# Patient Record
Sex: FEMALE | Race: WHITE | NOT HISPANIC OR LATINO | Employment: UNEMPLOYED | URBAN - METROPOLITAN AREA
[De-identification: names, ages, dates, MRNs, and addresses within clinical notes are randomized per-mention and may not be internally consistent; named-entity substitution may affect disease eponyms.]

---

## 2021-07-23 PROCEDURE — 99283 EMERGENCY DEPT VISIT LOW MDM: CPT

## 2021-07-23 PROCEDURE — 99282 EMERGENCY DEPT VISIT SF MDM: CPT

## 2021-07-24 ENCOUNTER — APPOINTMENT (OUTPATIENT)
Dept: CT IMAGING | Facility: HOSPITAL | Age: 46
End: 2021-07-24

## 2021-07-24 ENCOUNTER — HOSPITAL ENCOUNTER (EMERGENCY)
Facility: HOSPITAL | Age: 46
Discharge: HOME | End: 2021-07-24
Admitting: EMERGENCY MEDICINE

## 2021-07-24 VITALS
BODY MASS INDEX: 34.4 KG/M2 | TEMPERATURE: 99.2 F | RESPIRATION RATE: 20 BRPM | HEIGHT: 66 IN | SYSTOLIC BLOOD PRESSURE: 138 MMHG | OXYGEN SATURATION: 100 % | WEIGHT: 214.07 LBS | HEART RATE: 73 BPM | DIASTOLIC BLOOD PRESSURE: 85 MMHG

## 2021-07-24 DIAGNOSIS — L03.213 PRESEPTAL CELLULITIS OF LEFT EYE: Primary | ICD-10-CM

## 2021-07-24 DIAGNOSIS — R51.9 LEFT FACIAL PAIN: ICD-10-CM

## 2021-07-24 PROCEDURE — 96365 THER/PROPH/DIAG IV INF INIT: CPT

## 2021-07-24 PROCEDURE — 0 IOPAMIDOL PER 1 ML: Performed by: NURSE PRACTITIONER

## 2021-07-24 PROCEDURE — 25010000002 MORPHINE PER 10 MG: Performed by: NURSE PRACTITIONER

## 2021-07-24 PROCEDURE — 96375 TX/PRO/DX INJ NEW DRUG ADDON: CPT

## 2021-07-24 PROCEDURE — 70487 CT MAXILLOFACIAL W/DYE: CPT

## 2021-07-24 PROCEDURE — 25010000002 ONDANSETRON PER 1 MG: Performed by: NURSE PRACTITIONER

## 2021-07-24 RX ORDER — ONDANSETRON 2 MG/ML
4 INJECTION INTRAMUSCULAR; INTRAVENOUS ONCE
Status: COMPLETED | OUTPATIENT
Start: 2021-07-24 | End: 2021-07-24

## 2021-07-24 RX ORDER — METHYLPREDNISOLONE 4 MG/1
TABLET ORAL
Qty: 21 TABLET | Refills: 0 | Status: SHIPPED | OUTPATIENT
Start: 2021-07-24 | End: 2021-07-24 | Stop reason: ALTCHOICE

## 2021-07-24 RX ORDER — SODIUM CHLORIDE 0.9 % (FLUSH) 0.9 %
10 SYRINGE (ML) INJECTION AS NEEDED
Status: DISCONTINUED | OUTPATIENT
Start: 2021-07-24 | End: 2021-07-24 | Stop reason: HOSPADM

## 2021-07-24 RX ORDER — TRAMADOL HYDROCHLORIDE 50 MG/1
50 TABLET ORAL EVERY 8 HOURS PRN
Qty: 7 TABLET | Refills: 0 | Status: SHIPPED | OUTPATIENT
Start: 2021-07-24

## 2021-07-24 RX ORDER — METHYLPREDNISOLONE 4 MG/1
TABLET ORAL
Qty: 21 TABLET | Refills: 0 | Status: SHIPPED | OUTPATIENT
Start: 2021-07-24

## 2021-07-24 RX ORDER — CLINDAMYCIN PHOSPHATE 600 MG/50ML
600 INJECTION, SOLUTION INTRAVENOUS ONCE
Status: COMPLETED | OUTPATIENT
Start: 2021-07-24 | End: 2021-07-24

## 2021-07-24 RX ORDER — MORPHINE SULFATE 4 MG/ML
4 INJECTION, SOLUTION INTRAMUSCULAR; INTRAVENOUS ONCE
Status: COMPLETED | OUTPATIENT
Start: 2021-07-24 | End: 2021-07-24

## 2021-07-24 RX ORDER — CLINDAMYCIN HYDROCHLORIDE 300 MG/1
300 CAPSULE ORAL 4 TIMES DAILY
Qty: 40 CAPSULE | Refills: 0 | Status: SHIPPED | OUTPATIENT
Start: 2021-07-24 | End: 2021-08-03

## 2021-07-24 RX ORDER — CLINDAMYCIN HYDROCHLORIDE 300 MG/1
300 CAPSULE ORAL 4 TIMES DAILY
Qty: 40 CAPSULE | Refills: 0 | Status: SHIPPED | OUTPATIENT
Start: 2021-07-24 | End: 2021-07-24 | Stop reason: ALTCHOICE

## 2021-07-24 RX ADMIN — ONDANSETRON 4 MG: 2 INJECTION INTRAMUSCULAR; INTRAVENOUS at 02:11

## 2021-07-24 RX ADMIN — MORPHINE SULFATE 4 MG: 4 INJECTION INTRAVENOUS at 02:10

## 2021-07-24 RX ADMIN — IOPAMIDOL 100 ML: 755 INJECTION, SOLUTION INTRAVENOUS at 02:38

## 2021-07-24 RX ADMIN — CLINDAMYCIN PHOSPHATE 600 MG: 600 INJECTION, SOLUTION INTRAVENOUS at 02:11

## 2021-07-24 NOTE — DISCHARGE INSTRUCTIONS
Take full course of antibiotics as well as full course of steroid and call Dr. Orozco office on Monday for follow-up appointment.  Worsening of symptoms such as shortness of breath difficulty swallowing return to ER immediately.

## 2021-07-24 NOTE — ED NOTES
Patient reports left sided swelling with pain. Unsure of what happened. Denies any bites or allergic reactions. Denies SOA.      Marissa Bowen RN  07/24/21 4917

## 2021-07-24 NOTE — ED PROVIDER NOTES
Subjective   History: Patient is a 46-year-old female history IVDA complains of left facial pain and swelling.  Patient states she was admitted to [facility] Wednesday, 2 days ago for left facial cellulitis and anaphylactic reaction.  Apparently patient took a Keflex antibiotic and then had a reaction to it.  reports she was intubated and then extubated yesterday.  Patient states she left AMA did not receive any antibiotics upon leaving.  Patient currently states she is in pain denies any sore throat, change in phonation, denies shortness of breath.      Onset: 2 days  Location: Left face  Duration: Constant  Character: Pain and edema  Aggravating/Alleviating factors: None  Radiation none  Severity: Moderate            Review of Systems   Constitutional: Negative for chills, fatigue and fever.   HENT: Positive for dental problem and facial swelling. Negative for congestion, sore throat, tinnitus, trouble swallowing and voice change.    Eyes: Negative for photophobia, discharge and visual disturbance.   Respiratory: Negative for cough and shortness of breath.    Cardiovascular: Negative for chest pain.   Gastrointestinal: Negative for abdominal pain, diarrhea, nausea and vomiting.   Genitourinary: Negative for dysuria, frequency and urgency.   Musculoskeletal: Negative for back pain and myalgias.   Skin: Negative for rash.   Neurological: Negative for dizziness and headaches.   Psychiatric/Behavioral: Negative for confusion.       History reviewed. No pertinent past medical history.    Allergies   Allergen Reactions   • Cephalexin Anaphylaxis     Required intubation       History reviewed. No pertinent surgical history.    History reviewed. No pertinent family history.    Social History     Socioeconomic History   • Marital status:      Spouse name: Not on file   • Number of children: Not on file   • Years of education: Not on file   • Highest education level: Not on file           Objective  "  Physical Exam  Vitals reviewed.   Constitutional:       General: She is not in acute distress.     Appearance: She is normal weight. She is not toxic-appearing.   HENT:      Head: Normocephalic and atraumatic.      Comments: Left facial swelling periorbital edema.  No submental or submandibular  no erythema edema or tenderness on palpation.     Right Ear: Tympanic membrane normal.      Left Ear: Tympanic membrane normal.      Nose: Nose normal.      Mouth/Throat:      Mouth: Mucous membranes are moist.      Pharynx: Oropharynx is clear. No oropharyngeal exudate or posterior oropharyngeal erythema.      Comments: No visible gingival abscess.  No peritonsillar abscess visualized.  Eyes:      General:         Right eye: No discharge.         Left eye: No discharge.      Extraocular Movements: Extraocular movements intact.      Pupils: Pupils are equal, round, and reactive to light.      Comments: No pain with EOM.  Conjunctiva are clear without injection   Cardiovascular:      Rate and Rhythm: Normal rate.      Pulses: Normal pulses.      Heart sounds: Normal heart sounds. No murmur heard.     Pulmonary:      Effort: Pulmonary effort is normal.      Breath sounds: Normal breath sounds.   Musculoskeletal:         General: Normal range of motion.      Cervical back: Normal range of motion and neck supple. No tenderness.   Lymphadenopathy:      Cervical: No cervical adenopathy.   Skin:     General: Skin is warm and dry.   Neurological:      Mental Status: She is alert and oriented to person, place, and time.   Psychiatric:         Mood and Affect: Mood normal.         Behavior: Behavior normal.         Thought Content: Thought content normal.         Judgment: Judgment normal.         Procedures           ED Course    /52 (BP Location: Left arm, Patient Position: Sitting)   Pulse 78   Temp 98.1 °F (36.7 °C) (Oral)   Resp 20   Ht 167.6 cm (66\")   Wt 97.1 kg (214 lb 1.1 oz)   SpO2 100%   BMI 34.55 kg/m² "   Labs Reviewed - No data to display  Medications   sodium chloride 0.9 % flush 10 mL (has no administration in time range)   clindamycin (CLEOCIN) 600 mg in sodium chloride 0.9% 50 mL IVPB (premix) (0 mg Intravenous Stopped 7/24/21 0302)   Morphine sulfate (PF) injection 4 mg (4 mg Intravenous Given 7/24/21 0210)   ondansetron (ZOFRAN) injection 4 mg (4 mg Intravenous Given 7/24/21 0211)   iopamidol (ISOVUE-370) 76 % injection 100 mL (100 mL Intravenous Given 7/24/21 0238)     No radiology results for the last day                                          MDM     I examined the patient using the appropriate personal protective equipment.      DISPOSITION:   Chart Review: 7/21/2021 admitted to [facility] anaphylaxis left facial swelling difficulty speaking wheezing vocal cord edema.  Patient was intubated and extubated 1 day later she was initially on clindamycin IV.  Per records patient was going to be discharged home on tapered steroids however she left AMA and did not receive any antibiotics or steroids.    Comorbidity:  has no past medical history on file.  Differentials:this list is not all inclusive and does not constitute the entirety of considered causes --> cellulitis orbital cellulitis dental abscess Cesar's angina  ECG: interpreted by ER physician and reviewed by myself: Not applicable  Labs: Not applicable    Imaging: Was interpreted by physician and reviewed by myself:  No radiology results for the last day    Disposition/Treatment:  IV established given clindamycin 600 mg morphine and Zofran while in ER.  Patient had CT facial bones with contrast Left facial and preseptal periorbital soft tissue swelling, consistent with the provided clinical diagnosis of cellulitis.  No evidence of post-septal orbital cellulitis.  Carious left upper canine tooth with associated periapical and periodontal abscesses, as above.   On physical exam of patient she was satting 100% on room air did not appear to be in  any distress she was not wheezing was not short of breath she was not tachycardic or febrile.  There was no submental or submandibular erythema edema or tenderness therefore Ludwigs angina unlikely.    Discussed results with patient she verbalized understanding agreeable plan of care.  Patient given patient connection number to establish PCP and discussed emergent reasons with patient to return to ER otherwise follow-up with PCP.    Patient left AMA from [facility] and the plan was to continue patient on tapered steroid pack therefore I will discharge patient with Medrol Dosepak and antibiotic    Prescription: medrol dose pack, clindamycin, tramadol    INSPECT quieried  Final diagnoses:   Preseptal cellulitis of left eye   Left facial pain       ED Disposition  ED Disposition     ED Disposition Condition Comment    Discharge Stable           Adolfo Dave MD  [address] [facility] [place] [address] [phone]               Medication List      New Prescriptions    clindamycin 300 MG capsule  Commonly known as: CLEOCIN  Take 1 capsule by mouth 4 (Four) Times a Day for 10 days.     methylPREDNISolone 4 MG dose pack  Commonly known as: MEDROL  Take as directed on package instructions.           Where to Get Your Medications      These medications were sent to [id number] - [facility] [place] - [address] AT [place] [address] & [address] [place] - [phone] [facility] [phone]   [address] [place] [address]    Hours: 24-hours Phone: [phone]   · clindamycin 300 MG capsule  · methylPREDNISolone 4 MG dose pack          Hallie Dean APRN  07/24/21 0326       Hallie Dean APRN  07/24/21 0333       Hallie Dean APRN  07/24/21 041

## 2024-05-09 ENCOUNTER — INPATIENT HOSPITAL (AMBULATORY)
Dept: URBAN - METROPOLITAN AREA HOSPITAL 76 | Facility: HOSPITAL | Age: 49
End: 2024-05-09
Payer: MEDICAID

## 2024-05-09 DIAGNOSIS — R94.5 ABNORMAL RESULTS OF LIVER FUNCTION STUDIES: ICD-10-CM

## 2024-05-09 PROCEDURE — 99222 1ST HOSP IP/OBS MODERATE 55: CPT | Performed by: NURSE PRACTITIONER

## 2024-05-10 ENCOUNTER — INPATIENT HOSPITAL (AMBULATORY)
Dept: URBAN - METROPOLITAN AREA HOSPITAL 76 | Facility: HOSPITAL | Age: 49
End: 2024-05-10
Payer: MEDICAID

## 2024-05-10 DIAGNOSIS — R94.5 ABNORMAL RESULTS OF LIVER FUNCTION STUDIES: ICD-10-CM

## 2024-05-10 PROCEDURE — 99232 SBSQ HOSP IP/OBS MODERATE 35: CPT | Performed by: NURSE PRACTITIONER

## 2024-06-02 ENCOUNTER — HOSPITAL ENCOUNTER (EMERGENCY)
Facility: HOSPITAL | Age: 49
Discharge: LEFT BEFORE DISCHARGE | End: 2024-06-02
Attending: EMERGENCY MEDICINE
Payer: MEDICAID

## 2024-06-02 ENCOUNTER — APPOINTMENT (OUTPATIENT)
Dept: GENERAL RADIOLOGY | Facility: HOSPITAL | Age: 49
End: 2024-06-02
Payer: MEDICAID

## 2024-06-02 ENCOUNTER — HOSPITAL ENCOUNTER (OUTPATIENT)
Facility: HOSPITAL | Age: 49
Discharge: LEFT BEFORE DISCHARGE | End: 2024-06-02
Attending: EMERGENCY MEDICINE | Admitting: HOSPITALIST
Payer: MEDICAID

## 2024-06-02 VITALS
BODY MASS INDEX: 31.89 KG/M2 | WEIGHT: 198.41 LBS | DIASTOLIC BLOOD PRESSURE: 74 MMHG | HEIGHT: 66 IN | OXYGEN SATURATION: 96 % | RESPIRATION RATE: 30 BRPM | SYSTOLIC BLOOD PRESSURE: 139 MMHG | HEART RATE: 103 BPM | TEMPERATURE: 97.6 F

## 2024-06-02 VITALS — HEART RATE: 111 BPM | TEMPERATURE: 98.7 F | RESPIRATION RATE: 17 BRPM

## 2024-06-02 DIAGNOSIS — I33.0 SUBACUTE BACTERIAL ENDOCARDITIS: Primary | ICD-10-CM

## 2024-06-02 PROBLEM — I38 ENDOCARDITIS: Status: ACTIVE | Noted: 2024-06-02

## 2024-06-02 LAB
ALBUMIN SERPL-MCNC: 3.3 G/DL (ref 3.5–5.2)
ALBUMIN/GLOB SERPL: 0.7 G/DL
ALP SERPL-CCNC: 117 U/L (ref 39–117)
ALT SERPL W P-5'-P-CCNC: 32 U/L (ref 1–33)
ANION GAP SERPL CALCULATED.3IONS-SCNC: 13.3 MMOL/L (ref 5–15)
AST SERPL-CCNC: 23 U/L (ref 1–32)
BASOPHILS # BLD AUTO: 0.06 10*3/MM3 (ref 0–0.2)
BASOPHILS NFR BLD AUTO: 0.3 % (ref 0–1.5)
BILIRUB SERPL-MCNC: 0.3 MG/DL (ref 0–1.2)
BUN SERPL-MCNC: 11 MG/DL (ref 6–20)
BUN/CREAT SERPL: 8.9 (ref 7–25)
CALCIUM SPEC-SCNC: 8.6 MG/DL (ref 8.6–10.5)
CHLORIDE SERPL-SCNC: 101 MMOL/L (ref 98–107)
CO2 SERPL-SCNC: 24.7 MMOL/L (ref 22–29)
CREAT SERPL-MCNC: 1.24 MG/DL (ref 0.57–1)
D-LACTATE SERPL-SCNC: 1.5 MMOL/L (ref 0.3–2)
DEPRECATED RDW RBC AUTO: 45.1 FL (ref 37–54)
EGFRCR SERPLBLD CKD-EPI 2021: 54.5 ML/MIN/1.73
EOSINOPHIL # BLD AUTO: 0.06 10*3/MM3 (ref 0–0.4)
EOSINOPHIL NFR BLD AUTO: 0.3 % (ref 0.3–6.2)
ERYTHROCYTE [DISTWIDTH] IN BLOOD BY AUTOMATED COUNT: 15.8 % (ref 12.3–15.4)
GLOBULIN UR ELPH-MCNC: 4.7 GM/DL
GLUCOSE SERPL-MCNC: 109 MG/DL (ref 65–99)
HCT VFR BLD AUTO: 29.1 % (ref 34–46.6)
HGB BLD-MCNC: 9.3 G/DL (ref 12–15.9)
IMM GRANULOCYTES # BLD AUTO: 0.18 10*3/MM3 (ref 0–0.05)
IMM GRANULOCYTES NFR BLD AUTO: 1 % (ref 0–0.5)
LYMPHOCYTES # BLD AUTO: 1.84 10*3/MM3 (ref 0.7–3.1)
LYMPHOCYTES NFR BLD AUTO: 9.8 % (ref 19.6–45.3)
MCH RBC QN AUTO: 27.4 PG (ref 26.6–33)
MCHC RBC AUTO-ENTMCNC: 32 G/DL (ref 31.5–35.7)
MCV RBC AUTO: 85.8 FL (ref 79–97)
MONOCYTES # BLD AUTO: 1.39 10*3/MM3 (ref 0.1–0.9)
MONOCYTES NFR BLD AUTO: 7.4 % (ref 5–12)
NEUTROPHILS NFR BLD AUTO: 15.2 10*3/MM3 (ref 1.7–7)
NEUTROPHILS NFR BLD AUTO: 81.2 % (ref 42.7–76)
NRBC BLD AUTO-RTO: 0 /100 WBC (ref 0–0.2)
NT-PROBNP SERPL-MCNC: ABNORMAL PG/ML (ref 0–450)
PLATELET # BLD AUTO: 436 10*3/MM3 (ref 140–450)
PMV BLD AUTO: 9.2 FL (ref 6–12)
POTASSIUM SERPL-SCNC: 3.2 MMOL/L (ref 3.5–5.2)
PROT SERPL-MCNC: 8 G/DL (ref 6–8.5)
RBC # BLD AUTO: 3.39 10*6/MM3 (ref 3.77–5.28)
SODIUM SERPL-SCNC: 139 MMOL/L (ref 136–145)
TROPONIN T SERPL HS-MCNC: 37 NG/L
WBC NRBC COR # BLD AUTO: 18.73 10*3/MM3 (ref 3.4–10.8)

## 2024-06-02 PROCEDURE — 85025 COMPLETE CBC W/AUTO DIFF WBC: CPT | Performed by: EMERGENCY MEDICINE

## 2024-06-02 PROCEDURE — 93005 ELECTROCARDIOGRAM TRACING: CPT

## 2024-06-02 PROCEDURE — 93005 ELECTROCARDIOGRAM TRACING: CPT | Performed by: EMERGENCY MEDICINE

## 2024-06-02 PROCEDURE — 71045 X-RAY EXAM CHEST 1 VIEW: CPT

## 2024-06-02 PROCEDURE — 84484 ASSAY OF TROPONIN QUANT: CPT | Performed by: EMERGENCY MEDICINE

## 2024-06-02 PROCEDURE — 96365 THER/PROPH/DIAG IV INF INIT: CPT

## 2024-06-02 PROCEDURE — 25010000002 FUROSEMIDE PER 20 MG: Performed by: EMERGENCY MEDICINE

## 2024-06-02 PROCEDURE — 99211 OFF/OP EST MAY X REQ PHY/QHP: CPT | Performed by: EMERGENCY MEDICINE

## 2024-06-02 PROCEDURE — 99285 EMERGENCY DEPT VISIT HI MDM: CPT

## 2024-06-02 PROCEDURE — 80053 COMPREHEN METABOLIC PANEL: CPT | Performed by: EMERGENCY MEDICINE

## 2024-06-02 PROCEDURE — 96375 TX/PRO/DX INJ NEW DRUG ADDON: CPT

## 2024-06-02 PROCEDURE — 94799 UNLISTED PULMONARY SVC/PX: CPT

## 2024-06-02 PROCEDURE — 83880 ASSAY OF NATRIURETIC PEPTIDE: CPT | Performed by: EMERGENCY MEDICINE

## 2024-06-02 PROCEDURE — 36415 COLL VENOUS BLD VENIPUNCTURE: CPT

## 2024-06-02 PROCEDURE — 87040 BLOOD CULTURE FOR BACTERIA: CPT | Performed by: EMERGENCY MEDICINE

## 2024-06-02 PROCEDURE — 94761 N-INVAS EAR/PLS OXIMETRY MLT: CPT

## 2024-06-02 PROCEDURE — 83605 ASSAY OF LACTIC ACID: CPT

## 2024-06-02 PROCEDURE — 25010000002 DAPTOMYCIN PER 1 MG: Performed by: EMERGENCY MEDICINE

## 2024-06-02 PROCEDURE — 94640 AIRWAY INHALATION TREATMENT: CPT

## 2024-06-02 RX ORDER — SODIUM CHLORIDE 0.9 % (FLUSH) 0.9 %
10 SYRINGE (ML) INJECTION EVERY 12 HOURS SCHEDULED
Status: DISCONTINUED | OUTPATIENT
Start: 2024-06-02 | End: 2024-06-02 | Stop reason: HOSPADM

## 2024-06-02 RX ORDER — IPRATROPIUM BROMIDE AND ALBUTEROL SULFATE 2.5; .5 MG/3ML; MG/3ML
3 SOLUTION RESPIRATORY (INHALATION) ONCE
Status: COMPLETED | OUTPATIENT
Start: 2024-06-02 | End: 2024-06-02

## 2024-06-02 RX ORDER — GABAPENTIN 600 MG/1
600 TABLET ORAL 3 TIMES DAILY
COMMUNITY
End: 2024-06-03

## 2024-06-02 RX ORDER — POTASSIUM CHLORIDE 20 MEQ/1
40 TABLET, EXTENDED RELEASE ORAL EVERY 4 HOURS
Qty: 4 TABLET | Refills: 0 | Status: DISCONTINUED | OUTPATIENT
Start: 2024-06-02 | End: 2024-06-02 | Stop reason: HOSPADM

## 2024-06-02 RX ORDER — BISACODYL 10 MG
10 SUPPOSITORY, RECTAL RECTAL DAILY PRN
Status: DISCONTINUED | OUTPATIENT
Start: 2024-06-02 | End: 2024-06-02 | Stop reason: HOSPADM

## 2024-06-02 RX ORDER — AMOXICILLIN 250 MG
2 CAPSULE ORAL 2 TIMES DAILY PRN
Status: DISCONTINUED | OUTPATIENT
Start: 2024-06-02 | End: 2024-06-02 | Stop reason: HOSPADM

## 2024-06-02 RX ORDER — BISACODYL 5 MG/1
5 TABLET, DELAYED RELEASE ORAL DAILY PRN
Status: DISCONTINUED | OUTPATIENT
Start: 2024-06-02 | End: 2024-06-02 | Stop reason: HOSPADM

## 2024-06-02 RX ORDER — SODIUM CHLORIDE 0.9 % (FLUSH) 0.9 %
10 SYRINGE (ML) INJECTION AS NEEDED
Status: DISCONTINUED | OUTPATIENT
Start: 2024-06-02 | End: 2024-06-02 | Stop reason: HOSPADM

## 2024-06-02 RX ORDER — POLYETHYLENE GLYCOL 3350 17 G/17G
17 POWDER, FOR SOLUTION ORAL DAILY PRN
Status: DISCONTINUED | OUTPATIENT
Start: 2024-06-02 | End: 2024-06-02 | Stop reason: HOSPADM

## 2024-06-02 RX ORDER — FUROSEMIDE 10 MG/ML
40 INJECTION INTRAMUSCULAR; INTRAVENOUS ONCE
Status: COMPLETED | OUTPATIENT
Start: 2024-06-02 | End: 2024-06-02

## 2024-06-02 RX ORDER — SODIUM CHLORIDE 9 MG/ML
40 INJECTION, SOLUTION INTRAVENOUS AS NEEDED
Status: DISCONTINUED | OUTPATIENT
Start: 2024-06-02 | End: 2024-06-02 | Stop reason: HOSPADM

## 2024-06-02 RX ORDER — FUROSEMIDE 10 MG/ML
40 INJECTION INTRAMUSCULAR; INTRAVENOUS EVERY 12 HOURS SCHEDULED
Status: DISCONTINUED | OUTPATIENT
Start: 2024-06-02 | End: 2024-06-02 | Stop reason: HOSPADM

## 2024-06-02 RX ORDER — VANCOMYCIN/0.9 % SOD CHLORIDE 1.5G/250ML
1500 PLASTIC BAG, INJECTION (ML) INTRAVENOUS ONCE
Qty: 500 ML | Refills: 0 | Status: DISCONTINUED | OUTPATIENT
Start: 2024-06-02 | End: 2024-06-02 | Stop reason: HOSPADM

## 2024-06-02 RX ORDER — ALBUTEROL SULFATE 90 UG/1
2 AEROSOL, METERED RESPIRATORY (INHALATION) EVERY 4 HOURS PRN
Status: ON HOLD | COMMUNITY

## 2024-06-02 RX ORDER — ENOXAPARIN SODIUM 100 MG/ML
40 INJECTION SUBCUTANEOUS DAILY
Status: DISCONTINUED | OUTPATIENT
Start: 2024-06-02 | End: 2024-06-02 | Stop reason: HOSPADM

## 2024-06-02 RX ADMIN — POTASSIUM CHLORIDE 40 MEQ: 1500 TABLET, EXTENDED RELEASE ORAL at 19:15

## 2024-06-02 RX ADMIN — IPRATROPIUM BROMIDE AND ALBUTEROL SULFATE 3 ML: .5; 3 SOLUTION RESPIRATORY (INHALATION) at 14:20

## 2024-06-02 RX ADMIN — DAPTOMYCIN 700 MG: 500 INJECTION, POWDER, LYOPHILIZED, FOR SOLUTION INTRAVENOUS at 14:41

## 2024-06-02 RX ADMIN — FUROSEMIDE 40 MG: 10 INJECTION, SOLUTION INTRAMUSCULAR; INTRAVENOUS at 15:37

## 2024-06-02 NOTE — PLAN OF CARE
Goal Outcome Evaluation:      Patient admitted to PCU. Patient states that her chest hurts due to her shortness of breath. Patient is alert and oriented times 4. Skin assessment performed by this nurse and Alfredo GRIFFITH. Bruising to arms identified and skin tear found on bottom. Patient's respirations currently in 30s. Patient requesting water and complaining that the lasix makes her pee to much. Plan of care ongoing.

## 2024-06-02 NOTE — H&P
[facility] Medicine Services      Patient Name: Марина Tilley  : 1975  MRN: [id number]  Primary Care Physician:  Provider, No Known  Date of admission: 2024      Subjective      Chief Complaint: Shortness of breath and chest pain    History of Present Illness: Марина Tilley is a 48 y.o. female who presented to [facility] on 2024 complaining of increasing shortness of breath and chest pain.  Patient giving a significant history of IVDU with heroin, she said she was in [facility], from there she went to [facility] where she was diagnosed with endocarditis and recently she signed out herself AMA, patient not a very good historian.  Patient chest x-ray revealed possible CHF pattern with infiltrates involving the left lower lung field, proBNP was found significantly elevated.  Patient also noted to have creatinine of 1.24, baseline 0.45.  WBC count elevated around 18,000.  Records not available, will try to get records from [facility].      Review of Systems   All other systems reviewed and are negative.       Personal History     No past medical history on file.  Past medical history significant for IVDU and endocarditis.  No past surgical history on file.    Family History: family history is not on file. Otherwise pertinent FHx was reviewed and not pertinent to current issue.    Social History:    Significant for smoking and IVDU  Home Medications:  Prior to Admission Medications       None              Allergies:  Allergies   Allergen Reactions    Cephalexin Anaphylaxis     Required intubation       Objective      Vitals:   Temp:  [98.5 °F (36.9 °C)] 98.5 °F (36.9 °C)  Heart Rate:  [] 99  Resp:  [24-32] 32  BP: (129-165)/() 140/66  Flow (L/min):  [2-6] 2    Physical Exam  Vitals and nursing note reviewed.   Constitutional:       General: She is not in acute distress.     Appearance: Normal appearance. She is well-developed. She is not  ill-appearing, toxic-appearing or diaphoretic.   HENT:      Head: Normocephalic and atraumatic.      Right Ear: Ear canal and external ear normal.      Left Ear: Ear canal and external ear normal.      Nose: Nose normal. No congestion or rhinorrhea.      Mouth/Throat:      Mouth: Mucous membranes are moist.      Pharynx: No oropharyngeal exudate.   Eyes:      General: No scleral icterus.        Right eye: No discharge.         Left eye: No discharge.      Extraocular Movements: Extraocular movements intact.      Conjunctiva/sclera: Conjunctivae normal.      Pupils: Pupils are equal, round, and reactive to light.   Neck:      Thyroid: No thyromegaly.      Vascular: No carotid bruit or JVD.      Trachea: No tracheal deviation.   Cardiovascular:      Rate and Rhythm: Normal rate and regular rhythm.      Pulses: Normal pulses.      Heart sounds: Normal heart sounds. No murmur heard.     No friction rub. No gallop.   Pulmonary:      Effort: No respiratory distress.      Breath sounds: No stridor. Rales present. No wheezing or rhonchi.   Chest:      Chest wall: No tenderness.   Abdominal:      General: Bowel sounds are normal. There is no distension.      Palpations: Abdomen is soft. There is no mass.      Tenderness: There is no abdominal tenderness. There is no guarding or rebound.      Hernia: No hernia is present.   Musculoskeletal:         General: No swelling, tenderness, deformity or signs of injury. Normal range of motion.      Cervical back: Normal range of motion and neck supple. No rigidity. No muscular tenderness.      Right lower leg: No edema.      Left lower leg: No edema.   Lymphadenopathy:      Cervical: No cervical adenopathy.   Skin:     General: Skin is warm and dry.      Coloration: Skin is not jaundiced or pale.      Findings: No bruising, erythema or rash.   Neurological:      General: No focal deficit present.      Mental Status: She is alert and oriented to person, place, and time. Mental status  is at baseline.      Cranial Nerves: No cranial nerve deficit.      Sensory: No sensory deficit.      Motor: No weakness or abnormal muscle tone.      Coordination: Coordination normal.   Psychiatric:         Mood and Affect: Mood normal.         Behavior: Behavior normal.         Thought Content: Thought content normal.         Judgment: Judgment normal.          Result Review    Result Review:  I have personally reviewed the results from the time of this admission to 6/2/2024 17:11 EDT and agree with these findings:  [x]  Laboratory  [x]  Microbiology  [x]  Radiology  []  EKG/Telemetry   []  Cardiology/Vascular   []  Pathology  []  Old records  []  Other:  Most notable findings include:       Assessment & Plan        Active Hospital Problems:  Active Hospital Problems    Diagnosis     **Endocarditis      Plan:     IVDU with endocarditis diagnosed at [facility], will obtain records, will start the patient on IV vancomycin and cefepime, repeat 2D echo here, consult infectious disease, patient likely will need evaluation with cardiology in the cardiothoracic surgery based on 2D echo finding and after reviewing the records from [facility].    Acute exacerbation of CHF, will start the patient on Lasix 40 IV 2 times a day.    IVDU as described above      DVT prophylaxis:  Medical DVT prophylaxis orders are signed and held.          CODE STATUS:       Admission Status:  I believe this patient meets inpatient status.    I discussed the patient's findings and my recommendations with patient.    This patient has been examined wearing appropriate Personal Protective Equipment and discussed with hospital infection control department. 06/02/24      Signature:

## 2024-06-02 NOTE — ED PROVIDER NOTES
Subjective   History of Present Illness  48-year-old female presents for chest pain and shortness of breath.  Has known endocarditis.  States she has been at multiple hospitals.  States she has been at one in [place] and a couple in [facility].  States he was most recently at [facility].  Has been intermittently on antibiotics.  States when she was discharged from the hospital she was not placed on any antibiotics.  States she has not used since before being in the hospital the last time.    Review of cultures from [facility] shows that patient grew MRSA in her blood.    Review of echocardiogram from [facility] demonstrated vegetations on the septal leaflet of tricuspid valve, mild to moderate tricuspid regurgitation, small pericardial effusion, and EF at 30 to 35%, moderate to severe global LV hypokinesis and a severely dilated left ventricle.    Further review of records from [facility] demonstrated a white count of 15.9, hemoglobin of 10.3, a creatinine of 1.86, chest imaging that demonstrated multifocal pneumonia versus septic emboli.  Reportedly signed out AGAINST MEDICAL ADVICE from other hospital before going to the [facility] based on [facility] records.    Review of Systems  See HPI.  Past Medical History:   Diagnosis Date    Anxiety     Asthma     CHF (congestive heart failure)     COPD (chronic obstructive pulmonary disease)        Allergies   Allergen Reactions    Cephalexin Anaphylaxis     Required intubation    Latex Hives       Past Surgical History:   Procedure Laterality Date    CHOLECYSTECTOMY      COLONOSCOPY      ENDOSCOPY      HERNIA REPAIR      HYSTERECTOMY         Family History   Family history unknown: Yes       Social History     Socioeconomic History    Marital status: Legally    Tobacco Use    Smoking status: Every Day     Current packs/day: 3.00     Average packs/day: 3.0 packs/day for 30.4  "years (91.3 ttl pk-yrs)     Types: Cigarettes     Start date: 1994    Smokeless tobacco: Never   Vaping Use    Vaping status: Never Used   Substance and Sexual Activity    Alcohol use: Never    Drug use: Yes     Frequency: 7.0 times per week     Types: Heroin     Comment: patient states she quit 3 weeks ago    Sexual activity: Defer           Objective   Physical Exam  Tachycardic rate and regular rhythm, mild tachypnea, diminished breath sounds bilaterally, 2 out of 6 systolic murmur present, lower extremity edema, ill-appearing.  Procedures           ED Course      /74 (BP Location: Right arm, Patient Position: Lying)   Pulse 103   Temp 97.6 °F (36.4 °C) (Oral)   Resp (!) 30   Ht 167.6 cm (66\")   Wt 90 kg (198 lb 6.6 oz)   SpO2 96%   BMI 32.02 kg/m²   Labs Reviewed   COMPREHENSIVE METABOLIC PANEL - Abnormal; Notable for the following components:       Result Value    Glucose 109 (*)     Creatinine 1.24 (*)     Potassium 3.2 (*)     Albumin 3.3 (*)     eGFR 54.5 (*)     All other components within normal limits    Narrative:     GFR Normal >60  Chronic Kidney Disease <60  Kidney Failure <15     SINGLE HS TROPONIN T - Abnormal; Notable for the following components:    HS Troponin T 37 (*)     All other components within normal limits    Narrative:     High Sensitive Troponin T Reference Range:  <14.0 ng/L- Negative Female for AMI  <22.0 ng/L- Negative Male for AMI  >=14 - Abnormal Female indicating possible myocardial injury.  >=22 - Abnormal Male indicating possible myocardial injury.   Clinicians would have to utilize clinical acumen, EKG, Troponin, and serial changes to determine if it is an Acute Myocardial Infarction or myocardial injury due to an underlying chronic condition.        BNP (IN-HOUSE) - Abnormal; Notable for the following components:    proBNP 33,058.0 (*)     All other components within normal limits    Narrative:     This assay is used as an aid in the diagnosis of individuals " suspected of having heart failure. It can be used as an aid in the diagnosis of acute decompensated heart failure (ADHF) in patients presenting with signs and symptoms of ADHF to the emergency department (ED). In addition, NT-proBNP of <300 pg/mL indicates ADHF is not likely.    Age Range Result Interpretation  NT-proBNP Concentration (pg/mL:      <50             Positive            >450                   Gray                 300-450                    Negative             <300    50-75           Positive            >900                  Gray                300-900                  Negative            <300      >75             Positive            >1800                  Gray                300-1800                  Negative            <300   CBC WITH AUTO DIFFERENTIAL - Abnormal; Notable for the following components:    WBC 18.73 (*)     RBC 3.39 (*)     Hemoglobin 9.3 (*)     Hematocrit 29.1 (*)     RDW 15.8 (*)     Neutrophil % 81.2 (*)     Lymphocyte % 9.8 (*)     Immature Grans % 1.0 (*)     Neutrophils, Absolute 15.20 (*)     Monocytes, Absolute 1.39 (*)     Immature Grans, Absolute 0.18 (*)     All other components within normal limits   BLOOD CULTURE - Normal   BLOOD CULTURE - Normal    Narrative:     Less than seven (7) mL's of blood was collected.  Insufficient quantity may yield false negative results.   POC LACTATE - Normal   MRSA SCREEN, PCR   POC LACTATE   CBC AND DIFFERENTIAL    Narrative:     The following orders were created for panel order CBC & Differential.  Procedure                               Abnormality         Status                     ---------                               -----------         ------                     CBC Auto Differential[[id number]]        Abnormal            Final result                 Please view results for these tests on the individual orders.     Medications   ipratropium-albuterol (DUO-NEB) nebulizer solution 3 mL (3 mL Nebulization Given 6/2/24 9168)    DAPTOmycin (CUBICIN) 700 mg in sodium chloride 0.9 % 50 mL IVPB (0 mg Intravenous Stopped 6/2/24 1514)   furosemide (LASIX) injection 40 mg (40 mg Intravenous Given 6/2/24 1537)     MRI Lumbar Spine Without Contrast    Result Date: 6/4/2024  1. The study is mildly degraded by motion. 2. No MR evidence of discitis or osteomyelitis. 3. No high-grade lumbar canal stenosis or high-grade neural foraminal stenosis is evident. Electronically Signed: Migdalia Godoy MD  6/4/2024 11:04 AM EDT  Workstation ID: [id number]    MRI Thoracic Spine Without Contrast    Result Date: 6/4/2024  Impression: Somewhat motion degraded exam demonstrating no specific findings to suggest osteomyelitis or discitis in the thoracic spine. Electronically Signed: Glen Hartman MD  6/4/2024 8:45 AM EDT  Workstation ID: [id number]    Adult Transthoracic Echo Complete W/ Cont if Necessary Per Protocol    Addendum Date: 6/3/2024      Left ventricular systolic function is mildly decreased. Left ventricular ejection fraction appears to be 51 - 55%.   Left ventricular diastolic function was normal.   There is calcification of the aortic valve.   Moderate to severe aortic valve regurgitation is present.   Moderate to severe mitral valve regurgitation is present.   There is echodensity attached to septal leaflet of tricuspid valve measuring 1.2 x 1.5 cm.   Recommend LETICIA to assess echodensity and mitral and aortic valve regurgitation.  If clinically indicated.   Recommend modified Duke criteria for clinical diagnosis of bacterial endocarditis     CT Chest Without Contrast Diagnostic    Result Date: 6/3/2024  1. Multifocal patchy peripheral nodular densities are scattered throughout both lungs, some which have a cavitary component, which would correspond to the provided clinical suspicion of septic emboli. 2. Incompletely imaged at 8 cm low-density within the mid spleen, suspicious for splenic infarct. 3. Small bilateral pleural effusions. 4. Dense posterior  bibasilar airspace disease is nonspecific and may reflect changes of atelectasis or pneumonia. 5. Mild cardiomegaly. Small concentric pericardial effusion. 6. Enlarged bilateral hilar and mediastinal lymph nodes are nonspecific and may be reactive. Attention at CT chest follow-up is recommended Electronically Signed: Migdalia Godoy MD  6/3/2024 2:40 PM EDT  Workstation ID: [id number]                                          Medical Decision Making  Problems Addressed:  Subacute bacterial endocarditis: complicated acute illness or injury    Amount and/or Complexity of Data Reviewed  Labs: ordered.  Radiology: ordered.  ECG/medicine tests: ordered.    Risk  Prescription drug management.  Decision regarding hospitalization.    EKG interpretation: 1:04 PM, rate 110, sinus tachycardia, normal axis, borderline prolonged QTc, nonspecific T wave inversions and biphasic T waves present.    My interpretation of chest x-ray concerning for pulmonary edema.  See system for radiology interpretation.    Patient with known endocarditis.  Giving dose of daptomycin here given positive MRSA blood cultures. Likely needs LETICIA Excepted by Dr. Pino.    Final diagnoses:   Subacute bacterial endocarditis       ED Disposition  ED Disposition       ED Disposition   Decision to Admit    Condition   --    Comment   Level of Care: Telemetry [5]   Diagnosis: Endocarditis [[id number]]   Admitting Physician: NII BEAR [[id number]]   Attending Physician: NII BEAR [[id number]]   Certification: I Certify That Inpatient Hospital Services Are Medically Necessary For Greater Than 2 Midnights                 Provider, Radha Known  [facility] [place] [address]               Medication List      No changes were made to your prescriptions during this visit.            Sincere Grey MD  06/04/24 1600

## 2024-06-02 NOTE — ED NOTES
Pt in ER via EMS with c/o SOA and L sided CP that started last night and has worsened.  Pt reports she was recently admitted at [facility] for endocarditis and pneumonia.  Auditory wheezes present.  Denies HA, fever or NVD.  Pt is A/O x4.  No s/s of distress.  Pt is tachypneic but respirations are unlabored.

## 2024-06-02 NOTE — ED NOTES
Nursing report ED to floor  Марина Tilley  48 y.o.  female    HPI:   Chief Complaint   Patient presents with    Chest Pain     CP SOA HX endocarditis Possible Afib on ems EKG 6l n/c not on o2 at home       Admitting doctor:   Charlie Bowers MD    Admitting diagnosis:   The encounter diagnosis was Subacute bacterial endocarditis.    Code status:   Current Code Status       Date Active Code Status Order ID Comments User Context       Not on file            Allergies:   Cephalexin    Isolation:  No active isolations     Fall Risk:  Fall Risk Assessment was completed, and patient is at moderate risk for falls.   Predictive Model Details         6 (Low) Factor Value    Calculated 6/2/2024 16:58 Age 48    Risk of Fall Model Respiratory Rate 32     Active Peripheral IV Present     Imaging order in this encounter Present     Magnesium not on file     Number of Distinct Medication Classes administered 4     Diastolic BP 66     Drug Use Not Asked     Albumin 3.3 g/dL     Calcium 8.6 mg/dL     Kurtis Scale not on file     Creatinine 1.24 mg/dL     Chloride 101 mmol/L     ALT 32 U/L     Days after Admission 0.173     Tobacco Use Not Asked     Potassium 3.2 mmol/L     Total Bilirubin 0.3 mg/dL         Weight:       06/02/24  1246   Weight: 92.5 kg (204 lb)       Intake and Output  No intake or output data in the 24 hours ending 06/02/24 1658    Diet:        Most recent vitals:   Vitals:    06/02/24 1601 06/02/24 1626 06/02/24 1631 06/02/24 1646   BP: 151/90 133/71 129/72 140/66   Pulse: 108 105 102 99   Resp:       Temp:       SpO2: 96% 98% 96% 99%   Weight:       Height:           Active LDAs/IV Access:   Lines, Drains & Airways       Active LDAs       Name Placement date Placement time Site Days    Peripheral IV 06/02/24 1342 Anterior;Left;Medial Forearm 06/02/24  1342  Forearm  less than 1                    Skin Condition:   Skin Assessments (last day)       None             Labs (abnormal labs have a star):   Labs  Reviewed   COMPREHENSIVE METABOLIC PANEL - Abnormal; Notable for the following components:       Result Value    Glucose 109 (*)     Creatinine 1.24 (*)     Potassium 3.2 (*)     Albumin 3.3 (*)     eGFR 54.5 (*)     All other components within normal limits    Narrative:     GFR Normal >60  Chronic Kidney Disease <60  Kidney Failure <15     SINGLE HS TROPONIN T - Abnormal; Notable for the following components:    HS Troponin T 37 (*)     All other components within normal limits    Narrative:     High Sensitive Troponin T Reference Range:  <14.0 ng/L- Negative Female for AMI  <22.0 ng/L- Negative Male for AMI  >=14 - Abnormal Female indicating possible myocardial injury.  >=22 - Abnormal Male indicating possible myocardial injury.   Clinicians would have to utilize clinical acumen, EKG, Troponin, and serial changes to determine if it is an Acute Myocardial Infarction or myocardial injury due to an underlying chronic condition.        BNP (IN-HOUSE) - Abnormal; Notable for the following components:    proBNP 33,058.0 (*)     All other components within normal limits    Narrative:     This assay is used as an aid in the diagnosis of individuals suspected of having heart failure. It can be used as an aid in the diagnosis of acute decompensated heart failure (ADHF) in patients presenting with signs and symptoms of ADHF to the emergency department (ED). In addition, NT-proBNP of <300 pg/mL indicates ADHF is not likely.    Age Range Result Interpretation  NT-proBNP Concentration (pg/mL:      <50             Positive            >450                   Gray                 300-450                    Negative             <300    50-75           Positive            >900                  Gray                300-900                  Negative            <300      >75             Positive            >1800                  Gray                300-1800                  Negative            <300   CBC WITH AUTO DIFFERENTIAL -  Abnormal; Notable for the following components:    WBC 18.73 (*)     RBC 3.39 (*)     Hemoglobin 9.3 (*)     Hematocrit 29.1 (*)     RDW 15.8 (*)     Neutrophil % 81.2 (*)     Lymphocyte % 9.8 (*)     Immature Grans % 1.0 (*)     Neutrophils, Absolute 15.20 (*)     Monocytes, Absolute 1.39 (*)     Immature Grans, Absolute 0.18 (*)     All other components within normal limits   POC LACTATE - Normal   BLOOD CULTURE   BLOOD CULTURE   POC LACTATE   CBC AND DIFFERENTIAL    Narrative:     The following orders were created for panel order CBC & Differential.  Procedure                               Abnormality         Status                     ---------                               -----------         ------                     CBC Auto Differential[[id number]]        Abnormal            Final result                 Please view results for these tests on the individual orders.       LOC: Person, Place, Time, and Situation    Telemetry:  Telemetry    Cardiac Monitoring Ordered: yes    EKG:   ECG 12 Lead Chest Pain   Preliminary Result   HEART RATE= 110  bpm   RR Interval= 548  ms   LA Interval= 156  ms   P Horizontal Axis= 0  deg   P Front Axis= 46  deg   QRSD Interval= 86  ms   QT Interval= 370  ms   QTcB= 500  ms   QRS Axis= -7  deg   T Wave Axis= 176  deg   - ABNORMAL ECG -   Sinus tachycardia   Probable left atrial enlargement   Nonspecific T abnormalities, diffuse leads   Electronically Signed By:    Date and Time of Study: 2024-06-02 13:04:43          Medications Given in the ED:   Medications   sodium chloride 0.9 % flush 10 mL (has no administration in time range)   ipratropium-albuterol (DUO-NEB) nebulizer solution 3 mL (3 mL Nebulization Given 6/2/24 1420)   DAPTOmycin (CUBICIN) 700 mg in sodium chloride 0.9 % 50 mL IVPB (0 mg Intravenous Stopped 6/2/24 1514)   furosemide (LASIX) injection 40 mg (40 mg Intravenous Given 6/2/24 1537)       Imaging results:  XR Chest 1 View    Result Date: 6/2/2024  Vascular  congestion. Left lower lobe infiltrate may represent developing pulmonary edema or possibly pneumonia depending on the clinical setting. Electronically Signed: Onel Diaz MD  6/2/2024 2:27 PM EDT  Workstation ID: [id number]     Social issues:   Social History     Socioeconomic History    Marital status: Legally        NIH Stroke Scale:  Interval: (not recorded)  1a. Level of Consciousness: (not recorded)  1b. LOC Questions: (not recorded)  1c. LOC Commands: (not recorded)  2. Best Gaze: (not recorded)  3. Visual: (not recorded)  4. Facial Palsy: (not recorded)  5a. Motor Arm, Left: (not recorded)  5b. Motor Arm, Right: (not recorded)  6a. Motor Leg, Left: (not recorded)  6b. Motor Leg, Right: (not recorded)  7. Limb Ataxia: (not recorded)  8. Sensory: (not recorded)  9. Best Language: (not recorded)  10. Dysarthria: (not recorded)  11. Extinction and Inattention (formerly Neglect): (not recorded)    Total (NIH Stroke Scale): (not recorded)     Additional notable assessment information:N/A      Nursing report ED to floor:  NACHO David RN   06/02/24 16:58 EDT

## 2024-06-02 NOTE — Clinical Note
Level of Care: Progressive Care [20]   Admitting Physician: NII BEAR [[id number]]   Attending Physician: NII BEAR [[id number]]

## 2024-06-03 ENCOUNTER — APPOINTMENT (OUTPATIENT)
Dept: CT IMAGING | Facility: HOSPITAL | Age: 49
End: 2024-06-03
Payer: MEDICAID

## 2024-06-03 ENCOUNTER — APPOINTMENT (OUTPATIENT)
Dept: MRI IMAGING | Facility: HOSPITAL | Age: 49
End: 2024-06-03
Payer: MEDICAID

## 2024-06-03 ENCOUNTER — APPOINTMENT (OUTPATIENT)
Dept: CARDIOLOGY | Facility: HOSPITAL | Age: 49
End: 2024-06-03
Payer: MEDICAID

## 2024-06-03 ENCOUNTER — HOSPITAL ENCOUNTER (INPATIENT)
Facility: HOSPITAL | Age: 49
LOS: 5 days | Discharge: ANOTHER HOSPITAL | End: 2024-06-08
Attending: EMERGENCY MEDICINE | Admitting: FAMILY MEDICINE
Payer: MEDICAID

## 2024-06-03 DIAGNOSIS — I38 ENDOCARDITIS, UNSPECIFIED CHRONICITY, UNSPECIFIED ENDOCARDITIS TYPE: Primary | ICD-10-CM

## 2024-06-03 LAB
ALBUMIN SERPL-MCNC: 3.2 G/DL (ref 3.5–5.2)
ALBUMIN/GLOB SERPL: 0.7 G/DL
ALP SERPL-CCNC: 111 U/L (ref 39–117)
ALT SERPL W P-5'-P-CCNC: 27 U/L (ref 1–33)
AMPHET+METHAMPHET UR QL: NEGATIVE
ANION GAP SERPL CALCULATED.3IONS-SCNC: 15.3 MMOL/L (ref 5–15)
AORTIC DIMENSIONLESS INDEX: 0.84 (DI)
AST SERPL-CCNC: 23 U/L (ref 1–32)
BARBITURATES UR QL SCN: POSITIVE
BASOPHILS # BLD AUTO: 0.05 10*3/MM3 (ref 0–0.2)
BASOPHILS NFR BLD AUTO: 0.3 % (ref 0–1.5)
BENZODIAZ UR QL SCN: POSITIVE
BH CV ECHO MEAS - AI P1/2T: 417.5 MSEC
BH CV ECHO MEAS - AO MAX PG: 10 MMHG
BH CV ECHO MEAS - AO MEAN PG: 6 MMHG
BH CV ECHO MEAS - AO V2 MAX: 158 CM/SEC
BH CV ECHO MEAS - AO V2 VTI: 26 CM
BH CV ECHO MEAS - AVA(I,D): 2.7 CM2
BH CV ECHO MEAS - EDV(CUBED): 195.1 ML
BH CV ECHO MEAS - EDV(MOD-SP2): 150 ML
BH CV ECHO MEAS - EDV(MOD-SP4): 177 ML
BH CV ECHO MEAS - EF(MOD-BP): 48.9 %
BH CV ECHO MEAS - EF(MOD-SP2): 48.5 %
BH CV ECHO MEAS - EF(MOD-SP4): 49.8 %
BH CV ECHO MEAS - ESV(CUBED): 68.9 ML
BH CV ECHO MEAS - ESV(MOD-SP2): 77.2 ML
BH CV ECHO MEAS - ESV(MOD-SP4): 88.9 ML
BH CV ECHO MEAS - FS: 29.3 %
BH CV ECHO MEAS - IVS/LVPW: 1 CM
BH CV ECHO MEAS - IVSD: 1 CM
BH CV ECHO MEAS - LA DIMENSION: 4.3 CM
BH CV ECHO MEAS - LAT PEAK E' VEL: 10.8 CM/SEC
BH CV ECHO MEAS - LV DIASTOLIC VOL/BSA (35-75): 87.8 CM2
BH CV ECHO MEAS - LV MASS(C)D: 233.1 GRAMS
BH CV ECHO MEAS - LV MAX PG: 7 MMHG
BH CV ECHO MEAS - LV MEAN PG: 4 MMHG
BH CV ECHO MEAS - LV SYSTOLIC VOL/BSA (12-30): 44.1 CM2
BH CV ECHO MEAS - LV V1 MAX: 132 CM/SEC
BH CV ECHO MEAS - LV V1 VTI: 22.1 CM
BH CV ECHO MEAS - LVIDD: 5.8 CM
BH CV ECHO MEAS - LVIDS: 4.1 CM
BH CV ECHO MEAS - LVOT AREA: 3.1 CM2
BH CV ECHO MEAS - LVOT DIAM: 2 CM
BH CV ECHO MEAS - LVPWD: 1 CM
BH CV ECHO MEAS - MED PEAK E' VEL: 12.4 CM/SEC
BH CV ECHO MEAS - MR MAX PG: 104.4 MMHG
BH CV ECHO MEAS - MR MAX VEL: 511 CM/SEC
BH CV ECHO MEAS - MV A DUR: 0.16 SEC
BH CV ECHO MEAS - MV A MAX VEL: 50.8 CM/SEC
BH CV ECHO MEAS - MV DEC SLOPE: 762 CM/SEC2
BH CV ECHO MEAS - MV DEC TIME: 0.17 SEC
BH CV ECHO MEAS - MV E MAX VEL: 108 CM/SEC
BH CV ECHO MEAS - MV E/A: 2.13
BH CV ECHO MEAS - MV MAX PG: 5.9 MMHG
BH CV ECHO MEAS - MV MEAN PG: 3 MMHG
BH CV ECHO MEAS - MV P1/2T: 45.7 MSEC
BH CV ECHO MEAS - MV V2 VTI: 21.8 CM
BH CV ECHO MEAS - MVA(P1/2T): 4.8 CM2
BH CV ECHO MEAS - MVA(VTI): 3.2 CM2
BH CV ECHO MEAS - PA ACC TIME: 0.08 SEC
BH CV ECHO MEAS - PA V2 MAX: 102 CM/SEC
BH CV ECHO MEAS - RV MAX PG: 1.93 MMHG
BH CV ECHO MEAS - RV V1 MAX: 69.5 CM/SEC
BH CV ECHO MEAS - RV V1 VTI: 11.5 CM
BH CV ECHO MEAS - SV(LVOT): 69.4 ML
BH CV ECHO MEAS - SV(MOD-SP2): 72.8 ML
BH CV ECHO MEAS - SV(MOD-SP4): 88.1 ML
BH CV ECHO MEAS - SVI(LVOT): 34.4 ML/M2
BH CV ECHO MEAS - SVI(MOD-SP2): 36.1 ML/M2
BH CV ECHO MEAS - SVI(MOD-SP4): 43.7 ML/M2
BH CV ECHO MEAS - TAPSE (>1.6): 3 CM
BH CV ECHO MEAS - TR MAX PG: 50.7 MMHG
BH CV ECHO MEAS - TR MAX VEL: 356 CM/SEC
BH CV ECHO MEASUREMENTS AVERAGE E/E' RATIO: 9.31
BH CV XLRA - TDI S': 13.4 CM/SEC
BILIRUB SERPL-MCNC: 0.3 MG/DL (ref 0–1.2)
BUN SERPL-MCNC: 14 MG/DL (ref 6–20)
BUN/CREAT SERPL: 10.1 (ref 7–25)
CALCIUM SPEC-SCNC: 8.3 MG/DL (ref 8.6–10.5)
CANNABINOIDS SERPL QL: NEGATIVE
CHLORIDE SERPL-SCNC: 100 MMOL/L (ref 98–107)
CK SERPL-CCNC: 62 U/L (ref 20–180)
CO2 SERPL-SCNC: 23.7 MMOL/L (ref 22–29)
COCAINE UR QL: NEGATIVE
CREAT SERPL-MCNC: 1.39 MG/DL (ref 0.57–1)
DEPRECATED RDW RBC AUTO: 46.5 FL (ref 37–54)
EGFRCR SERPLBLD CKD-EPI 2021: 47.5 ML/MIN/1.73
EOSINOPHIL # BLD AUTO: 0.06 10*3/MM3 (ref 0–0.4)
EOSINOPHIL NFR BLD AUTO: 0.3 % (ref 0.3–6.2)
ERYTHROCYTE [DISTWIDTH] IN BLOOD BY AUTOMATED COUNT: 16.2 % (ref 12.3–15.4)
GEN 5 2HR TROPONIN T REFLEX: 43 NG/L
GLOBULIN UR ELPH-MCNC: 4.6 GM/DL
GLUCOSE SERPL-MCNC: 117 MG/DL (ref 65–99)
HAV IGM SERPL QL IA: NORMAL
HBV CORE IGM SERPL QL IA: NORMAL
HBV SURFACE AG SERPL QL IA: NORMAL
HCT VFR BLD AUTO: 29.2 % (ref 34–46.6)
HCV AB SER QL: NORMAL
HGB BLD-MCNC: 8.9 G/DL (ref 12–15.9)
HIV 1+2 AB+HIV1 P24 AG SERPL QL IA: NORMAL
HOLD SPECIMEN: NORMAL
IMM GRANULOCYTES # BLD AUTO: 0.16 10*3/MM3 (ref 0–0.05)
IMM GRANULOCYTES NFR BLD AUTO: 0.9 % (ref 0–0.5)
LEFT ATRIUM VOLUME INDEX: 40.4 ML/M2
LYMPHOCYTES # BLD AUTO: 1.88 10*3/MM3 (ref 0.7–3.1)
LYMPHOCYTES NFR BLD AUTO: 10.7 % (ref 19.6–45.3)
MCH RBC QN AUTO: 26.8 PG (ref 26.6–33)
MCHC RBC AUTO-ENTMCNC: 30.5 G/DL (ref 31.5–35.7)
MCV RBC AUTO: 88 FL (ref 79–97)
METHADONE UR QL SCN: NEGATIVE
MONOCYTES # BLD AUTO: 1.48 10*3/MM3 (ref 0.1–0.9)
MONOCYTES NFR BLD AUTO: 8.4 % (ref 5–12)
NEUTROPHILS NFR BLD AUTO: 13.91 10*3/MM3 (ref 1.7–7)
NEUTROPHILS NFR BLD AUTO: 79.4 % (ref 42.7–76)
NRBC BLD AUTO-RTO: 0 /100 WBC (ref 0–0.2)
OPIATES UR QL: NEGATIVE
OXYCODONE UR QL SCN: NEGATIVE
PLATELET # BLD AUTO: 457 10*3/MM3 (ref 140–450)
PMV BLD AUTO: 9.2 FL (ref 6–12)
POTASSIUM SERPL-SCNC: 3.5 MMOL/L (ref 3.5–5.2)
POTASSIUM SERPL-SCNC: 4.5 MMOL/L (ref 3.5–5.2)
PROT SERPL-MCNC: 7.8 G/DL (ref 6–8.5)
QT INTERVAL: 366 MS
QT INTERVAL: 370 MS
QTC INTERVAL: 500 MS
QTC INTERVAL: 500 MS
RBC # BLD AUTO: 3.32 10*6/MM3 (ref 3.77–5.28)
SINUS: 2.8 CM
SODIUM SERPL-SCNC: 139 MMOL/L (ref 136–145)
STJ: 2.1 CM
TROPONIN T DELTA: -1 NG/L
TROPONIN T SERPL HS-MCNC: 44 NG/L
WBC NRBC COR # BLD AUTO: 17.54 10*3/MM3 (ref 3.4–10.8)
WHOLE BLOOD HOLD COAG: NORMAL

## 2024-06-03 PROCEDURE — 25010000002 FUROSEMIDE PER 20 MG: Performed by: NURSE PRACTITIONER

## 2024-06-03 PROCEDURE — G0432 EIA HIV-1/HIV-2 SCREEN: HCPCS | Performed by: INTERNAL MEDICINE

## 2024-06-03 PROCEDURE — 80307 DRUG TEST PRSMV CHEM ANLYZR: CPT | Performed by: NURSE PRACTITIONER

## 2024-06-03 PROCEDURE — 99285 EMERGENCY DEPT VISIT HI MDM: CPT

## 2024-06-03 PROCEDURE — 71250 CT THORAX DX C-: CPT

## 2024-06-03 PROCEDURE — 94799 UNLISTED PULMONARY SVC/PX: CPT

## 2024-06-03 PROCEDURE — 25010000002 MORPHINE PER 10 MG: Performed by: NURSE PRACTITIONER

## 2024-06-03 PROCEDURE — 25010000002 ENOXAPARIN PER 10 MG: Performed by: NURSE PRACTITIONER

## 2024-06-03 PROCEDURE — 82550 ASSAY OF CK (CPK): CPT | Performed by: NURSE PRACTITIONER

## 2024-06-03 PROCEDURE — 84132 ASSAY OF SERUM POTASSIUM: CPT | Performed by: EMERGENCY MEDICINE

## 2024-06-03 PROCEDURE — 93005 ELECTROCARDIOGRAM TRACING: CPT

## 2024-06-03 PROCEDURE — 25010000002 LORAZEPAM PER 2 MG: Performed by: NURSE PRACTITIONER

## 2024-06-03 PROCEDURE — 25010000002 ONDANSETRON PER 1 MG: Performed by: NURSE PRACTITIONER

## 2024-06-03 PROCEDURE — 80074 ACUTE HEPATITIS PANEL: CPT | Performed by: INTERNAL MEDICINE

## 2024-06-03 PROCEDURE — 84484 ASSAY OF TROPONIN QUANT: CPT | Performed by: NURSE PRACTITIONER

## 2024-06-03 PROCEDURE — 80053 COMPREHEN METABOLIC PANEL: CPT | Performed by: NURSE PRACTITIONER

## 2024-06-03 PROCEDURE — 94640 AIRWAY INHALATION TREATMENT: CPT

## 2024-06-03 PROCEDURE — 93356 MYOCRD STRAIN IMG SPCKL TRCK: CPT | Performed by: INTERNAL MEDICINE

## 2024-06-03 PROCEDURE — 93356 MYOCRD STRAIN IMG SPCKL TRCK: CPT

## 2024-06-03 PROCEDURE — 72146 MRI CHEST SPINE W/O DYE: CPT

## 2024-06-03 PROCEDURE — 25010000002 DAPTOMYCIN PER 1 MG: Performed by: INTERNAL MEDICINE

## 2024-06-03 PROCEDURE — 85025 COMPLETE CBC W/AUTO DIFF WBC: CPT | Performed by: NURSE PRACTITIONER

## 2024-06-03 PROCEDURE — 93306 TTE W/DOPPLER COMPLETE: CPT | Performed by: INTERNAL MEDICINE

## 2024-06-03 PROCEDURE — 93306 TTE W/DOPPLER COMPLETE: CPT

## 2024-06-03 PROCEDURE — 99222 1ST HOSP IP/OBS MODERATE 55: CPT | Performed by: INTERNAL MEDICINE

## 2024-06-03 RX ORDER — CLONIDINE HYDROCHLORIDE 0.1 MG/1
0.1 TABLET ORAL 3 TIMES DAILY PRN
Status: ACTIVE | OUTPATIENT
Start: 2024-06-05 | End: 2024-06-06

## 2024-06-03 RX ORDER — POLYETHYLENE GLYCOL 3350 17 G/17G
17 POWDER, FOR SOLUTION ORAL DAILY PRN
Status: DISCONTINUED | OUTPATIENT
Start: 2024-06-03 | End: 2024-06-08 | Stop reason: HOSPADM

## 2024-06-03 RX ORDER — FUROSEMIDE 10 MG/ML
40 INJECTION INTRAMUSCULAR; INTRAVENOUS DAILY
Status: DISCONTINUED | OUTPATIENT
Start: 2024-06-03 | End: 2024-06-03

## 2024-06-03 RX ORDER — CALCIUM CARBONATE 500 MG/1
2 TABLET, CHEWABLE ORAL 2 TIMES DAILY PRN
Status: DISCONTINUED | OUTPATIENT
Start: 2024-06-03 | End: 2024-06-08 | Stop reason: HOSPADM

## 2024-06-03 RX ORDER — HYDROXYZINE HYDROCHLORIDE 25 MG/1
50 TABLET, FILM COATED ORAL 3 TIMES DAILY PRN
Status: DISPENSED | OUTPATIENT
Start: 2024-06-03 | End: 2024-06-08

## 2024-06-03 RX ORDER — ONDANSETRON 2 MG/ML
4 INJECTION INTRAMUSCULAR; INTRAVENOUS EVERY 6 HOURS PRN
Status: DISCONTINUED | OUTPATIENT
Start: 2024-06-03 | End: 2024-06-08 | Stop reason: HOSPADM

## 2024-06-03 RX ORDER — ENOXAPARIN SODIUM 100 MG/ML
40 INJECTION SUBCUTANEOUS DAILY
Status: DISCONTINUED | OUTPATIENT
Start: 2024-06-03 | End: 2024-06-08 | Stop reason: HOSPADM

## 2024-06-03 RX ORDER — CLONIDINE HYDROCHLORIDE 0.1 MG/1
0.1 TABLET ORAL 2 TIMES DAILY PRN
Status: ACTIVE | OUTPATIENT
Start: 2024-06-06 | End: 2024-06-07

## 2024-06-03 RX ORDER — BISACODYL 10 MG
10 SUPPOSITORY, RECTAL RECTAL DAILY PRN
Status: DISCONTINUED | OUTPATIENT
Start: 2024-06-03 | End: 2024-06-08 | Stop reason: HOSPADM

## 2024-06-03 RX ORDER — SODIUM CHLORIDE 0.9 % (FLUSH) 0.9 %
10 SYRINGE (ML) INJECTION AS NEEDED
Status: DISCONTINUED | OUTPATIENT
Start: 2024-06-03 | End: 2024-06-08 | Stop reason: HOSPADM

## 2024-06-03 RX ORDER — AMOXICILLIN 250 MG
2 CAPSULE ORAL 2 TIMES DAILY PRN
Status: DISCONTINUED | OUTPATIENT
Start: 2024-06-03 | End: 2024-06-08 | Stop reason: HOSPADM

## 2024-06-03 RX ORDER — DICYCLOMINE HYDROCHLORIDE 10 MG/1
10 CAPSULE ORAL 3 TIMES DAILY PRN
Status: ACTIVE | OUTPATIENT
Start: 2024-06-03 | End: 2024-06-08

## 2024-06-03 RX ORDER — NITROGLYCERIN 0.4 MG/1
0.4 TABLET SUBLINGUAL
Status: DISCONTINUED | OUTPATIENT
Start: 2024-06-03 | End: 2024-06-07

## 2024-06-03 RX ORDER — POTASSIUM CHLORIDE 20 MEQ/1
40 TABLET, EXTENDED RELEASE ORAL EVERY 4 HOURS
Qty: 4 TABLET | Refills: 0 | Status: COMPLETED | OUTPATIENT
Start: 2024-06-03 | End: 2024-06-03

## 2024-06-03 RX ORDER — NICOTINE 21 MG/24HR
1 PATCH, TRANSDERMAL 24 HOURS TRANSDERMAL
Status: DISCONTINUED | OUTPATIENT
Start: 2024-06-03 | End: 2024-06-08 | Stop reason: HOSPADM

## 2024-06-03 RX ORDER — IPRATROPIUM BROMIDE AND ALBUTEROL SULFATE 2.5; .5 MG/3ML; MG/3ML
3 SOLUTION RESPIRATORY (INHALATION) EVERY 4 HOURS PRN
Status: DISCONTINUED | OUTPATIENT
Start: 2024-06-03 | End: 2024-06-08 | Stop reason: HOSPADM

## 2024-06-03 RX ORDER — FUROSEMIDE 10 MG/ML
40 INJECTION INTRAMUSCULAR; INTRAVENOUS EVERY 12 HOURS
Status: DISCONTINUED | OUTPATIENT
Start: 2024-06-03 | End: 2024-06-05

## 2024-06-03 RX ORDER — CLONIDINE HYDROCHLORIDE 0.1 MG/1
0.1 TABLET ORAL ONCE AS NEEDED
Status: ACTIVE | OUTPATIENT
Start: 2024-06-07 | End: 2024-06-08

## 2024-06-03 RX ORDER — CLONIDINE HYDROCHLORIDE 0.1 MG/1
0.1 TABLET ORAL 4 TIMES DAILY PRN
Status: ACTIVE | OUTPATIENT
Start: 2024-06-04 | End: 2024-06-05

## 2024-06-03 RX ORDER — FUROSEMIDE 10 MG/ML
40 INJECTION INTRAMUSCULAR; INTRAVENOUS EVERY 12 HOURS
Status: DISCONTINUED | OUTPATIENT
Start: 2024-06-03 | End: 2024-06-03

## 2024-06-03 RX ORDER — ACETAMINOPHEN 325 MG/1
650 TABLET ORAL EVERY 4 HOURS PRN
Status: DISCONTINUED | OUTPATIENT
Start: 2024-06-03 | End: 2024-06-08 | Stop reason: HOSPADM

## 2024-06-03 RX ORDER — METOPROLOL SUCCINATE 25 MG/1
25 TABLET, EXTENDED RELEASE ORAL
Status: DISCONTINUED | OUTPATIENT
Start: 2024-06-03 | End: 2024-06-08 | Stop reason: HOSPADM

## 2024-06-03 RX ORDER — MORPHINE SULFATE 2 MG/ML
2 INJECTION, SOLUTION INTRAMUSCULAR; INTRAVENOUS EVERY 4 HOURS PRN
Status: DISPENSED | OUTPATIENT
Start: 2024-06-03 | End: 2024-06-08

## 2024-06-03 RX ORDER — BISACODYL 5 MG/1
5 TABLET, DELAYED RELEASE ORAL DAILY PRN
Status: DISCONTINUED | OUTPATIENT
Start: 2024-06-03 | End: 2024-06-08 | Stop reason: HOSPADM

## 2024-06-03 RX ORDER — SODIUM CHLORIDE 9 MG/ML
40 INJECTION, SOLUTION INTRAVENOUS AS NEEDED
Status: DISCONTINUED | OUTPATIENT
Start: 2024-06-03 | End: 2024-06-08 | Stop reason: HOSPADM

## 2024-06-03 RX ORDER — FAMOTIDINE 20 MG/1
40 TABLET, FILM COATED ORAL DAILY
Status: DISCONTINUED | OUTPATIENT
Start: 2024-06-03 | End: 2024-06-08 | Stop reason: HOSPADM

## 2024-06-03 RX ORDER — CLONIDINE HYDROCHLORIDE 0.1 MG/1
0.1 TABLET ORAL 4 TIMES DAILY PRN
Status: DISPENSED | OUTPATIENT
Start: 2024-06-03 | End: 2024-06-04

## 2024-06-03 RX ORDER — SODIUM CHLORIDE 0.9 % (FLUSH) 0.9 %
10 SYRINGE (ML) INJECTION EVERY 12 HOURS SCHEDULED
Status: DISCONTINUED | OUTPATIENT
Start: 2024-06-03 | End: 2024-06-08 | Stop reason: HOSPADM

## 2024-06-03 RX ORDER — LORAZEPAM 2 MG/ML
1 INJECTION INTRAMUSCULAR ONCE
Status: COMPLETED | OUTPATIENT
Start: 2024-06-03 | End: 2024-06-03

## 2024-06-03 RX ADMIN — POTASSIUM CHLORIDE 40 MEQ: 1500 TABLET, EXTENDED RELEASE ORAL at 05:32

## 2024-06-03 RX ADMIN — MORPHINE SULFATE 2 MG: 2 INJECTION, SOLUTION INTRAMUSCULAR; INTRAVENOUS at 18:34

## 2024-06-03 RX ADMIN — NITROGLYCERIN 0.5 INCH: 20 OINTMENT TOPICAL at 05:32

## 2024-06-03 RX ADMIN — MORPHINE SULFATE 2 MG: 2 INJECTION, SOLUTION INTRAMUSCULAR; INTRAVENOUS at 14:18

## 2024-06-03 RX ADMIN — METOPROLOL SUCCINATE 25 MG: 25 TABLET, EXTENDED RELEASE ORAL at 20:46

## 2024-06-03 RX ADMIN — CLONIDINE HYDROCHLORIDE 0.1 MG: 0.1 TABLET ORAL at 08:39

## 2024-06-03 RX ADMIN — DAPTOMYCIN 750 MG: 500 INJECTION, POWDER, LYOPHILIZED, FOR SOLUTION INTRAVENOUS at 17:11

## 2024-06-03 RX ADMIN — MORPHINE SULFATE 2 MG: 2 INJECTION, SOLUTION INTRAMUSCULAR; INTRAVENOUS at 03:18

## 2024-06-03 RX ADMIN — FUROSEMIDE 40 MG: 10 INJECTION, SOLUTION INTRAMUSCULAR; INTRAVENOUS at 08:03

## 2024-06-03 RX ADMIN — ACETAMINOPHEN 650 MG: 325 TABLET, FILM COATED ORAL at 02:45

## 2024-06-03 RX ADMIN — IPRATROPIUM BROMIDE AND ALBUTEROL SULFATE 3 ML: .5; 3 SOLUTION RESPIRATORY (INHALATION) at 03:24

## 2024-06-03 RX ADMIN — LORAZEPAM 1 MG: 2 INJECTION INTRAMUSCULAR; INTRAVENOUS at 08:41

## 2024-06-03 RX ADMIN — ONDANSETRON 4 MG: 2 INJECTION INTRAMUSCULAR; INTRAVENOUS at 08:18

## 2024-06-03 RX ADMIN — Medication 10 ML: at 02:07

## 2024-06-03 RX ADMIN — Medication 10 ML: at 02:56

## 2024-06-03 RX ADMIN — FUROSEMIDE 40 MG: 10 INJECTION, SOLUTION INTRAMUSCULAR; INTRAVENOUS at 20:46

## 2024-06-03 RX ADMIN — FAMOTIDINE 40 MG: 20 TABLET, FILM COATED ORAL at 08:03

## 2024-06-03 RX ADMIN — ENOXAPARIN SODIUM 40 MG: 100 INJECTION SUBCUTANEOUS at 17:11

## 2024-06-03 RX ADMIN — NITROGLYCERIN 0.5 INCH: 20 OINTMENT TOPICAL at 12:10

## 2024-06-03 RX ADMIN — Medication 10 ML: at 08:19

## 2024-06-03 RX ADMIN — Medication 10 ML: at 20:46

## 2024-06-03 RX ADMIN — POTASSIUM CHLORIDE 40 MEQ: 1500 TABLET, EXTENDED RELEASE ORAL at 08:03

## 2024-06-03 RX ADMIN — Medication 1 PATCH: at 02:45

## 2024-06-03 RX ADMIN — MORPHINE SULFATE 2 MG: 2 INJECTION, SOLUTION INTRAMUSCULAR; INTRAVENOUS at 22:34

## 2024-06-03 RX ADMIN — NITROGLYCERIN 0.5 INCH: 20 OINTMENT TOPICAL at 17:11

## 2024-06-03 RX ADMIN — MORPHINE SULFATE 2 MG: 2 INJECTION, SOLUTION INTRAMUSCULAR; INTRAVENOUS at 08:03

## 2024-06-03 NOTE — PROGRESS NOTES
"Pharmacy Antimicrobial Dosing Service    Subjective:  Марина Tilley is a 48 y.o.female admitted with endocarditis. Pharmacy has been consulted to dose Vancomycin and Cefepime for possible bacteremia.      Assessment/Plan    1. Day #1 Vancomycin: Goal -600 mcg*h/mL.   Load: 1500 mg (~21 mg/kg AdjBW)  Maint: 1000 mg (~14 mg/kg AdjBW)  AUC: ~ 590 per Insight  Pk: 6/3 @ 2300  Tr: 6/4 @ 0700    2. Day #1 Cefepime: 2 gm IV q8h for estCrCl > 60 mL/min.    Will continue to monitor drug levels, renal function, culture and sensitivities, and patient clinical status.       Objective:  Relevant clinical data and objective history reviewed:  167.6 cm (66\")   90 kg (198 lb 6.6 oz)   Ideal body weight: 59.3 kg (130 lb 11.7 oz)  Adjusted ideal body weight: 71.6 kg (157 lb 12.9 oz)  Body mass index is 32.02 kg/m².        Results from last 7 days   Lab Units 06/02/24  1344   CREATININE mg/dL 1.24*     Estimated Creatinine Clearance: 64.1 mL/min (A) (by C-G formula based on SCr of 1.24 mg/dL (H)).  I/O last 3 completed shifts:  In: 120 [P.O.:120]  Out: -     Results from last 7 days   Lab Units 06/02/24  1344   WBC 10*3/mm3 18.73*     Temperature    06/02/24 1246 06/02/24 1734   Temp: 98.5 °F (36.9 °C) 97.6 °F (36.4 °C)     Baseline culture/source/susceptibility:  Microbiology Results (last 10 days)       ** No results found for the last 240 hours. **            Clifton Choudhury, [facility]  06/02/24 19:32 EDT    "

## 2024-06-03 NOTE — ED NOTES
"Pt states that she left AMA from PCU and family was called and told to bring her back in due to her dx of endocarditis. Notified pt that she would have to be seen through the ER again and pt states that she \"does not want to wait hours in an ER room to get back upstairs\" and decided to leave.  "

## 2024-06-03 NOTE — CASE MANAGEMENT/SOCIAL WORK
Case Management Discharge Note                Selected Continued Care - Discharged on 6/2/2024 Admission date: 6/2/2024 - Discharge disposition: Left Against Medical Advice            Transportation Services  Private: Car    Final Discharge Disposition Code: 07 - left AMA

## 2024-06-03 NOTE — H&P
[facility] Medicine Services    Hospitalist History and Physical     Марина Tilley : 1975 MRN:[id number] LOS:0 ROOM: 10/10     Reason for admission: Endocarditis     Assessment / Plan     Chest pain and dyspnea  Suspect 2/2 untreated Endocarditis and Bacteremia  Presumed ICM/ pulmonary edema vs LLL infiltrate   Noncompliance- pt seen at multiple hospitals and recurrent history of leaving AMA, most recently yesterday from  this hospital   Hx IV Fentanyl use   Hypoxia   - Outside records show positive for MRSA bacteremia at [facility]  - per ED provider report Echo from [facility] demonstrated vegetationson the septal leaflet of tricuspid valve, mild to moderate tricuspid regurgitation, small pericardial effusion, and EF at 30 to 35%, moderate to severe global LV hypokinesis and a severely dilated left ventricle  -CXR 24: Vascular congestion. Left lower lobe infiltrate may represent developing pulmonary edema or possibly pneumonia depending on the clinical setting.   - pt with history of anaphylaxis requiring intubation from oral Keflex  - will continue previous plan for Daptomycin. Had considered Cefepime and Vancomycin but due to pt hx of anaphylaxis concern for possible adverse affect- discussed with pharmacy  - will consult ID for antibiotic management  - get 2D echo   - WBC 18.73. Now 17.54 after Daptomycin on 24  - Blood cultures from 24 pending  - will get urine drug screen  - cardiology consult     Presumed ICM 2/2 IV drug use  - outside source documentation with EF 30-35%  - BNP 33,058  - lactic 1.5  - VSS  - check 2D echo   - lasix prn for symptomatic mangement  - cardiology to follow    Hypokalemia  - replace per protocol    CATRINA  - scr 1.24  - will hold lasix   - monitor     Anemia  - noted hgb drop 9.3-8.9  - no overt sign of bleed  - montior  - transfuse <7.0    Tobacco dependency  - nicotine patch     IV Fentanyl use  - add the clonidine  withdrawal protocol for opioid withdrawal       Code Status (Patient has no pulse and is not breathing): CPR (Attempt to Resuscitate)  Medical Interventions (Patient has pulse or is breathing): Full Support       Nutrition:   Diet: Regular/House; Fluid Consistency: Thin (IDDSI 0)     DVT prophylaxis:  Medical DVT prophylaxis orders are present.         History of Present illness     Марина Tilley is a 48 y.o. female with known history of IV drug use initially came to the ED on 6/2/2024 at 1530.  Per ED documentation patient was seen for chest pain and shortness of breath.  Has a known endocarditis.  She has been to multiple hospitals.  She had been seen in [place] and a couple in [facility].  Most recently was at [facility].  She has been intermittently on antibiotics.  States she was discharged from the hospital but was not given any antibiotics.  She reports she had not used since before being in the hospital the last time.      Review of blood cultures from [facility] shows patient grew MRSA on 5/9/2024.  Review of echocardiogram from [facility] demonstrated vegetationson the septal leaflet of tricuspid valve, mild to moderate tricuspid regurgitation, small pericardial effusion, and EF at 30 to 35%, moderate to severe global LV hypokinesis and a severely dilated left ventricle. Further review of records from [facility] demonstrated a white count of 15.9, hemoglobin of 10.3, a creatinine of 1.86, chest imaging that demonstrated multifocal pneumonia versus septic emboli. Reportedly signed out AGAINST MEDICAL ADVICE from other hospital before going to the [facility] based on [facility] records.     Patient was seen by the hospitalist at 1700 on 6/2/2024.  She was admitted to the PCU at 1751.  At 2005 patient was requesting to leave the unit and go outside and became upset when told that she could not.  At 2010 patient had  "IV removed and left AGAINST MEDICAL ADVICE.  At 2130 to return to the ED and was subsequently readmitted to the hospitalist team around 2 AM on 6/3/2024 to continue previous treatment plan. Pt states \"I can't do this anymore\". Using a cane because SOA with any exertion. Having significant pain in chest and back and just pain in general. On 2L NC.  States long history of IV Fentanyl but denies use in the past 3 weeks.     Review of records show patient with elevated troponin elevated BNP potassium 3.2 acute kidney injury with creatinine 1.2 and white blood cell of 18.73 hemoglobin 9.3.  Blood cultures collected at 1344 and 1356 are pending.  Due to patient's history of anaphylaxis with Keflex will continue plan for daptomycin that was given yesterday afternoon.  Will consult ID for antibiotic management.  Have ordered 2D echo and will have cardiology to follow due to documentation suggesting patient with cardiomyopathy and endocarditis.  Will continue diuretics.  She has been admitted for further treatment.      Patient was seen and examined on 06/03/24 at 03:24 EDT .    Subjective / Review of systems     Review of Systems   Constitutional:  Positive for chills and fatigue.   Respiratory:  Positive for shortness of breath (with exertion and at rest).    Cardiovascular:  Positive for chest pain.   Musculoskeletal:  Positive for back pain.   Neurological:  Positive for weakness.          Past Medical/Surgical/Social/Family History & Allergies     Past Medical History:   Diagnosis Date    Anxiety     Asthma     CHF (congestive heart failure)     COPD (chronic obstructive pulmonary disease)       Past Surgical History:   Procedure Laterality Date    CHOLECYSTECTOMY      COLONOSCOPY      ENDOSCOPY      HERNIA REPAIR      HYSTERECTOMY        Social History     Socioeconomic History    Marital status: Legally    Tobacco Use    Smoking status: Every Day     Current packs/day: 3.00     Average packs/day: 3.0 " packs/day for 30.4 years (91.3 ttl pk-yrs)     Types: Cigarettes     Start date: 1994    Smokeless tobacco: Never   Vaping Use    Vaping status: Never Used   Substance and Sexual Activity    Alcohol use: Never    Drug use: Yes     Frequency: 7.0 times per week     Types: Heroin     Comment: patient states she quit 3 weeks ago    Sexual activity: Defer      Family History   Family history unknown: Yes      Allergies   Allergen Reactions    Cephalexin Anaphylaxis     Required intubation    Latex Hives      Social Determinants of Health     Tobacco Use: High Risk (6/2/2024)    Patient History     Smoking Tobacco Use: Every Day     Smokeless Tobacco Use: Never     Passive Exposure: Not on file   Alcohol Use: Not At Risk (6/2/2024)    AUDIT-C     Frequency of Alcohol Consumption: Never     Average Number of Drinks: Patient does not drink     Frequency of Binge Drinking: Never   Financial Resource Strain: Not on file   Food Insecurity: Not on file   Transportation Needs: Not on file   Physical Activity: Not on file   Stress: Not on file   Social Connections: Unknown (10/12/2023)    Family and Community Support     Help with Day-to-Day Activities: Not on file     Lonely or Isolated: Not on file   Interpersonal Safety: Not At Risk (6/3/2024)    Abuse Screen     Unsafe at Home or Work/School: no     Feels Threatened by Someone?: no     Does Anyone Keep You from Contacting Others or Doint Things Outside the Home?: no     Physical Sign of Abuse Present: no   Depression: Not on file   Housing Stability: Unknown (6/2/2024)    Housing Stability     Current Living Arrangements: home     Potentially Unsafe Housing Conditions: Not on file   Utilities: Not on file   Health Literacy: Unknown (10/12/2023)    Education     Help with school or training?: Not on file     Preferred Language: Not on file   Employment: Unknown (10/12/2023)    Employment     Do you want help finding or keeping work or a job?: Not on file   Disabilities: Not  At Risk (6/2/2024)    Disabilities     Concentrating, Remembering, or Making Decisions Difficulty: no     Doing Errands Independently Difficulty: no        Home Medications     Prior to Admission medications    Medication Sig Start Date End Date Taking? Authorizing Provider   albuterol sulfate  (90 Base) MCG/ACT inhaler Inhale 2 puffs Every 4 (Four) Hours As Needed for Wheezing.    ProviderLopez MD   gabapentin (NEURONTIN) 600 MG tablet Take 1 tablet by mouth 3 (Three) Times a Day.    Provider, MD Lopez        Objective / Physical Exam     Vital signs:  Temp: 98 °F (36.7 °C)  BP: 128/70  Heart Rate: 111  Resp: 19  SpO2: 94 %  Weight: 92.5 kg (204 lb)    Admission Weight: Weight: 92.5 kg (204 lb)    Physical Exam  Constitutional:       Appearance: She is ill-appearing.   HENT:      Mouth/Throat:      Mouth: Mucous membranes are dry.   Cardiovascular:      Rate and Rhythm: Regular rhythm. Tachycardia present.   Pulmonary:      Comments: Wheeze    Musculoskeletal:         General: Normal range of motion.   Neurological:      Mental Status: She is alert and oriented to person, place, and time.            Labs     Results from last 7 days   Lab Units 06/03/24  0209 06/02/24  1344   WBC 10*3/mm3 17.54* 18.73*   HEMOGLOBIN g/dL 8.9* 9.3*   HEMATOCRIT % 29.2* 29.1*   PLATELETS 10*3/mm3 457* 436      Results from last 7 days   Lab Units 06/02/24  1344   ALK PHOS U/L 117   AST (SGOT) U/L 23   ALT (SGPT) U/L 32           Results from last 7 days   Lab Units 06/02/24  1344   SODIUM mmol/L 139   POTASSIUM mmol/L 3.2*   CHLORIDE mmol/L 101   CO2 mmol/L 24.7   BUN mg/dL 11   CREATININE mg/dL 1.24*   GLUCOSE mg/dL 109*        Imaging     XR Chest 1 View    Result Date: 6/2/2024  XR CHEST 1 VW Date of Exam: 6/2/2024 2:15 PM EDT Indication: sob Comparison: None available. Findings: Vascular congestion. Left lower lobe infiltrate may represent developing pulmonary edema. Cardiac size is normal. The clavicles are  intact. No rib fractures. No pneumothorax or pleural effusion. The visualized upper abdomen is normal.     Vascular congestion. Left lower lobe infiltrate may represent developing pulmonary edema or possibly pneumonia depending on the clinical setting. Electronically Signed: Onel Diaz MD  6/2/2024 2:27 PM EDT  Workstation ID: [id number]      ECG 12 Lead Chest Pain   Preliminary Result   HEART RATE= 112  bpm   RR Interval= 536  ms   LA Interval= 145  ms   P Horizontal Axis= 6  deg   P Front Axis= 59  deg   QRSD Interval= 82  ms   QT Interval= 366  ms   QTcB= 500  ms   QRS Axis= 14  deg   T Wave Axis= 171  deg   - ABNORMAL ECG -   Sinus tachycardia   Probable left atrial enlargement   Anteroseptal infarct, age indeterminate   When compared with ECG of 02-Jun-2024 13:04:43,   No significant change   Electronically Signed By:    Date and Time of Study: 2024-06-03 01:32:22           Current Medications     Scheduled Meds:  DAPTOmycin, 8 mg/kg (Adjusted), Intravenous, Q24H  enoxaparin, 40 mg, Subcutaneous, Daily  famotidine, 40 mg, Oral, Daily  furosemide, 40 mg, Intravenous, Daily  nicotine, 1 patch, Transdermal, Q24H  nitroglycerin, 0.5 inch, Topical, Q6H  sodium chloride, 10 mL, Intravenous, Q12H         Continuous Infusions:          Aicha Gerber [facility]  06/03/24   03:24 EDT

## 2024-06-03 NOTE — PROGRESS NOTES
[facility] MEDICINE SERVICE  DAILY PROGRESS NOTE    NAME: Марина Tilley  : 1975  MRN: [id number]      LOS: 0 days     PROVIDER OF SERVICE: Timothy Duane Brammell, MD    Chief Complaint: Endocarditis    Subjective:     Interval History:  History taken from: patient  Patient Complaints: Patient states that she feels somewhat better than prior.  Some chest pain complaints.  Generalized abdominal discomfort.  Noted back pain.  Denies any nausea vomiting.  States that she does not plan on resuming her IV drug abuse.  Remorseful for her use as well as her recent AMA discharges.  Concerned over her ability to continue to live.  No other acute concerns noted.      Review of Systems:   Review of Systems   All other systems reviewed and are negative.      Objective:     Vital Signs  Temp:  [98 °F (36.7 °C)-98.2 °F (36.8 °C)] 98.2 °F (36.8 °C)  Heart Rate:  [] 116  Resp:  [19-38] 30  BP: (104-135)/(59-82) 114/68  Flow (L/min):  [0.5-2] 2   Body mass index is 32.91 kg/m².    Physical Exam  Physical Exam  Vitals reviewed.   Constitutional:       General: She is not in acute distress.     Appearance: Normal appearance. She is obese.   HENT:      Head: Normocephalic.   Cardiovascular:      Rate and Rhythm: Normal rate.      Heart sounds: No murmur heard.  Pulmonary:      Effort: Pulmonary effort is normal.      Breath sounds: Normal breath sounds.   Abdominal:      General: Bowel sounds are normal.      Palpations: Abdomen is soft.      Tenderness: There is no abdominal tenderness.   Musculoskeletal:         General: No swelling.   Neurological:      Mental Status: She is alert.         Scheduled Meds   DAPTOmycin, 10 mg/kg (Adjusted), Intravenous, Q24H  enoxaparin, 40 mg, Subcutaneous, Daily  famotidine, 40 mg, Oral, Daily  furosemide, 40 mg, Intravenous, Q12H  nicotine, 1 patch, Transdermal, Q24H  nitroglycerin, 0.5 inch, Topical, Q6H  sodium chloride, 10 mL, Intravenous, Q12H       PRN Meds      acetaminophen    senna-docusate sodium **AND** polyethylene glycol **AND** bisacodyl **AND** bisacodyl    calcium carbonate    cloNIDine **FOLLOWED BY** [START ON 6/4/2024] cloNIDine **FOLLOWED BY** [START ON 6/5/2024] cloNIDine **FOLLOWED BY** [START ON 6/6/2024] cloNIDine **FOLLOWED BY** [START ON 6/7/2024] cloNIDine    dicyclomine    hydrOXYzine    ipratropium-albuterol    Morphine    nitroglycerin    ondansetron    Potassium Replacement - Follow Nurse / BPA Driven Protocol    sodium chloride    sodium chloride   Infusions         Diagnostic Data    Results from last 7 days   Lab Units 06/03/24  0209   WBC 10*3/mm3 17.54*   HEMOGLOBIN g/dL 8.9*   HEMATOCRIT % 29.2*   PLATELETS 10*3/mm3 457*   GLUCOSE mg/dL 117*   CREATININE mg/dL 1.39*   BUN mg/dL 14   SODIUM mmol/L 139   POTASSIUM mmol/L 3.5   AST (SGOT) U/L 23   ALT (SGPT) U/L 27   ALK PHOS U/L 111   BILIRUBIN mg/dL 0.3   ANION GAP mmol/L 15.3*       CT Chest Without Contrast Diagnostic    Result Date: 6/3/2024  1. Multifocal patchy peripheral nodular densities are scattered throughout both lungs, some which have a cavitary component, which would correspond to the provided clinical suspicion of septic emboli. 2. Incompletely imaged at 8 cm low-density within the mid spleen, suspicious for splenic infarct. 3. Small bilateral pleural effusions. 4. Dense posterior bibasilar airspace disease is nonspecific and may reflect changes of atelectasis or pneumonia. 5. Mild cardiomegaly. Small concentric pericardial effusion. 6. Enlarged bilateral hilar and mediastinal lymph nodes are nonspecific and may be reactive. Attention at CT chest follow-up is recommended Electronically Signed: Migdalia Godoy MD  6/3/2024 2:40 PM EDT  Workstation ID: [id number]    XR Chest 1 View    Result Date: 6/2/2024  Vascular congestion. Left lower lobe infiltrate may represent developing pulmonary edema or possibly pneumonia depending on the clinical setting. Electronically Signed: Onel Diaz  MD  6/2/2024 2:27 PM EDT  Workstation ID: [id number]           Assessment:    Endocarditis/tricuspid endocarditis/regurgitation  IV drug abuse  Medical noncompliance  MRSA bacteremia  Nonischemic cardiomyopathy  Acute on chronic systolic congestive heart failure  Anemia  Acute renal insufficiency  Back pain   Plan.  Appreciate infectious disease input.  Planned MRI of thoracic and lumbar spine.  Echocardiogram pending.  Antibiotics per infectious disease.  Ongoing diuresis.  Monitoring for any signs of opiate withdrawal.  Follow-up labs.      Active and Resolved Problems  Active Hospital Problems    Diagnosis  POA    **Endocarditis [I38]  Yes      Resolved Hospital Problems   No resolved problems to display.           DVT prophylaxis:  Medical DVT prophylaxis orders are present.         Code status is   Code Status and Medical Interventions:   Ordered at: 06/03/24 0151     Code Status (Patient has no pulse and is not breathing):    CPR (Attempt to Resuscitate)     Medical Interventions (Patient has pulse or is breathing):    Full Support       Plan for disposition: SNF for antibiotics in 3 days    Time: 30 minutes    Signature: Electronically signed by Timothy Duane Brammell, MD, 06/03/24, 16:16 EDT.  [facility] Hospitalist Team

## 2024-06-03 NOTE — CASE MANAGEMENT/SOCIAL WORK
Social Work Assessment  [facility]     Patient Name: Марина Tilley  MRN: [id number]  Today's Date: 6/3/2024    Admit Date: 6/3/2024     Substance Abuse       Row Name 06/03/24 1049       Substance Use    Substance Use Status current street drug/inhalant/medication abuse    Longest Period of Street Drug Sobriety 8 years with MAT    Previous Substance Use Treatment detox unit;inpatient rehab;intensive outpatient;medication-assisted treatment;outpatient rehab    Substance Use Comment SW consulted for substance abuse. SW met with pt at bedside. Pt reported using IV heroin/fentanyl since she was 16 years old.  Pt reported an extensive hx of inpt and outpt tx.  Pt reported using buprenorphine for MAT for 8 years.  Pt does not want any tx or MAT at this time.   Pt stated she quit using street drugs 3 weeks ago. Pt stated, “I’m done.  I don’t want to die.”  Pt stated she will not leave AMA this time.  Pt declined any resources for substance abuse.  No further needs at this time.        Rosi Juarez, LCSW, MSSW   [facility]     Ph: [phone]  F: [phone]

## 2024-06-03 NOTE — CONSULTS
Infectious Diseases Consult Note    Referring Provider: Brammell, Timothy Duane,*    Reason for Consultation: Endocarditis    Patient Care Team:  Provider, No Known as PCP - General    Chief complaint chest pain and back pain    Subjective     History of present illness:      This is 48-year-old female who is being IV drug user since age 16.  Patient denied having history of hepatitis or HIV.  Patient has been evaluated at the different facilities during the last few weeks.  According to her she went to [facility] in May 2024 and she was told that she has infection in the heart valve/endocarditis.  She is not sure what valve was involved.  Reviewing care everywhere patient had a positive blood culture for MRSA from May 9 and May 11.  The patient claims that she went to nursing [place] and medicine and she was not sent on IV antibiotics and she left [facility] and then went to [facility] and she left [facility] eventually she ended up going to [facility] and leaving [facility].  She presented to [facility] yesterday and left [facility] then came back earlier today.  2 sets of blood cultures were ordered.  Patient was started on IV daptomycin 8 mg/kg CPK was normal.  The patient stated that she was having chest pain on the left side and she was having spine pain.    Review of Systems   Review of Systems   Constitutional: Negative.    HENT: Negative.     Eyes: Negative.    Respiratory: Negative.     Cardiovascular:  Positive for chest pain.   Gastrointestinal: Negative.    Genitourinary: Negative.    Musculoskeletal:  Positive for back pain.   Skin: Negative.    Neurological: Negative.    Hematological: Negative.    Psychiatric/Behavioral: Negative.         Medications  (Not in a hospital admission)      History  Past Medical History:   Diagnosis Date    Anxiety     Asthma     CHF (congestive heart failure)     COPD (chronic obstructive pulmonary disease)      Past Surgical History:   Procedure Laterality  Date    CHOLECYSTECTOMY      COLONOSCOPY      ENDOSCOPY      HERNIA REPAIR      HYSTERECTOMY         Family History  Family History   Family history unknown: Yes       Social History   reports that she has been smoking cigarettes. She started smoking about 30 years ago. She has a 91.3 pack-year smoking history. She has never used smokeless tobacco. She reports current drug use. Frequency: 7.00 times per week. Drug: Heroin. She reports that she does not drink alcohol.    Allergies  Cephalexin and Latex    Objective     Vital Signs   Vital Signs (last 24 hours)         06/02 0700  06/03 0659 06/03 0700  06/03 1215   Most Recent      Temp (°F)   98      98.2     98.2 (36.8) 06/03 1111    Heart Rate 100 -  117    89 -  116     116 06/03 1000    Resp 19 -  38    30 -  35     30 06/03 1111    /59 -  135/82    104/63 -  123/81     114/68 06/03 1000    SpO2 (%) 94 -  99    95 -  99     97 06/03 1000    Flow (L/min) 0.5 -  2      2     2 06/03 1111            Physical Exam:  Physical Exam  Vitals and nursing note reviewed.   Constitutional:       Appearance: She is well-developed.   HENT:      Head: Normocephalic and atraumatic.   Eyes:      Pupils: Pupils are equal, round, and reactive to light.   Cardiovascular:      Rate and Rhythm: Normal rate and regular rhythm.      Heart sounds: Murmur heard.   Pulmonary:      Effort: Pulmonary effort is normal. No respiratory distress.      Breath sounds: Normal breath sounds. No wheezing or rales.      Comments: Diminished breathing sounds bilaterally  Abdominal:      General: Bowel sounds are normal. There is no distension.      Palpations: Abdomen is soft. There is no mass.      Tenderness: There is no abdominal tenderness. There is no guarding or rebound.   Musculoskeletal:         General: No deformity. Normal range of motion.      Cervical back: Normal range of motion and neck supple.   Skin:     General: Skin is warm.      Findings: No erythema or rash.   Neurological:       Mental Status: She is alert and oriented to person, place, and time.      Cranial Nerves: No cranial nerve deficit.         Microbiology  Microbiology Results (last 10 days)       ** No results found for the last 240 hours. **            Laboratory  Results from last 7 days   Lab Units 06/03/24  0209   WBC 10*3/mm3 17.54*   HEMOGLOBIN g/dL 8.9*   HEMATOCRIT % 29.2*   PLATELETS 10*3/mm3 457*     Results from last 7 days   Lab Units 06/03/24  0209   SODIUM mmol/L 139   POTASSIUM mmol/L 3.5   CHLORIDE mmol/L 100   CO2 mmol/L 23.7   BUN mg/dL 14   CREATININE mg/dL 1.39*   GLUCOSE mg/dL 117*   CALCIUM mg/dL 8.3*     Results from last 7 days   Lab Units 06/03/24  0209   SODIUM mmol/L 139   POTASSIUM mmol/L 3.5   CHLORIDE mmol/L 100   CO2 mmol/L 23.7   BUN mg/dL 14   CREATININE mg/dL 1.39*   GLUCOSE mg/dL 117*   CALCIUM mg/dL 8.3*     Results from last 7 days   Lab Units 06/03/24  0417   CK TOTAL U/L 62               Radiology  Imaging Results (Last 72 Hours)       ** No results found for the last 72 hours. **            Cardiology      Results Review:  I have reviewed all clinical data, test, lab, and imaging results.       Schedule Meds  DAPTOmycin, 10 mg/kg (Adjusted), Intravenous, Q24H  enoxaparin, 40 mg, Subcutaneous, Daily  famotidine, 40 mg, Oral, Daily  furosemide, 40 mg, Intravenous, Daily  nicotine, 1 patch, Transdermal, Q24H  nitroglycerin, 0.5 inch, Topical, Q6H  sodium chloride, 10 mL, Intravenous, Q12H        Infusion Meds       PRN Meds    acetaminophen    senna-docusate sodium **AND** polyethylene glycol **AND** bisacodyl **AND** bisacodyl    calcium carbonate    cloNIDine **FOLLOWED BY** [START ON 6/4/2024] cloNIDine **FOLLOWED BY** [START ON 6/5/2024] cloNIDine **FOLLOWED BY** [START ON 6/6/2024] cloNIDine **FOLLOWED BY** [START ON 6/7/2024] cloNIDine    dicyclomine    hydrOXYzine    ipratropium-albuterol    Morphine    nitroglycerin    ondansetron    Potassium Replacement - Follow Nurse / BPA Driven  Protocol    sodium chloride    sodium chloride      Assessment & Plan       Assessment    Probable tricuspid valve endocarditis based on the chart history and patient history.  Patient had positive blood culture for MRSA from May 9, 2024.  No records available yet.    IV drug user with history of using fentanyl.  Claims that she stopped using fentanyl since admission to [facility] in May 2024    History of COPD and tobacco abuse        Plan    Continue IV daptomycin but increase dose to 10 mg/kg daily  Request labs in a.m. including CPK  Hepatitis B and C screen as well as HIV screen  Request records from [facility]  Waiting on blood culture results  2D echo was already ordered  The patient may need LETICIA  Request MRI of the lumbar and thoracic spine without contrast  Smoking and drug cessation    Andrew Pollack MD  06/03/24  12:15 EDT    Note is dictated utilizing voice recognition software/Dragon

## 2024-06-03 NOTE — CONSULTS
"[facility]        Subjective:     Encounter Date:06/03/2024      Patient ID: Марина Tilley is a 48 y.o. female.    Chief Complaint: pain, chest pain, HF    Referring Physician: Aicha Gerber     HPI:  Марина Tilley is a 48 y.o. female who presents with concerns over endocarditis and shortness of breath, chest pain. She does not routinely see a cardiologist. She has a history of IVDA since age of 16. She reportedly has been to numerous hospitals with concerns for weakness, shortness of breath, and was diagnosed with endocarditis of TV but unfortunately left AMA at [facility] and [facility]. Pmh includes anxiety, drug abuse, PTSD, COPD, smoker.    Per H&P: blood cultures from [facility] shows patient grew MRSA on 5/9/2024.  Review of echocardiogram from [facility] demonstrated vegetationson the septal leaflet of tricuspid valve, mild to moderate tricuspid regurgitation, small pericardial effusion, and EF at 30 to 35%, moderate to severe global LV hypokinesis and a severely dilated left ventricle. Further review of records from [facility] demonstrated a white count of 15.9, hemoglobin of 10.3, a creatinine of 1.86, chest imaging that demonstrated multifocal pneumonia versus septic emboli. Reportedly signed out AGAINST MEDICAL ADVICE from other hospital before going to the [facility] based on [facility] records.      Patient was seen by the hospitalist at 1700 on 6/2/2024.  She was admitted to the PCU at 1751.  At 2005 patient was requesting to leave the unit and go outside and became upset when told that she could not.  At 2010 patient had IV removed and left AGAINST MEDICAL ADVICE.  At 2130 to return to the ED and was subsequently readmitted to the hospitalist team around 2 AM on 6/3/2024 to continue previous treatment plan. Pt states \"I can't do this anymore\". Using a cane because SOA with any exertion. Having significant pain in " chest and back and just pain in general. On 2L NC.  States long history of IV Fentanyl but denies use in the past 3 weeks.     Cardiology has been consulted this admission with the request for LETICIA and tx of CHF. Patient is on 2L NC, she is dyspneic with conversation. She is on daily Lasix, she is fidgety and tearful saying she can't live like this anymore and requesting help for her drug abuse. She is complaining of chest pain and back pain.      Past Medical History:   Diagnosis Date    Anxiety     Asthma     CHF (congestive heart failure)     COPD (chronic obstructive pulmonary disease)        Past Surgical History:   Procedure Laterality Date    CHOLECYSTECTOMY      COLONOSCOPY      ENDOSCOPY      HERNIA REPAIR      HYSTERECTOMY         Family History   Family history unknown: Yes       Social History     Socioeconomic History    Marital status: Legally    Tobacco Use    Smoking status: Every Day     Current packs/day: 3.00     Average packs/day: 3.0 packs/day for 30.4 years (91.3 ttl pk-yrs)     Types: Cigarettes     Start date: 1994    Smokeless tobacco: Never   Vaping Use    Vaping status: Never Used   Substance and Sexual Activity    Alcohol use: Never    Drug use: Yes     Frequency: 7.0 times per week     Types: Heroin     Comment: patient states she quit 3 weeks ago    Sexual activity: Defer         Allergies   Allergen Reactions    Cephalexin Anaphylaxis     Required intubation    Latex Hives       Current Medications:   Scheduled Meds:DAPTOmycin, 10 mg/kg (Adjusted), Intravenous, Q24H  enoxaparin, 40 mg, Subcutaneous, Daily  famotidine, 40 mg, Oral, Daily  furosemide, 40 mg, Intravenous, Q12H  nicotine, 1 patch, Transdermal, Q24H  nitroglycerin, 0.5 inch, Topical, Q6H  sodium chloride, 10 mL, Intravenous, Q12H      Continuous Infusions:     Review of Systems   Constitutional: Positive for malaise/fatigue. Negative for chills and diaphoresis.   Cardiovascular:  Positive for chest pain and dyspnea  "on exertion. Negative for irregular heartbeat, leg swelling, near-syncope, orthopnea, palpitations, paroxysmal nocturnal dyspnea and syncope.   Respiratory:  Positive for shortness of breath. Negative for cough, sleep disturbances due to breathing and sputum production.    Gastrointestinal:  Positive for bloating. Negative for change in bowel habit.   Genitourinary:  Negative for urgency.   Neurological:  Negative for dizziness and headaches.   Psychiatric/Behavioral:  Negative for altered mental status.           Objective:         /72 (BP Location: Right arm, Patient Position: Lying)   Pulse 114   Temp 97.4 °F (36.3 °C) (Tympanic)   Resp 24   Ht 167.6 cm (66\")   Wt 92.5 kg (203 lb 14.8 oz)   SpO2 95%   BMI 32.91 kg/m²     Physical Exam:  General Appearance:    Alert, cooperative, in no acute distress                                Head: Atraumatic, normocephalic, PERRLA               Neck:   supple, no JVD   Lungs:    2L NC, diminished bilateral bases    Heart:    Regular rhythm and normal rate, normal S1 and S2 murmur   Abdomen:     Normal bowel sounds, no masses, no organomegaly, soft  nontender, nondistended, no guarding, no rebound  tenderness   Extremities:   Moves all extremities well, no edema, no cyanosis, no  redness   Pulses:   Pulses palpable and equal bilaterally   Skin:   discoloration   Neurologic:   Awake, alert, oriented x3                 ASCVD Risk Score::  The ASCVD Risk score (N[place] DK, et al., 2019) failed to calculate for the following reasons:    Cannot find a previous HDL lab    Cannot find a previous total cholesterol lab      Lab Review:     Results from last 7 days   Lab Units 06/03/24  1445 06/03/24  0209 06/02/24  1344   SODIUM mmol/L  --  139 139   POTASSIUM mmol/L 4.5 3.5 3.2*   CHLORIDE mmol/L  --  100 101   CO2 mmol/L  --  23.7 24.7   BUN mg/dL  --  14 11   CREATININE mg/dL  --  1.39* 1.24*   GLUCOSE mg/dL  --  117* 109*   CALCIUM mg/dL  --  8.3* 8.6   AST (SGOT) U/L  " "--  23 23   ALT (SGPT) U/L  --  27 32     Results from last 7 days   Lab Units 06/03/24  0417 06/03/24  0209 06/02/24  1344   CK TOTAL U/L 62  --   --    HSTROP T ng/L 43* 44* 37*     Results from last 7 days   Lab Units 06/03/24  0209 06/02/24  1344   WBC 10*3/mm3 17.54* 18.73*   HEMOGLOBIN g/dL 8.9* 9.3*   HEMATOCRIT % 29.2* 29.1*   PLATELETS 10*3/mm3 457* 436                   Invalid input(s): \"LDLCALC\"  Results from last 7 days   Lab Units 06/02/24  1344   PROBNP pg/mL 33,058.0*           Recent Radiology:  Imaging Results (Most Recent)       Procedure Component Value Units Date/Time    CT Chest Without Contrast Diagnostic [[id number]] Collected: 06/03/24 1434     Updated: 06/03/24 1442    Narrative:      CT CHEST WO CONTRAST DIAGNOSTIC    Date of Exam: 6/3/2024 2:29 PM EDT    Indication: Rule out septic emboli.    Comparison: AP portable chest 6/2/2024.    Technique: Axial CT images were obtained of the chest without contrast administration.  Sagittal and coronal reconstructions were performed.  Automated exposure control and iterative reconstruction methods were used.      Findings:  Multiple irregular patchy nodular densities are scattered throughout both lungs, few of which are cavitary. . Index right upper lobe peripheral density measures 2.4 x 1.9 cm (5/47). Index parenchymal density within the superior left lower lobe measures   2.1 x 3.3 cm (5/39). Index cavitary nodule in the right lower lobe measures 1.7 cm (5/59).    There are small layering bilateral pleural effusions with posterior bibasilar airspace disease which may represent atelectasis or pneumonia.    Heart size is mildly enlarged with a small concentric pericardial effusion. Evaluation for adenopathy is limited due to lack of IV contrast. Suspected bilateral hilar adenopathy, on the right measuring up to 1.5 cm short axis (2/42). Mildly large   subcarinal lymph node measures 1.4 cm short axis (2/45). AP window node measures 1.0 cm short axis " (2/34). Prevascular node measures 8 mm short axis.    There is incompletely imaged low-density within the soft 20 mid pole posteriorly measuring 8.0 x 6.3 cm, which appears to partially spare the peripheral capsule, raising the possibility of splenic infarct. Cholecystectomy changes are present. Remainder   of the imaged upper abdominal organs have a normal noncontrast appearance.    Mildly enlarged bilateral cervical lymph nodes are present, although not strictly pathologically enlarged by CT criteria.      Impression:      1. Multifocal patchy peripheral nodular densities are scattered throughout both lungs, some which have a cavitary component, which would correspond to the provided clinical suspicion of septic emboli.  2. Incompletely imaged at 8 cm low-density within the mid spleen, suspicious for splenic infarct.  3. Small bilateral pleural effusions.  4. Dense posterior bibasilar airspace disease is nonspecific and may reflect changes of atelectasis or pneumonia.  5. Mild cardiomegaly. Small concentric pericardial effusion.  6. Enlarged bilateral hilar and mediastinal lymph nodes are nonspecific and may be reactive. Attention at CT chest follow-up is recommended      Electronically Signed: Migdalia Godoy MD    6/3/2024 2:40 PM EDT    Workstation ID: [id number]              ECHOCARDIOGRAM:    Results for orders placed during the hospital encounter of 06/03/24    Adult Transthoracic Echo Complete W/ Cont if Necessary Per Protocol    Interpretation Summary    Left ventricular systolic function is mildly decreased. Left ventricular ejection fraction appears to be 51 - 55%.    Left ventricular diastolic function was normal.    There is calcification of the aortic valve.    Moderate to severe aortic valve regurgitation is present.    Moderate to severe mitral valve regurgitation is present.    There is echodensity attached to septal leaflet of tricuspid valve measuring 1.2 x 1.5 cm.    Recommend LETICIA to assess  echodensity and mitral and aortic valve regurgitation.  If clinically indicated.    Recommend modified Duke criteria for clinical diagnosis of bacterial endocarditis            Assessment:         Active Hospital Problems    Diagnosis  POA    **Endocarditis [I38]  Yes     Dyspnea / HFrEF / outside ECHO LVEF 30% mild to moderate tricuspid regurgitation, small pericardial effusion, and EF at 30 to 35%, moderate to severe global LV hypokinesis and a severely dilated left ventricle   Untreated endocarditis / bacteremia recently- [facility] records show vegetation on septal leaflet of TV  H/o IV drug abuse- fentanyl   CATRINA  Anemia  Pneumonia / shortness of breath / leukocytosis  Tobacco use  Medical non compliance   Back pain- MRI of lumbar and thoracic spine ordered     Plan:   ECHO pending this admission  ID consulted, managing Abx  Increase diuresis to Lasix 40mg Q12hr  Monitor electrolytes  Low sodium diet, fluid restriction, daily weight  Will tentatively plan for LETICIA tomorrow pending anesthesia availability / volume status / patient clinical status in am    Patient is seen and examined and findings are verified.  All data is reviewed by me personally.  Assessment and plan formulated by APC was done after discussion with attending.  I spent more than 50% of time in taking care of the patient.    Patient is short of breath.  Patient was admitted with shortness of breath.  Patient has mild leg edema.    Patient is tachycardic    Normal S1 and S2.  Cardiac murmur noted.  Decreased breath sound at the bases crackles noted.  Leg edema present    MDM:    1.  Congestive heart failure acute on chronic:    Patient is on diuretics continue diuresis for now.    2.  Dilated cardiomyopathy:    Patient EF is 30 to 35%.  I would start Toprol-XL and slowly add other guideline mediated heart failure therapy    3.  Bacterial endocarditis:    Patient has bacteremia and transthoracic echocardiogram showed tricuspid valve endocarditis with  vegetation attached to septal leaflet.  Patient is on antibiotics    4.  Aortic regurgitation and mitral regurgitation:    Patient is noted to have moderate to severe aortic regurgitation and mitral regurgitation.  At this stage I would start Toprol-XL.  Patient will need LETICIA to assess the severity of these regurgitation.  This may be related to endocarditis.    Electronically signed by Bryant Frank MD, 06/03/24, 6:26 PM EDT.           Bryant Frank MD  06/03/24  18:26 EDT

## 2024-06-03 NOTE — PAYOR COMM NOTE
"    James Tilley (48 y.o. Female)       Date of Birth   1975    Social Security Number       Address   [address] [place] [address] [place] [address]    Home Phone       MRN   [id number]       Druze   None    Marital Status   Legally                             Admission Date   6/3/24    Admission Type   Emergency    Admitting Provider   Bernabe Michael MD    Attending Provider   Brammell, Timothy Duane, MD    Department, Room/Bed   [facility] EMERGENCY DEPARTMENT,        Discharge Date       Discharge Disposition       Discharge Destination                                 Attending Provider: Brammell, Timothy Duane, MD    Allergies: Cephalexin, Latex    Isolation: None   Infection: None   Code Status: CPR    Ht: 167.6 cm (66\")   Wt: 92.5 kg (203 lb 14.8 oz)    Admission Cmt: None   Principal Problem: Endocarditis [I38]                   Active Insurance as of 6/3/2024       Primary Coverage       Payor Plan Insurance Group Employer/Plan Group    [employer] [facility]       Payor Plan Address Payor Plan Phone Number Payor Plan Fax Number Effective Dates    [address]   2021 - None Entered    [facility] [id number]         Subscriber Name Subscriber Birth Date Member ID       JAMES TILLEY 1975 [id number]                     Emergency Contacts        (Rel.) Home Phone Work Phone Mobile Phone    kate muhammad (Sister) -- -- [phone]                 History & Physical        Aicha Gerber APRN at 24 0210              [facility] Medicine Services    Hospitalist History and Physical     James Tilley : 1975 MRN:[id number] LOS:0 ROOM: 10/10     Reason for admission: Endocarditis     Assessment / Plan     Chest pain and dyspnea  Suspect 2/2 untreated Endocarditis and Bacteremia  Presumed ICM/ pulmonary edema vs LLL infiltrate   Noncompliance- pt seen at multiple hospitals and recurrent history of leaving [place], [facility] " recently yesterday from  this hospital   Hx IV Fentanyl use   Hypoxia   - Outside records show positive for MRSA bacteremia at [facility]  - per ED provider report Echo from [facility] demonstrated vegetationson the septal leaflet of tricuspid valve, mild to moderate tricuspid regurgitation, small pericardial effusion, and EF at 30 to 35%, moderate to severe global LV hypokinesis and a severely dilated left ventricle  -CXR 6/2/24: Vascular congestion. Left lower lobe infiltrate may represent developing pulmonary edema or possibly pneumonia depending on the clinical setting.   - pt with history of anaphylaxis requiring intubation from oral Keflex  - will continue previous plan for Daptomycin. Had considered Cefepime and Vancomycin but due to pt hx of anaphylaxis concern for possible adverse affect- discussed with pharmacy  - will consult ID for antibiotic management  - get 2D echo   - WBC 18.73. Now 17.54 after Daptomycin on 6/2/24  - Blood cultures from 6/2/24 pending  - will get urine drug screen  - cardiology consult     Presumed ICM 2/2 IV drug use  - outside source documentation with EF 30-35%  - BNP 33,058  - lactic 1.5  - VSS  - check 2D echo   - lasix prn for symptomatic mangement  - cardiology to follow    Hypokalemia  - replace per protocol    CATRINA  - scr 1.24  - will hold lasix   - monitor     Anemia  - noted hgb drop 9.3-8.9  - no overt sign of bleed  - montior  - transfuse <7.0    Tobacco dependency  - nicotine patch     IV Fentanyl use  - add the clonidine withdrawal protocol for opioid withdrawal       Code Status (Patient has no pulse and is not breathing): CPR (Attempt to Resuscitate)  Medical Interventions (Patient has pulse or is breathing): Full Support       Nutrition:   Diet: Regular/House; Fluid Consistency: Thin (IDDSI 0)     DVT prophylaxis:  Medical DVT prophylaxis orders are present.         History of Present illness     Марина Tilley is a 48 y.o. female  "with known history of IV drug use initially came to the ED on 6/2/2024 at 1530.  Per ED documentation patient was seen for chest pain and shortness of breath.  Has a known endocarditis.  She has been to multiple hospitals.  She had been seen in [place] and a couple in [facility].  Most recently was at [facility].  She has been intermittently on antibiotics.  States she was discharged from the hospital but was not given any antibiotics.  She reports she had not used since before being in the hospital the last time.      Review of blood cultures from [facility] shows patient grew MRSA on 5/9/2024.  Review of echocardiogram from [facility] demonstrated vegetationson the septal leaflet of tricuspid valve, mild to moderate tricuspid regurgitation, small pericardial effusion, and EF at 30 to 35%, moderate to severe global LV hypokinesis and a severely dilated left ventricle. Further review of records from [facility] demonstrated a white count of 15.9, hemoglobin of 10.3, a creatinine of 1.86, chest imaging that demonstrated multifocal pneumonia versus septic emboli. Reportedly signed out AGAINST MEDICAL ADVICE from other hospital before going to the [facility] based on [facility] records.     Patient was seen by the hospitalist at 1700 on 6/2/2024.  She was admitted to the PCU at 1751.  At 2005 patient was requesting to leave the unit and go outside and became upset when told that she could not.  At 2010 patient had IV removed and left AGAINST MEDICAL ADVICE.  At 2130 to return to the ED and was subsequently readmitted to the hospitalist team around 2 AM on 6/3/2024 to continue previous treatment plan. Pt states \"I can't do this anymore\". Using a cane because SOA with any exertion. Having significant pain in chest and back and just pain in general. On 2L NC.  States long history of IV Fentanyl but denies use in the past 3 " weeks.     Review of records show patient with elevated troponin elevated BNP potassium 3.2 acute kidney injury with creatinine 1.2 and white blood cell of 18.73 hemoglobin 9.3.  Blood cultures collected at 1344 and 1356 are pending.  Due to patient's history of anaphylaxis with Keflex will continue plan for daptomycin that was given yesterday afternoon.  Will consult ID for antibiotic management.  Have ordered 2D echo and will have cardiology to follow due to documentation suggesting patient with cardiomyopathy and endocarditis.  Will continue diuretics.  She has been admitted for further treatment.      Patient was seen and examined on 06/03/24 at 03:24 EDT .    Subjective / Review of systems     Review of Systems   Constitutional:  Positive for chills and fatigue.   Respiratory:  Positive for shortness of breath (with exertion and at rest).    Cardiovascular:  Positive for chest pain.   Musculoskeletal:  Positive for back pain.   Neurological:  Positive for weakness.          Past Medical/Surgical/Social/Family History & Allergies     Past Medical History:   Diagnosis Date    Anxiety     Asthma     CHF (congestive heart failure)     COPD (chronic obstructive pulmonary disease)       Past Surgical History:   Procedure Laterality Date    CHOLECYSTECTOMY      COLONOSCOPY      ENDOSCOPY      HERNIA REPAIR      HYSTERECTOMY        Social History     Socioeconomic History    Marital status: Legally    Tobacco Use    Smoking status: Every Day     Current packs/day: 3.00     Average packs/day: 3.0 packs/day for 30.4 years (91.3 ttl pk-yrs)     Types: Cigarettes     Start date: 1994    Smokeless tobacco: Never   Vaping Use    Vaping status: Never Used   Substance and Sexual Activity    Alcohol use: Never    Drug use: Yes     Frequency: 7.0 times per week     Types: Heroin     Comment: patient states she quit 3 weeks ago    Sexual activity: Defer      Family History   Family history unknown: Yes      Allergies    Allergen Reactions    Cephalexin Anaphylaxis     Required intubation    Latex Hives      Social Determinants of Health     Tobacco Use: High Risk (6/2/2024)    Patient History     Smoking Tobacco Use: Every Day     Smokeless Tobacco Use: Never     Passive Exposure: Not on file   Alcohol Use: Not At Risk (6/2/2024)    AUDIT-C     Frequency of Alcohol Consumption: Never     Average Number of Drinks: Patient does not drink     Frequency of Binge Drinking: Never   Financial Resource Strain: Not on file   Food Insecurity: Not on file   Transportation Needs: Not on file   Physical Activity: Not on file   Stress: Not on file   Social Connections: Unknown (10/12/2023)    Family and Community Support     Help with Day-to-Day Activities: Not on file     Lonely or Isolated: Not on file   Interpersonal Safety: Not At Risk (6/3/2024)    Abuse Screen     Unsafe at Home or Work/School: no     Feels Threatened by Someone?: no     Does Anyone Keep You from Contacting Others or Doint Things Outside the Home?: no     Physical Sign of Abuse Present: no   Depression: Not on file   Housing Stability: Unknown (6/2/2024)    Housing Stability     Current Living Arrangements: home     Potentially Unsafe Housing Conditions: Not on file   Utilities: Not on file   Health Literacy: Unknown (10/12/2023)    Education     Help with school or training?: Not on file     Preferred Language: Not on file   Employment: Unknown (10/12/2023)    Employment     Do you want help finding or keeping work or a job?: Not on file   Disabilities: Not At Risk (6/2/2024)    Disabilities     Concentrating, Remembering, or Making Decisions Difficulty: no     Doing Errands Independently Difficulty: no        Home Medications     Prior to Admission medications    Medication Sig Start Date End Date Taking? Authorizing Provider   albuterol sulfate  (90 Base) MCG/ACT inhaler Inhale 2 puffs Every 4 (Four) Hours As Needed for Wheezing.    Provider, MD Lopez    gabapentin (NEURONTIN) 600 MG tablet Take 1 tablet by mouth 3 (Three) Times a Day.    Provider, MD Lopez        Objective / Physical Exam     Vital signs:  Temp: 98 °F (36.7 °C)  BP: 128/70  Heart Rate: 111  Resp: 19  SpO2: 94 %  Weight: 92.5 kg (204 lb)    Admission Weight: Weight: 92.5 kg (204 lb)    Physical Exam  Constitutional:       Appearance: She is ill-appearing.   HENT:      Mouth/Throat:      Mouth: Mucous membranes are dry.   Cardiovascular:      Rate and Rhythm: Regular rhythm. Tachycardia present.   Pulmonary:      Comments: Wheeze    Musculoskeletal:         General: Normal range of motion.   Neurological:      Mental Status: She is alert and oriented to person, place, and time.            Labs     Results from last 7 days   Lab Units 06/03/24  0209 06/02/24  1344   WBC 10*3/mm3 17.54* 18.73*   HEMOGLOBIN g/dL 8.9* 9.3*   HEMATOCRIT % 29.2* 29.1*   PLATELETS 10*3/mm3 457* 436      Results from last 7 days   Lab Units 06/02/24  1344   ALK PHOS U/L 117   AST (SGOT) U/L 23   ALT (SGPT) U/L 32           Results from last 7 days   Lab Units 06/02/24  1344   SODIUM mmol/L 139   POTASSIUM mmol/L 3.2*   CHLORIDE mmol/L 101   CO2 mmol/L 24.7   BUN mg/dL 11   CREATININE mg/dL 1.24*   GLUCOSE mg/dL 109*        Imaging     XR Chest 1 View    Result Date: 6/2/2024  XR CHEST 1 VW Date of Exam: 6/2/2024 2:15 PM EDT Indication: sob Comparison: None available. Findings: Vascular congestion. Left lower lobe infiltrate may represent developing pulmonary edema. Cardiac size is normal. The clavicles are intact. No rib fractures. No pneumothorax or pleural effusion. The visualized upper abdomen is normal.     Vascular congestion. Left lower lobe infiltrate may represent developing pulmonary edema or possibly pneumonia depending on the clinical setting. Electronically Signed: Onel Diaz MD  6/2/2024 2:27 PM EDT  Workstation ID: [id number]      ECG 12 Lead Chest Pain   Preliminary Result   HEART RATE= 112  bpm   RR  "Interval= 536  ms   NJ Interval= 145  ms   P Horizontal Axis= 6  deg   P Front Axis= 59  deg   QRSD Interval= 82  ms   QT Interval= 366  ms   QTcB= 500  ms   QRS Axis= 14  deg   T Wave Axis= 171  deg   - ABNORMAL ECG -   Sinus tachycardia   Probable left atrial enlargement   Anteroseptal infarct, age indeterminate   When compared with ECG of 02-Jun-2024 13:04:43,   No significant change   Electronically Signed By:    Date and Time of Study: 2024-06-03 01:32:22           Current Medications     Scheduled Meds:  DAPTOmycin, 8 mg/kg (Adjusted), Intravenous, Q24H  enoxaparin, 40 mg, Subcutaneous, Daily  famotidine, 40 mg, Oral, Daily  furosemide, 40 mg, Intravenous, Daily  nicotine, 1 patch, Transdermal, Q24H  nitroglycerin, 0.5 inch, Topical, Q6H  sodium chloride, 10 mL, Intravenous, Q12H         Continuous Infusions:          MARY Pastor   [facility]  06/03/24   03:24 EDT       Electronically signed by Aicha Gerber APRN at 06/03/24 0546          Emergency Department Notes        Ursula Gutierrez RN at 06/03/24 0206          Pt reports chest pain and SOA, was admitted to PCU yesterday on the 2nd and left AMA for \"fresh air\" and called ems this morning to come back and be seen, pt states she has a history of using Fentanyl since she was 16 years old, pt states she has not used in three weeks. Bruising noted to both arms.     Electronically signed by Ursula Gutierrez RN at 06/03/24 0209       Florentino Holland MD at 06/03/24 0144          Subjective   History of Present Illness  48-year-old female admitted to this facility for endocarditis left AMA at 10:30 PM has come back to the ER for readmission.  Patient complains of the chest pain and dyspnea she had when she left the hospital.  EKG shows sinus tachycardia, rate 112, no ST elevation present      Review of Systems   Respiratory:  Positive for shortness of breath.    Cardiovascular:  Positive for chest pain.       Past Medical History:   Diagnosis " Date    Anxiety     Asthma     CHF (congestive heart failure)     COPD (chronic obstructive pulmonary disease)        Allergies   Allergen Reactions    Cephalexin Anaphylaxis     Required intubation    Latex Hives       Past Surgical History:   Procedure Laterality Date    CHOLECYSTECTOMY      COLONOSCOPY      ENDOSCOPY      HERNIA REPAIR      HYSTERECTOMY         Family History   Family history unknown: Yes       Social History     Socioeconomic History    Marital status: Legally    Tobacco Use    Smoking status: Every Day     Current packs/day: 3.00     Average packs/day: 3.0 packs/day for 30.4 years (91.3 ttl pk-yrs)     Types: Cigarettes     Start date: 1994    Smokeless tobacco: Never   Vaping Use    Vaping status: Never Used   Substance and Sexual Activity    Alcohol use: Never    Drug use: Yes     Frequency: 7.0 times per week     Types: Heroin     Comment: patient states she quit 3 weeks ago    Sexual activity: Defer           Objective   Physical Exam  Constitutional:       Appearance: She is well-developed.   HENT:      Head: Normocephalic and atraumatic.      Mouth/Throat:      Mouth: Mucous membranes are moist.      Pharynx: Oropharynx is clear.   Cardiovascular:      Rate and Rhythm: Tachycardia present.      Heart sounds: Normal heart sounds.   Pulmonary:      Effort: Pulmonary effort is normal.      Comments: Mild wheeze present  Musculoskeletal:         General: No swelling or tenderness. Normal range of motion.   Skin:     General: Skin is warm and dry.      Capillary Refill: Capillary refill takes less than 2 seconds.   Neurological:      General: No focal deficit present.      Mental Status: She is alert and oriented to person, place, and time.         Procedures          ED Course                                             Medical Decision Making  EKG without STEMI criteria, will readmit to the hospital service, case discussed with Aicha with hospital service, agrees with  plan.    Problems Addressed:  Endocarditis, unspecified chronicity, unspecified endocarditis type: acute illness or injury    Risk  Decision regarding hospitalization.        Final diagnoses:   Endocarditis, unspecified chronicity, unspecified endocarditis type       ED Disposition  ED Disposition       ED Disposition   Decision to Admit    Condition   --    Comment   Level of Care: Telemetry [5]   Admitting Physician: RADHA CROWE [[id number]]                 No follow-up provider specified.       Medication List      No changes were made to your prescriptions during this visit.            Florentino Holland MD  06/03/24 0145      Electronically signed by Florentino Holland MD at 06/03/24 0145       Vital Signs (last day)       Date/Time Temp Temp src Pulse Resp BP Patient Position SpO2    06/03/24 0839 -- -- 104 -- 104/63 -- --    06/03/24 0805 -- -- 110 35 123/81 Sitting 97    06/03/24 0749 -- -- 111 -- -- -- 95    06/03/24 0720 -- -- 89 -- -- -- 97    06/03/24 0543 -- -- 104 37 -- -- 97    06/03/24 0430 98 (36.7) Oral 100 -- 116/59 Lying 97    06/03/24 0330 -- -- 104 -- 118/62 -- 96    06/03/24 0327 -- -- 104 38 -- -- 99    06/03/24 0324 -- -- -- 38 -- -- --    06/03/24 0314 -- -- 110 -- 127/71 -- 95    06/03/24 0301 -- -- 111 -- 128/70 -- 94    06/03/24 0243 -- -- 117 -- -- -- 96    06/03/24 0211 -- -- 111 -- -- -- 95    06/03/24 0119 98 (36.7) Oral 115 19 135/82 Lying 99          Oxygen Therapy (last day)       Date/Time SpO2 Device (Oxygen Therapy) Flow (L/min) Oxygen Concentration (%) ETCO2 (mmHg)    06/03/24 0805 97 nasal cannula 2 -- --    06/03/24 0749 95 -- -- -- --    06/03/24 0720 97 -- -- -- --    06/03/24 0543 97 nasal cannula 0.5 -- --    06/03/24 0535 -- nasal cannula 0.5 -- --    06/03/24 0430 97 nasal cannula 0.5 -- --    06/03/24 0330 96 -- -- -- --    06/03/24 0327 99 nasal cannula 2 -- --    06/03/24 0324 -- nasal cannula 2 -- --    06/03/24 0314 95 -- -- -- --    06/03/24 0301 94 -- -- -- --     06/03/24 0243 96 -- -- -- --    06/03/24 0211 95 -- -- -- --    06/03/24 0119 99 nasal cannula 2 -- --          Facility-Administered Medications as of 6/3/2024   Medication Dose Route Frequency Provider Last Rate Last Admin    acetaminophen (TYLENOL) tablet 650 mg  650 mg Oral Q4H PRN Gerber, Aicha, APRN   650 mg at 06/03/24 0245    sennosides-docusate (PERICOLACE) 8.6-50 MG per tablet 2 tablet  2 tablet Oral BID PRN Gerber, Aicha, APRN        And    polyethylene glycol (MIRALAX) packet 17 g  17 g Oral Daily PRN Gerber, Aicha, APRN        And    bisacodyl (DULCOLAX) EC tablet 5 mg  5 mg Oral Daily PRN Gerber, Aicha, APRN        And    bisacodyl (DULCOLAX) suppository 10 mg  10 mg Rectal Daily PRN Gerber, Aicha, APRN        calcium carbonate (TUMS) chewable tablet 500 mg (200 mg elemental)  2 tablet Oral BID PRN Gerber, Aicha, APRN        cloNIDine (CATAPRES) tablet 0.1 mg  0.1 mg Oral 4x Daily PRN Gerber, Aicha, APRN   0.1 mg at 06/03/24 0839    Followed by    [START ON 6/4/2024] cloNIDine (CATAPRES) tablet 0.1 mg  0.1 mg Oral 4x Daily PRN Gerber, Aicha, APRN        Followed by    [START ON 6/5/2024] cloNIDine (CATAPRES) tablet 0.1 mg  0.1 mg Oral TID PRN Gerber, Aicha, APRN        Followed by    [START ON 6/6/2024] cloNIDine (CATAPRES) tablet 0.1 mg  0.1 mg Oral BID PRN Gerber, Aicha, APRN        Followed by    [START ON 6/7/2024] cloNIDine (CATAPRES) tablet 0.1 mg  0.1 mg Oral Once PRN Gerber, Aicha, APRN        DAPTOmycin (CUBICIN) 600 mg in sodium chloride 0.9 % 50 mL IVPB  8 mg/kg (Adjusted) Intravenous Q24H Aicha Gerber APRN        dicyclomine (BENTYL) capsule 10 mg  10 mg Oral TID PRN Aicha Gerber APRN        Enoxaparin Sodium (LOVENOX) syringe 40 mg  40 mg Subcutaneous Daily Aicha Gerber APRN        famotidine (PEPCID) tablet 40 mg  40 mg Oral Daily Aicha Gerber APRN   40 mg at 06/03/24 0803    furosemide (LASIX) injection 40 mg  40 mg Intravenous Daily  Brent Gerbera, APRN   40 mg at 06/03/24 0803    hydrOXYzine (ATARAX) tablet 50 mg  50 mg Oral TID PRN Brent Gerbera, APRN        ipratropium-albuterol (DUO-NEB) nebulizer solution 3 mL  3 mL Nebulization Q4H PRN Brent Gerbera, APRN   3 mL at 06/03/24 0324    [COMPLETED] LORazepam (ATIVAN) injection 1 mg  1 mg Intravenous Once Brent Gerbera, APRN   1 mg at 06/03/24 0841    morphine injection 2 mg  2 mg Intravenous Q4H PRN Gerber, Aicha, APRN   2 mg at 06/03/24 0803    nicotine (NICODERM CQ) 21 MG/24HR patch 1 patch  1 patch Transdermal Q24H Brent Gerbera, APRN   1 patch at 06/03/24 0245    nitroglycerin (NITROSTAT) ointment 0.5 inch  0.5 inch Topical Q6H Brent Gerbera, APRN   0.5 inch at 06/03/24 0532    nitroglycerin (NITROSTAT) SL tablet 0.4 mg  0.4 mg Sublingual Q5 Min PRN Brent Gerbera, APRN        ondansetron (ZOFRAN) injection 4 mg  4 mg Intravenous Q6H PRN Zabrina, Aicha, APRN   4 mg at 06/03/24 0818    [COMPLETED] potassium chloride (KLOR-CON M20) CR tablet 40 mEq  40 mEq Oral Q4H Florentino Holland MD   40 mEq at 06/03/24 0803    Potassium Replacement - Follow Nurse / BPA Driven Protocol   Does not apply PRN Gerber, Aicha, APRN        sodium chloride 0.9 % flush 10 mL  10 mL Intravenous Q12H Brent Gerbera, APRN   10 mL at 06/03/24 0819    sodium chloride 0.9 % flush 10 mL  10 mL Intravenous PRN Gerber, Aicha, APRN   10 mL at 06/03/24 0256    sodium chloride 0.9 % infusion 40 mL  40 mL Intravenous PRN Eric Gerberessa, APRN         Lab Results (last 24 hours)       Procedure Component Value Units Date/Time    High Sensitivity Troponin T 2Hr [[id number]]  (Abnormal) Collected: 06/03/24 0417    Specimen: Blood Updated: 06/03/24 0444     HS Troponin T 43 ng/L      Troponin T Delta -1 ng/L     Narrative:      High Sensitive Troponin T Reference Range:  <14.0 ng/L- Negative Female for AMI  <22.0 ng/L- Negative Male for AMI  >=14 - Abnormal Female indicating possible myocardial  injury.  >=22 - Abnormal Male indicating possible myocardial injury.   Clinicians would have to utilize clinical acumen, EKG, Troponin, and serial changes to determine if it is an Acute Myocardial Infarction or myocardial injury due to an underlying chronic condition.         Comprehensive Metabolic Panel [[id number]]  (Abnormal) Collected: 06/03/24 0209    Specimen: Blood from Arm, Left Updated: 06/03/24 0341     Glucose 117 mg/dL      BUN 14 mg/dL      Creatinine 1.39 mg/dL      Sodium 139 mmol/L      Potassium 3.5 mmol/L      Chloride 100 mmol/L      CO2 23.7 mmol/L      Calcium 8.3 mg/dL      Total Protein 7.8 g/dL      Albumin 3.2 g/dL      ALT (SGPT) 27 U/L      AST (SGOT) 23 U/L      Alkaline Phosphatase 111 U/L      Total Bilirubin 0.3 mg/dL      Globulin 4.6 gm/dL      A/G Ratio 0.7 g/dL      BUN/Creatinine Ratio 10.1     Anion Gap 15.3 mmol/L      eGFR 47.5 mL/min/1.73     Narrative:      GFR Normal >60  Chronic Kidney Disease <60  Kidney Failure <15      Urine Drug Screen - Urine, Clean Catch [[id number]]  (Abnormal) Collected: 06/03/24 0250    Specimen: Urine, Clean Catch Updated: 06/03/24 0323     Amphet/Methamphet, Screen Negative     Barbiturates Screen, Urine Positive     Benzodiazepine Screen, Urine Positive     Cocaine Screen, Urine Negative     Opiate Screen Negative     THC, Screen, Urine Negative     Methadone Screen, Urine Negative     Oxycodone Screen, Urine Negative    Narrative:      Negative Thresholds Per Drugs Screened:    Amphetamines                 500 ng/ml  Barbiturates                 200 ng/ml  Benzodiazepines              100 ng/ml  Cocaine                      300 ng/ml  Methadone                    300 ng/ml  Opiates                      300 ng/ml  Oxycodone                    100 ng/ml  THC                           50 ng/ml    The Normal Value for all drugs tested is negative. This report includes final unconfirmed screening results to be used for medical treatment purposes  only. Unconfirmed results must not be used for non-medical purposes such as employment or legal testing. Clinical consideration should be applied to any drug of abuse test, particularly when unconfirmed results are used.          All urine drugs of abuse requests without chain of custody are for medical screening purposes only.  False positives are possible.      High Sensitivity Troponin T [[id number]]  (Abnormal) Collected: 06/03/24 0209    Specimen: Blood from Arm, Left Updated: 06/03/24 0239     HS Troponin T 44 ng/L     Narrative:      High Sensitive Troponin T Reference Range:  <14.0 ng/L- Negative Female for AMI  <22.0 ng/L- Negative Male for AMI  >=14 - Abnormal Female indicating possible myocardial injury.  >=22 - Abnormal Male indicating possible myocardial injury.   Clinicians would have to utilize clinical acumen, EKG, Troponin, and serial changes to determine if it is an Acute Myocardial Infarction or myocardial injury due to an underlying chronic condition.         Extra Tubes [[id number]] Collected: 06/03/24 0209    Specimen: Blood from Arm, Left Updated: 06/03/24 0215    Narrative:      The following orders were created for panel order Extra Tubes.  Procedure                               Abnormality         Status                     ---------                               -----------         ------                     Gold Top - SST[[id number]]                                   Final result               Light Blue Top[[id number]]                                   Final result                 Please view results for these tests on the individual orders.    Gold Top - SST [[id number]] Collected: 06/03/24 0209    Specimen: Blood from Arm, Left Updated: 06/03/24 0215     Extra Tube Hold for add-ons.     Comment: Auto resulted.       Light Blue Top [[id number]] Collected: 06/03/24 0209    Specimen: Blood from Arm, Left Updated: 06/03/24 0215     Extra Tube Hold for add-ons.     Comment: Auto resulted        CBC & Differential [[id number]]  (Abnormal) Collected: 06/03/24 0209    Specimen: Blood from Arm, Left Updated: 06/03/24 0215    Narrative:      The following orders were created for panel order CBC & Differential.  Procedure                               Abnormality         Status                     ---------                               -----------         ------                     CBC Auto Differential[[id number]]        Abnormal            Final result                 Please view results for these tests on the individual orders.    CBC Auto Differential [[id number]]  (Abnormal) Collected: 06/03/24 0209    Specimen: Blood from Arm, Left Updated: 06/03/24 0215     WBC 17.54 10*3/mm3      RBC 3.32 10*6/mm3      Hemoglobin 8.9 g/dL      Hematocrit 29.2 %      MCV 88.0 fL      MCH 26.8 pg      MCHC 30.5 g/dL      RDW 16.2 %      RDW-SD 46.5 fl      MPV 9.2 fL      Platelets 457 10*3/mm3      Neutrophil % 79.4 %      Lymphocyte % 10.7 %      Monocyte % 8.4 %      Eosinophil % 0.3 %      Basophil % 0.3 %      Immature Grans % 0.9 %      Neutrophils, Absolute 13.91 10*3/mm3      Lymphocytes, Absolute 1.88 10*3/mm3      Monocytes, Absolute 1.48 10*3/mm3      Eosinophils, Absolute 0.06 10*3/mm3      Basophils, Absolute 0.05 10*3/mm3      Immature Grans, Absolute 0.16 10*3/mm3      nRBC 0.0 /100 WBC           Imaging Results (Last 24 Hours)       ** No results found for the last 24 hours. **          Operative/Procedure Notes (all)    No notes of this type exist for this encounter.       Physician Progress Notes (all)    No notes of this type exist for this encounter.       Consult Notes (all)    No notes of this type exist for this encounter.

## 2024-06-03 NOTE — ED PROVIDER NOTES
Subjective   History of Present Illness  48-year-old female admitted to this facility for endocarditis left AMA at 10:30 PM has come back to the ER for readmission.  Patient complains of the chest pain and dyspnea she had when she left the hospital.  EKG shows sinus tachycardia, rate 112, no ST elevation present      Review of Systems   Respiratory:  Positive for shortness of breath.    Cardiovascular:  Positive for chest pain.       Past Medical History:   Diagnosis Date    Anxiety     Asthma     CHF (congestive heart failure)     COPD (chronic obstructive pulmonary disease)        Allergies   Allergen Reactions    Cephalexin Anaphylaxis     Required intubation    Latex Hives       Past Surgical History:   Procedure Laterality Date    CHOLECYSTECTOMY      COLONOSCOPY      ENDOSCOPY      HERNIA REPAIR      HYSTERECTOMY         Family History   Family history unknown: Yes       Social History     Socioeconomic History    Marital status: Legally    Tobacco Use    Smoking status: Every Day     Current packs/day: 3.00     Average packs/day: 3.0 packs/day for 30.4 years (91.3 ttl pk-yrs)     Types: Cigarettes     Start date: 1994    Smokeless tobacco: Never   Vaping Use    Vaping status: Never Used   Substance and Sexual Activity    Alcohol use: Never    Drug use: Yes     Frequency: 7.0 times per week     Types: Heroin     Comment: patient states she quit 3 weeks ago    Sexual activity: Defer           Objective   Physical Exam  Constitutional:       Appearance: She is well-developed.   HENT:      Head: Normocephalic and atraumatic.      Mouth/Throat:      Mouth: Mucous membranes are moist.      Pharynx: Oropharynx is clear.   Cardiovascular:      Rate and Rhythm: Tachycardia present.      Heart sounds: Normal heart sounds.   Pulmonary:      Effort: Pulmonary effort is normal.      Comments: Mild wheeze present  Musculoskeletal:         General: No swelling or tenderness. Normal range of motion.   Skin:      General: Skin is warm and dry.      Capillary Refill: Capillary refill takes less than 2 seconds.   Neurological:      General: No focal deficit present.      Mental Status: She is alert and oriented to person, place, and time.         Procedures           ED Course                                             Medical Decision Making  EKG without STEMI criteria, will readmit to the hospital service, case discussed with Aicha with hospital service, agrees with plan.    Problems Addressed:  Endocarditis, unspecified chronicity, unspecified endocarditis type: acute illness or injury    Risk  Decision regarding hospitalization.        Final diagnoses:   Endocarditis, unspecified chronicity, unspecified endocarditis type       ED Disposition  ED Disposition       ED Disposition   Decision to Admit    Condition   --    Comment   Level of Care: Telemetry [5]   Admitting Physician: RADHA CROWE [[id number]]                 No follow-up provider specified.       Medication List      No changes were made to your prescriptions during this visit.            Florentino Holland MD  06/03/24 0145

## 2024-06-03 NOTE — CASE MANAGEMENT/SOCIAL WORK
Discharge Planning Assessment   [facility]     Patient Name: Марина Tilley  MRN: [id number]  Today's Date: 6/3/2024    Admit Date: 6/3/2024    Plan: Home, Watch for O2 needs.   Discharge Needs Assessment       Row Name 06/03/24 1223       Living Environment    People in Home sibling(s)    Name(s) of People in Home Sister Naomi and her family    Current Living Arrangements home    Potentially Unsafe Housing Conditions none    In the past 12 months has the electric, gas, oil, or water company threatened to shut off services in your home? No    Primary Care Provided by self    Provides Primary Care For no one    Family Caregiver if Needed sibling(s)    Family Caregiver Names Sister Naomi    Quality of Family Relationships helpful;involved;supportive    Able to Return to Prior Arrangements yes    Living Arrangement Comments Pt. lives with Sister Naomi and her family       Resource/Environmental Concerns    Resource/Environmental Concerns none    Transportation Concerns none       Transportation Needs    In the past 12 months, has lack of transportation kept you from medical appointments or from getting medications? no    In the past 12 months, has lack of transportation kept you from meetings, work, or from getting things needed for daily living? No       Food Insecurity    Within the past 12 months, you worried that your food would run out before you got the money to buy more. Never true    Within the past 12 months, the food you bought just didn't last and you didn't have money to get more. Never true       Transition Planning    Patient/Family Anticipates Transition to home with family    Transportation Anticipated family or friend will provide  Sister Naomi can transport at d/c.       Discharge Needs Assessment    Readmission Within the Last 30 Days other (see comments)  Pt. left AMA    Equipment Currently Used at Home nebulizer;grab bar                   Discharge Plan       Row Name 06/03/24 5736       Plan    Plan  Home, Watch for O2 needs.    Patient/Family in Agreement with Plan yes    Provided Post Acute Provider List? N/A    Provided Post Acute Provider Quality & Resource List? N/A    Plan Comments CM spoke to pt. at bedside. PCP and pharmacy confirmed. Pt. denies financial, transportation, or medication issues. Pt. wants M2B. Sister Naomi can transport at d/c. Watch for O2 needs. DC barriers: Cardiology consult, ID consult, IV meds.                Demographic Summary       Row Name 06/03/24 1111       General Information    Admission Type inpatient    Arrived From home    Referral Source admission list    Reason for Consult discharge planning    Preferred Language English       Contact Information    Permission Granted to Share Info With     Contact Information Obtained for                    Functional Status       Row Name 06/03/24 1218       Functional Status    Usual Activity Tolerance moderate    Current Activity Tolerance fair       Physical Activity    On average, how many days per week do you engage in moderate to strenuous exercise (like a brisk walk)? 7 days    On average, how many minutes do you engage in exercise at this level? 40 min    Number of minutes of exercise per week 280       Functional Status, IADL    Medications independent    Meal Preparation assistive person  Sister Naomi    Housekeeping assistive person  Sister Naomi    Laundry assistive person  Sister Naomi    Shopping assistive person  Sister Naomi                    Substance Abuse       Row Name 06/03/24 1049       Substance Use    Substance Use Status current street drug/inhalant/medication abuse    Longest Period of Street Drug Sobriety 8 years with MAT    Previous Substance Use Treatment detox unit;inpatient rehab;intensive outpatient;medication-assisted treatment;outpatient rehab    Substance Use Comment [facility] consulted for substance abuse. [facility] met with pt at bedside. Pt reported using heroin/fentanyl since she was 16  years old.  Pt reported an extensive hx of inpt and outpt tx.  Pt reported using buprenorphine for MAT for 8 years.  Pt does not want any tx or MAT at this time.   Pt stated she quit using street drugs 3 weeks ago. Pt stated, “I’m done.  I don’t want to die.”  Pt stated she will not leave AMA this time.  Pt declined any resources for substance abuse.  No further needs at this time.               Social Determinants of Health     Tobacco Use: High Risk (6/3/2024)    Patient History     Smoking Tobacco Use: Every Day     Smokeless Tobacco Use: Never     Passive Exposure: Not on file   Alcohol Use: Not At Risk (6/3/2024)    AUDIT-C     Frequency of Alcohol Consumption: Never     Average Number of Drinks: Patient does not drink     Frequency of Binge Drinking: Never   Financial Resource Strain: Low Risk  (6/3/2024)    Overall Financial Resource Strain (CARDIA)     Difficulty of Paying Living Expenses: Not hard at all   Food Insecurity: No Food Insecurity (6/3/2024)    Hunger Vital Sign     Worried About Running Out of Food in the Last Year: Never true     Ran Out of Food in the Last Year: Never true   Transportation Needs: No Transportation Needs (6/3/2024)    PRAPARE - Transportation     Lack of Transportation (Medical): No     Lack of Transportation (Non-Medical): No   Physical Activity: Sufficiently Active (6/3/2024)    Exercise Vital Sign     Days of Exercise per Week: 7 days     Minutes of Exercise per Session: 40 min   Stress: No Stress Concern Present (6/3/2024)    [employer] [place] [employer] - Occupational Stress Questionnaire     Feeling of Stress : Not at all   Social Connections: Not At Risk (6/3/2024)    Family and Community Support     Help with Day-to-Day Activities: I don't need any help     Lonely or Isolated: Rarely   Interpersonal Safety: Not At Risk (6/3/2024)    Abuse Screen     Unsafe at Home or Work/School: no     Feels Threatened by Someone?: no     Does Anyone Keep You from  Contacting Others or Doint Things Outside the Home?: no     Physical Sign of Abuse Present: no   Depression: Not at risk (6/3/2024)    PHQ-2     PHQ-2 Score: 1   Housing Stability: Not At Risk (6/3/2024)    Housing Stability     Current Living Arrangements: home     Potentially Unsafe Housing Conditions: none   Utilities: Not At Risk (6/3/2024)    C Utilities     Threatened with loss of utilities: No   Health Literacy: Not At Risk (6/3/2024)    Education     Help with school or training?: No     Preferred Language: English   Employment: Not At Risk (6/3/2024)    Employment     Do you want help finding or keeping work or a job?: I do not need or want help   Disabilities: Not At Risk (6/3/2024)    Disabilities     Concentrating, Remembering, or Making Decisions Difficulty: no     Doing Errands Independently Difficulty: no       Hilda Ochoa RN    Office: [phone]  Fax: [phone]  [email or web address]

## 2024-06-03 NOTE — NURSING NOTE
"This CRN received call from ED CRN. ED CRN advised that pt is reporting that we have called her & asked for her to come back to her room for treatment. Advised ED CRN that pt willingly left AMA. Pt was offered a nicotine patch after smoking in pt room & declined nicotine patch. Pt stated that was not the issue, that she needed fresh air. Even after being educated on hospital policy regarding leaving the unit, pt stated that \"she can't do it\" & that she \"needs fresh air\". Pt room & bathroom smelled strongly of smoke upon pt's AMA discharge. This CRN advised ED CRN that pt was not asked to return to unit as she has signed out AMA. Advised pt will need to be seen through the ED again at this time for readmission.  "

## 2024-06-03 NOTE — LETTER
EMS Transport Request  For use at [facility], [place], [place], [place], and [place] only   Patient Name: Марина Tilley : 1975   Weight:92.5 kg (203 lb 14.8 oz) Pick-up Location: [facility] BLS/ALS: BLS/ALS: BLS   Insurance: [employer] Auth End Date:    Pre-Cert #: D/C Summary complete:    [facility] [place] [facility]   Contact Precautions: None   Equipment (O2, Fluids, etc.): O2, settings 2L    Arrive By Date/Time: 24 1620 Stretcher/WC: Stretcher   CM Requesting: Brittany Blanc RN Ext: [phone]   Notes/Medical Necessity: Patient to be transferred to [facility] for Oral Surgery- periapical abscess of right molar. Patient in need of oral surgery prior to Heart Valve surgery, evaluation by Cardiothoracic surgery for Homograft. Patient needs 6 weeks IV abx. Recent History of drug use.      ______________________________________________________________________    *Only 2 patient bags OR 1 carry-on size bag are permitted.  Wheelchairs and walkers CANNOT transported with the patient. Acknowledge: Yes

## 2024-06-03 NOTE — NURSING NOTE
"Pt requesting to leave unit & go outside. Pt room smells strongly of smoke. Advised pt that once admitted as a patient, we cannot allow her to leave the unit. Pt stated that she was leaving, that she cannot do it. Advised pt that we would be more than happy to provide her with a nicotine patch. Pt stated \"that's not it, I need fresh air\". Educated pt on facility policy regarding leaving the unit. Pt stated again that she would like to leave. Advised pt she would need to sign out AMA & let us remove her IV. Pt requested for transport. Advised pt, once signing out AMA, she would not be able to have transport & would need to leave the hospital without staff assist.  notified of pt's wish to leave AMA.  "

## 2024-06-03 NOTE — ED NOTES
"Pt reports chest pain and SOA, was admitted to PCU yesterday on the 2nd and left AMA for \"fresh air\" and called ems this morning to come back and be seen, pt states she has a history of using Fentanyl since she was 16 years old, pt states she has not used in three weeks. Bruising noted to both arms.   "

## 2024-06-04 ENCOUNTER — APPOINTMENT (OUTPATIENT)
Dept: CARDIOLOGY | Facility: HOSPITAL | Age: 49
End: 2024-06-04
Payer: MEDICAID

## 2024-06-04 ENCOUNTER — APPOINTMENT (OUTPATIENT)
Dept: MRI IMAGING | Facility: HOSPITAL | Age: 49
End: 2024-06-04
Payer: MEDICAID

## 2024-06-04 ENCOUNTER — ANESTHESIA EVENT (OUTPATIENT)
Dept: CARDIOLOGY | Facility: HOSPITAL | Age: 49
End: 2024-06-04
Payer: MEDICAID

## 2024-06-04 ENCOUNTER — ANESTHESIA (OUTPATIENT)
Dept: CARDIOLOGY | Facility: HOSPITAL | Age: 49
End: 2024-06-04
Payer: MEDICAID

## 2024-06-04 LAB
ALBUMIN SERPL-MCNC: 3.1 G/DL (ref 3.5–5.2)
ALBUMIN/GLOB SERPL: 0.7 G/DL
ALP SERPL-CCNC: 107 U/L (ref 39–117)
ALT SERPL W P-5'-P-CCNC: 32 U/L (ref 1–33)
ANION GAP SERPL CALCULATED.3IONS-SCNC: 13.3 MMOL/L (ref 5–15)
AST SERPL-CCNC: 32 U/L (ref 1–32)
BASOPHILS # BLD AUTO: 0.08 10*3/MM3 (ref 0–0.2)
BASOPHILS NFR BLD AUTO: 0.5 % (ref 0–1.5)
BILIRUB SERPL-MCNC: 0.3 MG/DL (ref 0–1.2)
BUN SERPL-MCNC: 20 MG/DL (ref 6–20)
BUN/CREAT SERPL: 11.7 (ref 7–25)
CALCIUM SPEC-SCNC: 7.8 MG/DL (ref 8.6–10.5)
CHLORIDE SERPL-SCNC: 99 MMOL/L (ref 98–107)
CK SERPL-CCNC: 64 U/L (ref 20–180)
CO2 SERPL-SCNC: 24.7 MMOL/L (ref 22–29)
CREAT SERPL-MCNC: 1.71 MG/DL (ref 0.57–1)
DEPRECATED RDW RBC AUTO: 51.8 FL (ref 37–54)
EGFRCR SERPLBLD CKD-EPI 2021: 37 ML/MIN/1.73
EOSINOPHIL # BLD AUTO: 0.1 10*3/MM3 (ref 0–0.4)
EOSINOPHIL NFR BLD AUTO: 0.6 % (ref 0.3–6.2)
ERYTHROCYTE [DISTWIDTH] IN BLOOD BY AUTOMATED COUNT: 17 % (ref 12.3–15.4)
GLOBULIN UR ELPH-MCNC: 4.4 GM/DL
GLUCOSE SERPL-MCNC: 165 MG/DL (ref 65–99)
HCT VFR BLD AUTO: 26.4 % (ref 34–46.6)
HGB BLD-MCNC: 8 G/DL (ref 12–15.9)
IMM GRANULOCYTES # BLD AUTO: 0.14 10*3/MM3 (ref 0–0.05)
IMM GRANULOCYTES NFR BLD AUTO: 0.9 % (ref 0–0.5)
LYMPHOCYTES # BLD AUTO: 2.38 10*3/MM3 (ref 0.7–3.1)
LYMPHOCYTES NFR BLD AUTO: 15.3 % (ref 19.6–45.3)
MCH RBC QN AUTO: 27.7 PG (ref 26.6–33)
MCHC RBC AUTO-ENTMCNC: 30.3 G/DL (ref 31.5–35.7)
MCV RBC AUTO: 91.3 FL (ref 79–97)
MONOCYTES # BLD AUTO: 1.4 10*3/MM3 (ref 0.1–0.9)
MONOCYTES NFR BLD AUTO: 9 % (ref 5–12)
NEUTROPHILS NFR BLD AUTO: 11.49 10*3/MM3 (ref 1.7–7)
NEUTROPHILS NFR BLD AUTO: 73.7 % (ref 42.7–76)
NRBC BLD AUTO-RTO: 0.2 /100 WBC (ref 0–0.2)
PLATELET # BLD AUTO: 386 10*3/MM3 (ref 140–450)
PMV BLD AUTO: 9.7 FL (ref 6–12)
POTASSIUM SERPL-SCNC: 4.3 MMOL/L (ref 3.5–5.2)
PROT SERPL-MCNC: 7.5 G/DL (ref 6–8.5)
RBC # BLD AUTO: 2.89 10*6/MM3 (ref 3.77–5.28)
SODIUM SERPL-SCNC: 137 MMOL/L (ref 136–145)
WBC NRBC COR # BLD AUTO: 15.59 10*3/MM3 (ref 3.4–10.8)

## 2024-06-04 PROCEDURE — 82550 ASSAY OF CK (CPK): CPT | Performed by: INTERNAL MEDICINE

## 2024-06-04 PROCEDURE — 93312 ECHO TRANSESOPHAGEAL: CPT | Performed by: INTERNAL MEDICINE

## 2024-06-04 PROCEDURE — 25010000002 PHENYLEPHRINE 10 MG/ML SOLUTION: Performed by: NURSE ANESTHETIST, CERTIFIED REGISTERED

## 2024-06-04 PROCEDURE — 25010000002 MORPHINE PER 10 MG: Performed by: NURSE PRACTITIONER

## 2024-06-04 PROCEDURE — 72148 MRI LUMBAR SPINE W/O DYE: CPT

## 2024-06-04 PROCEDURE — 94799 UNLISTED PULMONARY SVC/PX: CPT

## 2024-06-04 PROCEDURE — 94664 DEMO&/EVAL PT USE INHALER: CPT

## 2024-06-04 PROCEDURE — 94761 N-INVAS EAR/PLS OXIMETRY MLT: CPT

## 2024-06-04 PROCEDURE — 25010000002 DAPTOMYCIN PER 1 MG: Performed by: INTERNAL MEDICINE

## 2024-06-04 PROCEDURE — 25810000003 SODIUM CHLORIDE 0.9 % SOLUTION: Performed by: NURSE ANESTHETIST, CERTIFIED REGISTERED

## 2024-06-04 PROCEDURE — 85025 COMPLETE CBC W/AUTO DIFF WBC: CPT | Performed by: INTERNAL MEDICINE

## 2024-06-04 PROCEDURE — 93320 DOPPLER ECHO COMPLETE: CPT | Performed by: INTERNAL MEDICINE

## 2024-06-04 PROCEDURE — 93312 ECHO TRANSESOPHAGEAL: CPT

## 2024-06-04 PROCEDURE — 25810000003 SODIUM CHLORIDE 0.9 % SOLUTION: Performed by: NURSE PRACTITIONER

## 2024-06-04 PROCEDURE — 25010000002 PROPOFOL 1000 MG/100ML EMULSION: Performed by: NURSE ANESTHETIST, CERTIFIED REGISTERED

## 2024-06-04 PROCEDURE — 25010000002 PROPOFOL 200 MG/20ML EMULSION: Performed by: NURSE ANESTHETIST, CERTIFIED REGISTERED

## 2024-06-04 PROCEDURE — 25010000002 VANCOMYCIN 10 G RECONSTITUTED SOLUTION: Performed by: NURSE PRACTITIONER

## 2024-06-04 PROCEDURE — 25010000002 ENOXAPARIN PER 10 MG: Performed by: NURSE PRACTITIONER

## 2024-06-04 PROCEDURE — 93325 DOPPLER ECHO COLOR FLOW MAPG: CPT | Performed by: INTERNAL MEDICINE

## 2024-06-04 PROCEDURE — 80053 COMPREHEN METABOLIC PANEL: CPT | Performed by: INTERNAL MEDICINE

## 2024-06-04 PROCEDURE — B24BZZ4 ULTRASONOGRAPHY OF HEART WITH AORTA, TRANSESOPHAGEAL: ICD-10-PCS | Performed by: INTERNAL MEDICINE

## 2024-06-04 PROCEDURE — 93325 DOPPLER ECHO COLOR FLOW MAPG: CPT

## 2024-06-04 PROCEDURE — 93320 DOPPLER ECHO COMPLETE: CPT

## 2024-06-04 PROCEDURE — 99232 SBSQ HOSP IP/OBS MODERATE 35: CPT | Performed by: INTERNAL MEDICINE

## 2024-06-04 RX ORDER — LIDOCAINE HYDROCHLORIDE 20 MG/ML
INJECTION, SOLUTION INTRAVENOUS AS NEEDED
Status: DISCONTINUED | OUTPATIENT
Start: 2024-06-04 | End: 2024-06-04 | Stop reason: SURG

## 2024-06-04 RX ORDER — PROPOFOL 10 MG/ML
INJECTION, EMULSION INTRAVENOUS CONTINUOUS PRN
Status: DISCONTINUED | OUTPATIENT
Start: 2024-06-04 | End: 2024-06-04 | Stop reason: SURG

## 2024-06-04 RX ORDER — ALPRAZOLAM 0.5 MG/1
0.5 TABLET ORAL ONCE
Status: COMPLETED | OUTPATIENT
Start: 2024-06-04 | End: 2024-06-04

## 2024-06-04 RX ORDER — PROPOFOL 10 MG/ML
INJECTION, EMULSION INTRAVENOUS AS NEEDED
Status: DISCONTINUED | OUTPATIENT
Start: 2024-06-04 | End: 2024-06-04 | Stop reason: SURG

## 2024-06-04 RX ORDER — SODIUM CHLORIDE 9 MG/ML
INJECTION, SOLUTION INTRAVENOUS CONTINUOUS PRN
Status: DISCONTINUED | OUTPATIENT
Start: 2024-06-04 | End: 2024-06-04 | Stop reason: SURG

## 2024-06-04 RX ORDER — EPHEDRINE SULFATE 5 MG/ML
INJECTION INTRAVENOUS AS NEEDED
Status: DISCONTINUED | OUTPATIENT
Start: 2024-06-04 | End: 2024-06-04 | Stop reason: SURG

## 2024-06-04 RX ORDER — PHENYLEPHRINE HYDROCHLORIDE 10 MG/ML
INJECTION INTRAVENOUS AS NEEDED
Status: DISCONTINUED | OUTPATIENT
Start: 2024-06-04 | End: 2024-06-04 | Stop reason: SURG

## 2024-06-04 RX ORDER — VANCOMYCIN 1.75 GRAM/500 ML IN 0.9 % SODIUM CHLORIDE INTRAVENOUS
20 ONCE
Status: DISCONTINUED | OUTPATIENT
Start: 2024-06-04 | End: 2024-06-04

## 2024-06-04 RX ORDER — VANCOMYCIN 2 GRAM/500 ML IN 0.9 % SODIUM CHLORIDE INTRAVENOUS
22 ONCE
Status: COMPLETED | OUTPATIENT
Start: 2024-06-04 | End: 2024-06-04

## 2024-06-04 RX ADMIN — NITROGLYCERIN 0.5 INCH: 20 OINTMENT TOPICAL at 06:24

## 2024-06-04 RX ADMIN — SODIUM CHLORIDE: 9 INJECTION, SOLUTION INTRAVENOUS at 13:18

## 2024-06-04 RX ADMIN — MORPHINE SULFATE 2 MG: 2 INJECTION, SOLUTION INTRAMUSCULAR; INTRAVENOUS at 19:54

## 2024-06-04 RX ADMIN — EPHEDRINE SULFATE 5 MG: 5 INJECTION INTRAVENOUS at 13:43

## 2024-06-04 RX ADMIN — PHENYLEPHRINE HYDROCHLORIDE 200 MCG: 10 INJECTION INTRAVENOUS at 13:31

## 2024-06-04 RX ADMIN — Medication 1 PATCH: at 08:21

## 2024-06-04 RX ADMIN — PHENYLEPHRINE HYDROCHLORIDE 200 MCG: 10 INJECTION INTRAVENOUS at 13:34

## 2024-06-04 RX ADMIN — Medication 10 ML: at 08:21

## 2024-06-04 RX ADMIN — PHENYLEPHRINE HYDROCHLORIDE 100 MCG: 10 INJECTION INTRAVENOUS at 13:28

## 2024-06-04 RX ADMIN — VANCOMYCIN HYDROCHLORIDE 2000 MG: 10 INJECTION, POWDER, LYOPHILIZED, FOR SOLUTION INTRAVENOUS at 18:38

## 2024-06-04 RX ADMIN — PHENYLEPHRINE HYDROCHLORIDE 200 MCG: 10 INJECTION INTRAVENOUS at 13:49

## 2024-06-04 RX ADMIN — LIDOCAINE HYDROCHLORIDE 60 MG: 20 INJECTION, SOLUTION INTRAVENOUS at 13:25

## 2024-06-04 RX ADMIN — MORPHINE SULFATE 2 MG: 2 INJECTION, SOLUTION INTRAMUSCULAR; INTRAVENOUS at 02:24

## 2024-06-04 RX ADMIN — ALPRAZOLAM 0.5 MG: 0.5 TABLET ORAL at 09:56

## 2024-06-04 RX ADMIN — EPHEDRINE SULFATE 5 MG: 5 INJECTION INTRAVENOUS at 13:37

## 2024-06-04 RX ADMIN — PROPOFOL INJECTABLE EMULSION 100 MCG/KG/MIN: 10 INJECTION, EMULSION INTRAVENOUS at 13:25

## 2024-06-04 RX ADMIN — EPHEDRINE SULFATE 5 MG: 5 INJECTION INTRAVENOUS at 13:49

## 2024-06-04 RX ADMIN — IPRATROPIUM BROMIDE AND ALBUTEROL SULFATE 3 ML: .5; 3 SOLUTION RESPIRATORY (INHALATION) at 09:50

## 2024-06-04 RX ADMIN — Medication 10 ML: at 20:01

## 2024-06-04 RX ADMIN — ENOXAPARIN SODIUM 40 MG: 100 INJECTION SUBCUTANEOUS at 15:12

## 2024-06-04 RX ADMIN — IPRATROPIUM BROMIDE AND ALBUTEROL SULFATE 3 ML: .5; 3 SOLUTION RESPIRATORY (INHALATION) at 23:30

## 2024-06-04 RX ADMIN — PHENYLEPHRINE HYDROCHLORIDE 200 MCG: 10 INJECTION INTRAVENOUS at 13:46

## 2024-06-04 RX ADMIN — MORPHINE SULFATE 2 MG: 2 INJECTION, SOLUTION INTRAMUSCULAR; INTRAVENOUS at 15:12

## 2024-06-04 RX ADMIN — DAPTOMYCIN 750 MG: 500 INJECTION, POWDER, LYOPHILIZED, FOR SOLUTION INTRAVENOUS at 15:12

## 2024-06-04 RX ADMIN — EPHEDRINE SULFATE 5 MG: 5 INJECTION INTRAVENOUS at 13:34

## 2024-06-04 RX ADMIN — PHENYLEPHRINE HYDROCHLORIDE 200 MCG: 10 INJECTION INTRAVENOUS at 13:43

## 2024-06-04 RX ADMIN — PHENYLEPHRINE HYDROCHLORIDE 100 MCG: 10 INJECTION INTRAVENOUS at 13:25

## 2024-06-04 RX ADMIN — MORPHINE SULFATE 2 MG: 2 INJECTION, SOLUTION INTRAMUSCULAR; INTRAVENOUS at 06:24

## 2024-06-04 RX ADMIN — PROPOFOL 50 MG: 10 INJECTION, EMULSION INTRAVENOUS at 13:25

## 2024-06-04 RX ADMIN — PHENYLEPHRINE HYDROCHLORIDE 200 MCG: 10 INJECTION INTRAVENOUS at 13:37

## 2024-06-04 NOTE — PLAN OF CARE
Goal Outcome Evaluation:  Plan of Care Reviewed With: patient        Progress: no change  Outcome Evaluation: Pt resting in bed with c/o gen pain, PRN meds given. MRI and LETICIA performed. IV abx given.

## 2024-06-04 NOTE — ANESTHESIA PREPROCEDURE EVALUATION
Anesthesia Evaluation     Patient summary reviewed and Nursing notes reviewed   no history of anesthetic complications:   NPO Solid Status: > 8 hours  NPO Liquid Status: > 2 hours           Airway   Dental      Pulmonary    (+) a smoker Current, COPD, asthma,  Cardiovascular     ECG reviewed    (+) valvular problems/murmurs AI, TI and MR, CHF       Neuro/Psych  (+) psychiatric history Anxiety  GI/Hepatic/Renal/Endo    (+) obesity, renal disease- CRI    Musculoskeletal     Abdominal    Substance History   (+) drug use     OB/GYN          Other   blood dyscrasia anemia,     ROS/Med Hx Other: Additional History:  Hypocalcemia, low albumin, endocarditis    Heroin abuse    Echo:  Echocardiogram Findings    Left Ventricle Left ventricular systolic function is mildly decreased. Left ventricular ejection fraction appears to be 51 - 55%.     Normal left ventricular cavity size and wall thickness noted. All left ventricular wall segments contract normally. Left ventricular diastolic function was normal. Normal left atrial pressure. No evidence of left ventricular thrombus or mass present.  Right Ventricle Normal right ventricular cavity size and systolic function noted.  Left Atrium Normal left atrial cavity size noted.  Right Atrium Normal right atrial cavity size noted.  Aortic Valve The aortic valve is abnormal in structure. The aortic valve exhibits sclerosis. There is calcification of the aortic valve. Moderate to severe aortic valve regurgitation is present. No hemodynamically significant aortic valve stenosis is present.  Mitral Valve The mitral valve is structurally normal with no significant stenosis present. Moderate to severe mitral valve regurgitation is present.  Tricuspid Valve The tricuspid valve is structurally normal with no significant stenosis present. Mild tricuspid valve regurgitation is present.  Pulmonic Valve The pulmonic valve is not well visualized.  Greater Vessels No dilation of the aortic root is  present.  Pericardium There is no evidence of pericardial effusion. .        PSH:  HYSTERECTOMY CHOLECYSTECTOMY  HERNIA REPAIR COLONOSCOPY  ENDOSCOPY               Anesthesia Plan    ASA 4     general   total IV anesthesia  (Patient identified; pre-operative vital signs, all relevant labs/studies, complete medical/surgical/anesthetic history, full medication list, full allergy list, and NPO status obtained/reviewed; physical assessment performed; anesthetic options, side effects, potential complications, risks, and benefits discussed; questions answered; written anesthesia consent obtained; patient cleared for procedure; anesthesia machine and equipment checked and functioning)  intravenous induction     Anesthetic plan, risks, benefits, and alternatives have been provided, discussed and informed consent has been obtained with: patient.    Plan discussed with CRNA and CAA.    CODE STATUS:    Code Status (Patient has no pulse and is not breathing): CPR (Attempt to Resuscitate)  Medical Interventions (Patient has pulse or is breathing): Full Support

## 2024-06-04 NOTE — PROGRESS NOTES
Infectious Diseases Progress Note      LOS: 1 day   Patient Care Team:  Provider, No Known as PCP - General    Chief Complaint: Chest and back pain, fatigue, shortness of breath    Subjective       The patient has been afebrile for the last 24 hours.  The patient is on 2 L of oxygen by nasal cannula hemodynamically stable, and is tolerating antimicrobial therapy.      Review of Systems:   Review of Systems   Constitutional:  Positive for fatigue.   HENT: Negative.     Eyes: Negative.    Respiratory:  Positive for shortness of breath.    Cardiovascular:  Positive for chest pain.   Gastrointestinal: Negative.    Endocrine: Negative.    Genitourinary: Negative.    Musculoskeletal:  Positive for back pain.   Skin: Negative.    Neurological: Negative.    Psychiatric/Behavioral: Negative.     All other systems reviewed and are negative.       Objective     Vital Signs  Temp:  [97.7 °F (36.5 °C)-99.6 °F (37.6 °C)] 98 °F (36.7 °C)  Heart Rate:  [] 88  Resp:  [16-34] 19  BP: ()/(43-68) 101/51    Physical Exam:  Physical Exam  Vitals and nursing note reviewed.   Constitutional:       General: She is not in acute distress.     Appearance: She is well-developed and normal weight. She is ill-appearing. She is not diaphoretic.   HENT:      Head: Normocephalic and atraumatic.   Eyes:      General: No scleral icterus.     Extraocular Movements: Extraocular movements intact.      Conjunctiva/sclera: Conjunctivae normal.      Pupils: Pupils are equal, round, and reactive to light.   Cardiovascular:      Rate and Rhythm: Normal rate and regular rhythm.      Heart sounds: S1 normal and S2 normal. Murmur heard.   Pulmonary:      Effort: Pulmonary effort is normal. No respiratory distress.      Breath sounds: No stridor. No wheezing or rales.      Comments: Diminished throughout  Chest:      Chest wall: No tenderness.   Abdominal:      General: Bowel sounds are normal. There is no distension.      Palpations: Abdomen is soft.  There is no mass.      Tenderness: There is no abdominal tenderness. There is no guarding.   Musculoskeletal:         General: No swelling, tenderness or deformity. Normal range of motion.      Cervical back: Neck supple.   Skin:     General: Skin is warm and dry.      Coloration: Skin is not pale.      Findings: No bruising, erythema or rash.   Neurological:      General: No focal deficit present.      Mental Status: She is alert and oriented to person, place, and time. Mental status is at baseline.      Cranial Nerves: No cranial nerve deficit.   Psychiatric:         Mood and Affect: Mood normal.          Results Review:    I have reviewed all clinical data, test, lab, and imaging results.     Radiology  MRI Lumbar Spine Without Contrast    Result Date: 6/4/2024  MRI LUMBAR SPINE WO CONTRAST Date of Exam: 6/4/2024 10:25 AM EDT Indication: Rule out discitis.  Comparison: No prior dedicated lumbar imaging for comparison at this institution. Technique:  Routine multiplanar/multisequence sequence images of the lumbar spine were obtained without contrast administration.  Findings: Study is degraded by motion. The patient was unable to tolerate repeated imaging due to claustrophobia. Lumbar vertebral bodies maintain normal height, alignment and marrow signal intensity. Disc heights appear preserved with normal signal intensity. No MR evidence of discitis/osteomyelitis. Conus medullaris terminates at the L1 level with normal signal intensity. The imaged paraspinal soft tissues appear grossly unremarkable. No high-grade canal stenosis or high-grade neural foraminal stenosis is seen. Mild posterior osteophyte formation is suggested at L3-4 and L4-5. No high-grade neural foraminal stenosis or lumbar nerve root encroachment is seen. Mild to moderate right neural foraminal stenosis at L4-5.     1. The study is mildly degraded by motion. 2. No MR evidence of discitis or osteomyelitis. 3. No high-grade lumbar canal stenosis or  high-grade neural foraminal stenosis is evident. Electronically Signed: Migdalia Godoy MD  6/4/2024 11:04 AM EDT  Workstation ID: [id number]    MRI Thoracic Spine Without Contrast    Result Date: 6/4/2024  MRI THORACIC SPINE WO CONTRAST Date of Exam: 6/3/2024 11:00 PM EDT Indication: Rule out discitis.  Comparison: None available. Technique:  Routine multiplanar/multisequence sequence images of the thoracic spine were obtained without contrast administration. Findings: Evaluation is somewhat degraded by patient motion. Marrow signal appears homogeneous and maintained, without evidence of marrow or disc edema to suggest infectious discitis or osteomyelitis. There is no evidence of fracture or malalignment. The thoracic spinal cord demonstrates no definite focal areas of intramedullary signal abnormality. The paraspinal soft tissues demonstrate no acute or suspicious findings.     Impression: Somewhat motion degraded exam demonstrating no specific findings to suggest osteomyelitis or discitis in the thoracic spine. Electronically Signed: Glen Hartman MD  6/4/2024 8:45 AM EDT  Workstation ID: [id number]     Cardiology    Laboratory    Results from last 7 days   Lab Units 06/04/24  0042 06/03/24  0209 06/02/24  1344   WBC 10*3/mm3 15.59* 17.54* 18.73*   HEMOGLOBIN g/dL 8.0* 8.9* 9.3*   HEMATOCRIT % 26.4* 29.2* 29.1*   PLATELETS 10*3/mm3 386 457* 436     Results from last 7 days   Lab Units 06/04/24  0042 06/03/24  1445 06/03/24  0209 06/02/24  1344   SODIUM mmol/L 137  --  139 139   POTASSIUM mmol/L 4.3 4.5 3.5 3.2*   CHLORIDE mmol/L 99  --  100 101   CO2 mmol/L 24.7  --  23.7 24.7   BUN mg/dL 20  --  14 11   CREATININE mg/dL 1.71*  --  1.39* 1.24*   GLUCOSE mg/dL 165*  --  117* 109*   ALBUMIN g/dL 3.1*  --  3.2* 3.3*   BILIRUBIN mg/dL 0.3  --  0.3 0.3   ALK PHOS U/L 107  --  111 117   AST (SGOT) U/L 32  --  23 23   ALT (SGPT) U/L 32  --  27 32   CALCIUM mg/dL 7.8*  --  8.3* 8.6     Results from last 7 days   Lab Units  24  0042   CK TOTAL U/L 64             Microbiology   Microbiology Results (last 10 days)       Procedure Component Value - Date/Time    Blood Culture - Blood, Arm, Right [[id number]]  (Normal) Collected: 24 1356    Lab Status: Preliminary result Specimen: Blood from Arm, Right Updated: 24 1400     Blood Culture No growth at 2 days    Narrative:      Less than seven (7) mL's of blood was collected.  Insufficient quantity may yield false negative results.    Blood Culture - Blood, Arm, Left [[id number]]  (Normal) Collected: 24 1344    Lab Status: Preliminary result Specimen: Blood from Arm, Left Updated: 24 1400     Blood Culture No growth at 2 days            Medication Review:       Schedule Meds  DAPTOmycin, 10 mg/kg (Adjusted), Intravenous, Q24H  enoxaparin, 40 mg, Subcutaneous, Daily  famotidine, 40 mg, Oral, Daily  [Held by provider] furosemide, 40 mg, Intravenous, Q12H  metoprolol succinate XL, 25 mg, Oral, Q24H  nicotine, 1 patch, Transdermal, Q24H  nitroglycerin, 0.5 inch, Topical, Q6H  sodium chloride, 10 mL, Intravenous, Q12H        Infusion Meds       PRN Meds    acetaminophen    senna-docusate sodium **AND** polyethylene glycol **AND** bisacodyl **AND** bisacodyl    calcium carbonate    [] cloNIDine **FOLLOWED BY** cloNIDine **FOLLOWED BY** [START ON 2024] cloNIDine **FOLLOWED BY** [START ON 2024] cloNIDine **FOLLOWED BY** [START ON 2024] cloNIDine    dicyclomine    hydrOXYzine    ipratropium-albuterol    Morphine    nitroglycerin    ondansetron    Potassium Replacement - Follow Nurse / BPA Driven Protocol    sodium chloride    sodium chloride        Assessment & Plan       Antimicrobial Therapy   1.  IV daptomycin        2.        3.        4.        5.            Assessment       Tricuspid valve endocarditis   Patient had positive blood culture for MRSA from 2024 and 2024 according to care everywhere records from [facility].  Cultures had an  KRYSTEN of 1 to vancomycin.  LETICIA performed at our facility on 6/4/2024 showed a large vegetation on the tricuspid valve with moderate tricuspid valve regurgitation.  LETICIA also showed severe aortic insufficiency with probably healed vegetation on the aortic valve leaflet.  There was moderate MR.    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  Claims that she stopped using fentanyl since admission to [facility] in May 2024.  Current drug screen positive for barbiturates and benzodiazepines.  HIV screen was negative.  Hepatitis panel was negative     History of COPD and tobacco abuse-currently on 2 L of oxygen by nasal cannula     Back pain-MRI of the lumbar spine and thoracic spine were negative for signs of discitis/osteomyelitis        Plan     Discontinue IV daptomycin since patient has septic emboli and daptomycin does not have good penetration to the lungs  Start IV vancomycin-ask pharmacy to monitor and dose and try to keep vancomycin trough between 15 and 20  Case discussed with cardiology  Consult cardiothoracic surgery evaluate patient for possible aortic and tricuspid valve replacement  Continue supportive care  A.m. labs  Tobacco and illicit drug abuse cessation  Overall prognosis is guarded patient continues use illicit substances continues to be noncompliant    MARY Clifford  06/04/24  16:51 EDT    Note is dictated utilizing voice recognition software/Dragon
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
## 2024-06-04 NOTE — NURSING NOTE
48-year-old female admitted to this facility for endocarditis who left AMA and has come back to the ER for admission.  Consult was received to assess the patient's buttocks area.  Patient reports that she has not been able to sleep in her bed at home and has been sitting up mostly.  She denies any diarrhea.  She thinks that the area to her bottom has been present for over a week but she thinks that it is now getting better.  She is well position off of the affected area.  Patient does have what appears to be a resolving stage II R freshly epithelialized area.  The area is dry.  There is fresh epithelialization noted.  It is a little hyperpigmented but no exudate.  No warmth induration noted from the area.  I would recommend continuing pressure injury prevention strategies.  I provided the patient with a chair cushion she can take this home and utilize at home as well and we can treat the area with zinc oxide-based barrier creams

## 2024-06-04 NOTE — CASE MANAGEMENT/SOCIAL WORK
Social Work Assessment  [facility]     Patient Name: Марина Tilley  MRN: [id number]  Today's Date: 6/4/2024    Admit Date: 6/3/2024     Discharge Plan       Row Name 06/04/24 1114       Plan    Plan Home with Sister.    Plan Comments SW met [facility] patient at bedside.  She is aware of the causes of her medical condition and reported she has stopped using IV heroin approximately 3-4 weeks ago.  She reported using heroin to self medicate due to her anxiety.  She is open to re-starting an anti-anxiety medication.  SW spoke with hospitalist who is going to get a Psych consult to discuss anxiety.  Patient was open to SA resources which were given to her for consideration.  Patient has tried IP and OP SA Tx Programs in the past.  Patient is interested in getting established with a PCP through [facility].  SW will arrange an appointment and put on AVS.    Pharmacy used is [facility] in [place],  DME used is a can.  Her sister will provide transportation home at time of discharge.                  Continued Care and Services - Admitted Since 6/3/2024    No active coordination exists for this encounter.       Expected Discharge Date and Time       Expected Discharge Date Expected Discharge Time    Jun 6, 2024            Demographic Summary       Row Name 06/04/24 1059       General Information    Admission Type inpatient    Arrived From emergency department    Referral Source admission list    Reason for Consult discharge planning    Preferred Language English       Contact Information    Permission Granted to Share Info With     Contact Information Obtained for                    Functional Status       Row Name 06/04/24 1100       Functional Status    Usual Activity Tolerance good    Current Activity Tolerance good       Physical Activity    On average, how many days per week do you engage in moderate to strenuous exercise (like a brisk walk)? 7 days    On average, how many minutes do you engage in  "exercise at this level? 30 min    Number of minutes of exercise per week 210       Functional Status, IADL    Medications independent    Meal Preparation assistive equipment    Housekeeping assistive equipment    Laundry assistive equipment    Shopping assistive equipment       Mental Status    General Appearance WDL WDL       Mental Status Summary    Recent Changes in Mental Status/Cognitive Functioning no changes       Employment/    Employment Status unemployed                   Psychosocial       Row Name 06/04/24 1103       Values/Beliefs    Spiritual, Cultural Beliefs, Roman Catholic Practices, Values that Affect Care no       Behavior WDL    Behavior WDL WDL       Emotion Mood WDL    Emotion/Mood/Affect WDL WDL       Mental Health    Little Interest or Pleasure in Doing Things 0-->not at all    Feeling Down, Depressed or Hopeless 1-->several days       Speech WDL    Speech WDL WDL       Perceptual State WDL    Perceptual State WDL WDL       Thought Process WDL    Thought Process WDL WDL       Stress    Do you feel stress - tense, restless, nervous, or anxious, or unable to sleep at night because your mind is troubled all the time - these days? Very much  \"I have a lot of anxiety and self medicate with heroine\"       Coping/Stress    Major Change/Loss/Stressor illness    Patient Personal Strengths expressive of needs    Sources of Support sibling(s)    Techniques to [place] with Loss/Stress/Change diversional activities;other (see comments)  Heroine use    Understanding of Condition and Treatment adequate understanding of medical condition       C-SSRS (Recent)    Q1 Wished to be Dead (Past Month) no    Q2 Suicidal Thoughts (Past Month) no    Q6 Suicide Behavior (Lifetime) no       Violence Risk    Feels Like Hurting Others no    Previous Attempt to Harm Others no                   Abuse/Neglect       Row Name 06/04/24 1105       Personal Safety    Feels Unsafe at Home or Work/School no    Feels Threatened by " Someone no    Does Anyone Try to Keep You From Having Contact with Others or Doing Things Outside Your Home? no    Physical Signs of Abuse Present no                   Legal       Row Name 06/04/24 1105       Financial Resource Strain    How hard is it for you to pay for the very basics like food, housing, medical care, and heating? Not hard       Financial/Legal    Source of Income none    Who Manages Finances if Patient Unable Sister assists with housing and utilities.       Legal    Criminal Activity/Legal Involvement none                   Substance Abuse       Row Name 06/04/24 1105       Substance Use    Substance Use Status current tobacco use;current street drug/inhalant/medication abuse    Last Tobacco Use Date 06/03/24    Environment Typically Uses Tobacco private residence    Reported Characteristics of Tobacco Use continue despite physical/psychological problems    Readiness to Change Tobacco Use precontemplation    Attempts to Quit Tobacco Use none    Longest Period Tobacco-Free unknown    Last Street Drug/Medication/Inhalant Use 05/04/24    Environment Typically Uses Street Drugs private residence    Reported Characteristics of Street Drugs continue despite physical/psychological problems    Readiness to Change Street Use precontemplation    Attempts to Quit Street Drug Use inpatient substance abuse rehabilitation    Street Drug Withdrawal Pattern intense desire for drugs;other (see comments)  anxiety    Previous Substance Use Treatment detox unit;inpatient rehab;outpatient rehab    Substance Use Comment HAIM met with pt today.  Patient reports self medicating with IV heroin for her anxiety.  She has taken anti anxiety medication in the past and it did help her.  Patient has been in Substance Abuse Tx in the past.  Patient was accepting of Substance Abuse resources and will consider.  Patient reported she stopped using heroin 3-4 weeks ago. HAIM spoke with hospitalist and he is going to consult Psych for  anxiety.  SW will arrange PCP and counseling appointment at [facility].  Information will be placed AVS.       AUDIT-C (Alcohol Use Disorders ID Test)    Q1: How often do you have a drink containing alcohol? Monthly or l    Q2: How many drinks containing alcohol do you have on a typical day when you are drinking? None    Q3: How often do you have six or more drinks on one occasion? Never    Audit-C Score 1                   Patient Forms    No documentation.              MALIK Kowalski MSW    Phone: [phone]  Cell: [phone]  Fax: [phone]  [email or web address]

## 2024-06-04 NOTE — PLAN OF CARE
Goal Outcome Evaluation:  Plan of Care Reviewed With: patient     Problem: Adult Inpatient Plan of Care  Goal: Plan of Care Review  Outcome: Ongoing, Progressing  Flowsheets (Taken 6/4/2024 8241)  Progress: no change  Plan of Care Reviewed With: patient        Progress: no change

## 2024-06-04 NOTE — PROGRESS NOTES
"Pharmacy Antimicrobial Dosing Service    Subjective:  Марина Tilley is a 48 y.o.female admitted with chest and back pain, shortness of breath. Pharmacy has been consulted to dose Vancomycin for endocarditis.    PMH: previously on daptomycin, switching to vancomycin per ID.       Assessment/Plan    1. Day #1 Vancomycin: Pulse dosing d/t renal dysfxn. Patient's Scr has been increasing over past two days    Latest Reference Range & Units Most Recent 06/02/24 13:44 06/03/24 02:09 06/04/24 00:42   Creatinine 0.57 - 1.00 mg/dL 1.71 (H)  6/4/24 00:42 1.24 (H) 1.39 (H) 1.71 (H)   (H): Data is abnormally high  Therefore, will pulse dose. Will load with 2000 mg IV x 1 (~22 mg/kg based on weight of 92.5 kg), then check random level ~18 hours and re-dose as needed.     Will continue to monitor drug levels, renal function, culture and sensitivities, and patient clinical status.       Objective:  Relevant clinical data and objective history reviewed:  167.6 cm (66\")   92.5 kg (203 lb 14.8 oz)   Ideal body weight: 59.3 kg (130 lb 11.7 oz)  Adjusted ideal body weight: 72.6 kg (160 lb 0.2 oz)  Body mass index is 32.91 kg/m².        Results from last 7 days   Lab Units 06/04/24  0042 06/03/24  0209 06/02/24  1344   CREATININE mg/dL 1.71* 1.39* 1.24*     Estimated Creatinine Clearance: 47.1 mL/min (A) (by C-G formula based on SCr of 1.71 mg/dL (H)).  I/O last 3 completed shifts:  In: 358 [P.O.:358]  Out: 0     Results from last 7 days   Lab Units 06/04/24  0042 06/03/24  0209 06/02/24  1344   WBC 10*3/mm3 15.59* 17.54* 18.73*     Temperature    06/04/24 0702 06/04/24 1107 06/04/24 1500   Temp: 98.7 °F (37.1 °C) 97.9 °F (36.6 °C) 98 °F (36.7 °C)     Baseline culture/source/susceptibility:  Microbiology Results (last 10 days)       Procedure Component Value - Date/Time    Blood Culture - Blood, Arm, Right [[id number]]  (Normal) Collected: 06/02/24 1356    Lab Status: Preliminary result Specimen: Blood from Arm, Right Updated: 06/04/24 " 1400     Blood Culture No growth at 2 days    Narrative:      Less than seven (7) mL's of blood was collected.  Insufficient quantity may yield false negative results.    Blood Culture - Blood, Arm, Left [[id number]]  (Normal) Collected: 06/02/24 1344    Lab Status: Preliminary result Specimen: Blood from Arm, Left Updated: 06/04/24 1400     Blood Culture No growth at 2 days            Marielena Valle, Sage  06/04/24 18:02 EDT

## 2024-06-04 NOTE — CASE MANAGEMENT/SOCIAL WORK
Case Management Readmission Assessment Note    Case Management Readmission Assessment (all recorded)       Readmission Interview       [facility] Name 06/04/24 1416 06/03/24 1219          Readmission Indications    Is the patient and/or family able to complete the readmission assessment questions? Yes Yes     Is this hospitalization related to the prior hospital diagnosis? Yes Yes       [facility] Name 06/04/24 1416 06/03/24 1219          Recommendation for rehospitalization    Did you speak with your physician prior to coming to the hospital No No  Family suggested return       [facility] Name 06/04/24 1416 06/03/24 1219          Follow-up Appointments    Do you have a PCP? Yes Yes     Did you have an appointment with PCP after your hospitalization? No No     Did you have an appointment with a Specialist? No No     Are you current with the Pulmonary Clinic? No No     Are you current with the CHF Clinic? No No       [facility] Name 06/04/24 1416 06/03/24 1219          Medications    Did you have newly prescribed medications at discharge? No No     Did you understand the reasons for your medications at discharge and how to take them? --  AMA --  N/A Left AMA     Did you understand the side effects of your medications? No No  N/A Left AMA     Are you taking all of you prescribed medications? No No  N/A Left AMA     If not, why? Patient non-compliance;Other (comment)  Left AMA --     What pharmacy was used to fill prescription(s)? -- N/A Left AMA     Were medications picked up? No No  N/A Left AMA       [facility] Name 06/04/24 1416 06/03/24 1219          Discharge Instructions    Did you understand your discharge instructions? No No  N/A Left AMA     Did your family/caregiver hear your instructions? No No  N/A Left AMA     Were you told to eat a special diet? No No  N/A Left AMA     Did you adhere to the diet? No No  N/A Left AMA     Were you given a number of someone to call if you had questions or concerns? No No  N/A Left AMA       [facility] Name 06/04/24  1416 06/03/24 1219          Index discharge location/services    Where did you go upon discharge? Home Home     Do you have supportive family or friends in the home? Yes Yes     What services were arranged at discharge? Other (comment)  N/A Left AMA --       Row Name 06/04/24 1416 06/03/24 1219          Discharge Readiness    On a scale of 1-5 (5 being well prepared), how ready were you for discharge 1 1  N/A Left AMA     Recommendation based on interview Education on diagnosis/self management --       Row Name 06/04/24 1416 06/03/24 1219          Palliative Care/Hospice    Are you current with Palliative Care? No No     Are you current with Hospice Care? No No       Row Name 06/04/24 1416 06/03/24 0545          Advance Directives (For Healthcare)    Pre-existing AND/MOST/POLST Order No No     Advance Directive Status Patient does not have advance directive Patient does not have advance directive     Have you reviewed your Advance Directive and is it valid for this stay? No Not applicable     Literature Provided on Advance Directives No No     Patient Requests Assistance on Advance Directives Patient Declined Patient Declined       Row Name 06/04/24 1416             Readmission Assessment Final Comments    Final Comments Previous stay 6/2 (LOS less than one day)   -ED w/ endocarditis.  -IV abx.  -IV Lasix.  -Infectious disease consulted.  -Chest Xray (Vascular congestion. Left lower lobe infiltrate may represent developing pulmonary edema. Cardiac size is normal. The clavicles are intact. No rib fractures. No pneumothorax or pleural effusion. The visualized upper abdomen is normal.)  -Left AMA.     Current stay 6/3  -ED w/ endocarditis.   -6/3 Infectious disease consult (Continue IV daptomycin but increase dose to 10 mg/kg daily. Request labs in a.m. including CPK. Hepatitis B and C screen as well as HIV screen. Request records from [facility]. Waiting on blood culture results. 2D echo was already  ordered. The patient may need LETICIA. Request MRI of the lumbar and thoracic spine without contrast. Smoking and drug cessation)    -6/3 Cardiology consult (ECHO pending this admission. ID consulted, managing Abx. Increase diuresis to Lasix 40mg Q12hr. Monitor electrolytes. Low sodium diet, fluid restriction, daily weight. Will tentatively plan for LETICIA tomorrow pending anesthesia availability / volume status / patient clinical status in am)

## 2024-06-05 ENCOUNTER — APPOINTMENT (OUTPATIENT)
Dept: GENERAL RADIOLOGY | Facility: HOSPITAL | Age: 49
End: 2024-06-05
Payer: MEDICAID

## 2024-06-05 LAB
ANION GAP SERPL CALCULATED.3IONS-SCNC: 14.1 MMOL/L (ref 5–15)
BASOPHILS # BLD AUTO: 0.09 10*3/MM3 (ref 0–0.2)
BASOPHILS NFR BLD AUTO: 0.6 % (ref 0–1.5)
BUN SERPL-MCNC: 23 MG/DL (ref 6–20)
BUN/CREAT SERPL: 16 (ref 7–25)
CALCIUM SPEC-SCNC: 7.5 MG/DL (ref 8.6–10.5)
CHLORIDE SERPL-SCNC: 100 MMOL/L (ref 98–107)
CO2 SERPL-SCNC: 20.9 MMOL/L (ref 22–29)
CREAT SERPL-MCNC: 1.44 MG/DL (ref 0.57–1)
DEPRECATED RDW RBC AUTO: 54.3 FL (ref 37–54)
EGFRCR SERPLBLD CKD-EPI 2021: 45.5 ML/MIN/1.73
EOSINOPHIL # BLD AUTO: 0.03 10*3/MM3 (ref 0–0.4)
EOSINOPHIL NFR BLD AUTO: 0.2 % (ref 0.3–6.2)
ERYTHROCYTE [DISTWIDTH] IN BLOOD BY AUTOMATED COUNT: 18 % (ref 12.3–15.4)
GLUCOSE SERPL-MCNC: 142 MG/DL (ref 65–99)
HCT VFR BLD AUTO: 26.4 % (ref 34–46.6)
HGB BLD-MCNC: 8.1 G/DL (ref 12–15.9)
IMM GRANULOCYTES # BLD AUTO: 0.16 10*3/MM3 (ref 0–0.05)
IMM GRANULOCYTES NFR BLD AUTO: 1.1 % (ref 0–0.5)
LYMPHOCYTES # BLD AUTO: 1.7 10*3/MM3 (ref 0.7–3.1)
LYMPHOCYTES NFR BLD AUTO: 11.5 % (ref 19.6–45.3)
MCH RBC QN AUTO: 27.9 PG (ref 26.6–33)
MCHC RBC AUTO-ENTMCNC: 30.7 G/DL (ref 31.5–35.7)
MCV RBC AUTO: 91 FL (ref 79–97)
MONOCYTES # BLD AUTO: 1.38 10*3/MM3 (ref 0.1–0.9)
MONOCYTES NFR BLD AUTO: 9.4 % (ref 5–12)
NEUTROPHILS NFR BLD AUTO: 11.38 10*3/MM3 (ref 1.7–7)
NEUTROPHILS NFR BLD AUTO: 77.2 % (ref 42.7–76)
NRBC BLD AUTO-RTO: 0 /100 WBC (ref 0–0.2)
PLATELET # BLD AUTO: 332 10*3/MM3 (ref 140–450)
PMV BLD AUTO: 10 FL (ref 6–12)
POTASSIUM SERPL-SCNC: 4.5 MMOL/L (ref 3.5–5.2)
RBC # BLD AUTO: 2.9 10*6/MM3 (ref 3.77–5.28)
SODIUM SERPL-SCNC: 135 MMOL/L (ref 136–145)
VANCOMYCIN SERPL-MCNC: 27.4 MCG/ML (ref 5–40)
WBC NRBC COR # BLD AUTO: 14.74 10*3/MM3 (ref 3.4–10.8)

## 2024-06-05 PROCEDURE — 25810000003 SODIUM CHLORIDE 0.9 % SOLUTION 250 ML FLEX CONT: Performed by: NURSE PRACTITIONER

## 2024-06-05 PROCEDURE — 97161 PT EVAL LOW COMPLEX 20 MIN: CPT

## 2024-06-05 PROCEDURE — 94664 DEMO&/EVAL PT USE INHALER: CPT

## 2024-06-05 PROCEDURE — 70100 X-RAY EXAM OF JAW <4VIEWS: CPT

## 2024-06-05 PROCEDURE — 25010000002 ENOXAPARIN PER 10 MG: Performed by: NURSE PRACTITIONER

## 2024-06-05 PROCEDURE — 80048 BASIC METABOLIC PNL TOTAL CA: CPT | Performed by: STUDENT IN AN ORGANIZED HEALTH CARE EDUCATION/TRAINING PROGRAM

## 2024-06-05 PROCEDURE — 80202 ASSAY OF VANCOMYCIN: CPT | Performed by: STUDENT IN AN ORGANIZED HEALTH CARE EDUCATION/TRAINING PROGRAM

## 2024-06-05 PROCEDURE — 94799 UNLISTED PULMONARY SVC/PX: CPT

## 2024-06-05 PROCEDURE — 25010000002 MORPHINE PER 10 MG: Performed by: NURSE PRACTITIONER

## 2024-06-05 PROCEDURE — 25010000002 VANCOMYCIN 1 G RECONSTITUTED SOLUTION 1 EACH VIAL: Performed by: NURSE PRACTITIONER

## 2024-06-05 PROCEDURE — 99222 1ST HOSP IP/OBS MODERATE 55: CPT | Performed by: PSYCHIATRY & NEUROLOGY

## 2024-06-05 PROCEDURE — 99232 SBSQ HOSP IP/OBS MODERATE 35: CPT | Performed by: INTERNAL MEDICINE

## 2024-06-05 PROCEDURE — 94761 N-INVAS EAR/PLS OXIMETRY MLT: CPT

## 2024-06-05 PROCEDURE — 85025 COMPLETE CBC W/AUTO DIFF WBC: CPT | Performed by: NURSE PRACTITIONER

## 2024-06-05 RX ORDER — SERTRALINE HYDROCHLORIDE 25 MG/1
25 TABLET, FILM COATED ORAL DAILY
Status: DISCONTINUED | OUTPATIENT
Start: 2024-06-05 | End: 2024-06-08 | Stop reason: HOSPADM

## 2024-06-05 RX ORDER — FUROSEMIDE 40 MG/1
40 TABLET ORAL DAILY
Status: DISCONTINUED | OUTPATIENT
Start: 2024-06-06 | End: 2024-06-08 | Stop reason: HOSPADM

## 2024-06-05 RX ORDER — FUROSEMIDE 10 MG/ML
20 INJECTION INTRAMUSCULAR; INTRAVENOUS DAILY
Status: DISCONTINUED | OUTPATIENT
Start: 2024-06-06 | End: 2024-06-05

## 2024-06-05 RX ORDER — GABAPENTIN 100 MG/1
100 CAPSULE ORAL 3 TIMES DAILY
Status: DISCONTINUED | OUTPATIENT
Start: 2024-06-05 | End: 2024-06-08 | Stop reason: HOSPADM

## 2024-06-05 RX ADMIN — Medication 1 PATCH: at 07:45

## 2024-06-05 RX ADMIN — VANCOMYCIN HYDROCHLORIDE 1000 MG: 1 INJECTION, POWDER, LYOPHILIZED, FOR SOLUTION INTRAVENOUS at 21:45

## 2024-06-05 RX ADMIN — GABAPENTIN 100 MG: 100 CAPSULE ORAL at 20:03

## 2024-06-05 RX ADMIN — MORPHINE SULFATE 2 MG: 2 INJECTION, SOLUTION INTRAMUSCULAR; INTRAVENOUS at 17:54

## 2024-06-05 RX ADMIN — FAMOTIDINE 40 MG: 20 TABLET, FILM COATED ORAL at 07:45

## 2024-06-05 RX ADMIN — MORPHINE SULFATE 2 MG: 2 INJECTION, SOLUTION INTRAMUSCULAR; INTRAVENOUS at 13:20

## 2024-06-05 RX ADMIN — MORPHINE SULFATE 2 MG: 2 INJECTION, SOLUTION INTRAMUSCULAR; INTRAVENOUS at 04:23

## 2024-06-05 RX ADMIN — MORPHINE SULFATE 2 MG: 2 INJECTION, SOLUTION INTRAMUSCULAR; INTRAVENOUS at 08:33

## 2024-06-05 RX ADMIN — MORPHINE SULFATE 2 MG: 2 INJECTION, SOLUTION INTRAMUSCULAR; INTRAVENOUS at 21:45

## 2024-06-05 RX ADMIN — Medication 10 ML: at 07:46

## 2024-06-05 RX ADMIN — MORPHINE SULFATE 2 MG: 2 INJECTION, SOLUTION INTRAMUSCULAR; INTRAVENOUS at 00:12

## 2024-06-05 RX ADMIN — IPRATROPIUM BROMIDE AND ALBUTEROL SULFATE 3 ML: .5; 3 SOLUTION RESPIRATORY (INHALATION) at 17:40

## 2024-06-05 RX ADMIN — GABAPENTIN 100 MG: 100 CAPSULE ORAL at 16:52

## 2024-06-05 RX ADMIN — ENOXAPARIN SODIUM 40 MG: 100 INJECTION SUBCUTANEOUS at 16:52

## 2024-06-05 RX ADMIN — VANCOMYCIN HYDROCHLORIDE 1000 MG: 1 INJECTION, POWDER, LYOPHILIZED, FOR SOLUTION INTRAVENOUS at 09:44

## 2024-06-05 RX ADMIN — Medication 10 ML: at 20:03

## 2024-06-05 RX ADMIN — SERTRALINE HYDROCHLORIDE 25 MG: 25 TABLET ORAL at 13:20

## 2024-06-05 RX ADMIN — METOPROLOL SUCCINATE 25 MG: 25 TABLET, EXTENDED RELEASE ORAL at 07:45

## 2024-06-05 NOTE — CONSULTS
Patient Care Team:  Provider, No Known as PCP - General  Referring Provider:  MARY Quinn  Reason for consultation:  TV endocarditis    Chief complaint:  SOA/ left sided chest pain    Subjective     History of Present Illness:  49 y/o woman  presented to [facility] ED via EMS for SOA/ left sided chest pain on 6/2 and was admitted.  She left AMA and returned early morning of 6/3.  She has a recent dx of endocarditis and has in several local hospitals and tends to leave AMA.  In mid May, she had + MRSA blood cultures and MRSA UTI.  She has been using IV drugs since age 16.  Urine tox screen in ED showed + barbiturates/ benzodiazepines.  Tobacco abuse x 30 years.  Drug use is heroin/fentanyl.  Denies alcohol use.  She has anxiety, asthma, HF, and COPD.  Echo 6/3 showed EF 50-55%, mod to severe AI/ MR and echodensity to septal leaflet of TV (1.2 x 1.5 cm).  Echo at [facility] showed EF 30-35%.  CT chest 6/3 showed multifocal septic emboli, splenic infarct, bilateral effusion, mild cardiomegaly, and enlarged hilar/ mediastinal lymph nodes.  LETICIA yesterday with report pending.  Hep A/B/C and HIV negative.  Dr. Skaggs was asked to evaluate her for endocarditis.    Review of Systems   Constitutional:  Positive for fatigue.   Respiratory:  Positive for shortness of breath.         + orthopnea   Cardiovascular:  Positive for chest pain.   Neurological:  Positive for weakness.        Past Medical History:   Diagnosis Date    Anxiety     Asthma     CHF (congestive heart failure)     COPD (chronic obstructive pulmonary disease)      Past Surgical History:   Procedure Laterality Date    CHOLECYSTECTOMY      COLONOSCOPY      ENDOSCOPY      HERNIA REPAIR      HYSTERECTOMY       Family History   Family history unknown: Yes     Social History     Tobacco Use    Smoking status: Every Day     Current packs/day: 3.00     Average packs/day: 3.0 packs/day for 30.4 years (91.3 ttl pk-yrs)     Types: Cigarettes     Start date: 1994     "Smokeless tobacco: Never   Vaping Use    Vaping status: Never Used   Substance Use Topics    Alcohol use: Never    Drug use: Yes     Frequency: 7.0 times per week     Types: Heroin     Comment: patient states she quit 3 weeks ago     Medications Prior to Admission   Medication Sig Dispense Refill Last Dose    albuterol sulfate  (90 Base) MCG/ACT inhaler Inhale 2 puffs Every 4 (Four) Hours As Needed for Wheezing.        enoxaparin, 40 mg, Subcutaneous, Daily  famotidine, 40 mg, Oral, Daily  [Held by provider] furosemide, 40 mg, Intravenous, Q12H  metoprolol succinate XL, 25 mg, Oral, Q24H  nicotine, 1 patch, Transdermal, Q24H  nitroglycerin, 0.5 inch, Topical, Q6H  sodium chloride, 10 mL, Intravenous, Q12H  vancomycin, 1,000 mg, Intravenous, Q12H      Allergies:  Cephalexin and Latex    Objective      Vital Signs  Temp:  [97.5 °F (36.4 °C)-98.2 °F (36.8 °C)] 97.5 °F (36.4 °C)  Heart Rate:  [84-96] 91  Resp:  [14-34] 20  BP: ()/(48-62) 105/62    Flowsheet Rows      Flowsheet Row First Filed Value   Admission Height 167.6 cm (66\") Documented at 06/03/2024 0119   Admission Weight 92.5 kg (204 lb) Documented at 06/03/2024 0119          167.6 cm (66\")    Physical Exam  Vitals and nursing note reviewed.   Constitutional:       General: She is awake.      Appearance: Normal appearance. She is well-developed and well-groomed.   HENT:      Head: Normocephalic and atraumatic.      Nose: Nose normal.      Mouth/Throat:      Lips: Pink.      Mouth: Mucous membranes are moist.      Dentition: Abnormal dentition. Dental caries present.      Pharynx: Uvula midline.      Comments: Missing and poor dentition  Eyes:      General: Lids are normal. No scleral icterus.     Extraocular Movements: Extraocular movements intact.      Conjunctiva/sclera: Conjunctivae normal.      Pupils: Pupils are equal, round, and reactive to light.   Neck:      Thyroid: No thyroid mass or thyromegaly.      Vascular: Normal carotid pulses. No " carotid bruit, hepatojugular reflux or JVD.      Trachea: Trachea normal.   Cardiovascular:      Rate and Rhythm: Normal rate and regular rhythm.      Pulses:           Carotid pulses are 2+ on the right side and 2+ on the left side.       Radial pulses are 2+ on the right side and 2+ on the left side.        Femoral pulses are 2+ on the right side and 2+ on the left side.       Popliteal pulses are 2+ on the right side and 2+ on the left side.        Dorsalis pedis pulses are 2+ on the right side and 2+ on the left side.        Posterior tibial pulses are 2+ on the right side and 2+ on the left side.      Heart sounds: Normal heart sounds. No murmur heard.      with a grade of 2/6.      Comments: Tele:  SR 80-90s  Pulmonary:      Effort: Pulmonary effort is normal.      Breath sounds: Normal breath sounds.      Comments: 93% 2L  Abdominal:      General: Abdomen is protuberant. Bowel sounds are normal. There is no distension.      Palpations: Abdomen is soft.      Tenderness: There is no abdominal tenderness.   Musculoskeletal:      Cervical back: Neck supple.      Right lower leg: No edema.      Left lower leg: No edema.      Comments: Gait steady and strong without use of assistive devices   Lymphadenopathy:      Cervical: No cervical adenopathy.      Upper Body:      Right upper body: No supraclavicular adenopathy.      Left upper body: No supraclavicular adenopathy.   Skin:     General: Skin is warm and dry.      Capillary Refill: Capillary refill takes less than 2 seconds.      Findings: No erythema or rash.      Nails: There is no clubbing.      Comments: Multiple tattoos and old track markings noted   Neurological:      Mental Status: She is alert and oriented to person, place, and time.      GCS: GCS eye subscore is 4. GCS verbal subscore is 5. GCS motor subscore is 6.   Psychiatric:         Attention and Perception: Attention and perception normal.         Mood and Affect: Mood and affect normal.          Speech: Speech normal.         Behavior: Behavior normal. Behavior is cooperative.         Thought Content: Thought content normal.         Cognition and Memory: Cognition and memory normal.         Judgment: Judgment normal.         Results Review:   Lab Results (last 24 hours)       Procedure Component Value Units Date/Time    Vancomycin, Random [[id number]]  (Normal) Collected: 06/05/24 0023    Specimen: Blood from Arm, Left Updated: 06/05/24 0824     Vancomycin Random 27.40 mcg/mL     Narrative:      Therapeutic Ranges for Vancomycin    Vancomycin Random   5.0-40.0 mcg/mL  Vancomycin Trough   5.0-20.0 mcg/mL  Vancomycin Peak     20.0-40.0 mcg/mL    Basic Metabolic Panel [[id number]]  (Abnormal) Collected: 06/05/24 0023    Specimen: Blood from Arm, Left Updated: 06/05/24 0118     Glucose 142 mg/dL      BUN 23 mg/dL      Creatinine 1.44 mg/dL      Sodium 135 mmol/L      Potassium 4.5 mmol/L      Chloride 100 mmol/L      CO2 20.9 mmol/L      Calcium 7.5 mg/dL      BUN/Creatinine Ratio 16.0     Anion Gap 14.1 mmol/L      eGFR 45.5 mL/min/1.73     Narrative:      GFR Normal >60  Chronic Kidney Disease <60  Kidney Failure <15      CBC & Differential [[id number]]  (Abnormal) Collected: 06/05/24 0023    Specimen: Blood from Arm, Left Updated: 06/05/24 0053    Narrative:      The following orders were created for panel order CBC & Differential.  Procedure                               Abnormality         Status                     ---------                               -----------         ------                     CBC Auto Differential[[id number]]        Abnormal            Final result                 Please view results for these tests on the individual orders.    CBC Auto Differential [[id number]]  (Abnormal) Collected: 06/05/24 0023    Specimen: Blood from Arm, Left Updated: 06/05/24 0053     WBC 14.74 10*3/mm3      RBC 2.90 10*6/mm3      Hemoglobin 8.1 g/dL      Hematocrit 26.4 %      MCV 91.0 fL      MCH 27.9 pg       MCHC 30.7 g/dL      RDW 18.0 %      RDW-SD 54.3 fl      MPV 10.0 fL      Platelets 332 10*3/mm3      Neutrophil % 77.2 %      Lymphocyte % 11.5 %      Monocyte % 9.4 %      Eosinophil % 0.2 %      Basophil % 0.6 %      Immature Grans % 1.1 %      Neutrophils, Absolute 11.38 10*3/mm3      Lymphocytes, Absolute 1.70 10*3/mm3      Monocytes, Absolute 1.38 10*3/mm3      Eosinophils, Absolute 0.03 10*3/mm3      Basophils, Absolute 0.09 10*3/mm3      Immature Grans, Absolute 0.16 10*3/mm3      nRBC 0.0 /100 WBC                 Assessment & Plan       Endocarditis      Assessment & Plan    - Native tricuspid valve endocarditis, 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 Skaggs to review echo/ LETICIA and provide recs.  D/w ID and cardiology.  Order mandible xray given poor dentition.  Thank you for allowing us to participate in the care of this patient.      Stacey Ambriz, MARY  06/05/24  10:15 EDT    **all problems new to this examiner  **EKG and CXR independently reviewed and interpreted

## 2024-06-05 NOTE — PLAN OF CARE
Goal Outcome Evaluation:  Plan of Care Reviewed With: patient        Progress: no change  Outcome Evaluation: Pt resting in bed with c/o pain, PRN meds. Lasix restarted. Nitro adjusted to as needed. BP stil soft, MAP good. IV abx given.

## 2024-06-05 NOTE — PLAN OF CARE
Goal Outcome Evaluation:  Plan of Care Reviewed With: patient        Problem: Adult Inpatient Plan of Care  Goal: Plan of Care Review  Outcome: Ongoing, Progressing  Flowsheets (Taken 6/5/2024 3947)  Progress: no change  Plan of Care Reviewed With: patient     Progress: no change

## 2024-06-05 NOTE — PROGRESS NOTES
Cardiology [place]        LOS:  LOS: 2 days   Patient Name: Марина Tilley  Age/Sex: 48 y.o. female  : 1975  MRN: [id number]    Day of Service: 24   Length of Stay: 2  Encounter Provider: MARY Kennedy  Place of Service: [facility] CARDIOLOGY  Patient Care Team:  Provider, No Known as PCP - General    Subjective:     Chief Complaint: f/u IE, HF    Subjective: Denies chest pain or dyspnea at present, c/o sore throat    Current Medications:   Scheduled Meds:enoxaparin, 40 mg, Subcutaneous, Daily  famotidine, 40 mg, Oral, Daily  [Held by provider] furosemide, 40 mg, Intravenous, Q12H  metoprolol succinate XL, 25 mg, Oral, Q24H  nicotine, 1 patch, Transdermal, Q24H  nitroglycerin, 0.5 inch, Topical, Q6H  sodium chloride, 10 mL, Intravenous, Q12H  vancomycin, 1,000 mg, Intravenous, Q12H      Continuous Infusions:Pharmacy to dose vancomycin,         Allergies:  Allergies   Allergen Reactions    Cephalexin Anaphylaxis     Required intubation    Latex Hives       Review of Systems   Constitutional: Negative for chills, diaphoresis and malaise/fatigue.   Cardiovascular:  Positive for dyspnea on exertion. Negative for chest pain, irregular heartbeat, leg swelling, near-syncope, orthopnea, palpitations, paroxysmal nocturnal dyspnea and syncope.   Respiratory:  Negative for cough, shortness of breath, sleep disturbances due to breathing and sputum production.    Musculoskeletal:  Positive for back pain.   Gastrointestinal:  Positive for bloating. Negative for change in bowel habit.   Genitourinary:  Negative for urgency.   Neurological:  Negative for dizziness and headaches.   Psychiatric/Behavioral:  Negative for altered mental status.          Objective:     Temp:  [97.5 °F (36.4 °C)-98.2 °F (36.8 °C)] 97.5 °F (36.4 °C)  Heart Rate:  [84-96] 90  Resp:  [14-34] 20  BP: ()/(48-61) 111/60     Intake/Output Summary (Last 24 hours) at 2024 2365  Last data filed at  "6/5/2024 0012  Gross per 24 hour   Intake 1440.29 ml   Output --   Net 1440.29 ml     Body mass index is 32.91 kg/m².      06/03/24  0119 06/03/24  0151   Weight: 92.5 kg (204 lb) 92.5 kg (203 lb 14.8 oz)         General Appearance:    Alert, cooperative, in no acute distress                                Head: Atraumatic, normocephalic, PERRLA               Neck:   supple, mildJVD   Lungs:     Supplemental O2, diminished bilateral bases    Heart:    Regular rhythm and normal rate, normal S1 and S2, murmur   Abdomen:     Normal bowel sounds, distended but soft   Extremities:   Moves all extremities well, no edema, no cyanosis, no  redness   Pulses:   Pulses palpable and equal bilaterally   Skin:   No bleeding, bruising or rash   Neurologic:   Awake, alert, oriented x3         Lab Review:   Results from last 7 days   Lab Units 06/05/24  0023 06/04/24  0042 06/03/24  1445 06/03/24  0209   SODIUM mmol/L 135* 137  --  139   POTASSIUM mmol/L 4.5 4.3   < > 3.5   CHLORIDE mmol/L 100 99  --  100   CO2 mmol/L 20.9* 24.7  --  23.7   BUN mg/dL 23* 20  --  14   CREATININE mg/dL 1.44* 1.71*  --  1.39*   GLUCOSE mg/dL 142* 165*  --  117*   CALCIUM mg/dL 7.5* 7.8*  --  8.3*   AST (SGOT) U/L  --  32  --  23   ALT (SGPT) U/L  --  32  --  27    < > = values in this interval not displayed.     Results from last 7 days   Lab Units 06/04/24  0042 06/03/24  0417 06/03/24  0209 06/02/24  1344   CK TOTAL U/L 64 62  --   --    HSTROP T ng/L  --  43* 44* 37*     Results from last 7 days   Lab Units 06/05/24  0023 06/04/24  0042   WBC 10*3/mm3 14.74* 15.59*   HEMOGLOBIN g/dL 8.1* 8.0*   HEMATOCRIT % 26.4* 26.4*   PLATELETS 10*3/mm3 332 386                   Invalid input(s): \"LDLCALC\"  Results from last 7 days   Lab Units 06/02/24  1344   PROBNP pg/mL 33,058.0*           Recent Radiology:  Imaging Results (Most Recent)       Procedure Component Value Units Date/Time    MRI Lumbar Spine Without Contrast [[id number]] Collected: 06/04/24 1059    "  Updated: 06/04/24 1106    Narrative:      MRI LUMBAR SPINE WO CONTRAST    Date of Exam: 6/4/2024 10:25 AM EDT    Indication: Rule out discitis.     Comparison: No prior dedicated lumbar imaging for comparison at this institution.    Technique:  Routine multiplanar/multisequence sequence images of the lumbar spine were obtained without contrast administration.        Findings:  Study is degraded by motion. The patient was unable to tolerate repeated imaging due to claustrophobia.    Lumbar vertebral bodies maintain normal height, alignment and marrow signal intensity. Disc heights appear preserved with normal signal intensity. No MR evidence of discitis/osteomyelitis. Conus medullaris terminates at the L1 level with normal signal   intensity. The imaged paraspinal soft tissues appear grossly unremarkable. No high-grade canal stenosis or high-grade neural foraminal stenosis is seen. Mild posterior osteophyte formation is suggested at L3-4 and L4-5. No high-grade neural foraminal   stenosis or lumbar nerve root encroachment is seen. Mild to moderate right neural foraminal stenosis at L4-5.      Impression:      1. The study is mildly degraded by motion.  2. No MR evidence of discitis or osteomyelitis.  3. No high-grade lumbar canal stenosis or high-grade neural foraminal stenosis is evident.        Electronically Signed: Migdalia Godoy MD    6/4/2024 11:04 AM EDT    Workstation ID: [id number]    MRI Thoracic Spine Without Contrast [[id number]] Collected: 06/04/24 0842     Updated: 06/04/24 0847    Narrative:      MRI THORACIC SPINE WO CONTRAST    Date of Exam: 6/3/2024 11:00 PM EDT    Indication: Rule out discitis.     Comparison: None available.    Technique:  Routine multiplanar/multisequence sequence images of the thoracic spine were obtained without contrast administration.      Findings:  Evaluation is somewhat degraded by patient motion. Marrow signal appears homogeneous and maintained, without evidence of marrow  or disc edema to suggest infectious discitis or osteomyelitis. There is no evidence of fracture or malalignment. The thoracic   spinal cord demonstrates no definite focal areas of intramedullary signal abnormality. The paraspinal soft tissues demonstrate no acute or suspicious findings.      Impression:      Impression:  Somewhat motion degraded exam demonstrating no specific findings to suggest osteomyelitis or discitis in the thoracic spine.        Electronically Signed: Glen Hartman MD    6/4/2024 8:45 AM EDT    Workstation ID: [id number]    CT Chest Without Contrast Diagnostic [[id number]] Collected: 06/03/24 1434     Updated: 06/03/24 1442    Narrative:      CT CHEST WO CONTRAST DIAGNOSTIC    Date of Exam: 6/3/2024 2:29 PM EDT    Indication: Rule out septic emboli.    Comparison: AP portable chest 6/2/2024.    Technique: Axial CT images were obtained of the chest without contrast administration.  Sagittal and coronal reconstructions were performed.  Automated exposure control and iterative reconstruction methods were used.      Findings:  Multiple irregular patchy nodular densities are scattered throughout both lungs, few of which are cavitary. . Index right upper lobe peripheral density measures 2.4 x 1.9 cm (5/47). Index parenchymal density within the superior left lower lobe measures   2.1 x 3.3 cm (5/39). Index cavitary nodule in the right lower lobe measures 1.7 cm (5/59).    There are small layering bilateral pleural effusions with posterior bibasilar airspace disease which may represent atelectasis or pneumonia.    Heart size is mildly enlarged with a small concentric pericardial effusion. Evaluation for adenopathy is limited due to lack of IV contrast. Suspected bilateral hilar adenopathy, on the right measuring up to 1.5 cm short axis (2/42). Mildly large   subcarinal lymph node measures 1.4 cm short axis (2/45). AP window node measures 1.0 cm short axis (2/34). Prevascular node measures 8 mm short  axis.    There is incompletely imaged low-density within the soft 20 mid pole posteriorly measuring 8.0 x 6.3 cm, which appears to partially spare the peripheral capsule, raising the possibility of splenic infarct. Cholecystectomy changes are present. Remainder   of the imaged upper abdominal organs have a normal noncontrast appearance.    Mildly enlarged bilateral cervical lymph nodes are present, although not strictly pathologically enlarged by CT criteria.      Impression:      1. Multifocal patchy peripheral nodular densities are scattered throughout both lungs, some which have a cavitary component, which would correspond to the provided clinical suspicion of septic emboli.  2. Incompletely imaged at 8 cm low-density within the mid spleen, suspicious for splenic infarct.  3. Small bilateral pleural effusions.  4. Dense posterior bibasilar airspace disease is nonspecific and may reflect changes of atelectasis or pneumonia.  5. Mild cardiomegaly. Small concentric pericardial effusion.  6. Enlarged bilateral hilar and mediastinal lymph nodes are nonspecific and may be reactive. Attention at CT chest follow-up is recommended      Electronically Signed: Migdalia Godoy MD    6/3/2024 2:40 PM EDT    Workstation ID: [id number]            ECHOCARDIOGRAM:    Results for orders placed during the hospital encounter of 06/03/24    Adult Transthoracic Echo Complete W/ Cont if Necessary Per Protocol    Interpretation Summary    Left ventricular systolic function is mildly decreased. Left ventricular ejection fraction appears to be 51 - 55%.    Left ventricular diastolic function was normal.    There is calcification of the aortic valve.    Moderate to severe aortic valve regurgitation is present.    Moderate to severe mitral valve regurgitation is present.    There is echodensity attached to septal leaflet of tricuspid valve measuring 1.2 x 1.5 cm.    Recommend LETICIA to assess echodensity and mitral and aortic valve regurgitation.   If clinically indicated.    Recommend modified [facility] criteria for clinical diagnosis of bacterial endocarditis        I reviewed the patient's new clinical results.    EKG:      Assessment:       Endocarditis    Dyspnea / HFrEF / outside ECHO LVEF 30% mild to moderate tricuspid regurgitation, small pericardial effusion, and EF at 30 to 35%, moderate to severe global LV hypokinesis and a severely dilated left ventricle ----- ECHO This admission shows LVEF 50% however she has severe AI and MR ECHO density noted on TV  Untreated endocarditis / bacteremia recently- [facility] records show vegetation on septal leaflet of TV  H/o IV drug abuse- fentanyl   CATRINA  Anemia  Pneumonia / shortness of breath / leukocytosis  Tobacco use  Medical non compliance   Back pain- MRI of lumbar and thoracic spine ordered       Plan:   Continue diuresis, she has normal LVEF on ECHO here however she has severe AI and MR, she also has echo density on TV  LETICIA confirms TV endocarditis  CTS consulted  GDMT include toprol XL, b/p soft, will hold on additional therapy  Additional recommendations per Dr. Frank    Patient is seen and examined and findings are verified.  All data is reviewed by me personally.  Assessment and plan formulated by APC was done after discussion with attending.  I spent more than 50% of time in taking care of the patient.    Patient is sitting up in the bed.  Mildly short of breath.    Normal S1 and S2.  No pericardial rub or murmur abdominal exam is benign    Continue diuresis.  Patient had LETICIA and showed severe aortic regurgitation and moderate to severe mitral regurgitation and large vegetation on tricuspid valve.  Patient would need mitral valve repair with possible aortic valve replacement.  Patient may need tricuspid valve replacement 2.  Final decision as per surgical team.  Patient may need cardiac catheterization once patient is stabilized.    Electronically signed by Bryant Frank MD, 06/06/24, 5:25 PM EDT.           Nimco Ortez, MARY  06/05/24  08:52 EDT

## 2024-06-05 NOTE — DISCHARGE PLACEMENT REQUEST
"James Tilley (48 y.o. Female)       Date of Birth   1975    Social Security Number       Address   [address] [place] [address] [place] [address]    Home Phone       MRN   [id number]       Sabianism   None    Marital Status   Legally                             Admission Date   6/3/24    Admission Type   Emergency    Admitting Provider   Bernabe Michael MD    Attending Provider   Tashi Birmingham MD    Department, Room/Bed   [facility] 2D, 269/1       Discharge Date       Discharge Disposition       Discharge Destination                                 Attending Provider: Tashi Birmingham MD    Allergies: Cephalexin, Latex    Isolation: Contact   Infection: None   Code Status: CPR    Ht: 167.6 cm (66\")   Wt: 92.5 kg (203 lb 14.8 oz)    Admission Cmt: None   Principal Problem: Endocarditis [I38]                   Active Insurance as of 6/3/2024       Primary Coverage       Payor Plan Insurance Group Employer/Plan Group    [facility] [place] [facility]       Payor Plan Address Payor Plan Phone Number Payor Plan Fax Number Effective Dates    [address]   2021 - None Entered    [facility] [id number]         Subscriber Name Subscriber Birth Date Member ID       JAMES TILLEY 1975 [id number]                     Emergency Contacts        (Rel.) Home Phone Work Phone Mobile Phone    kate muhammad (Sister) -- -- [phone]                 History & Physical        Aicha Gerber APRN at 24 0210              [facility] Medicine Services    Hospitalist History and Physical     James Tilley : 1975 MRN:[id number] LOS:0 ROOM: 10/10     Reason for admission: Endocarditis     Assessment / Plan     Chest pain and dyspnea  Suspect 2/2 untreated Endocarditis and Bacteremia  Presumed ICM/ pulmonary edema vs LLL infiltrate   Noncompliance- pt seen at multiple hospitals and recurrent history of leaving AMA, most recently yesterday from  this hospital "   Hx IV Fentanyl use   Hypoxia   - Outside records show positive for MRSA bacteremia at [facility]  - per ED provider report Echo from [facility] demonstrated vegetationson the septal leaflet of tricuspid valve, mild to moderate tricuspid regurgitation, small pericardial effusion, and EF at 30 to 35%, moderate to severe global LV hypokinesis and a severely dilated left ventricle  -CXR 6/2/24: Vascular congestion. Left lower lobe infiltrate may represent developing pulmonary edema or possibly pneumonia depending on the clinical setting.   - pt with history of anaphylaxis requiring intubation from oral Keflex  - will continue previous plan for Daptomycin. Had considered Cefepime and Vancomycin but due to pt hx of anaphylaxis concern for possible adverse affect- discussed with pharmacy  - will consult ID for antibiotic management  - get 2D echo   - WBC 18.73. Now 17.54 after Daptomycin on 6/2/24  - Blood cultures from 6/2/24 pending  - will get urine drug screen  - cardiology consult     Presumed ICM 2/2 IV drug use  - outside source documentation with EF 30-35%  - BNP 33,058  - lactic 1.5  - VSS  - check 2D echo   - lasix prn for symptomatic mangement  - cardiology to follow    Hypokalemia  - replace per protocol    CATRINA  - scr 1.24  - will hold lasix   - monitor     Anemia  - noted hgb drop 9.3-8.9  - no overt sign of bleed  - montior  - transfuse <7.0    Tobacco dependency  - nicotine patch     IV Fentanyl use  - add the clonidine withdrawal protocol for opioid withdrawal       Code Status (Patient has no pulse and is not breathing): CPR (Attempt to Resuscitate)  Medical Interventions (Patient has pulse or is breathing): Full Support       Nutrition:   Diet: Regular/House; Fluid Consistency: Thin (IDDSI 0)     DVT prophylaxis:  Medical DVT prophylaxis orders are present.         History of Present illness     Марина Tilley is a 48 y.o. female with known history of IV drug use  "initially came to the ED on 6/2/2024 at 1530.  Per ED documentation patient was seen for chest pain and shortness of breath.  Has a known endocarditis.  She has been to multiple hospitals.  She had been seen in [place] and a couple in [facility].  Most recently was at [facility].  She has been intermittently on antibiotics.  States she was discharged from the hospital but was not given any antibiotics.  She reports she had not used since before being in the hospital the last time.      Review of blood cultures from [facility] shows patient grew MRSA on 5/9/2024.  Review of echocardiogram from [facility] demonstrated vegetationson the septal leaflet of tricuspid valve, mild to moderate tricuspid regurgitation, small pericardial effusion, and EF at 30 to 35%, moderate to severe global LV hypokinesis and a severely dilated left ventricle. Further review of records from [facility] demonstrated a white count of 15.9, hemoglobin of 10.3, a creatinine of 1.86, chest imaging that demonstrated multifocal pneumonia versus septic emboli. Reportedly signed out AGAINST MEDICAL ADVICE from other hospital before going to the [facility] based on [facility] records.     Patient was seen by the hospitalist at 1700 on 6/2/2024.  She was admitted to the PCU at 1751.  At 2005 patient was requesting to leave the unit and go outside and became upset when told that she could not.  At 2010 patient had IV removed and left AGAINST MEDICAL ADVICE.  At 2130 to return to the ED and was subsequently readmitted to the hospitalist team around 2 AM on 6/3/2024 to continue previous treatment plan. Pt states \"I can't do this anymore\". Using a cane because SOA with any exertion. Having significant pain in chest and back and just pain in general. On 2L NC.  States long history of IV Fentanyl but denies use in the past 3 weeks.     Review of records show " patient with elevated troponin elevated BNP potassium 3.2 acute kidney injury with creatinine 1.2 and white blood cell of 18.73 hemoglobin 9.3.  Blood cultures collected at 1344 and 1356 are pending.  Due to patient's history of anaphylaxis with Keflex will continue plan for daptomycin that was given yesterday afternoon.  Will consult ID for antibiotic management.  Have ordered 2D echo and will have cardiology to follow due to documentation suggesting patient with cardiomyopathy and endocarditis.  Will continue diuretics.  She has been admitted for further treatment.      Patient was seen and examined on 06/03/24 at 03:24 EDT .    Subjective / Review of systems     Review of Systems   Constitutional:  Positive for chills and fatigue.   Respiratory:  Positive for shortness of breath (with exertion and at rest).    Cardiovascular:  Positive for chest pain.   Musculoskeletal:  Positive for back pain.   Neurological:  Positive for weakness.          Past Medical/Surgical/Social/Family History & Allergies     Past Medical History:   Diagnosis Date    Anxiety     Asthma     CHF (congestive heart failure)     COPD (chronic obstructive pulmonary disease)       Past Surgical History:   Procedure Laterality Date    CHOLECYSTECTOMY      COLONOSCOPY      ENDOSCOPY      HERNIA REPAIR      HYSTERECTOMY        Social History     Socioeconomic History    Marital status: Legally    Tobacco Use    Smoking status: Every Day     Current packs/day: 3.00     Average packs/day: 3.0 packs/day for 30.4 years (91.3 ttl pk-yrs)     Types: Cigarettes     Start date: 1994    Smokeless tobacco: Never   Vaping Use    Vaping status: Never Used   Substance and Sexual Activity    Alcohol use: Never    Drug use: Yes     Frequency: 7.0 times per week     Types: Heroin     Comment: patient states she quit 3 weeks ago    Sexual activity: Defer      Family History   Family history unknown: Yes      Allergies   Allergen Reactions    Cephalexin  Anaphylaxis     Required intubation    Latex Hives      Social Determinants of Health     Tobacco Use: High Risk (6/2/2024)    Patient History     Smoking Tobacco Use: Every Day     Smokeless Tobacco Use: Never     Passive Exposure: Not on file   Alcohol Use: Not At Risk (6/2/2024)    AUDIT-C     Frequency of Alcohol Consumption: Never     Average Number of Drinks: Patient does not drink     Frequency of Binge Drinking: Never   Financial Resource Strain: Not on file   Food Insecurity: Not on file   Transportation Needs: Not on file   Physical Activity: Not on file   Stress: Not on file   Social Connections: Unknown (10/12/2023)    Family and Community Support     Help with Day-to-Day Activities: Not on file     Lonely or Isolated: Not on file   Interpersonal Safety: Not At Risk (6/3/2024)    Abuse Screen     Unsafe at Home or Work/School: no     Feels Threatened by Someone?: no     Does Anyone Keep You from Contacting Others or Doint Things Outside the Home?: no     Physical Sign of Abuse Present: no   Depression: Not on file   Housing Stability: Unknown (6/2/2024)    Housing Stability     Current Living Arrangements: home     Potentially Unsafe Housing Conditions: Not on file   Utilities: Not on file   Health Literacy: Unknown (10/12/2023)    Education     Help with school or training?: Not on file     Preferred Language: Not on file   Employment: Unknown (10/12/2023)    Employment     Do you want help finding or keeping work or a job?: Not on file   Disabilities: Not At Risk (6/2/2024)    Disabilities     Concentrating, Remembering, or Making Decisions Difficulty: no     Doing Errands Independently Difficulty: no        Home Medications     Prior to Admission medications    Medication Sig Start Date End Date Taking? Authorizing Provider   albuterol sulfate  (90 Base) MCG/ACT inhaler Inhale 2 puffs Every 4 (Four) Hours As Needed for Wheezing.    Provider, MD Lopez   gabapentin (NEURONTIN) 600 MG tablet  Take 1 tablet by mouth 3 (Three) Times a Day.    Provider, MD Lopez        Objective / Physical Exam     Vital signs:  Temp: 98 °F (36.7 °C)  BP: 128/70  Heart Rate: 111  Resp: 19  SpO2: 94 %  Weight: 92.5 kg (204 lb)    Admission Weight: Weight: 92.5 kg (204 lb)    Physical Exam  Constitutional:       Appearance: She is ill-appearing.   HENT:      Mouth/Throat:      Mouth: Mucous membranes are dry.   Cardiovascular:      Rate and Rhythm: Regular rhythm. Tachycardia present.   Pulmonary:      Comments: Wheeze    Musculoskeletal:         General: Normal range of motion.   Neurological:      Mental Status: She is alert and oriented to person, place, and time.            Labs     Results from last 7 days   Lab Units 06/03/24  0209 06/02/24  1344   WBC 10*3/mm3 17.54* 18.73*   HEMOGLOBIN g/dL 8.9* 9.3*   HEMATOCRIT % 29.2* 29.1*   PLATELETS 10*3/mm3 457* 436      Results from last 7 days   Lab Units 06/02/24  1344   ALK PHOS U/L 117   AST (SGOT) U/L 23   ALT (SGPT) U/L 32           Results from last 7 days   Lab Units 06/02/24  1344   SODIUM mmol/L 139   POTASSIUM mmol/L 3.2*   CHLORIDE mmol/L 101   CO2 mmol/L 24.7   BUN mg/dL 11   CREATININE mg/dL 1.24*   GLUCOSE mg/dL 109*        Imaging     XR Chest 1 View    Result Date: 6/2/2024  XR CHEST 1 VW Date of Exam: 6/2/2024 2:15 PM EDT Indication: sob Comparison: None available. Findings: Vascular congestion. Left lower lobe infiltrate may represent developing pulmonary edema. Cardiac size is normal. The clavicles are intact. No rib fractures. No pneumothorax or pleural effusion. The visualized upper abdomen is normal.     Vascular congestion. Left lower lobe infiltrate may represent developing pulmonary edema or possibly pneumonia depending on the clinical setting. Electronically Signed: Onel Diaz MD  6/2/2024 2:27 PM EDT  Workstation ID: [id number]      ECG 12 Lead Chest Pain   Preliminary Result   HEART RATE= 112  bpm   RR Interval= 536  ms   KS Interval= 145  ms    P Horizontal Axis= 6  deg   P Front Axis= 59  deg   QRSD Interval= 82  ms   QT Interval= 366  ms   QTcB= 500  ms   QRS Axis= 14  deg   T Wave Axis= 171  deg   - ABNORMAL ECG -   Sinus tachycardia   Probable left atrial enlargement   Anteroseptal infarct, age indeterminate   When compared with ECG of 02-Jun-2024 13:04:43,   No significant change   Electronically Signed By:    Date and Time of Study: 2024-06-03 01:32:22           Current Medications     Scheduled Meds:  DAPTOmycin, 8 mg/kg (Adjusted), Intravenous, Q24H  enoxaparin, 40 mg, Subcutaneous, Daily  famotidine, 40 mg, Oral, Daily  furosemide, 40 mg, Intravenous, Daily  nicotine, 1 patch, Transdermal, Q24H  nitroglycerin, 0.5 inch, Topical, Q6H  sodium chloride, 10 mL, Intravenous, Q12H         Continuous Infusions:          MARY Pastor   [facility] Medicine  06/03/24   03:24 EDT       Electronically signed by Aicha Gerber APRN at 06/03/24 0546          Physician Progress Notes (last 24 hours)        Fartun Blankenship APRN at 06/05/24 1436          Infectious Diseases Progress Note      LOS: 2 days   Patient Care Team:  Provider, No Known as PCP - General    Chief Complaint: Chest and back pain, fatigue, shortness of breath    Subjective       The patient has been afebrile for the last 24 hours.  The patient is on 2 L of oxygen by nasal cannula hemodynamically stable, and is tolerating antimicrobial therapy.  Still having back pain and shortness of breath      Review of Systems:   Review of Systems   Constitutional:  Positive for fatigue.   HENT: Negative.     Eyes: Negative.    Respiratory:  Positive for shortness of breath.    Cardiovascular:  Positive for chest pain.   Gastrointestinal: Negative.    Endocrine: Negative.    Genitourinary: Negative.    Musculoskeletal:  Positive for back pain.   Skin: Negative.    Neurological: Negative.    Psychiatric/Behavioral: Negative.     All other systems reviewed and are negative.       Objective      Vital Signs  Temp:  [96.4 °F (35.8 °C)-98.2 °F (36.8 °C)] 97.8 °F (36.6 °C)  Heart Rate:  [88-96] 92  Resp:  [14-31] 19  BP: (100-113)/(48-65) 103/48    Physical Exam:  Physical Exam  Vitals and nursing note reviewed.   Constitutional:       General: She is not in acute distress.     Appearance: She is well-developed and normal weight. She is ill-appearing. She is not diaphoretic.   HENT:      Head: Normocephalic and atraumatic.   Eyes:      General: No scleral icterus.     Extraocular Movements: Extraocular movements intact.      Conjunctiva/sclera: Conjunctivae normal.      Pupils: Pupils are equal, round, and reactive to light.   Cardiovascular:      Rate and Rhythm: Normal rate and regular rhythm.      Heart sounds: S1 normal and S2 normal. Murmur heard.   Pulmonary:      Effort: Pulmonary effort is normal. No respiratory distress.      Breath sounds: No stridor. No wheezing or rales.      Comments: Diminished throughout  Chest:      Chest wall: No tenderness.   Abdominal:      General: Bowel sounds are normal. There is no distension.      Palpations: Abdomen is soft. There is no mass.      Tenderness: There is no abdominal tenderness. There is no guarding.   Musculoskeletal:         General: No swelling, tenderness or deformity. Normal range of motion.      Cervical back: Neck supple.   Skin:     General: Skin is warm and dry.      Coloration: Skin is not pale.      Findings: No bruising, erythema or rash.   Neurological:      General: No focal deficit present.      Mental Status: She is alert and oriented to person, place, and time. Mental status is at baseline.      Cranial Nerves: No cranial nerve deficit.   Psychiatric:         Mood and Affect: Mood normal.          Results Review:    I have reviewed all clinical data, test, lab, and imaging results.     Radiology  No Radiology Exams Resulted Within Past 24 Hours    Cardiology    Laboratory    Results from last 7 days   Lab Units 06/05/24  0023  06/04/24  0042 06/03/24  0209 06/02/24  1344   WBC 10*3/mm3 14.74* 15.59* 17.54* 18.73*   HEMOGLOBIN g/dL 8.1* 8.0* 8.9* 9.3*   HEMATOCRIT % 26.4* 26.4* 29.2* 29.1*   PLATELETS 10*3/mm3 332 386 457* 436     Results from last 7 days   Lab Units 06/05/24  0023 06/04/24  0042 06/03/24  1445 06/03/24  0209 06/02/24  1344   SODIUM mmol/L 135* 137  --  139 139   POTASSIUM mmol/L 4.5 4.3 4.5 3.5 3.2*   CHLORIDE mmol/L 100 99  --  100 101   CO2 mmol/L 20.9* 24.7  --  23.7 24.7   BUN mg/dL 23* 20  --  14 11   CREATININE mg/dL 1.44* 1.71*  --  1.39* 1.24*   GLUCOSE mg/dL 142* 165*  --  117* 109*   ALBUMIN g/dL  --  3.1*  --  3.2* 3.3*   BILIRUBIN mg/dL  --  0.3  --  0.3 0.3   ALK PHOS U/L  --  107  --  111 117   AST (SGOT) U/L  --  32  --  23 23   ALT (SGPT) U/L  --  32  --  27 32   CALCIUM mg/dL 7.5* 7.8*  --  8.3* 8.6     Results from last 7 days   Lab Units 06/04/24  0042   CK TOTAL U/L 64             Microbiology   Microbiology Results (last 10 days)       Procedure Component Value - Date/Time    Blood Culture - Blood, Arm, Right [[id number]]  (Normal) Collected: 06/02/24 1356    Lab Status: Preliminary result Specimen: Blood from Arm, Right Updated: 06/05/24 1400     Blood Culture No growth at 3 days    Narrative:      Less than seven (7) mL's of blood was collected.  Insufficient quantity may yield false negative results.    Blood Culture - Blood, Arm, Left [[id number]]  (Normal) Collected: 06/02/24 1344    Lab Status: Preliminary result Specimen: Blood from Arm, Left Updated: 06/05/24 1400     Blood Culture No growth at 3 days            Medication Review:       Schedule Meds  enoxaparin, 40 mg, Subcutaneous, Daily  famotidine, 40 mg, Oral, Daily  [Held by provider] furosemide, 40 mg, Oral, Daily  gabapentin, 100 mg, Oral, TID  metoprolol succinate XL, 25 mg, Oral, Q24H  nicotine, 1 patch, Transdermal, Q24H  sertraline, 25 mg, Oral, Daily  sodium chloride, 10 mL, Intravenous, Q12H  vancomycin, 1,000 mg, Intravenous,  Q12H        Infusion Meds  Pharmacy to dose vancomycin,         PRN Meds    acetaminophen    senna-docusate sodium **AND** polyethylene glycol **AND** bisacodyl **AND** bisacodyl    calcium carbonate    [] cloNIDine **FOLLOWED BY** [] cloNIDine **FOLLOWED BY** cloNIDine **FOLLOWED BY** [START ON 2024] cloNIDine **FOLLOWED BY** [START ON 2024] cloNIDine    dicyclomine    hydrOXYzine    ipratropium-albuterol    Morphine    nitroglycerin    ondansetron    Pharmacy to dose vancomycin    Potassium Replacement - Follow Nurse / BPA Driven Protocol    sodium chloride    sodium chloride    Vancomycin Pharmacy Intermittent/Pulse Dosing        Assessment & Plan       Antimicrobial Therapy   1.  IV vancomycin        2.        3.        4.        5.            Assessment       Tricuspid valve endocarditis   Patient had positive blood culture for MRSA from 2024 and 2024 according to care everywhere records from [facility].  Cultures had an KRYSTEN of 1 to vancomycin.  LETICIA performed at our facility on 2024 showed a large vegetation on the tricuspid valve with moderate tricuspid valve regurgitation.  LETICIA also showed severe aortic insufficiency with probably healed vegetation on the aortic valve leaflet.  There was moderate MR.    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  Claims that she stopped using fentanyl since admission to [facility] in May 2024.  Current drug screen positive for barbiturates and benzodiazepines.  HIV screen was negative.  Hepatitis panel was negative     History of COPD and tobacco abuse-currently on 2 L of oxygen by nasal cannula     Back pain-MRI of the lumbar spine and thoracic spine were negative for signs of discitis/osteomyelitis         Plan     Continue IV vancomycin-ask pharmacy to monitor and dose and try to keep vancomycin trough between 15 and 20  Patient will need at least 6 weeks of IV antimicrobial therapy  Case discussed with cardiothoracic surgery NP-awaiting recommendations from the surgeon  Continue supportive care  A.m. labs  Tobacco and illicit drug abuse cessation  Overall prognosis is guarded patient continues use illicit substances continues to be noncompliant    MARY Clifford  24  14:36 EDT    Note is dictated utilizing voice recognition software/Dragon    Electronically signed by Fartun Blankenship APRN at 24 1437       Tashi Birmingham MD at 24 1305              [facility] MEDICINE SERVICE  DAILY PROGRESS NOTE    NAME: Марина Tilley  : 1975  MRN: [id number]      LOS: 2 days     PROVIDER OF SERVICE: Tashi Birmingham MD    Chief Complaint: Endocarditis  Марина Tillye is a 48 y.o. female with known history of IV drug use initially came to the ED on 2024 at 1530.  Per ED documentation patient was seen for chest pain and shortness of breath.  Has a known endocarditis.  She has been to multiple hospitals.  She had been seen in [place] and a couple in [facility].  Most recently was at [facility].  She has been intermittently on antibiotics.  States she was discharged from the hospital but was not given any antibiotics.  She reports she had not used since before being in the hospital the last time.       Review of blood cultures from [facility] shows patient grew MRSA on 2024.  Review of echocardiogram from [facility] demonstrated vegetationson the septal leaflet of tricuspid valve, mild to moderate tricuspid regurgitation, small pericardial effusion, and EF at 30 to 35%, moderate to severe global LV hypokinesis and a severely dilated left ventricle. Further review of records from [facility] demonstrated a white count  "of 15.9, hemoglobin of 10.3, a creatinine of 1.86, chest imaging that demonstrated multifocal pneumonia versus septic emboli. Reportedly signed out AGAINST MEDICAL ADVICE from other hospital before going to the [facility] based on [facility] records.      Patient was seen by the hospitalist at 1700 on 6/2/2024.  She was admitted to the PCU at 1751.  At 2005 patient was requesting to leave the unit and go outside and became upset when told that she could not.  At 2010 patient had IV removed and left AGAINST MEDICAL ADVICE.  At 2130 to return to the ED and was subsequently readmitted to the hospitalist team around 2 AM on 6/3/2024 to continue previous treatment plan. Pt states \"I can't do this anymore\". Using a cane because SOA with any exertion  Subjective:     Interval History:  History taken from: patient  Patient Complaints: Complains of generalized body pain as well as shortness of breath LETICIA done previously showed a large vegetation on the tricuspid valve with moderate tricuspid valve regurgitation.  CT surgery team consulted  Patient Denies: Nausea or vomiting; no change in vision    Review of Systems:   Review of Systems  14 point review of system unremarkable except mentioned above  Objective:     Vital Signs  Temp:  [96.4 °F (35.8 °C)-98.2 °F (36.8 °C)] 97.8 °F (36.6 °C)  Heart Rate:  [84-96] 92  Resp:  [14-31] 19  BP: ()/(48-65) 103/48  Flow (L/min):  [2-10] 2   Body mass index is 32.91 kg/m².    Physical Exam  Physical Exam  HENT:      Head: Atraumatic.   Eyes:      Pupils: Pupils are equal, round, and reactive to light.   Cardiovascular:      Rate and Rhythm: Normal rate and regular rhythm.   Pulmonary:      Effort: Pulmonary effort is normal.      Breath sounds: Wheezing present.   Abdominal:      General: Bowel sounds are normal.      Palpations: Abdomen is soft.   Musculoskeletal:         General: Normal range of motion.      Cervical back: Neck supple.   Skin:     " General: Skin is warm.   Neurological:      General: No focal deficit present.      Mental Status: She is alert and oriented to person, place, and time.   Psychiatric:         Judgment: Judgment normal.         Scheduled Meds   enoxaparin, 40 mg, Subcutaneous, Daily  famotidine, 40 mg, Oral, Daily  [Held by provider] furosemide, 40 mg, Oral, Daily  gabapentin, 100 mg, Oral, TID  metoprolol succinate XL, 25 mg, Oral, Q24H  nicotine, 1 patch, Transdermal, Q24H  sertraline, 25 mg, Oral, Daily  sodium chloride, 10 mL, Intravenous, Q12H  vancomycin, 1,000 mg, Intravenous, Q12H       PRN Meds     acetaminophen    senna-docusate sodium **AND** polyethylene glycol **AND** bisacodyl **AND** bisacodyl    calcium carbonate    [] cloNIDine **FOLLOWED BY** [] cloNIDine **FOLLOWED BY** cloNIDine **FOLLOWED BY** [START ON 2024] cloNIDine **FOLLOWED BY** [START ON 2024] cloNIDine    dicyclomine    hydrOXYzine    ipratropium-albuterol    Morphine    nitroglycerin    ondansetron    Pharmacy to dose vancomycin    Potassium Replacement - Follow Nurse / BPA Driven Protocol    sodium chloride    sodium chloride    Vancomycin Pharmacy Intermittent/Pulse Dosing   Infusions  Pharmacy to dose vancomycin,           Diagnostic Data    Results from last 7 days   Lab Units 24  0023 24  0042   WBC 10*3/mm3 14.74* 15.59*   HEMOGLOBIN g/dL 8.1* 8.0*   HEMATOCRIT % 26.4* 26.4*   PLATELETS 10*3/mm3 332 386   GLUCOSE mg/dL 142* 165*   CREATININE mg/dL 1.44* 1.71*   BUN mg/dL 23* 20   SODIUM mmol/L 135* 137   POTASSIUM mmol/L 4.5 4.3   AST (SGOT) U/L  --  32   ALT (SGPT) U/L  --  32   ALK PHOS U/L  --  107   BILIRUBIN mg/dL  --  0.3   ANION GAP mmol/L 14.1 13.3       MRI Lumbar Spine Without Contrast    Result Date: 2024  1. The study is mildly degraded by motion. 2. No MR evidence of discitis or osteomyelitis. 3. No high-grade lumbar canal stenosis or high-grade neural foraminal stenosis is evident. Electronically  Signed: Migdalia Godoy MD  6/4/2024 11:04 AM EDT  Workstation ID: [id number]    MRI Thoracic Spine Without Contrast    Result Date: 6/4/2024  Impression: Somewhat motion degraded exam demonstrating no specific findings to suggest osteomyelitis or discitis in the thoracic spine. Electronically Signed: Glen Hartman MD  6/4/2024 8:45 AM EDT  Workstation ID: [id number]    CT Chest Without Contrast Diagnostic    Result Date: 6/3/2024  1. Multifocal patchy peripheral nodular densities are scattered throughout both lungs, some which have a cavitary component, which would correspond to the provided clinical suspicion of septic emboli. 2. Incompletely imaged at 8 cm low-density within the mid spleen, suspicious for splenic infarct. 3. Small bilateral pleural effusions. 4. Dense posterior bibasilar airspace disease is nonspecific and may reflect changes of atelectasis or pneumonia. 5. Mild cardiomegaly. Small concentric pericardial effusion. 6. Enlarged bilateral hilar and mediastinal lymph nodes are nonspecific and may be reactive. Attention at CT chest follow-up is recommended Electronically Signed: Migdalia Godoy MD  6/3/2024 2:40 PM EDT  Workstation ID: [id number]       I reviewed the patient's new clinical results.    Assessment/Plan:     Active and Resolved Problems  Chest pain and dyspnea  Suspect 2/2 untreated Endocarditis and Bacteremia  pulmonary edema vs LLL infiltrate   Noncompliance- pt seen at multiple hospitals and recurrent history of leaving AMA, most recently yesterday from  this hospital   Hx IV Fentanyl use   Hypoxia requiring oxygen supplementation  - Outside records show positive for MRSA bacteremia at Loc[facility]  - per ED provider report Echo from [facility] demonstrated vegetationson the septal leaflet of tricuspid valve, mild to moderate tricuspid regurgitation, small pericardial effusion, and EF at 30 to 35%, moderate to severe global LV hypokinesis and a severely  "dilated left ventricle  -CXR 6/2/24: Vascular congestion. Left lower lobe infiltrate may represent developing pulmonary edema or possibly pneumonia depending on the clinical setting.   -CT of chest without contrast multifocal patchy peripheral nodular densities suspicious for septic emboli, mild small bilateral pleural effusions  Cellulitis bilateral hilar lymphadenopathy  - pt with history of anaphylaxis requiring intubation from oral Keflex  -IDTeam consulted,  d/ganesh daptomycin restarted on vancomycin      hep B C as well as HIV screening     HFreF - outside source documentation with EF 30-35%  - BNP 33,058  - lactic 1.5  - VSS  -2 D echo noted- cardiology to follow  -Continue bronchodilators and inhaled steroids       Hypokalemia  - replace per protocol     CATRINA  - scr 1.24,-> 1.71->1.44   - will hold lasix   - monitor   -if Continue to trend up we will consult nephrology     Anemia  - noted hgb drop 9.3-8.9  - no overt sign of bleed  - montior  - transfuse <7.0     Tobacco dependency  - nicotine patch      IV Fentanyl use  - add the clonidine withdrawal protocol for opioid withdrawal     #Generalized anxiety disorder  PTSD  - psych Team consulted patient started on     Sertraline       Patient will need 30 days or less of skilled services\".       DVT prophylaxis:  Medical DVT prophylaxis orders are present.         Code status is   Code Status and Medical Interventions:   Ordered at: 06/03/24 0151     Code Status (Patient has no pulse and is not breathing):    CPR (Attempt to Resuscitate)     Medical Interventions (Patient has pulse or is breathing):    Full Support       Plan for disposition: SNF  in 2-3 days; will require placement for IV antibiotics given previous history of IV drug use versus daily outpatient infusion therapy    Time: 35 minutes    Signature: Electronically signed by Tashi Birmingham MD, 06/05/24, 13:05 EDT.  [facility] Hospitalist Team     Electronically signed by Tashi Birmingham MD at " 24 1309       Nimco Ortez APRN at 24 0852          Card[facility] [place]        LOS:  LOS: 2 days   Patient Name: Марина Tilley  Age/Sex: 48 y.o. female  : 1975  MRN: [id number]    Day of Service: 24   Length of Stay: 2  Encounter Provider: MARY Kennedy  Place of Service: [facility] CARDIOLOGY  Patient Care Team:  Provider, No Known as PCP - General    Subjective:     Chief Complaint: f/u IE, HF    Subjective: Denies chest pain or dyspnea at present, c/o sore throat    Current Medications:   Scheduled Meds:enoxaparin, 40 mg, Subcutaneous, Daily  famotidine, 40 mg, Oral, Daily  [Held by provider] furosemide, 40 mg, Intravenous, Q12H  metoprolol succinate XL, 25 mg, Oral, Q24H  nicotine, 1 patch, Transdermal, Q24H  nitroglycerin, 0.5 inch, Topical, Q6H  sodium chloride, 10 mL, Intravenous, Q12H  vancomycin, 1,000 mg, Intravenous, Q12H      Continuous Infusions:Pharmacy to dose vancomycin,         Allergies:  Allergies   Allergen Reactions    Cephalexin Anaphylaxis     Required intubation    Latex Hives       Review of Systems   Constitutional: Negative for chills, diaphoresis and malaise/fatigue.   Cardiovascular:  Positive for dyspnea on exertion. Negative for chest pain, irregular heartbeat, leg swelling, near-syncope, orthopnea, palpitations, paroxysmal nocturnal dyspnea and syncope.   Respiratory:  Negative for cough, shortness of breath, sleep disturbances due to breathing and sputum production.    Musculoskeletal:  Positive for back pain.   Gastrointestinal:  Positive for bloating. Negative for change in bowel habit.   Genitourinary:  Negative for urgency.   Neurological:  Negative for dizziness and headaches.   Psychiatric/Behavioral:  Negative for altered mental status.          Objective:     Temp:  [97.5 °F (36.4 °C)-98.2 °F (36.8 °C)] 97.5 °F (36.4 °C)  Heart Rate:  [84-96] 90  Resp:  [14-34] 20  BP: ()/(48-61)  "111/60     Intake/Output Summary (Last 24 hours) at 6/5/2024 0852  Last data filed at 6/5/2024 0012  Gross per 24 hour   Intake 1440.29 ml   Output --   Net 1440.29 ml     Body mass index is 32.91 kg/m².      06/03/24  0119 06/03/24  0151   Weight: 92.5 kg (204 lb) 92.5 kg (203 lb 14.8 oz)         General Appearance:    Alert, cooperative, in no acute distress                                Head: Atraumatic, normocephalic, PERRLA               Neck:   supple, mildJVD   Lungs:     Supplemental O2, diminished bilateral bases    Heart:    Regular rhythm and normal rate, normal S1 and S2, murmur   Abdomen:     Normal bowel sounds, distended but soft   Extremities:   Moves all extremities well, no edema, no cyanosis, no  redness   Pulses:   Pulses palpable and equal bilaterally   Skin:   No bleeding, bruising or rash   Neurologic:   Awake, alert, oriented x3         Lab Review:   Results from last 7 days   Lab Units 06/05/24  0023 06/04/24  0042 06/03/24  1445 06/03/24  0209   SODIUM mmol/L 135* 137  --  139   POTASSIUM mmol/L 4.5 4.3   < > 3.5   CHLORIDE mmol/L 100 99  --  100   CO2 mmol/L 20.9* 24.7  --  23.7   BUN mg/dL 23* 20  --  14   CREATININE mg/dL 1.44* 1.71*  --  1.39*   GLUCOSE mg/dL 142* 165*  --  117*   CALCIUM mg/dL 7.5* 7.8*  --  8.3*   AST (SGOT) U/L  --  32  --  23   ALT (SGPT) U/L  --  32  --  27    < > = values in this interval not displayed.     Results from last 7 days   Lab Units 06/04/24  0042 06/03/24  0417 06/03/24  0209 06/02/24  1344   CK TOTAL U/L 64 62  --   --    HSTROP T ng/L  --  43* 44* 37*     Results from last 7 days   Lab Units 06/05/24  0023 06/04/24  0042   WBC 10*3/mm3 14.74* 15.59*   HEMOGLOBIN g/dL 8.1* 8.0*   HEMATOCRIT % 26.4* 26.4*   PLATELETS 10*3/mm3 332 386                   Invalid input(s): \"LDLCALC\"  Results from last 7 days   Lab Units 06/02/24  1344   PROBNP pg/mL 33,058.0*           Recent Radiology:  Imaging Results (Most Recent)       Procedure Component Value Units " Date/Time    MRI Lumbar Spine Without Contrast [[id number]] Collected: 06/04/24 1059     Updated: 06/04/24 1106    Narrative:      MRI LUMBAR SPINE WO CONTRAST    Date of Exam: 6/4/2024 10:25 AM EDT    Indication: Rule out discitis.     Comparison: No prior dedicated lumbar imaging for comparison at this institution.    Technique:  Routine multiplanar/multisequence sequence images of the lumbar spine were obtained without contrast administration.        Findings:  Study is degraded by motion. The patient was unable to tolerate repeated imaging due to claustrophobia.    Lumbar vertebral bodies maintain normal height, alignment and marrow signal intensity. Disc heights appear preserved with normal signal intensity. No MR evidence of discitis/osteomyelitis. Conus medullaris terminates at the L1 level with normal signal   intensity. The imaged paraspinal soft tissues appear grossly unremarkable. No high-grade canal stenosis or high-grade neural foraminal stenosis is seen. Mild posterior osteophyte formation is suggested at L3-4 and L4-5. No high-grade neural foraminal   stenosis or lumbar nerve root encroachment is seen. Mild to moderate right neural foraminal stenosis at L4-5.      Impression:      1. The study is mildly degraded by motion.  2. No MR evidence of discitis or osteomyelitis.  3. No high-grade lumbar canal stenosis or high-grade neural foraminal stenosis is evident.        Electronically Signed: Migdalia Godoy MD    6/4/2024 11:04 AM EDT    Workstation ID: [id number]    MRI Thoracic Spine Without Contrast [[id number]] Collected: 06/04/24 0842     Updated: 06/04/24 0847    Narrative:      MRI THORACIC SPINE WO CONTRAST    Date of Exam: 6/3/2024 11:00 PM EDT    Indication: Rule out discitis.     Comparison: None available.    Technique:  Routine multiplanar/multisequence sequence images of the thoracic spine were obtained without contrast administration.      Findings:  Evaluation is somewhat degraded by patient  motion. Marrow signal appears homogeneous and maintained, without evidence of marrow or disc edema to suggest infectious discitis or osteomyelitis. There is no evidence of fracture or malalignment. The thoracic   spinal cord demonstrates no definite focal areas of intramedullary signal abnormality. The paraspinal soft tissues demonstrate no acute or suspicious findings.      Impression:      Impression:  Somewhat motion degraded exam demonstrating no specific findings to suggest osteomyelitis or discitis in the thoracic spine.        Electronically Signed: Glen Hartman MD    6/4/2024 8:45 AM EDT    Workstation ID: [id number]    CT Chest Without Contrast Diagnostic [[id number]] Collected: 06/03/24 1434     Updated: 06/03/24 1442    Narrative:      CT CHEST WO CONTRAST DIAGNOSTIC    Date of Exam: 6/3/2024 2:29 PM EDT    Indication: Rule out septic emboli.    Comparison: AP portable chest 6/2/2024.    Technique: Axial CT images were obtained of the chest without contrast administration.  Sagittal and coronal reconstructions were performed.  Automated exposure control and iterative reconstruction methods were used.      Findings:  Multiple irregular patchy nodular densities are scattered throughout both lungs, few of which are cavitary. . Index right upper lobe peripheral density measures 2.4 x 1.9 cm (5/47). Index parenchymal density within the superior left lower lobe measures   2.1 x 3.3 cm (5/39). Index cavitary nodule in the right lower lobe measures 1.7 cm (5/59).    There are small layering bilateral pleural effusions with posterior bibasilar airspace disease which may represent atelectasis or pneumonia.    Heart size is mildly enlarged with a small concentric pericardial effusion. Evaluation for adenopathy is limited due to lack of IV contrast. Suspected bilateral hilar adenopathy, on the right measuring up to 1.5 cm short axis (2/42). Mildly large   subcarinal lymph node measures 1.4 cm short axis (2/45). AP  window node measures 1.0 cm short axis (2/34). Prevascular node measures 8 mm short axis.    There is incompletely imaged low-density within the soft 20 mid pole posteriorly measuring 8.0 x 6.3 cm, which appears to partially spare the peripheral capsule, raising the possibility of splenic infarct. Cholecystectomy changes are present. Remainder   of the imaged upper abdominal organs have a normal noncontrast appearance.    Mildly enlarged bilateral cervical lymph nodes are present, although not strictly pathologically enlarged by CT criteria.      Impression:      1. Multifocal patchy peripheral nodular densities are scattered throughout both lungs, some which have a cavitary component, which would correspond to the provided clinical suspicion of septic emboli.  2. Incompletely imaged at 8 cm low-density within the mid spleen, suspicious for splenic infarct.  3. Small bilateral pleural effusions.  4. Dense posterior bibasilar airspace disease is nonspecific and may reflect changes of atelectasis or pneumonia.  5. Mild cardiomegaly. Small concentric pericardial effusion.  6. Enlarged bilateral hilar and mediastinal lymph nodes are nonspecific and may be reactive. Attention at CT chest follow-up is recommended      Electronically Signed: Migdalia Godoy MD    6/3/2024 2:40 PM EDT    Workstation ID: [id number]            ECHOCARDIOGRAM:    Results for orders placed during the hospital encounter of 06/03/24    Adult Transthoracic Echo Complete W/ Cont if Necessary Per Protocol    Interpretation Summary    Left ventricular systolic function is mildly decreased. Left ventricular ejection fraction appears to be 51 - 55%.    Left ventricular diastolic function was normal.    There is calcification of the aortic valve.    Moderate to severe aortic valve regurgitation is present.    Moderate to severe mitral valve regurgitation is present.    There is echodensity attached to septal leaflet of tricuspid valve measuring 1.2 x 1.5  cm.    Recommend LETICIA to assess echodensity and mitral and aortic valve regurgitation.  If clinically indicated.    Recommend modified Duke criteria for clinical diagnosis of bacterial endocarditis        I reviewed the patient's new clinical results.    EKG:      Assessment:       Endocarditis    Dyspnea / HFrEF / outside ECHO LVEF 30% mild to moderate tricuspid regurgitation, small pericardial effusion, and EF at 30 to 35%, moderate to severe global LV hypokinesis and a severely dilated left ventricle ----- ECHO This admission shows LVEF 50% however she has severe AI and MR ECHO density noted on TV  Untreated endocarditis / bacteremia recently- [facility] records show vegetation on septal leaflet of TV  H/o IV drug abuse- fentanyl   CATRINA  Anemia  Pneumonia / shortness of breath / leukocytosis  Tobacco use  Medical non compliance   Back pain- MRI of lumbar and thoracic spine ordered       Plan:   Continue diuresis, she has normal LVEF on ECHO here however she has severe AI and MR, she also has echo density on TV  LETICIA confirms TV endocarditis  CTS consulted  GDMT include toprol XL, b/p soft, will hold on additional therapy  Additional recommendations per MARY Briggs  06/05/24  08:52 EDT    Electronically signed by Nimco Ortez APRN at 06/05/24 0978       Andrew Pollack MD at 06/04/24 1651          Infectious Diseases Progress Note      LOS: 1 day   Patient Care Team:  Provider, No Known as PCP - General    Chief Complaint: Chest and back pain, fatigue, shortness of breath    Subjective       The patient has been afebrile for the last 24 hours.  The patient is on 2 L of oxygen by nasal cannula hemodynamically stable, and is tolerating antimicrobial therapy.      Review of Systems:   Review of Systems   Constitutional:  Positive for fatigue.   HENT: Negative.     Eyes: Negative.    Respiratory:  Positive for shortness of breath.    Cardiovascular:  Positive for chest pain.   Gastrointestinal:  Negative.    Endocrine: Negative.    Genitourinary: Negative.    Musculoskeletal:  Positive for back pain.   Skin: Negative.    Neurological: Negative.    Psychiatric/Behavioral: Negative.     All other systems reviewed and are negative.       Objective     Vital Signs  Temp:  [97.7 °F (36.5 °C)-99.6 °F (37.6 °C)] 98 °F (36.7 °C)  Heart Rate:  [] 88  Resp:  [16-34] 19  BP: ()/(43-68) 101/51    Physical Exam:  Physical Exam  Vitals and nursing note reviewed.   Constitutional:       General: She is not in acute distress.     Appearance: She is well-developed and normal weight. She is ill-appearing. She is not diaphoretic.   HENT:      Head: Normocephalic and atraumatic.   Eyes:      General: No scleral icterus.     Extraocular Movements: Extraocular movements intact.      Conjunctiva/sclera: Conjunctivae normal.      Pupils: Pupils are equal, round, and reactive to light.   Cardiovascular:      Rate and Rhythm: Normal rate and regular rhythm.      Heart sounds: S1 normal and S2 normal. Murmur heard.   Pulmonary:      Effort: Pulmonary effort is normal. No respiratory distress.      Breath sounds: No stridor. No wheezing or rales.      Comments: Diminished throughout  Chest:      Chest wall: No tenderness.   Abdominal:      General: Bowel sounds are normal. There is no distension.      Palpations: Abdomen is soft. There is no mass.      Tenderness: There is no abdominal tenderness. There is no guarding.   Musculoskeletal:         General: No swelling, tenderness or deformity. Normal range of motion.      Cervical back: Neck supple.   Skin:     General: Skin is warm and dry.      Coloration: Skin is not pale.      Findings: No bruising, erythema or rash.   Neurological:      General: No focal deficit present.      Mental Status: She is alert and oriented to person, place, and time. Mental status is at baseline.      Cranial Nerves: No cranial nerve deficit.   Psychiatric:         Mood and Affect: Mood  normal.          Results Review:    I have reviewed all clinical data, test, lab, and imaging results.     Radiology  MRI Lumbar Spine Without Contrast    Result Date: 6/4/2024  MRI LUMBAR SPINE WO CONTRAST Date of Exam: 6/4/2024 10:25 AM EDT Indication: Rule out discitis.  Comparison: No prior dedicated lumbar imaging for comparison at this institution. Technique:  Routine multiplanar/multisequence sequence images of the lumbar spine were obtained without contrast administration.  Findings: Study is degraded by motion. The patient was unable to tolerate repeated imaging due to claustrophobia. Lumbar vertebral bodies maintain normal height, alignment and marrow signal intensity. Disc heights appear preserved with normal signal intensity. No MR evidence of discitis/osteomyelitis. Conus medullaris terminates at the L1 level with normal signal intensity. The imaged paraspinal soft tissues appear grossly unremarkable. No high-grade canal stenosis or high-grade neural foraminal stenosis is seen. Mild posterior osteophyte formation is suggested at L3-4 and L4-5. No high-grade neural foraminal stenosis or lumbar nerve root encroachment is seen. Mild to moderate right neural foraminal stenosis at L4-5.     1. The study is mildly degraded by motion. 2. No MR evidence of discitis or osteomyelitis. 3. No high-grade lumbar canal stenosis or high-grade neural foraminal stenosis is evident. Electronically Signed: Migdalia Godoy MD  6/4/2024 11:04 AM EDT  Workstation ID: [id number]    MRI Thoracic Spine Without Contrast    Result Date: 6/4/2024  MRI THORACIC SPINE WO CONTRAST Date of Exam: 6/3/2024 11:00 PM EDT Indication: Rule out discitis.  Comparison: None available. Technique:  Routine multiplanar/multisequence sequence images of the thoracic spine were obtained without contrast administration. Findings: Evaluation is somewhat degraded by patient motion. Marrow signal appears homogeneous and maintained, without evidence of  marrow or disc edema to suggest infectious discitis or osteomyelitis. There is no evidence of fracture or malalignment. The thoracic spinal cord demonstrates no definite focal areas of intramedullary signal abnormality. The paraspinal soft tissues demonstrate no acute or suspicious findings.     Impression: Somewhat motion degraded exam demonstrating no specific findings to suggest osteomyelitis or discitis in the thoracic spine. Electronically Signed: Glen Hartman MD  6/4/2024 8:45 AM EDT  Workstation ID: [id number]     Cardiology    Laboratory    Results from last 7 days   Lab Units 06/04/24  0042 06/03/24  0209 06/02/24  1344   WBC 10*3/mm3 15.59* 17.54* 18.73*   HEMOGLOBIN g/dL 8.0* 8.9* 9.3*   HEMATOCRIT % 26.4* 29.2* 29.1*   PLATELETS 10*3/mm3 386 457* 436     Results from last 7 days   Lab Units 06/04/24  0042 06/03/24  1445 06/03/24  0209 06/02/24  1344   SODIUM mmol/L 137  --  139 139   POTASSIUM mmol/L 4.3 4.5 3.5 3.2*   CHLORIDE mmol/L 99  --  100 101   CO2 mmol/L 24.7  --  23.7 24.7   BUN mg/dL 20  --  14 11   CREATININE mg/dL 1.71*  --  1.39* 1.24*   GLUCOSE mg/dL 165*  --  117* 109*   ALBUMIN g/dL 3.1*  --  3.2* 3.3*   BILIRUBIN mg/dL 0.3  --  0.3 0.3   ALK PHOS U/L 107  --  111 117   AST (SGOT) U/L 32  --  23 23   ALT (SGPT) U/L 32  --  27 32   CALCIUM mg/dL 7.8*  --  8.3* 8.6     Results from last 7 days   Lab Units 06/04/24  0042   CK TOTAL U/L 64             Microbiology   Microbiology Results (last 10 days)       Procedure Component Value - Date/Time    Blood Culture - Blood, Arm, Right [[id number]]  (Normal) Collected: 06/02/24 1356    Lab Status: Preliminary result Specimen: Blood from Arm, Right Updated: 06/04/24 1400     Blood Culture No growth at 2 days    Narrative:      Less than seven (7) mL's of blood was collected.  Insufficient quantity may yield false negative results.    Blood Culture - Blood, Arm, Left [[id number]]  (Normal) Collected: 06/02/24 1344    Lab Status: Preliminary result  Specimen: Blood from Arm, Left Updated: 24 1400     Blood Culture No growth at 2 days            Medication Review:       Schedule Meds  DAPTOmycin, 10 mg/kg (Adjusted), Intravenous, Q24H  enoxaparin, 40 mg, Subcutaneous, Daily  famotidine, 40 mg, Oral, Daily  [Held by provider] furosemide, 40 mg, Intravenous, Q12H  metoprolol succinate XL, 25 mg, Oral, Q24H  nicotine, 1 patch, Transdermal, Q24H  nitroglycerin, 0.5 inch, Topical, Q6H  sodium chloride, 10 mL, Intravenous, Q12H        Infusion Meds       PRN Meds    acetaminophen    senna-docusate sodium **AND** polyethylene glycol **AND** bisacodyl **AND** bisacodyl    calcium carbonate    [] cloNIDine **FOLLOWED BY** cloNIDine **FOLLOWED BY** [START ON 2024] cloNIDine **FOLLOWED BY** [START ON 2024] cloNIDine **FOLLOWED BY** [START ON 2024] cloNIDine    dicyclomine    hydrOXYzine    ipratropium-albuterol    Morphine    nitroglycerin    ondansetron    Potassium Replacement - Follow Nurse / BPA Driven Protocol    sodium chloride    sodium chloride        Assessment & Plan       Antimicrobial Therapy   1.  IV daptomycin        2.        3.        4.        5.            Assessment       Tricuspid valve endocarditis   Patient had positive blood culture for MRSA from 2024 and 2024 according to care everywhere records from [facility].  Cultures had an KRYSTEN of 1 to vancomycin.  LETICIA performed at our facility on 2024 showed a large vegetation on the tricuspid valve with moderate tricuspid valve regurgitation.  LETICIA also showed severe aortic insufficiency with probably healed vegetation on the aortic valve leaflet.  There was moderate MR.    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     Time Frame (Transfer Goal 1, PT) long term goal (LTG);2 weeks  -BR       Row Name 06/05/24 1513          Gait Training Goal 1 (PT)    Activity/Assistive Device (Gait Training Goal 1, PT) gait (walking locomotion);cane, straight;decrease fall risk  -BR     [place] Level (Gait Training Goal 1, PT) standby assist  -BR     Distance (Gait Training Goal 1, PT) 100  -BR     Time Frame (Gait Training Goal 1, PT) long term goal (LTG);2 weeks  -BR       Row Name 06/05/24 1513          Stairs Goal 1 (PT)    Activity/Assistive Device (Stairs Goal 1, PT) stairs, all skills  -BR     [place] Level/Cues Needed (Stairs Goal 1, PT) contact guard required  -BR     Number of Stairs (Stairs Goal 1, PT) 7  -BR     Time Frame (Stairs Goal 1, PT) long term goal (LTG);2 weeks  -BR       Row Name 06/05/24 1513          Therapy Assessment/Plan (PT)    Planned Therapy Interventions (PT) balance training;bed mobility training;gait training;patient/family education;strengthening;transfer training;ROM (range of motion);neuromuscular re-education  -BR               User Key  (r) = Recorded By, (t) = Taken By, (c) = Cosigned By      Initials Name Provider Type    BR  Brisa Duran, PT Physical Therapist                   Clinical Impression       Row Name 06/05/24 1500          Pain    Pretreatment Pain Rating 0/10 - no pain  -BR     Posttreatment Pain Rating 0/10 - no pain  -BR       Row Name 06/05/24 1500          Plan of Care Review    Plan of Care Reviewed With patient  -BR     Outcome Evaluation Pt presents as a 49 y/o F admitted to [facility] on 6/3/24 with known endocarditis. CXR 6/2/24: Vascular congestion. Left lower lobe infiltrate may represent developing pulmonary edema or possibly pneumonia. Outside records show positive for MRSA bacteremia at [facility]. Per ED provider report Echo from [facility] demonstrated vegetationson the septal leaflet of tricuspid valve, mild to moderate tricuspid regurgitation, small pericardial effusion, and EF at 30 to 35%, moderate to severe global LV hypokinesis and a severely dilated left ventricle. MRI spine(-). Pt does report having a fall in past 3 months where she hit her head.   Per chart review,Pt reported using IV heroin/fentanyl since she was 16 years old.  Pt has had 2 recent AMA DCs from hospitals.At baseline. Pt lives with sister in a bi-level home with 6 CHELE and 7 to 14 steps for interior. Pt has been using a cane for independence with household mobility.  Pt A and O x 4 with O2 at 2 L. She has hospital gown draped over her bc she can't stand to have it tied. Pt admits to being very anxious. She does not agree to ambulate in room or sit up in a chair but she is agreeable to transferring over to [facility] with CGA/SBA of 1. Pt appreciative of care.  PT will follow pt to encourage safe mobility and PT recommendation at this time is Home with Assist.  -BR       Row Name 06/05/24 1500          Therapy Assessment/Plan (PT)    Rehab Potential (PT) good, to achieve stated therapy goals  -BR     Criteria for Skilled Interventions Met (PT) yes;meets criteria  -BR     Therapy Frequency (PT) 3 times/wk  -BR      Predicted Duration of Therapy Intervention (PT) until D/C  -BR       Row Name 06/05/24 1500          Vital Signs    Pre Systolic BP Rehab 111  -BR     Pre Treatment Diastolic BP 60  -BR     Pretreatment Heart Rate (beats/min) 87  -BR     Pretreatment Resp Rate (breaths/min) 35  -BR     Pre SpO2 (%) 97  -BR     O2 Delivery Pre Treatment nasal cannula  2L  -BR     Post SpO2 (%) 98  -BR     O2 Delivery Post Treatment nasal cannula  2L  -BR     Pre Patient Position Supine  -BR     Intra Patient Position Standing  -BR     Post Patient Position Supine  -BR       Row Name 06/05/24 1500          Positioning and Restraints    Pre-Treatment Position in bed  -BR     Post Treatment Position bed  -BR     In Bed notified nsg;supine;call light within reach;encouraged to call for assist;exit alarm on;side rails up x3  -BR               User Key  (r) = Recorded By, (t) = Taken By, (c) = Cosigned By      Initials Name Provider Type    BR Brisa Duran, PT Physical Therapist                   Outcome Measures       Row Name 06/05/24 1514 06/05/24 0833       How much help from another person do you currently need...    Turning from your back to your side while in flat bed without using bedrails? 4  -BR 4  -RM    Moving from lying on back to sitting on the side of a flat bed without bedrails? 4  -BR 4  -RM    Moving to and from a bed to a chair (including a wheelchair)? 3  -BR 4  -RM    Standing up from a chair using your arms (e.g., wheelchair, bedside chair)? 4  -BR 4  -RM    Climbing 3-5 steps with a railing? 3  -BR 3  -RM    To walk in hospital room? 3  -BR 3  -RM    AM-PAC 6 Clicks Score (PT) 21  -BR 22  -RM    Highest Level of Mobility Goal 6 --> Walk 10 steps or more  -BR 7 --> Walk 25 feet or more  -RM      Row Name 06/05/24 1514          Functional Assessment    Outcome Measure Options AM-PAC 6 Clicks Basic Mobility (PT)  -BR               User Key  (r) = Recorded By, (t) = Taken By, (c) = Cosigned By      Initials Name  Provider Type     Frances Aviles, RN Registered Nurse    Brisa Mendosa, PT Physical Therapist                                 Physical Therapy Education       Title: PT OT SLP Therapies (Done)       Topic: Physical Therapy (Done)       Point: Mobility training (Done)       Learning Progress Summary             Patient Acceptance, E,D, VU,DU,NR by BR at 6/5/2024 1514                         Point: Body mechanics (Done)       Learning Progress Summary             Patient Acceptance, E,D, VU,DU,NR by BR at 6/5/2024 1514                         Point: Precautions (Done)       Learning Progress Summary             Patient Acceptance, E,D, VU,DU,NR by BR at 6/5/2024 1514                                         User Key       Initials Effective Dates Name Provider Type Discipline    BR 02/01/22 -  Brisa Duran PT Physical Therapist PT                  PT Recommendation and Plan  Planned Therapy Interventions (PT): balance training, bed mobility training, gait training, patient/family education, strengthening, transfer training, ROM (range of motion), neuromuscular re-education  Plan of Care Reviewed With: patient  Outcome Evaluation: Pt presents as a 49 y/o F admitted to [facility] on 6/3/24 with known endocarditis. CXR 6/2/24: Vascular congestion. Left lower lobe infiltrate may represent developing pulmonary edema or possibly pneumonia. Outside records show positive for MRSA bacteremia at [facility]. Per ED provider report Echo from [facility] demonstrated vegetationson the septal leaflet of tricuspid valve, mild to moderate tricuspid regurgitation, small pericardial effusion, and EF at 30 to 35%, moderate to severe global LV hypokinesis and a severely dilated left ventricle. MRI spine(-). Pt does report having a fall in past 3 months where she hit her head.   Per chart review,Pt reported using IV heroin/fentanyl since she was 16 years old.  Pt has had 2 recent AMA DCs from  hospitals.At baseline. Pt lives with sister in a bi-level home with 6 CHELE and 7 to 14 steps for interior. Pt has been using a cane for independence with household mobility.  Pt A and O x 4 with O2 at 2 L. She has hospital gown draped over her bc she can't stand to have it tied. Pt admits to being very anxious. She does not agree to ambulate in room or sit up in a chair but she is agreeable to transferring over to [facility] with CGA/SBA of 1. Pt appreciative of care.  PT will follow pt to encourage safe mobility and PT recommendation at this time is Home with Assist.     Time Calculation:         PT Charges       Row Name 06/05/24 1514             Time Calculation    Start Time 1009  -BR      Stop Time 1034  -BR      Time Calculation (min) 25 min  -BR      PT Received On 06/05/24  -BR      PT - Next Appointment 06/07/24  -BR      PT Goal Re-Cert Due Date 06/19/24  -BR         Time Calculation- PT    Total Timed Code Minutes- PT 0 minute(s)  -BR                User Key  (r) = Recorded By, (t) = Taken By, (c) = Cosigned By      Initials Name Provider Type    BR Brisa Duran, PT Physical Therapist                  Therapy Charges for Today       Code Description Service Date Service Provider Modifiers Qty    91432000503 HC PT EVAL LOW COMPLEXITY 4 6/5/2024 Brisa Duran, PT GP 1            PT G-Codes  Outcome Measure Options: AM-PAC 6 Clicks Basic Mobility (PT)  AM-PAC 6 Clicks Score (PT): 21  PT Discharge Summary  Anticipated Discharge Disposition (PT): home with assist    Brisa Duran PT  6/5/2024      Electronically signed by Brisa Duran PT at 06/05/24 1515       Occupational Therapy Notes (last 24 hours)  Notes from 06/04/24 1638 through 06/05/24 1638   No notes exist for this encounter.
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
## 2024-06-05 NOTE — PROGRESS NOTES
"Pharmacy Antimicrobial Dosing Service    Subjective:  Марина Tilley is a 48 y.o.female admitted with chest and back pain, shortness of breath. Pharmacy has been consulted to dose Vancomycin for endocarditis.    PMH: previously on daptomycin, switching to vancomycin per ID.       Assessment/Plan    1. Day #2 Vancomycin: Goal -600 mcg*h/mL.  Scr continuing to improve and good UOP documented.  Received loading dose of vancomycin 2,000 mg IV yesterday. Random 6 hour level = 27.4 mcg/mL.     Will cautiously start vancomycin 1,000 mg (~10 mg/kg) IV q12 hours and assess trough level prior to 4th dose tomorrow AM.     Will continue to monitor drug levels, renal function, culture and sensitivities, and patient clinical status.       Objective:  Relevant clinical data and objective history reviewed:  167.6 cm (66\")   92.5 kg (203 lb 14.8 oz)   Ideal body weight: 59.3 kg (130 lb 11.7 oz)  Adjusted ideal body weight: 72.6 kg (160 lb 0.2 oz)  Body mass index is 32.91 kg/m².    Results from last 7 days   Lab Units 06/05/24  0023   VANCOMYCIN RM mcg/mL 27.40     Results from last 7 days   Lab Units 06/05/24  0023 06/04/24  0042 06/03/24  0209   CREATININE mg/dL 1.44* 1.71* 1.39*     Estimated Creatinine Clearance: 55.9 mL/min (A) (by C-G formula based on SCr of 1.44 mg/dL (H)).  I/O last 3 completed shifts:  In: 1558.3 [P.O.:836; I.V.:222.3; IV Piggyback:500]  Out: 0     Results from last 7 days   Lab Units 06/05/24  0023 06/04/24  0042 06/03/24  0209   WBC 10*3/mm3 14.74* 15.59* 17.54*     Temperature    06/04/24 2305 06/05/24 0353 06/05/24 0744   Temp: 98.1 °F (36.7 °C) 97.5 °F (36.4 °C) 97.5 °F (36.4 °C)     Baseline culture/source/susceptibility:  Microbiology Results (last 10 days)       Procedure Component Value - Date/Time    Blood Culture - Blood, Arm, Right [[id number]]  (Normal) Collected: 06/02/24 1356    Lab Status: Preliminary result Specimen: Blood from Arm, Right Updated: 06/04/24 1400     Blood Culture No " growth at 2 days    Narrative:      Less than seven (7) mL's of blood was collected.  Insufficient quantity may yield false negative results.    Blood Culture - Blood, Arm, Left [[id number]]  (Normal) Collected: 06/02/24 1344    Lab Status: Preliminary result Specimen: Blood from Arm, Left Updated: 06/04/24 1400     Blood Culture No growth at 2 days            Chayo Mensah PharmD  06/05/24 08:40 EDT

## 2024-06-05 NOTE — PLAN OF CARE
Goal Outcome Evaluation:  Plan of Care Reviewed With: patient  Pt presents as a 47 y/o F admitted to [facility] on 6/3/24 with known endocarditis. CXR 6/2/24: Vascular congestion. Left lower lobe infiltrate may represent developing pulmonary edema or possibly pneumonia. Outside records show positive for MRSA bacteremia at [facility]. Per ED provider report Echo from [facility] demonstrated vegetationson the septal leaflet of tricuspid valve, mild to moderate tricuspid regurgitation, small pericardial effusion, and EF at 30 to 35%, moderate to severe global LV hypokinesis and a severely dilated left ventricle. MRI spine(-). Pt does report having a fall in past 3 months where she hit her head.   Per chart review,Pt reported using IV heroin/fentanyl since she was 16 years old.  Pt has had 2 recent AMA DCs from hospitals.At baseline. Pt lives with sister in a bi-level home with 6 CHELE and 7 to 14 steps for interior. Pt has been using a cane for independence with household mobility.  Pt A and O x 4 with O2 at 2 L. She has hospital gown draped over her bc she can't stand to have it tied. Pt admits to being very anxious. She does not agree to ambulate in room or sit up in a chair but she is agreeable to transferring over to [facility] with CGA/SBA of 1. Pt appreciative of care.  PT will follow pt to encourage safe mobility and PT recommendation at this time is Home with Assist.                Anticipated Discharge Disposition (PT): home with assist

## 2024-06-05 NOTE — PROGRESS NOTES
[facility] MEDICINE SERVICE  DAILY PROGRESS NOTE    NAME: Марина Tilley  : 1975  MRN: [id number]      LOS: 2 days     PROVIDER OF SERVICE: Tashi Birmingham MD    Chief Complaint: Endocarditis  Марина Tilley is a 48 y.o. female with known history of IV drug use initially came to the ED on 2024 at 1530.  Per ED documentation patient was seen for chest pain and shortness of breath.  Has a known endocarditis.  She has been to multiple hospitals.  She had been seen in [place] and a couple in [facility].  Most recently was at [facility].  She has been intermittently on antibiotics.  States she was discharged from the hospital but was not given any antibiotics.  She reports she had not used since before being in the hospital the last time.       Review of blood cultures from [facility] shows patient grew MRSA on 2024.  Review of echocardiogram from [facility] demonstrated vegetationson the septal leaflet of tricuspid valve, mild to moderate tricuspid regurgitation, small pericardial effusion, and EF at 30 to 35%, moderate to severe global LV hypokinesis and a severely dilated left ventricle. Further review of records from [facility] demonstrated a white count of 15.9, hemoglobin of 10.3, a creatinine of 1.86, chest imaging that demonstrated multifocal pneumonia versus septic emboli. Reportedly signed out AGAINST MEDICAL ADVICE from other hospital before going to the [facility] based on [facility] records.      Patient was seen by the hospitalist at 1700 on 2024.  She was admitted to the PCU at 1751.  At  patient was requesting to leave the unit and go outside and became upset when told that she could not.  At  patient had IV removed and left AGAINST MEDICAL ADVICE.  At 2130 to return to the ED and was subsequently readmitted to the hospitalist team around 2 AM on 6/3/2024 to continue previous  "treatment plan. Pt states \"I can't do this anymore\". Using a cane because SOA with any exertion  Subjective:     Interval History:  History taken from: patient  Patient Complaints: Complains of generalized body pain as well as shortness of breath LETICIA done previously showed a large vegetation on the tricuspid valve with moderate tricuspid valve regurgitation.  CT surgery team consulted  Patient Denies: Nausea or vomiting; no change in vision    Review of Systems:   Review of Systems  14 point review of system unremarkable except mentioned above  Objective:     Vital Signs  Temp:  [96.4 °F (35.8 °C)-98.2 °F (36.8 °C)] 97.8 °F (36.6 °C)  Heart Rate:  [84-96] 92  Resp:  [14-31] 19  BP: ()/(48-65) 103/48  Flow (L/min):  [2-10] 2   Body mass index is 32.91 kg/m².    Physical Exam  Physical Exam  HENT:      Head: Atraumatic.   Eyes:      Pupils: Pupils are equal, round, and reactive to light.   Cardiovascular:      Rate and Rhythm: Normal rate and regular rhythm.   Pulmonary:      Effort: Pulmonary effort is normal.      Breath sounds: Wheezing present.   Abdominal:      General: Bowel sounds are normal.      Palpations: Abdomen is soft.   Musculoskeletal:         General: Normal range of motion.      Cervical back: Neck supple.   Skin:     General: Skin is warm.   Neurological:      General: No focal deficit present.      Mental Status: She is alert and oriented to person, place, and time.   Psychiatric:         Judgment: Judgment normal.         Scheduled Meds   enoxaparin, 40 mg, Subcutaneous, Daily  famotidine, 40 mg, Oral, Daily  [Held by provider] furosemide, 40 mg, Oral, Daily  gabapentin, 100 mg, Oral, TID  metoprolol succinate XL, 25 mg, Oral, Q24H  nicotine, 1 patch, Transdermal, Q24H  sertraline, 25 mg, Oral, Daily  sodium chloride, 10 mL, Intravenous, Q12H  vancomycin, 1,000 mg, Intravenous, Q12H       PRN Meds     acetaminophen    senna-docusate sodium **AND** polyethylene glycol **AND** bisacodyl " **AND** bisacodyl    calcium carbonate    [] cloNIDine **FOLLOWED BY** [] cloNIDine **FOLLOWED BY** cloNIDine **FOLLOWED BY** [START ON 2024] cloNIDine **FOLLOWED BY** [START ON 2024] cloNIDine    dicyclomine    hydrOXYzine    ipratropium-albuterol    Morphine    nitroglycerin    ondansetron    Pharmacy to dose vancomycin    Potassium Replacement - Follow Nurse / BPA Driven Protocol    sodium chloride    sodium chloride    Vancomycin Pharmacy Intermittent/Pulse Dosing   Infusions  Pharmacy to dose vancomycin,           Diagnostic Data    Results from last 7 days   Lab Units 24  0023 24  0042   WBC 10*3/mm3 14.74* 15.59*   HEMOGLOBIN g/dL 8.1* 8.0*   HEMATOCRIT % 26.4* 26.4*   PLATELETS 10*3/mm3 332 386   GLUCOSE mg/dL 142* 165*   CREATININE mg/dL 1.44* 1.71*   BUN mg/dL 23* 20   SODIUM mmol/L 135* 137   POTASSIUM mmol/L 4.5 4.3   AST (SGOT) U/L  --  32   ALT (SGPT) U/L  --  32   ALK PHOS U/L  --  107   BILIRUBIN mg/dL  --  0.3   ANION GAP mmol/L 14.1 13.3       MRI Lumbar Spine Without Contrast    Result Date: 2024  1. The study is mildly degraded by motion. 2. No MR evidence of discitis or osteomyelitis. 3. No high-grade lumbar canal stenosis or high-grade neural foraminal stenosis is evident. Electronically Signed: Migdalia Godoy MD  2024 11:04 AM EDT  Workstation ID: [id number]    MRI Thoracic Spine Without Contrast    Result Date: 2024  Impression: Somewhat motion degraded exam demonstrating no specific findings to suggest osteomyelitis or discitis in the thoracic spine. Electronically Signed: Glen Hartman MD  2024 8:45 AM EDT  Workstation ID: [id number]    CT Chest Without Contrast Diagnostic    Result Date: 6/3/2024  1. Multifocal patchy peripheral nodular densities are scattered throughout both lungs, some which have a cavitary component, which would correspond to the provided clinical suspicion of septic emboli. 2. Incompletely imaged at 8 cm low-density within  the mid spleen, suspicious for splenic infarct. 3. Small bilateral pleural effusions. 4. Dense posterior bibasilar airspace disease is nonspecific and may reflect changes of atelectasis or pneumonia. 5. Mild cardiomegaly. Small concentric pericardial effusion. 6. Enlarged bilateral hilar and mediastinal lymph nodes are nonspecific and may be reactive. Attention at CT chest follow-up is recommended Electronically Signed: Migdalia Godoy MD  6/3/2024 2:40 PM EDT  Workstation ID: [id number]       I reviewed the patient's new clinical results.    Assessment/Plan:     Active and Resolved Problems  Chest pain and dyspnea  Suspect 2/2 untreated Endocarditis and Bacteremia  pulmonary edema vs LLL infiltrate   Noncompliance- pt seen at multiple hospitals and recurrent history of leaving AMA, most recently yesterday from  this hospital   Hx IV Fentanyl use   Hypoxia requiring oxygen supplementation  - Outside records show positive for MRSA bacteremia at [facility]  - per ED provider report Echo from [facility] demonstrated vegetationson the septal leaflet of tricuspid valve, mild to moderate tricuspid regurgitation, small pericardial effusion, and EF at 30 to 35%, moderate to severe global LV hypokinesis and a severely dilated left ventricle  -CXR 6/2/24: Vascular congestion. Left lower lobe infiltrate may represent developing pulmonary edema or possibly pneumonia depending on the clinical setting.   -CT of chest without contrast multifocal patchy peripheral nodular densities suspicious for septic emboli, mild small bilateral pleural effusions  Cellulitis bilateral hilar lymphadenopathy  - pt with history of anaphylaxis requiring intubation from oral Keflex  -IDTeam consulted,  d/ganesh daptomycin restarted on vancomycin      hep B C as well as HIV screening     HFreF - outside source documentation with EF 30-35%  - BNP 33,058  - lactic 1.5  - VSS  -2 D echo noted- cardiology to follow  -Continue  "bronchodilators and inhaled steroids       Hypokalemia  - replace per protocol     CATRINA  - scr 1.24,-> 1.71->1.44   - will hold lasix   - monitor   -if Continue to trend up we will consult nephrology     Anemia  - noted hgb drop 9.3-8.9  - no overt sign of bleed  - montior  - transfuse <7.0     Tobacco dependency  - nicotine patch      IV Fentanyl use  - add the clonidine withdrawal protocol for opioid withdrawal     #Generalized anxiety disorder  PTSD  - psych Team consulted patient started on     Sertraline       Patient will need 30 days or less of skilled services\".       DVT prophylaxis:  Medical DVT prophylaxis orders are present.         Code status is   Code Status and Medical Interventions:   Ordered at: 06/03/24 0151     Code Status (Patient has no pulse and is not breathing):    CPR (Attempt to Resuscitate)     Medical Interventions (Patient has pulse or is breathing):    Full Support       Plan for disposition: SNF  in 2-3 days; will require placement for IV antibiotics given previous history of IV drug use versus daily outpatient infusion therapy    Time: 35 minutes    Signature: Electronically signed by Tashi Birmingham MD, 06/05/24, 13:05 EDT.  [facility]ist Team   "

## 2024-06-05 NOTE — THERAPY EVALUATION
Patient Name: Марина Tilley  : 1975    MRN: [id number]                              Today's Date: 2024       Admit Date: 6/3/2024    Visit Dx:     ICD-10-CM ICD-9-CM   1. Endocarditis, unspecified chronicity, unspecified endocarditis type  I38 424.90     Patient Active Problem List   Diagnosis    Endocarditis     Past Medical History:   Diagnosis Date    Anxiety     Asthma     CHF (congestive heart failure)     COPD (chronic obstructive pulmonary disease)      Past Surgical History:   Procedure Laterality Date    CHOLECYSTECTOMY      COLONOSCOPY      ENDOSCOPY      HERNIA REPAIR      HYSTERECTOMY        General Information       Row Name 24 1454          Physical Therapy Time and Intention    Document Type evaluation  -BR     Mode of Treatment physical therapy  -BR       Row Name 24 1454          General Information    Patient Profile Reviewed yes  -BR     Prior Level of Function independent:;all household mobility;gait;transfer;community mobility  Pt uses a cane at baseline.  -BR     Existing Precautions/Restrictions oxygen therapy device and L/min;fall  -BR     Barriers to Rehab medically complex;ineffective coping  -BR       Row Name 24 1454          Living Environment    People in Home sibling(s)  -BR       Row Name 24 1454          Home Main Entrance    Number of Stairs, Main Entrance six  -BR     Stair Railings, Main Entrance none  -BR       Row Name 24 1454          Stairs Within Home, Primary    Number of Stairs, Within Home, Primary seven  Pt lives in a bi-level home with her sister.  -BR       Row Name 24 1454          Cognition    Orientation Status (Cognition) oriented x 4  -BR       Row Name 24 1454          Safety Issues, Functional Mobility    Safety Issues Affecting Function (Mobility) impulsivity;safety precautions follow-through/compliance  -BR     Impairments Affecting Function (Mobility) endurance/activity tolerance;shortness of breath;strength   -BR               User Key  (r) = Recorded By, (t) = Taken By, (c) = Cosigned By      Initials Name Provider Type    Brisa Mendosa PT Physical Therapist                   Mobility       Row Name 06/05/24 1456          Bed Mobility    Bed Mobility supine-sit;sit-supine  -BR     Supine-Sit [place] (Bed Mobility) standby assist  -BR     Sit-Supine [place] (Bed Mobility) standby assist  -BR     Assistive Device (Bed Mobility) head of bed elevated;bed rails  -BR     Comment, (Bed Mobility) Pt needed reminder cues for safety and to keep telemetry in place.  -BR       Row Name 06/05/24 1456          Bed-Chair Transfer    Bed-Chair [place] (Transfers) contact guard;1 person assist;verbal cues  -BR     Comment, (Bed-Chair Transfer) Pt transferred over to [facility] with CGA of 1.  -BR       Row Name 06/05/24 1456          Sit-Stand Transfer    Sit-Stand [place] (Transfers) standby assist  -BR       Row Name 06/05/24 1456          Gait/Stairs (Locomotion)    Comment, (Gait/Stairs) A transfer over to [facility] was highest level of mobility that pt was agreeable to this date.  -BR               User Key  (r) = Recorded By, (t) = Taken By, (c) = Cosigned By      Initials Name Provider Type    Brisa Mendosa PT Physical Therapist                   Obj/Interventions       Row Name 06/05/24 1459          Range of Motion Comprehensive    General Range of Motion bilateral lower extremity ROM WFL  -BR       Row Name 06/05/24 1459          Strength Comprehensive (MMT)    Comment, General Manual Muscle Testing (MMT) Assessment BLE strength was grossly 4-/5  -BR       Row Name 06/05/24 1459          Balance    Balance Assessment sitting static balance;sitting dynamic balance;standing static balance;standing dynamic balance  -BR     Static Sitting Balance set-up  -BR     Dynamic Sitting Balance set-up  -BR     Position, Sitting Balance sitting edge of bed;unsupported  -BR     Static Standing Balance supervision  -BR      Dynamic Standing Balance contact guard  -BR     Position/Device Used, Standing Balance unsupported  -BR       Row Name 06/05/24 1459          Sensory Assessment (Somatosensory)    Sensory Assessment (Somatosensory) LE sensation intact  -BR               User Key  (r) = Recorded By, (t) = Taken By, (c) = Cosigned By      Initials Name Provider Type    BR Brisa Duran, PT Physical Therapist                   Goals/Plan       Row Name 06/05/24 1513          Bed Mobility Goal 1 (PT)    Activity/Assistive Device (Bed Mobility Goal 1, PT) bed mobility activities, all  -BR     [place] Level/Cues Needed (Bed Mobility Goal 1, PT) independent  -BR     Time Frame (Bed Mobility Goal 1, PT) long term goal (LTG);2 weeks  -BR       Row Name 06/05/24 1513          Transfer Goal 1 (PT)    Activity/Assistive Device (Transfer Goal 1, PT) transfers, all  -BR     [place] Level/Cues Needed (Transfer Goal 1, PT) modified independence  -BR     Time Frame (Transfer Goal 1, PT) long term goal (LTG);2 weeks  -BR       Row Name 06/05/24 1513          Gait Training Goal 1 (PT)    Activity/Assistive Device (Gait Training Goal 1, PT) gait (walking locomotion);cane, straight;decrease fall risk  -BR     [place] Level (Gait Training Goal 1, PT) standby assist  -BR     Distance (Gait Training Goal 1, PT) 100  -BR     Time Frame (Gait Training Goal 1, PT) long term goal (LTG);2 weeks  -BR       Row Name 06/05/24 1513          Stairs Goal 1 (PT)    Activity/Assistive Device (Stairs Goal 1, PT) stairs, all skills  -BR     [place] Level/Cues Needed (Stairs Goal 1, PT) contact guard required  -BR     Number of Stairs (Stairs Goal 1, PT) 7  -BR     Time Frame (Stairs Goal 1, PT) long term goal (LTG);2 weeks  -BR       Row Name 06/05/24 1513          Therapy Assessment/Plan (PT)    Planned Therapy Interventions (PT) balance training;bed mobility training;gait training;patient/family education;strengthening;transfer training;ROM  (range of motion);neuromuscular re-education  -BR               User Key  (r) = Recorded By, (t) = Taken By, (c) = Cosigned By      Initials Name Provider Type    BR Brisa Duran, PT Physical Therapist                   Clinical Impression       Row Name 06/05/24 1500          Pain    Pretreatment Pain Rating 0/10 - no pain  -BR     Posttreatment Pain Rating 0/10 - no pain  -BR       Row Name 06/05/24 1500          Plan of Care Review    Plan of Care Reviewed With patient  -BR     Outcome Evaluation Pt presents as a 49 y/o F admitted to [facility] on 6/3/24 with known endocarditis. CXR 6/2/24: Vascular congestion. Left lower lobe infiltrate may represent developing pulmonary edema or possibly pneumonia. Outside records show positive for MRSA bacteremia at [facility]. Per ED provider report Echo from [facility] demonstrated vegetationson the septal leaflet of tricuspid valve, mild to moderate tricuspid regurgitation, small pericardial effusion, and EF at 30 to 35%, moderate to severe global LV hypokinesis and a severely dilated left ventricle. MRI spine(-). Pt does report having a fall in past 3 months where she hit her head.   Per chart review,Pt reported using IV heroin/fentanyl since she was 16 years old.  Pt has had 2 recent AMA DCs from hospitals.At baseline. Pt lives with sister in a bi-level home with 6 CHELE and 7 to 14 steps for interior. Pt has been using a cane for independence with household mobility.  Pt A and O x 4 with O2 at 2 L. She has hospital gown draped over her bc she can't stand to have it tied. Pt admits to being very anxious. She does not agree to ambulate in room or sit up in a chair but she is agreeable to transferring over to [facility] with CGA/SBA of 1. Pt appreciative of care.  PT will follow pt to encourage safe mobility and PT recommendation at this time is Home with Assist.  -BR       Row Name 06/05/24 1500          Therapy Assessment/Plan (PT)    Rehab  Potential (PT) good, to achieve stated therapy goals  -BR     Criteria for Skilled Interventions Met (PT) yes;meets criteria  -BR     Therapy Frequency (PT) 3 times/wk  -BR     Predicted Duration of Therapy Intervention (PT) until D/C  -BR       Row Name 06/05/24 1500          Vital Signs    Pre Systolic BP Rehab 111  -BR     Pre Treatment Diastolic BP 60  -BR     Pretreatment Heart Rate (beats/min) 87  -BR     Pretreatment Resp Rate (breaths/min) 35  -BR     Pre SpO2 (%) 97  -BR     O2 Delivery Pre Treatment nasal cannula  2L  -BR     Post SpO2 (%) 98  -BR     O2 Delivery Post Treatment nasal cannula  2L  -BR     Pre Patient Position Supine  -BR     Intra Patient Position Standing  -BR     Post Patient Position Supine  -BR       Row Name 06/05/24 1500          Positioning and Restraints    Pre-Treatment Position in bed  -BR     Post Treatment Position bed  -BR     In Bed notified nsg;supine;call light within reach;encouraged to call for assist;exit alarm on;side rails up x3  -BR               User Key  (r) = Recorded By, (t) = Taken By, (c) = Cosigned By      Initials Name Provider Type    BR Brisa Duran, PT Physical Therapist                   Outcome Measures       Row Name 06/05/24 1514 06/05/24 0833       How much help from another person do you currently need...    Turning from your back to your side while in flat bed without using bedrails? 4  -BR 4  -RM    Moving from lying on back to sitting on the side of a flat bed without bedrails? 4  -BR 4  -RM    Moving to and from a bed to a chair (including a wheelchair)? 3  -BR 4  -RM    Standing up from a chair using your arms (e.g., wheelchair, bedside chair)? 4  -BR 4  -RM    Climbing 3-5 steps with a railing? 3  -BR 3  -RM    To walk in hospital room? 3  -BR 3  -RM    AM-PAC 6 Clicks Score (PT) 21  -BR 22  -RM    Highest Level of Mobility Goal 6 --> Walk 10 steps or more  -BR 7 --> Walk 25 feet or more  -RM      Row Name 06/05/24 1514          Functional  Assessment    Outcome Measure Options AM-PAC 6 Clicks Basic Mobility (PT)  -BR               User Key  (r) = Recorded By, (t) = Taken By, (c) = Cosigned By      Initials Name Provider Type     Frances Aviles, RN Registered Nurse    Brisa Mendosa PT Physical Therapist                                 Physical Therapy Education       Title: PT OT SLP Therapies (Done)       Topic: Physical Therapy (Done)       Point: Mobility training (Done)       Learning Progress Summary             Patient Acceptance, E,D, VU,DU,NR by BR at 6/5/2024 1514                         Point: Body mechanics (Done)       Learning Progress Summary             Patient Acceptance, E,D, VU,DU,NR by BR at 6/5/2024 1514                         Point: Precautions (Done)       Learning Progress Summary             Patient Acceptance, E,D, VU,DU,NR by BR at 6/5/2024 1514                                         User Key       Initials Effective Dates Name Provider Type Discipline     02/01/22 -  Brisa Duran PT Physical Therapist PT                  PT Recommendation and Plan  Planned Therapy Interventions (PT): balance training, bed mobility training, gait training, patient/family education, strengthening, transfer training, ROM (range of motion), neuromuscular re-education  Plan of Care Reviewed With: patient  Outcome Evaluation: Pt presents as a 49 y/o F admitted to [facility] on 6/3/24 with known endocarditis. CXR 6/2/24: Vascular congestion. Left lower lobe infiltrate may represent developing pulmonary edema or possibly pneumonia. Outside records show positive for MRSA bacteremia at [facility]. Per ED provider report Echo from [facility] demonstrated vegetationson the septal leaflet of tricuspid valve, mild to moderate tricuspid regurgitation, small pericardial effusion, and EF at 30 to 35%, moderate to severe global LV hypokinesis and a severely dilated left ventricle. MRI spine(-). Pt does report  having a fall in past 3 months where she hit her head.   Per chart review,Pt reported using IV heroin/fentanyl since she was 16 years old.  Pt has had 2 recent AMA DCs from hospitals.At baseline. Pt lives with sister in a bi-level home with 6 CHELE and 7 to 14 steps for interior. Pt has been using a cane for independence with household mobility.  Pt A and O x 4 with O2 at 2 L. She has hospital gown draped over her bc she can't stand to have it tied. Pt admits to being very anxious. She does not agree to ambulate in room or sit up in a chair but she is agreeable to transferring over to [facility] with CGA/SBA of 1. Pt appreciative of care.  PT will follow pt to encourage safe mobility and PT recommendation at this time is Home with Assist.     Time Calculation:         PT Charges       Row Name 06/05/24 1514             Time Calculation    Start Time 1009  -BR      Stop Time 1034  -BR      Time Calculation (min) 25 min  -BR      PT Received On 06/05/24  -BR      PT - Next Appointment 06/07/24  -BR      PT Goal Re-Cert Due Date 06/19/24  -BR         Time Calculation- PT    Total Timed Code Minutes- PT 0 minute(s)  -BR                User Key  (r) = Recorded By, (t) = Taken By, (c) = Cosigned By      Initials Name Provider Type    Brisa Mendosa PT Physical Therapist                  Therapy Charges for Today       Code Description Service Date Service Provider Modifiers Qty    71503172540 HC PT EVAL LOW COMPLEXITY 4 6/5/2024 Brisa Duran, PT GP 1            PT G-Codes  Outcome Measure Options: AM-PAC 6 Clicks Basic Mobility (PT)  AM-PAC 6 Clicks Score (PT): 21  PT Discharge Summary  Anticipated Discharge Disposition (PT): home with assist    Brisa Duran PT  6/5/2024

## 2024-06-05 NOTE — ANESTHESIA POSTPROCEDURE EVALUATION
Patient: Марина Tilley    Procedure Summary       Date: 06/04/24 Room / Location: [facility]    Anesthesia Start: 1318 Anesthesia Stop: 1356    Procedure: ADULT TRANSESOPHAGEAL ECHO (LETICIA) W/ CONT IF NECESSARY PER PROTOCOL Diagnosis: (Endocarditis)    Scheduled Providers: Bryant Frank MD Provider: Reginald North MD    Anesthesia Type: general ASA Status: 4            Anesthesia Type: general    Vitals  Vitals Value Taken Time   /50 06/04/24 1959   Temp 98.2 °F (36.8 °C) 06/04/24 1959   Pulse 100 06/04/24 2112   Resp 18 06/04/24 1959   SpO2 96 % 06/04/24 2111   Vitals shown include unfiled device data.        Post Anesthesia Care and Evaluation    Patient location during evaluation: bedside  Patient participation: complete - patient participated  Level of consciousness: awake  Pain scale: See nurse's notes for pain score.  Pain management: adequate    Airway patency: patent  Anesthetic complications: No anesthetic complications  PONV Status: none  Cardiovascular status: acceptable  Respiratory status: acceptable and spontaneous ventilation  Hydration status: acceptable    Comments: Patient seen and examined postoperatively; vital signs stable; SpO2 greater than or equal to 90%; cardiopulmonary status stable; nausea/vomiting adequately controlled; pain adequately controlled; no apparent anesthesia complications; patient discharged from anesthesia care when discharge criteria were met

## 2024-06-05 NOTE — CASE MANAGEMENT/SOCIAL WORK
Continued Stay Note  VINOD Manzanares     Patient Name: Марина Tilley  MRN: [id number]  Today's Date: 6/5/2024    Admit Date: 6/3/2024    Plan: [facility], pending acceptance.  Will need [facility] approved.  VS home with sister with IV abx at [facility] (new IV daily).   Discharge Plan       Row Name 06/05/24 1639       Plan    Plan [facility], pending acceptance.  Will need [facility] approved.  VS home with sister with IV abx at [facility]ulatory [facility] Unit (new IV daily).    Patient/Family in Agreement with Plan yes    Plan Comments Met with patient at bedside to discuss plan for IV abx at DC. Discussed Potential for SNF vs [facility] (discussed that [facility]U would have to place new line each day, and only if it is once daily).   Discussed potential SNF choices, and barriers to SNF acceptance- due to recent history of drug use- states quit 1 month ago.  Patient agreeable to referral to [facility]. Not agreeable to referral to [facility] due to last admission there.   Referral sent.  Barriers to discharge: IV abx,  BP low but improving.  Restart low dose lasix oral (will need increased at BP tolerates) ID, cardio, CTS, psych following.             Expected Discharge Date and Time       Expected Discharge Date Expected Discharge Time    Jesse 10, 2024           Brittany Blanc RN     Office Phone [phone]  Office Cell [phone]

## 2024-06-05 NOTE — PROGRESS NOTES
Infectious Diseases Progress Note      LOS: 2 days   Patient Care Team:  Provider, No Known as PCP - General    Chief Complaint: Chest and back pain, fatigue, shortness of breath    Subjective       The patient has been afebrile for the last 24 hours.  The patient is on 2 L of oxygen by nasal cannula hemodynamically stable, and is tolerating antimicrobial therapy.  Still having back pain and shortness of breath      Review of Systems:   Review of Systems   Constitutional:  Positive for fatigue.   HENT: Negative.     Eyes: Negative.    Respiratory:  Positive for shortness of breath.    Cardiovascular:  Positive for chest pain.   Gastrointestinal: Negative.    Endocrine: Negative.    Genitourinary: Negative.    Musculoskeletal:  Positive for back pain.   Skin: Negative.    Neurological: Negative.    Psychiatric/Behavioral: Negative.     All other systems reviewed and are negative.       Objective     Vital Signs  Temp:  [96.4 °F (35.8 °C)-98.2 °F (36.8 °C)] 97.8 °F (36.6 °C)  Heart Rate:  [88-96] 92  Resp:  [14-31] 19  BP: (100-113)/(48-65) 103/48    Physical Exam:  Physical Exam  Vitals and nursing note reviewed.   Constitutional:       General: She is not in acute distress.     Appearance: She is well-developed and normal weight. She is ill-appearing. She is not diaphoretic.   HENT:      Head: Normocephalic and atraumatic.   Eyes:      General: No scleral icterus.     Extraocular Movements: Extraocular movements intact.      Conjunctiva/sclera: Conjunctivae normal.      Pupils: Pupils are equal, round, and reactive to light.   Cardiovascular:      Rate and Rhythm: Normal rate and regular rhythm.      Heart sounds: S1 normal and S2 normal. Murmur heard.   Pulmonary:      Effort: Pulmonary effort is normal. No respiratory distress.      Breath sounds: No stridor. No wheezing or rales.      Comments: Diminished throughout  Chest:      Chest wall: No tenderness.   Abdominal:      General: Bowel sounds are normal. There  is no distension.      Palpations: Abdomen is soft. There is no mass.      Tenderness: There is no abdominal tenderness. There is no guarding.   Musculoskeletal:         General: No swelling, tenderness or deformity. Normal range of motion.      Cervical back: Neck supple.   Skin:     General: Skin is warm and dry.      Coloration: Skin is not pale.      Findings: No bruising, erythema or rash.   Neurological:      General: No focal deficit present.      Mental Status: She is alert and oriented to person, place, and time. Mental status is at baseline.      Cranial Nerves: No cranial nerve deficit.   Psychiatric:         Mood and Affect: Mood normal.          Results Review:    I have reviewed all clinical data, test, lab, and imaging results.     Radiology  No Radiology Exams Resulted Within Past 24 Hours    Cardiology    Laboratory    Results from last 7 days   Lab Units 06/05/24  0023 06/04/24  0042 06/03/24  0209 06/02/24  1344   WBC 10*3/mm3 14.74* 15.59* 17.54* 18.73*   HEMOGLOBIN g/dL 8.1* 8.0* 8.9* 9.3*   HEMATOCRIT % 26.4* 26.4* 29.2* 29.1*   PLATELETS 10*3/mm3 332 386 457* 436     Results from last 7 days   Lab Units 06/05/24  0023 06/04/24  0042 06/03/24  1445 06/03/24  0209 06/02/24  1344   SODIUM mmol/L 135* 137  --  139 139   POTASSIUM mmol/L 4.5 4.3 4.5 3.5 3.2*   CHLORIDE mmol/L 100 99  --  100 101   CO2 mmol/L 20.9* 24.7  --  23.7 24.7   BUN mg/dL 23* 20  --  14 11   CREATININE mg/dL 1.44* 1.71*  --  1.39* 1.24*   GLUCOSE mg/dL 142* 165*  --  117* 109*   ALBUMIN g/dL  --  3.1*  --  3.2* 3.3*   BILIRUBIN mg/dL  --  0.3  --  0.3 0.3   ALK PHOS U/L  --  107  --  111 117   AST (SGOT) U/L  --  32  --  23 23   ALT (SGPT) U/L  --  32  --  27 32   CALCIUM mg/dL 7.5* 7.8*  --  8.3* 8.6     Results from last 7 days   Lab Units 06/04/24  0042   CK TOTAL U/L 64             Microbiology   Microbiology Results (last 10 days)       Procedure Component Value - Date/Time    Blood Culture - Blood, Arm, Right  [[id number]]  (Normal) Collected: 24 1356    Lab Status: Preliminary result Specimen: Blood from Arm, Right Updated: 24 1400     Blood Culture No growth at 3 days    Narrative:      Less than seven (7) mL's of blood was collected.  Insufficient quantity may yield false negative results.    Blood Culture - Blood, Arm, Left [[id number]]  (Normal) Collected: 24 1344    Lab Status: Preliminary result Specimen: Blood from Arm, Left Updated: 24 1400     Blood Culture No growth at 3 days            Medication Review:       Schedule Meds  enoxaparin, 40 mg, Subcutaneous, Daily  famotidine, 40 mg, Oral, Daily  [Held by provider] furosemide, 40 mg, Oral, Daily  gabapentin, 100 mg, Oral, TID  metoprolol succinate XL, 25 mg, Oral, Q24H  nicotine, 1 patch, Transdermal, Q24H  sertraline, 25 mg, Oral, Daily  sodium chloride, 10 mL, Intravenous, Q12H  vancomycin, 1,000 mg, Intravenous, Q12H        Infusion Meds  Pharmacy to dose vancomycin,         PRN Meds    acetaminophen    senna-docusate sodium **AND** polyethylene glycol **AND** bisacodyl **AND** bisacodyl    calcium carbonate    [] cloNIDine **FOLLOWED BY** [] cloNIDine **FOLLOWED BY** cloNIDine **FOLLOWED BY** [START ON 2024] cloNIDine **FOLLOWED BY** [START ON 2024] cloNIDine    dicyclomine    hydrOXYzine    ipratropium-albuterol    Morphine    nitroglycerin    ondansetron    Pharmacy to dose vancomycin    Potassium Replacement - Follow Nurse / BPA Driven Protocol    sodium chloride    sodium chloride    Vancomycin Pharmacy Intermittent/Pulse Dosing        Assessment & Plan       Antimicrobial Therapy   1.  IV vancomycin        2.        3.        4.        5.            Assessment       Tricuspid valve endocarditis   Patient had positive blood culture for MRSA from 2024 and 2024 according to care everywhere records from [facility].  Cultures had an KRYSTEN of 1 to vancomycin.  LETICIA performed at our facility on 2024  showed a large vegetation on the tricuspid valve with moderate tricuspid valve regurgitation.  LETICIA also showed severe aortic insufficiency with probably healed vegetation on the aortic valve leaflet.  There was moderate MR.    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  Claims that she stopped using fentanyl since admission to [facility] in May 2024.  Current drug screen positive for barbiturates and benzodiazepines.  HIV screen was negative.  Hepatitis panel was negative     History of COPD and tobacco abuse-currently on 2 L of oxygen by nasal cannula     Back pain-MRI of the lumbar spine and thoracic spine were negative for signs of discitis/osteomyelitis        Plan     Continue IV vancomycin-ask pharmacy to monitor and dose and try to keep vancomycin trough between 15 and 20  Patient will need at least 6 weeks of IV antimicrobial therapy  Case discussed with cardiothoracic surgery NP-awaiting recommendations from the surgeon  Continue supportive care  A.m. labs  Tobacco and illicit drug abuse cessation  Overall prognosis is guarded patient continues use illicit substances continues to be noncompliant    Fartun Blankenship, MARY  06/05/24  14:36 EDT    Note is dictated utilizing voice recognition software/Dragon

## 2024-06-06 LAB
ANION GAP SERPL CALCULATED.3IONS-SCNC: 12.9 MMOL/L (ref 5–15)
BASOPHILS # BLD AUTO: 0.06 10*3/MM3 (ref 0–0.2)
BASOPHILS NFR BLD AUTO: 0.5 % (ref 0–1.5)
BUN SERPL-MCNC: 18 MG/DL (ref 6–20)
BUN/CREAT SERPL: 13.3 (ref 7–25)
CALCIUM SPEC-SCNC: 8.3 MG/DL (ref 8.6–10.5)
CHLORIDE SERPL-SCNC: 100 MMOL/L (ref 98–107)
CO2 SERPL-SCNC: 20.1 MMOL/L (ref 22–29)
CREAT SERPL-MCNC: 1.35 MG/DL (ref 0.57–1)
DEPRECATED RDW RBC AUTO: 60.6 FL (ref 37–54)
EGFRCR SERPLBLD CKD-EPI 2021: 49.2 ML/MIN/1.73
EOSINOPHIL # BLD AUTO: 0.07 10*3/MM3 (ref 0–0.4)
EOSINOPHIL NFR BLD AUTO: 0.6 % (ref 0.3–6.2)
ERYTHROCYTE [DISTWIDTH] IN BLOOD BY AUTOMATED COUNT: 18.7 % (ref 12.3–15.4)
GLUCOSE SERPL-MCNC: 138 MG/DL (ref 65–99)
HCT VFR BLD AUTO: 29.1 % (ref 34–46.6)
HGB BLD-MCNC: 8.6 G/DL (ref 12–15.9)
IMM GRANULOCYTES # BLD AUTO: 0.08 10*3/MM3 (ref 0–0.05)
IMM GRANULOCYTES NFR BLD AUTO: 0.7 % (ref 0–0.5)
LYMPHOCYTES # BLD AUTO: 1.54 10*3/MM3 (ref 0.7–3.1)
LYMPHOCYTES NFR BLD AUTO: 12.8 % (ref 19.6–45.3)
MCH RBC QN AUTO: 27.6 PG (ref 26.6–33)
MCHC RBC AUTO-ENTMCNC: 29.6 G/DL (ref 31.5–35.7)
MCV RBC AUTO: 93.3 FL (ref 79–97)
MONOCYTES # BLD AUTO: 0.91 10*3/MM3 (ref 0.1–0.9)
MONOCYTES NFR BLD AUTO: 7.6 % (ref 5–12)
NEUTROPHILS NFR BLD AUTO: 77.8 % (ref 42.7–76)
NEUTROPHILS NFR BLD AUTO: 9.34 10*3/MM3 (ref 1.7–7)
NRBC BLD AUTO-RTO: 0.3 /100 WBC (ref 0–0.2)
PLATELET # BLD AUTO: 252 10*3/MM3 (ref 140–450)
PMV BLD AUTO: 10.7 FL (ref 6–12)
POTASSIUM SERPL-SCNC: 4.6 MMOL/L (ref 3.5–5.2)
POTASSIUM SERPL-SCNC: 5.5 MMOL/L (ref 3.5–5.2)
RBC # BLD AUTO: 3.12 10*6/MM3 (ref 3.77–5.28)
SODIUM SERPL-SCNC: 133 MMOL/L (ref 136–145)
VANCOMYCIN TROUGH SERPL-MCNC: 23.2 MCG/ML (ref 5–20)
WBC NRBC COR # BLD AUTO: 12 10*3/MM3 (ref 3.4–10.8)

## 2024-06-06 PROCEDURE — 99231 SBSQ HOSP IP/OBS SF/LOW 25: CPT | Performed by: PSYCHIATRY & NEUROLOGY

## 2024-06-06 PROCEDURE — 25010000002 ONDANSETRON PER 1 MG: Performed by: NURSE PRACTITIONER

## 2024-06-06 PROCEDURE — 94799 UNLISTED PULMONARY SVC/PX: CPT

## 2024-06-06 PROCEDURE — 25810000003 SODIUM CHLORIDE 0.9 % SOLUTION 250 ML FLEX CONT: Performed by: NURSE PRACTITIONER

## 2024-06-06 PROCEDURE — 85025 COMPLETE CBC W/AUTO DIFF WBC: CPT | Performed by: NURSE PRACTITIONER

## 2024-06-06 PROCEDURE — 25010000002 MORPHINE PER 10 MG: Performed by: NURSE PRACTITIONER

## 2024-06-06 PROCEDURE — 99232 SBSQ HOSP IP/OBS MODERATE 35: CPT | Performed by: INTERNAL MEDICINE

## 2024-06-06 PROCEDURE — 84132 ASSAY OF SERUM POTASSIUM: CPT | Performed by: STUDENT IN AN ORGANIZED HEALTH CARE EDUCATION/TRAINING PROGRAM

## 2024-06-06 PROCEDURE — 94761 N-INVAS EAR/PLS OXIMETRY MLT: CPT

## 2024-06-06 PROCEDURE — 80048 BASIC METABOLIC PNL TOTAL CA: CPT | Performed by: STUDENT IN AN ORGANIZED HEALTH CARE EDUCATION/TRAINING PROGRAM

## 2024-06-06 PROCEDURE — 25010000002 VANCOMYCIN 1 G RECONSTITUTED SOLUTION 1 EACH VIAL: Performed by: NURSE PRACTITIONER

## 2024-06-06 PROCEDURE — 80202 ASSAY OF VANCOMYCIN: CPT | Performed by: NURSE PRACTITIONER

## 2024-06-06 PROCEDURE — 94664 DEMO&/EVAL PT USE INHALER: CPT

## 2024-06-06 RX ADMIN — ONDANSETRON 4 MG: 2 INJECTION INTRAMUSCULAR; INTRAVENOUS at 18:37

## 2024-06-06 RX ADMIN — Medication 10 ML: at 07:57

## 2024-06-06 RX ADMIN — VANCOMYCIN HYDROCHLORIDE 1000 MG: 1 INJECTION, POWDER, LYOPHILIZED, FOR SOLUTION INTRAVENOUS at 10:54

## 2024-06-06 RX ADMIN — HYDROXYZINE HYDROCHLORIDE 50 MG: 25 TABLET ORAL at 21:38

## 2024-06-06 RX ADMIN — Medication 1 PATCH: at 07:57

## 2024-06-06 RX ADMIN — METOPROLOL SUCCINATE 25 MG: 25 TABLET, EXTENDED RELEASE ORAL at 07:57

## 2024-06-06 RX ADMIN — SERTRALINE HYDROCHLORIDE 25 MG: 25 TABLET ORAL at 07:57

## 2024-06-06 RX ADMIN — GABAPENTIN 100 MG: 100 CAPSULE ORAL at 21:38

## 2024-06-06 RX ADMIN — MORPHINE SULFATE 2 MG: 2 INJECTION, SOLUTION INTRAMUSCULAR; INTRAVENOUS at 10:41

## 2024-06-06 RX ADMIN — MORPHINE SULFATE 2 MG: 2 INJECTION, SOLUTION INTRAMUSCULAR; INTRAVENOUS at 18:37

## 2024-06-06 RX ADMIN — MORPHINE SULFATE 2 MG: 2 INJECTION, SOLUTION INTRAMUSCULAR; INTRAVENOUS at 02:27

## 2024-06-06 RX ADMIN — GABAPENTIN 100 MG: 100 CAPSULE ORAL at 07:57

## 2024-06-06 RX ADMIN — Medication 10 ML: at 21:38

## 2024-06-06 RX ADMIN — ONDANSETRON 4 MG: 2 INJECTION INTRAMUSCULAR; INTRAVENOUS at 12:23

## 2024-06-06 RX ADMIN — IPRATROPIUM BROMIDE AND ALBUTEROL SULFATE 3 ML: .5; 3 SOLUTION RESPIRATORY (INHALATION) at 06:27

## 2024-06-06 RX ADMIN — IPRATROPIUM BROMIDE AND ALBUTEROL SULFATE 3 ML: .5; 3 SOLUTION RESPIRATORY (INHALATION) at 12:01

## 2024-06-06 RX ADMIN — IPRATROPIUM BROMIDE AND ALBUTEROL SULFATE 3 ML: .5; 3 SOLUTION RESPIRATORY (INHALATION) at 21:50

## 2024-06-06 RX ADMIN — FAMOTIDINE 40 MG: 20 TABLET, FILM COATED ORAL at 07:57

## 2024-06-06 RX ADMIN — MORPHINE SULFATE 2 MG: 2 INJECTION, SOLUTION INTRAMUSCULAR; INTRAVENOUS at 14:31

## 2024-06-06 RX ADMIN — MORPHINE SULFATE 2 MG: 2 INJECTION, SOLUTION INTRAMUSCULAR; INTRAVENOUS at 22:55

## 2024-06-06 RX ADMIN — MORPHINE SULFATE 2 MG: 2 INJECTION, SOLUTION INTRAMUSCULAR; INTRAVENOUS at 06:06

## 2024-06-06 NOTE — PROGRESS NOTES
LOS: 3 days   Admitting Physician- Tashi Birmingham MD    Reason For Followup:    Aortic regurgitation severe  Moderate to severe mitral regurgitation  Moderate to severe tricuspid regurgitation with large vegetation  Bacterial endocarditis  Congestive heart failure    Subjective     Patient is afebrile patient is feeling better.  Congestive heart failure is improved    Objective     Hemodynamics are stable blood pressure is on the lower side    Review of Systems:   Review of Systems   Constitutional: Negative for chills and fever.   HENT:  Negative for ear discharge and nosebleeds.    Eyes:  Negative for discharge and redness.   Cardiovascular:  Negative for chest pain, orthopnea, palpitations, paroxysmal nocturnal dyspnea and syncope.   Respiratory:  Positive for shortness of breath. Negative for cough and wheezing.    Endocrine: Negative for heat intolerance.   Skin:  Negative for rash.   Musculoskeletal:  Negative for arthritis and myalgias.   Gastrointestinal:  Negative for abdominal pain, melena, nausea and vomiting.   Genitourinary:  Negative for dysuria and hematuria.   Neurological:  Negative for dizziness, light-headedness, numbness and tremors.   Psychiatric/Behavioral:  Negative for depression. The patient is not nervous/anxious.          Vital Signs  Vitals:    06/06/24 1039 06/06/24 1201 06/06/24 1204 06/06/24 1501   BP: 119/49   107/50   BP Location: Right arm   Right arm   Patient Position: Lying   Lying   Pulse: 85 84 86 85   Resp: 17 (!) 34 (!) 31 (!) 30   Temp: 98 °F (36.7 °C)   97.8 °F (36.6 °C)   TempSrc: Tympanic   Tympanic   SpO2:  98% 100%    Weight:       Height:         Wt Readings from Last 1 Encounters:   06/03/24 92.5 kg (203 lb 14.8 oz)       Intake/Output Summary (Last 24 hours) at 6/6/2024 1725  Last data filed at 6/6/2024 0900  Gross per 24 hour   Intake 480 ml   Output --   Net 480 ml     Physical Exam:  Constitutional:       Appearance: Well-developed.   Eyes:      General: No  scleral icterus.        Right eye: No discharge.   HENT:      Head: Normocephalic and atraumatic.   Neck:      Thyroid: No thyromegaly.      Lymphadenopathy: No cervical adenopathy.   Pulmonary:      Effort: Pulmonary effort is normal. No respiratory distress.      Breath sounds: Normal breath sounds. No wheezing. No rales.   Cardiovascular:      Normal rate. Regular rhythm.      No gallop.    Edema:     Peripheral edema absent.   Abdominal:      Tenderness: There is no abdominal tenderness.   Skin:     Findings: No erythema or rash.   Neurological:      Mental Status: Alert and oriented to person, place, and time.         Results Review:   Lab Results (last 24 hours)       Procedure Component Value Units Date/Time    Potassium [[id number]]  (Normal) Collected: 06/06/24 1002    Specimen: Blood Updated: 06/06/24 1706     Potassium 4.6 mmol/L     Vancomycin, Trough [[id number]]  (Abnormal) Collected: 06/06/24 1002    Specimen: Blood Updated: 06/06/24 1045     Vancomycin Trough 23.20 mcg/mL     Narrative:      Therapeutic Ranges for Vancomycin    Vancomycin Random   5.0-40.0 mcg/mL  Vancomycin Trough   5.0-20.0 mcg/mL  Vancomycin Peak     20.0-40.0 mcg/mL    Basic Metabolic Panel [[id number]]  (Abnormal) Collected: 06/06/24 0330    Specimen: Blood Updated: 06/06/24 0444     Glucose 138 mg/dL      BUN 18 mg/dL      Creatinine 1.35 mg/dL      Sodium 133 mmol/L      Potassium 5.5 mmol/L      Comment: Specimen hemolyzed.  Result may be falsely elevated.        Chloride 100 mmol/L      CO2 20.1 mmol/L      Calcium 8.3 mg/dL      BUN/Creatinine Ratio 13.3     Anion Gap 12.9 mmol/L      eGFR 49.2 mL/min/1.73     Narrative:      GFR Normal >60  Chronic Kidney Disease <60  Kidney Failure <15      CBC & Differential [[id number]]  (Abnormal) Collected: 06/06/24 0330    Specimen: Blood Updated: 06/06/24 0427    Narrative:      The following orders were created for panel order CBC & Differential.  Procedure                                Abnormality         Status                     ---------                               -----------         ------                     CBC Auto Differential[[id number]]        Abnormal            Final result               Scan Slide[[id number]]                                                                    Please view results for these tests on the individual orders.    CBC Auto Differential [[id number]]  (Abnormal) Collected: 06/06/24 0330    Specimen: Blood Updated: 06/06/24 0427     WBC 12.00 10*3/mm3      RBC 3.12 10*6/mm3      Hemoglobin 8.6 g/dL      Hematocrit 29.1 %      MCV 93.3 fL      MCH 27.6 pg      MCHC 29.6 g/dL      RDW 18.7 %      RDW-SD 60.6 fl      MPV 10.7 fL      Platelets 252 10*3/mm3      Neutrophil % 77.8 %      Lymphocyte % 12.8 %      Monocyte % 7.6 %      Eosinophil % 0.6 %      Basophil % 0.5 %      Immature Grans % 0.7 %      Neutrophils, Absolute 9.34 10*3/mm3      Lymphocytes, Absolute 1.54 10*3/mm3      Monocytes, Absolute 0.91 10*3/mm3      Eosinophils, Absolute 0.07 10*3/mm3      Basophils, Absolute 0.06 10*3/mm3      Immature Grans, Absolute 0.08 10*3/mm3      nRBC 0.3 /100 WBC           Imaging Results (Last 72 Hours)       Procedure Component Value Units Date/Time    XR Mandible < 4 View [[id number]] Collected: 06/05/24 1916     Updated: 06/05/24 1922    Narrative:      XR MANDIBLE < 4 VW    Date of Exam: 6/5/2024 6:25 PM EDT    Indication: poor dentition, endocarditis    Comparison: None available.    Findings:  There is no fracture or aggressive bone destruction. There is some lucency adjacent to the root of a remnant right molar tooth that demonstrates a large cavity. There is high density material dorsal to the roots of a remnant left molar tooth that was   present on the comparison exam      Impression:      Impression:  There is a large cavity involving the remnant right molar tooth, with lucency adjacent to the root suggesting periapical abscess      Electronically  Signed: Alfa Walker    6/5/2024 7:20 PM EDT    Workstation ID: [id number]    MRI Lumbar Spine Without Contrast [[id number]] Collected: 06/04/24 1059     Updated: 06/04/24 1106    Narrative:      MRI LUMBAR SPINE WO CONTRAST    Date of Exam: 6/4/2024 10:25 AM EDT    Indication: Rule out discitis.     Comparison: No prior dedicated lumbar imaging for comparison at this institution.    Technique:  Routine multiplanar/multisequence sequence images of the lumbar spine were obtained without contrast administration.        Findings:  Study is degraded by motion. The patient was unable to tolerate repeated imaging due to claustrophobia.    Lumbar vertebral bodies maintain normal height, alignment and marrow signal intensity. Disc heights appear preserved with normal signal intensity. No MR evidence of discitis/osteomyelitis. Conus medullaris terminates at the L1 level with normal signal   intensity. The imaged paraspinal soft tissues appear grossly unremarkable. No high-grade canal stenosis or high-grade neural foraminal stenosis is seen. Mild posterior osteophyte formation is suggested at L3-4 and L4-5. No high-grade neural foraminal   stenosis or lumbar nerve root encroachment is seen. Mild to moderate right neural foraminal stenosis at L4-5.      Impression:      1. The study is mildly degraded by motion.  2. No MR evidence of discitis or osteomyelitis.  3. No high-grade lumbar canal stenosis or high-grade neural foraminal stenosis is evident.        Electronically Signed: Migdalia Godoy MD    6/4/2024 11:04 AM EDT    Workstation ID: [id number]    MRI Thoracic Spine Without Contrast [[id number]] Collected: 06/04/24 0842     Updated: 06/04/24 0847    Narrative:      MRI THORACIC SPINE WO CONTRAST    Date of Exam: 6/3/2024 11:00 PM EDT    Indication: Rule out discitis.     Comparison: None available.    Technique:  Routine multiplanar/multisequence sequence images of the thoracic spine were obtained without contrast  administration.      Findings:  Evaluation is somewhat degraded by patient motion. Marrow signal appears homogeneous and maintained, without evidence of marrow or disc edema to suggest infectious discitis or osteomyelitis. There is no evidence of fracture or malalignment. The thoracic   spinal cord demonstrates no definite focal areas of intramedullary signal abnormality. The paraspinal soft tissues demonstrate no acute or suspicious findings.      Impression:      Impression:  Somewhat motion degraded exam demonstrating no specific findings to suggest osteomyelitis or discitis in the thoracic spine.        Electronically Signed: Glen Hartman MD    6/4/2024 8:45 AM EDT    Workstation ID: [id number]          ECG/EMG Results (most recent)       Procedure Component Value Units Date/Time    ECG 12 Lead Chest Pain [[id number]] Collected: 06/03/24 0132     Updated: 06/03/24 0133     QT Interval 366 ms      QTC Interval 500 ms     Narrative:      HEART RATE= 112  bpm  RR Interval= 536  ms  WA Interval= 145  ms  P Horizontal Axis= 6  deg  P Front Axis= 59  deg  QRSD Interval= 82  ms  QT Interval= 366  ms  QTcB= 500  ms  QRS Axis= 14  deg  T Wave Axis= 171  deg  - ABNORMAL ECG -  Sinus tachycardia  Probable left atrial enlargement  Anteroseptal infarct, age indeterminate  When compared with ECG of 02-Jun-2024 13:04:43,  No significant change  Electronically Signed By:   Date and Time of Study: 2024-06-03 01:32:22    Telemetry Scan [[id number]] Resulted: 06/03/24     Updated: 06/03/24 0713    Telemetry Scan [[id number]] Resulted: 06/03/24     Updated: 06/03/24 0838    Adult Transthoracic Echo Complete W/ Cont if Necessary Per Protocol [[id number]] Resulted: 06/03/24 1711     Updated: 06/03/24 1711     EF(MOD-bp) 48.9 %      LVIDd 5.8 cm      LVIDs 4.1 cm      IVSd 1.00 cm      LVPWd 1.00 cm      FS 29.3 %      IVS/LVPW 1.00 cm      ESV(cubed) 68.9 ml      LV Sys Vol (BSA corrected) 44.1 cm2      EDV(cubed) 195.1 ml      LV Solorio  Vol (BSA corrected) 87.8 cm2      LV mass(C)d 233.1 grams      LVOT area 3.1 cm2      LVOT diam 2.00 cm      EDV(MOD-sp2) 150.0 ml      EDV(MOD-sp4) 177.0 ml      ESV(MOD-sp2) 77.2 ml      ESV(MOD-sp4) 88.9 ml      SV(MOD-sp2) 72.8 ml      SV(MOD-sp4) 88.1 ml      SVi(MOD-SP2) 36.1 ml/m2      SVi(MOD-SP4) 43.7 ml/m2      SVi (LVOT) 34.4 ml/m2      EF(MOD-sp2) 48.5 %      EF(MOD-sp4) 49.8 %      MV E max jimenez 108.0 cm/sec      MV A max jimenez 50.8 cm/sec      MV dec time 0.17 sec      MV E/A 2.13     MV A dur 0.16 sec      LA ESV Index (BP) 40.4 ml/m2      Med Peak E' Jimenez 12.4 cm/sec      Lat Peak E' Jimenez 10.8 cm/sec      TR max jimenez 356.0 cm/sec      Avg E/e' ratio 9.31     SV(LVOT) 69.4 ml      TAPSE (>1.6) 3.0 cm      RV S' 13.4 cm/sec      LA dimension (2D)  4.3 cm      LV V1 max 132.0 cm/sec      LV V1 max PG 7.0 mmHg      LV V1 mean PG 4.0 mmHg      LV V1 VTI 22.1 cm      Ao pk jimenez 158.0 cm/sec      Ao max PG 10.0 mmHg      Ao mean PG 6.0 mmHg      Ao V2 VTI 26.0 cm      SANTIAGO(I,D) 2.7 cm2      AI P1/2t 417.5 msec      MV max PG 5.9 mmHg      MV mean PG 3.0 mmHg      MV V2 VTI 21.8 cm      MV P1/2t 45.7 msec      MVA(P1/2t) 4.8 cm2      MVA(VTI) 3.2 cm2      MV dec slope 762.0 cm/sec2      MR max jimenez 511.0 cm/sec      MR max .4 mmHg      TR max PG 50.7 mmHg      RV V1 max PG 1.93 mmHg      RV V1 max 69.5 cm/sec      RV V1 VTI 11.5 cm      PA V2 max 102.0 cm/sec      PA acc time 0.08 sec      Sinus 2.8 cm      STJ 2.10 cm      Dimensionless Index 0.84 (DI)     Addenda:            Left ventricular systolic function is mildly decreased. Left   ventricular ejection fraction appears to be 51 - 55%.    Left ventricular diastolic function was normal.    There is calcification of the aortic valve.    Moderate to severe aortic valve regurgitation is present.    Moderate to severe mitral valve regurgitation is present.    There is echodensity attached to septal leaflet of tricuspid valve   measuring 1.2 x 1.5 cm.     Recommend LETICIA to assess echodensity and mitral and aortic valve   regurgitation.  If clinically indicated.    Recommend modified [facility] criteria for clinical diagnosis of bacterial   endocarditis    Signed: 06/03/24 1711 by Bryant Frank MD    Telemetry Scan [[id number]] Resulted: 06/03/24     Updated: 06/04/24 0002    Telemetry Scan [[id number]] Resulted: 06/03/24     Updated: 06/04/24 0102    Telemetry Scan [[id number]] Resulted: 06/03/24     Updated: 06/04/24 0139    Telemetry Scan [[id number]] Resulted: 06/03/24     Updated: 06/04/24 0537    Telemetry Scan [[id number]] Resulted: 06/03/24     Updated: 06/04/24 0943    Telemetry Scan [[id number]] Resulted: 06/03/24     Updated: 06/04/24 1157    Adult Transesophageal Echo (LETICIA) W/ Cont if Necessary Per Protocol [[id number]] Resulted: 06/04/24 1410     Updated: 06/04/24 1414      CV ECHO SHUNT ASSESSMENT PERFORMED (HIDDEN SCRIPTING) 1     MR max bartolome 435.0 cm/sec      MR max PG 75.7 mmHg      MR mean bartolome 316.0 cm/sec      MR mean PG 46.0 mmHg      MR .0 cm     Telemetry Scan [[id number]] Resulted: 06/03/24     Updated: 06/04/24 1603    Telemetry Scan [[id number]] Resulted: 06/03/24     Updated: 06/05/24 0058    Telemetry Scan [[id number]] Resulted: 06/03/24     Updated: 06/05/24 0258    Telemetry Scan [[id number]] Resulted: 06/03/24     Updated: 06/05/24 0602    Telemetry Scan [[id number]] Resulted: 06/03/24     Updated: 06/05/24 0753    Telemetry Scan [[id number]] Resulted: 06/03/24     Updated: 06/05/24 1238    Telemetry Scan [[id number]] Resulted: 06/03/24     Updated: 06/05/24 2319    Telemetry Scan [[id number]] Resulted: 06/03/24     Updated: 06/06/24 0027    Telemetry Scan [[id number]] Resulted: 06/03/24     Updated: 06/06/24 0041    Telemetry Scan [[id number]] Resulted: 06/03/24     Updated: 06/06/24 0419    Telemetry Scan [[id number]] Resulted: 06/03/24     Updated: 06/06/24 1252    Telemetry Scan [[id number]] Resulted: 06/03/24     Updated: 06/06/24 1334          CBC     Results from last 7 days   Lab Units 06/06/24  0330 06/05/24  0023 06/04/24  0042 06/03/24  0209 06/02/24  1344   WBC 10*3/mm3 12.00* 14.74* 15.59* 17.54* 18.73*   HEMOGLOBIN g/dL 8.6* 8.1* 8.0* 8.9* 9.3*   PLATELETS 10*3/mm3 252 332 386 457* 436     BMP   Results from last 7 days   Lab Units 06/06/24 1002 06/06/24  0330 06/05/24  0023 06/04/24  0042 06/03/24  1445 06/03/24  0209 06/02/24  1344   SODIUM mmol/L  --  133* 135* 137  --  139 139   POTASSIUM mmol/L 4.6 5.5* 4.5 4.3 4.5 3.5 3.2*   CHLORIDE mmol/L  --  100 100 99  --  100 101   CO2 mmol/L  --  20.1* 20.9* 24.7  --  23.7 24.7   BUN mg/dL  --  18 23* 20  --  14 11   CREATININE mg/dL  --  1.35* 1.44* 1.71*  --  1.39* 1.24*   GLUCOSE mg/dL  --  138* 142* 165*  --  117* 109*     CMP   Results from last 7 days   Lab Units 06/06/24 1002 06/06/24 0330 06/05/24  0023 06/04/24  0042 06/03/24  1445 06/03/24  0209 06/02/24  1344   SODIUM mmol/L  --  133* 135* 137  --  139 139   POTASSIUM mmol/L 4.6 5.5* 4.5 4.3 4.5 3.5 3.2*   CHLORIDE mmol/L  --  100 100 99  --  100 101   CO2 mmol/L  --  20.1* 20.9* 24.7  --  23.7 24.7   BUN mg/dL  --  18 23* 20  --  14 11   CREATININE mg/dL  --  1.35* 1.44* 1.71*  --  1.39* 1.24*   GLUCOSE mg/dL  --  138* 142* 165*  --  117* 109*   ALBUMIN g/dL  --   --   --  3.1*  --  3.2* 3.3*   BILIRUBIN mg/dL  --   --   --  0.3  --  0.3 0.3   ALK PHOS U/L  --   --   --  107  --  111 117   AST (SGOT) U/L  --   --   --  32  --  23 23   ALT (SGPT) U/L  --   --   --  32  --  27 32     Cardiac Studies:  Echo- Results for orders placed during the hospital encounter of 06/03/24    Adult Transthoracic Echo Complete W/ Cont if Necessary Per Protocol    Interpretation Summary    Left ventricular systolic function is mildly decreased. Left ventricular ejection fraction appears to be 51 - 55%.    Left ventricular diastolic function was normal.    There is calcification of the aortic valve.    Moderate to severe aortic valve regurgitation is present.     Moderate to severe mitral valve regurgitation is present.    There is echodensity attached to septal leaflet of tricuspid valve measuring 1.2 x 1.5 cm.    Recommend LTEICIA to assess echodensity and mitral and aortic valve regurgitation.  If clinically indicated.    Recommend modified [facility] criteria for clinical diagnosis of bacterial endocarditis    Stress Myoview-  Cath-      Medication Review:   Scheduled Meds:enoxaparin, 40 mg, Subcutaneous, Daily  famotidine, 40 mg, Oral, Daily  [Held by provider] furosemide, 40 mg, Oral, Daily  gabapentin, 100 mg, Oral, TID  metoprolol succinate XL, 25 mg, Oral, Q24H  nicotine, 1 patch, Transdermal, Q24H  sertraline, 25 mg, Oral, Daily  sodium chloride, 10 mL, Intravenous, Q12H  [START ON 2024] vancomycin, 750 mg, Intravenous, Q12H      Continuous Infusions:Pharmacy to dose vancomycin,       PRN Meds:.  acetaminophen    senna-docusate sodium **AND** polyethylene glycol **AND** bisacodyl **AND** bisacodyl    calcium carbonate    [] cloNIDine **FOLLOWED BY** [] cloNIDine **FOLLOWED BY** [] cloNIDine **FOLLOWED BY** cloNIDine **FOLLOWED BY** [START ON 2024] cloNIDine    dicyclomine    hydrOXYzine    ipratropium-albuterol    Morphine    nitroglycerin    ondansetron    Pharmacy to dose vancomycin    Potassium Replacement - Follow Nurse / BPA Driven Protocol    sodium chloride    sodium chloride      Assessment & Plan     Endocarditis    MDM:    1.  Dilated cardiomyopathy:    Continue Toprol-XL patient would need ARB/ARNI once blood pressure is stabilized.    2.  Congestive heart failure:    Continue diuresis    3.  Renal insufficiency:    Creatinine has gone up.  Recommend observation    4.  Bacterial endocarditis:    Patient is on antibiotic    5.  Aortic regurgitation/mitral regurgitation/tricuspid regurgitation:    Patient would need surgical correction of these lesions.  Discussed with surgical team    Electronically signed by Bryant Frank MD, 24,  5:28 PM EDT.      Bryant Frank MD  06/06/24  17:26 EDT

## 2024-06-06 NOTE — PROGRESS NOTES
[facility] MEDICINE SERVICE  DAILY PROGRESS NOTE    NAME: Марина Tilley  : 1975  MRN: [id number]      LOS: 3 days     PROVIDER OF SERVICE: Tashi Birmingham MD    Chief Complaint: Endocarditis  Марина Tilley is a 48 y.o. female with known history of IV drug use initially came to the ED on 2024 at 1530.  Per ED documentation patient was seen for chest pain and shortness of breath.  Has a known endocarditis.  She has been to multiple hospitals.  She had been seen in [place] and a couple in [facility].  Most recently was at [facility].  She has been intermittently on antibiotics.  States she was discharged from the hospital but was not given any antibiotics.  She reports she had not used since before being in the hospital the last time.       Review of blood cultures from [facility] shows patient grew MRSA on 2024.  Review of echocardiogram from [facility] demonstrated vegetationson the septal leaflet of tricuspid valve, mild to moderate tricuspid regurgitation, small pericardial effusion, and EF at 30 to 35%, moderate to severe global LV hypokinesis and a severely dilated left ventricle. Further review of records from [facility] demonstrated a white count of 15.9, hemoglobin of 10.3, a creatinine of 1.86, chest imaging that demonstrated multifocal pneumonia versus septic emboli. Reportedly signed out AGAINST MEDICAL ADVICE from other hospital before going to the [facility] based on [facility] records.      Patient was seen by the hospitalist at 1700 on 2024.  She was admitted to the PCU at 1751.  At  patient was requesting to leave the unit and go outside and became upset when told that she could not.  At  patient had IV removed and left AGAINST MEDICAL ADVICE.  At 2130 to return to the ED and was subsequently readmitted to the hospitalist team around 2 AM on 6/3/2024 to continue previous  "treatment plan. Pt states \"I can't do this anymore\". Using a cane because SOA with any exertion  Subjective:     Interval History:  History taken from: patient  Patient Complaints: Still has generalized body pain, mandibular x-ray significant for periapical abscess to right molar tooth area.  Dr. Javi bazan we will evaluate likely will need dental work prior to surgical intervention    Patient Denies: Nausea or vomiting; no change in vision    Review of Systems:   Review of Systems  14 point review of system unremarkable except mentioned above  Objective:     Vital Signs  Temp:  [97.5 °F (36.4 °C)-99 °F (37.2 °C)] 98 °F (36.7 °C)  Heart Rate:  [84-93] 86  Resp:  [15-34] 31  BP: (114-123)/(49-63) 119/49  Flow (L/min):  [2] 2   Body mass index is 32.91 kg/m².    Physical Exam  Physical Exam  HENT:      Head: Atraumatic.   Eyes:      Pupils: Pupils are equal, round, and reactive to light.   Cardiovascular:      Rate and Rhythm: Normal rate and regular rhythm.   Pulmonary:      Effort: Pulmonary effort is normal.      Breath sounds: Normal breath sounds.   Abdominal:      General: Bowel sounds are normal.      Palpations: Abdomen is soft.   Musculoskeletal:         General: Normal range of motion.      Cervical back: Neck supple.   Skin:     General: Skin is warm.   Neurological:      General: No focal deficit present.      Mental Status: She is alert and oriented to person, place, and time.   Psychiatric:         Judgment: Judgment normal.         Scheduled Meds   enoxaparin, 40 mg, Subcutaneous, Daily  famotidine, 40 mg, Oral, Daily  [Held by provider] furosemide, 40 mg, Oral, Daily  gabapentin, 100 mg, Oral, TID  metoprolol succinate XL, 25 mg, Oral, Q24H  nicotine, 1 patch, Transdermal, Q24H  sertraline, 25 mg, Oral, Daily  sodium chloride, 10 mL, Intravenous, Q12H  [START ON 6/7/2024] vancomycin, 750 mg, Intravenous, Q12H       PRN Meds     acetaminophen    senna-docusate sodium **AND** polyethylene glycol **AND** " bisacodyl **AND** bisacodyl    calcium carbonate    [] cloNIDine **FOLLOWED BY** [] cloNIDine **FOLLOWED BY** [] cloNIDine **FOLLOWED BY** cloNIDine **FOLLOWED BY** [START ON 2024] cloNIDine    dicyclomine    hydrOXYzine    ipratropium-albuterol    Morphine    nitroglycerin    ondansetron    Pharmacy to dose vancomycin    Potassium Replacement - Follow Nurse / BPA Driven Protocol    sodium chloride    sodium chloride   Infusions  Pharmacy to dose vancomycin,           Diagnostic Data    Results from last 7 days   Lab Units 24  0330 24  0023 24  0042   WBC 10*3/mm3 12.00*   < > 15.59*   HEMOGLOBIN g/dL 8.6*   < > 8.0*   HEMATOCRIT % 29.1*   < > 26.4*   PLATELETS 10*3/mm3 252   < > 386   GLUCOSE mg/dL 138*   < > 165*   CREATININE mg/dL 1.35*   < > 1.71*   BUN mg/dL 18   < > 20   SODIUM mmol/L 133*   < > 137   POTASSIUM mmol/L 5.5*   < > 4.3   AST (SGOT) U/L  --   --  32   ALT (SGPT) U/L  --   --  32   ALK PHOS U/L  --   --  107   BILIRUBIN mg/dL  --   --  0.3   ANION GAP mmol/L 12.9   < > 13.3    < > = values in this interval not displayed.       XR Mandible < 4 View    Result Date: 2024  Impression: There is a large cavity involving the remnant right molar tooth, with lucency adjacent to the root suggesting periapical abscess Electronically Signed: Alfa Walker  2024 7:20 PM EDT  Workstation ID: [id number]       I reviewed the patient's new clinical results.    Assessment/Plan:     Active and Resolved Problems  Chest pain and dyspnea  Suspect 2/2 untreated Endocarditis and Bacteremia  pulmonary edema vs LLL infiltrate   Noncompliance- pt seen at multiple hospitals and recurrent history of leaving AMA, most recently yesterday from  this hospital   Hx IV Fentanyl use   Hypoxia requiring oxygen supplementation  - Outside records show positive for MRSA bacteremia at [facility]  - per ED provider report Echo from [facility]  "vegetationson the septal leaflet of tricuspid valve, mild to moderate tricuspid regurgitation, small pericardial effusion, and EF at 30 to 35%, moderate to severe global LV hypokinesis and a severely dilated left ventricle  -CXR 6/2/24: Vascular congestion. Left lower lobe infiltrate may represent developing pulmonary edema or possibly pneumonia depending on the clinical setting.   -CT of chest without contrast multifocal patchy peripheral nodular densities suspicious for septic emboli, mild small bilateral pleural effusions  Cellulitis bilateral hilar lymphadenopathy  - pt with history of anaphylaxis requiring intubation from oral Keflex  -IDTeam consulted,  d/ganesh daptomycin restarted on vancomycin      hep B C as well as HIV screening     HFreF - outside source documentation with EF 30-35%  - BNP 33,058  - lactic 1.5  - VSS  -2 D echo noted- cardiology to follow  -Continue bronchodilators and inhaled steroids    #Right molar area  periapical abscess- out pt dental work up        Hypokalemia- resolved   - replace per protocol     CATRINA- improving   - scr 1.24,-> 1.71->1.44   - will hold lasix   - monitor   -if Continue to trend up we will consult nephrology     Anemia  - noted hgb drop 9.3-8.9  - no overt sign of bleed  - montior  - transfuse <7.0     Tobacco dependency  - nicotine patch      IV Fentanyl use  - add the clonidine withdrawal protocol for opioid withdrawal     #Generalized anxiety disorder  PTSD  - psych Team consulted patient started on Sertraline       Patient will need 30 days or less of skilled services\".       DVT prophylaxis:  Medical DVT prophylaxis orders are present.         Code status is   Code Status and Medical Interventions:   Ordered at: 06/03/24 0151     Code Status (Patient has no pulse and is not breathing):    CPR (Attempt to Resuscitate)     Medical Interventions (Patient has pulse or is breathing):    Full Support       Plan for disposition: SNF  in 2-3 days; will require placement " for IV antibiotics given previous history of IV drug use versus daily outpatient infusion therapy    Time: 35 minutes    Signature: Electronically signed by Tashi Birmingham MD, 06/06/24, 12:27 EDT.  [facility] Hospitalist Team

## 2024-06-06 NOTE — PLAN OF CARE
Pt has complained of n/v and pain, prn medication given. On iv abx, rechecking K levels. On room air             Problem: Adult Inpatient Plan of Care  Goal: Plan of Care Review  Outcome: Ongoing, Progressing  Goal: Patient-Specific Goal (Individualized)  Outcome: Ongoing, Progressing  Goal: Absence of Hospital-Acquired Illness or Injury  Outcome: Ongoing, Progressing  Intervention: Identify and Manage Fall Risk  Goal: Optimal Comfort and Wellbeing  Outcome: Ongoing, Progressing  Goal: Readiness for Transition of Care  Outcome: Ongoing, Progressing     Problem: Chest Pain  Goal: Resolution of Chest Pain Symptoms  Outcome: Ongoing, Progressing     Problem: Skin Injury Risk Increased  Goal: Skin Health and Integrity  Outcome: Ongoing, Progressing     Problem: Adjustment to Illness (Sepsis/Septic Shock)  Goal: Optimal Coping  Outcome: Ongoing, Progressing     Problem: Bleeding (Sepsis/Septic Shock)  Goal: Absence of Bleeding  Outcome: Ongoing, Progressing     Problem: Glycemic Control Impaired (Sepsis/Septic Shock)  Goal: Blood Glucose Level Within Desired Range  Outcome: Ongoing, Progressing     Problem: Infection Progression (Sepsis/Septic Shock)  Goal: Absence of Infection Signs and Symptoms  Outcome: Ongoing, Progressing     Problem: Nutrition Impaired (Sepsis/Septic Shock)  Goal: Optimal Nutrition Intake  Outcome: Ongoing, Progressing     Problem: Fall Injury Risk  Goal: Absence of Fall and Fall-Related Injury  Outcome: Ongoing, Progressing  Intervention: Promote Injury-Free Environment   Goal Outcome Evaluation:

## 2024-06-06 NOTE — PROGRESS NOTES
"Pharmacy Antimicrobial Dosing Service    Subjective:  Марина Tilley is a 48 y.o.female admitted with chest and back pain, shortness of breath. Pharmacy has been consulted to dose Vancomycin for endocarditis.    PMH: previously on daptomycin, switching to vancomycin per ID.       Assessment/Plan    1. Day #3 Vancomycin: Goal -600 mcg*h/mL.  Scr continuing to improve and good UOP documented.  Trough prior to 3rd dose of q12 regimen = 23.2 mcg/mL. AUC calculated >600.    Will adjust to vancomycin 750 mg IV q12 hours and recheck trough in 3-4 days.     Will continue to monitor drug levels, renal function, culture and sensitivities, and patient clinical status.       Objective:  Relevant clinical data and objective history reviewed:  167.6 cm (66\")   92.5 kg (203 lb 14.8 oz)   Ideal body weight: 59.3 kg (130 lb 11.7 oz)  Adjusted ideal body weight: 72.6 kg (160 lb 0.2 oz)  Body mass index is 32.91 kg/m².    Results from last 7 days   Lab Units 06/06/24  1002 06/05/24  0023   VANCOMYCIN TR mcg/mL 23.20*  --    VANCOMYCIN RM mcg/mL  --  27.40     Results from last 7 days   Lab Units 06/06/24  0330 06/05/24  0023 06/04/24  0042   CREATININE mg/dL 1.35* 1.44* 1.71*     Estimated Creatinine Clearance: 59.7 mL/min (A) (by C-G formula based on SCr of 1.35 mg/dL (H)).  I/O last 3 completed shifts:  In: 1208 [P.O.:958; IV Piggyback:250]  Out: -     Results from last 7 days   Lab Units 06/06/24  0330 06/05/24  0023 06/04/24  0042   WBC 10*3/mm3 12.00* 14.74* 15.59*     Temperature    06/06/24 0415 06/06/24 0749 06/06/24 1039   Temp: 98.2 °F (36.8 °C) 98 °F (36.7 °C) 98 °F (36.7 °C)     Baseline culture/source/susceptibility:  Microbiology Results (last 10 days)       Procedure Component Value - Date/Time    Blood Culture - Blood, Arm, Right [[id number]]  (Normal) Collected: 06/02/24 1356    Lab Status: Preliminary result Specimen: Blood from Arm, Right Updated: 06/05/24 1400     Blood Culture No growth at 3 days    Narrative: "      Less than seven (7) mL's of blood was collected.  Insufficient quantity may yield false negative results.    Blood Culture - Blood, Arm, Left [[id number]]  (Normal) Collected: 06/02/24 1344    Lab Status: Preliminary result Specimen: Blood from Arm, Left Updated: 06/05/24 1400     Blood Culture No growth at 3 days            Chayo Mensah PharmD  06/06/24 10:52 EDT

## 2024-06-06 NOTE — PLAN OF CARE
Goal Outcome Evaluation:  Plan of Care Reviewed With: patient        Progress: no change  Outcome Evaluation: Slept at intervals. O2 at 2L in use. IV abx infused. PRN IV meds given for c/o back pain. Ambulating to BR. Will continue to monitor.

## 2024-06-06 NOTE — PROGRESS NOTES
CC:  SOA/ left sided chest pain     F/U:  native tricuspid valve IE/ severe AI with likely healed vegetation/ mod-severe MR--Pagni  EF 30-35% (echo)    Subjective:  no c/o's today except she do anything without being SOA    No events overnight  Psych consulted yest as well  Mandible xrays yest c/w periapical abscess of right molar      PREOP studies:  MRSA screen:  positive  Mandible xrays:  There is a large cavity involving the remnant right molar tooth, with lucency adjacent to the root suggesting periapical abscess         Intake/Output Summary (Last 24 hours) at 6/6/2024 0919  Last data filed at 6/5/2024 1957  Gross per 24 hour   Intake 730 ml   Output --   Net 730 ml     Temp:  [96.4 °F (35.8 °C)-99 °F (37.2 °C)] 98 °F (36.7 °C)  Heart Rate:  [87-93] 87  Resp:  [15-33] 17  BP: (103-123)/(48-65) 118/63      Results from last 7 days   Lab Units 06/06/24  0330 06/05/24  0023   WBC 10*3/mm3 12.00* 14.74*   HEMOGLOBIN g/dL 8.6* 8.1*   HEMATOCRIT % 29.1* 26.4*   PLATELETS 10*3/mm3 252 332     Results from last 7 days   Lab Units 06/06/24  0330   CREATININE mg/dL 1.35*   POTASSIUM mmol/L 5.5*   SODIUM mmol/L 133*       Physical Exam:  Neuro intact, nad, resting in bed  Tele:  SR 80s  Diminished bases, 96% 2L  Benign abd, + BM  No edema    Assessment/Plan:  Principal Problem:    Endocarditis    - Native tricuspid valve endocarditis, EF 50-55% (echo)--surgical eval in progress  - MRSA bacteremia/ UTI--ID following, Dapto  - Severe AI/ moderate-severe MR  - Acute HFrEF, r/t VHD, NYHA class III--proBNP > 33k  - Polysubstance abuse--IVDA/ tobacco abuse  - PTSD--psych following  - Bipolar disorder, type 2  - CATRINA, likely r/t acute HF  - Anemia  - Tobacco abuse, 91 pack yr hx  - Poor dentition, likely periapical abscess right molar--mandible xrays  - Prolonged QTc--500 ms (6/3 EKG)  - Medical non-compliance    Dr. Skaggs to evaluate studies/ patient today.  Will likely need dental work prior to any surgical intervention.       Stacey Ambriz, APRN  6/6/2024  09:19 EDT

## 2024-06-06 NOTE — PROGRESS NOTES
Infectious Diseases Progress Note      LOS: 3 days   Patient Care Team:  Provider, No Known as PCP - General    Chief Complaint: Chest and back pain, fatigue, shortness of breath    Subjective       The patient has been afebrile for the last 24 hours.  The patient is on 2 L of oxygen by nasal cannula hemodynamically stable, and is tolerating antimicrobial therapy.  Still having back pain and shortness of breath      Review of Systems:   Review of Systems   Constitutional:  Positive for fatigue.   HENT: Negative.     Eyes: Negative.    Respiratory:  Positive for shortness of breath.    Cardiovascular:  Positive for chest pain.   Gastrointestinal: Negative.    Endocrine: Negative.    Genitourinary: Negative.    Musculoskeletal:  Positive for back pain.   Skin: Negative.    Neurological: Negative.    Psychiatric/Behavioral: Negative.     All other systems reviewed and are negative.       Objective     Vital Signs  Temp:  [97.7 °F (36.5 °C)-99 °F (37.2 °C)] 97.8 °F (36.6 °C)  Heart Rate:  [84-93] 85  Resp:  [15-34] 30  BP: (107-123)/(49-63) 107/50    Physical Exam:  Physical Exam  Vitals and nursing note reviewed.   Constitutional:       General: She is not in acute distress.     Appearance: She is well-developed and normal weight. She is ill-appearing. She is not diaphoretic.   HENT:      Head: Normocephalic and atraumatic.   Eyes:      General: No scleral icterus.     Extraocular Movements: Extraocular movements intact.      Conjunctiva/sclera: Conjunctivae normal.      Pupils: Pupils are equal, round, and reactive to light.   Cardiovascular:      Rate and Rhythm: Normal rate and regular rhythm.      Heart sounds: S1 normal and S2 normal. Murmur heard.   Pulmonary:      Effort: Pulmonary effort is normal. No respiratory distress.      Breath sounds: No stridor. No wheezing or rales.      Comments: Diminished throughout  Chest:      Chest wall: No tenderness.   Abdominal:      General: Bowel sounds are normal. There is  no distension.      Palpations: Abdomen is soft. There is no mass.      Tenderness: There is no abdominal tenderness. There is no guarding.   Musculoskeletal:         General: No swelling, tenderness or deformity. Normal range of motion.      Cervical back: Neck supple.   Skin:     General: Skin is warm and dry.      Coloration: Skin is not pale.      Findings: No bruising, erythema or rash.   Neurological:      General: No focal deficit present.      Mental Status: She is alert and oriented to person, place, and time. Mental status is at baseline.      Cranial Nerves: No cranial nerve deficit.   Psychiatric:         Mood and Affect: Mood normal.          Results Review:    I have reviewed all clinical data, test, lab, and imaging results.     Radiology  XR Mandible < 4 View    Result Date: 6/5/2024  XR MANDIBLE < 4 VW Date of Exam: 6/5/2024 6:25 PM EDT Indication: poor dentition, endocarditis Comparison: None available. Findings: There is no fracture or aggressive bone destruction. There is some lucency adjacent to the root of a remnant right molar tooth that demonstrates a large cavity. There is high density material dorsal to the roots of a remnant left molar tooth that was present on the comparison exam     Impression: There is a large cavity involving the remnant right molar tooth, with lucency adjacent to the root suggesting periapical abscess Electronically Signed: Alfa Walker  6/5/2024 7:20 PM EDT  Workstation ID: [id number]     Cardiology    Laboratory    Results from last 7 days   Lab Units 06/06/24  0330 06/05/24  0023 06/04/24  0042 06/03/24  0209 06/02/24  1344   WBC 10*3/mm3 12.00* 14.74* 15.59* 17.54* 18.73*   HEMOGLOBIN g/dL 8.6* 8.1* 8.0* 8.9* 9.3*   HEMATOCRIT % 29.1* 26.4* 26.4* 29.2* 29.1*   PLATELETS 10*3/mm3 252 332 386 457* 436     Results from last 7 days   Lab Units 06/06/24  0330 06/05/24  0023 06/04/24  0042 06/03/24  1445 06/03/24  0209 06/02/24  1344   SODIUM mmol/L 133* 135* 137  --   139 139   POTASSIUM mmol/L 5.5* 4.5 4.3 4.5 3.5 3.2*   CHLORIDE mmol/L 100 100 99  --  100 101   CO2 mmol/L 20.1* 20.9* 24.7  --  23.7 24.7   BUN mg/dL 18 23* 20  --  14 11   CREATININE mg/dL 1.35* 1.44* 1.71*  --  1.39* 1.24*   GLUCOSE mg/dL 138* 142* 165*  --  117* 109*   ALBUMIN g/dL  --   --  3.1*  --  3.2* 3.3*   BILIRUBIN mg/dL  --   --  0.3  --  0.3 0.3   ALK PHOS U/L  --   --  107  --  111 117   AST (SGOT) U/L  --   --  32  --  23 23   ALT (SGPT) U/L  --   --  32  --  27 32   CALCIUM mg/dL 8.3* 7.5* 7.8*  --  8.3* 8.6     Results from last 7 days   Lab Units 24  0042   CK TOTAL U/L 64             Microbiology   Microbiology Results (last 10 days)       Procedure Component Value - Date/Time    Blood Culture - Blood, Arm, Right [[id number]]  (Normal) Collected: 24 1356    Lab Status: Preliminary result Specimen: Blood from Arm, Right Updated: 24 1400     Blood Culture No growth at 4 days    Narrative:      Less than seven (7) mL's of blood was collected.  Insufficient quantity may yield false negative results.    Blood Culture - Blood, Arm, Left [[id number]]  (Normal) Collected: 24 1344    Lab Status: Preliminary result Specimen: Blood from Arm, Left Updated: 24 1400     Blood Culture No growth at 4 days            Medication Review:       Schedule Meds  enoxaparin, 40 mg, Subcutaneous, Daily  famotidine, 40 mg, Oral, Daily  [Held by provider] furosemide, 40 mg, Oral, Daily  gabapentin, 100 mg, Oral, TID  metoprolol succinate XL, 25 mg, Oral, Q24H  nicotine, 1 patch, Transdermal, Q24H  sertraline, 25 mg, Oral, Daily  sodium chloride, 10 mL, Intravenous, Q12H  [START ON 2024] vancomycin, 750 mg, Intravenous, Q12H        Infusion Meds  Pharmacy to dose vancomycin,         PRN Meds    acetaminophen    senna-docusate sodium **AND** polyethylene glycol **AND** bisacodyl **AND** bisacodyl    calcium carbonate    [] cloNIDine **FOLLOWED BY** [] cloNIDine **FOLLOWED BY**  [] cloNIDine **FOLLOWED BY** cloNIDine **FOLLOWED BY** [START ON 2024] cloNIDine    dicyclomine    hydrOXYzine    ipratropium-albuterol    Morphine    nitroglycerin    ondansetron    Pharmacy to dose vancomycin    Potassium Replacement - Follow Nurse / BPA Driven Protocol    sodium chloride    sodium chloride        Assessment & Plan       Antimicrobial Therapy   1.  IV vancomycin        2.        3.        4.        5.            Assessment       Tricuspid valve endocarditis   Patient had positive blood culture for MRSA from 2024 and 2024 according to care everywhere records from [facility].  Cultures had an KRYSTEN of 1 to vancomycin.  LETICIA performed at our facility on 2024 showed a large vegetation on the tricuspid valve with moderate tricuspid valve regurgitation.  LETICIA also showed severe aortic insufficiency with probably healed vegetation on the aortic valve leaflet.  There was moderate MR.    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  Claims that she stopped using fentanyl since admission to [facility] in May 2024.  Current drug screen positive for barbiturates and benzodiazepines.  HIV screen was negative.  Hepatitis panel was negative     History of COPD and tobacco abuse-currently on 2 L of oxygen by nasal cannula     Back pain-MRI of the lumbar spine and thoracic spine were negative for signs of discitis/osteomyelitis    Right-sided molar abscess        Plan     Continue IV vancomycin-ask pharmacy to monitor and dose and try to keep vancomycin trough between 15 and 20  Patient will need at least 6 weeks of IV antimicrobial therapy  Patient may need to be transferred to [facility] for oral surgery before any valve surgery  Continue supportive  care  A.m. labs  Tobacco and illicit drug abuse cessation  Overall prognosis is guarded patient continues use illicit substances continues to be noncompliant    Fartun Blankenship, MARY  06/06/24  15:48 EDT    Note is dictated utilizing voice recognition software/Dragon
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
## 2024-06-07 ENCOUNTER — PREP FOR SURGERY (OUTPATIENT)
Dept: OTHER | Facility: HOSPITAL | Age: 49
End: 2024-06-07
Payer: MEDICAID

## 2024-06-07 LAB
ANION GAP SERPL CALCULATED.3IONS-SCNC: 10.8 MMOL/L (ref 5–15)
BASOPHILS # BLD AUTO: 0.06 10*3/MM3 (ref 0–0.2)
BASOPHILS NFR BLD AUTO: 0.5 % (ref 0–1.5)
BH CV ECHO MEAS - MR MAX PG: 75.7 MMHG
BH CV ECHO MEAS - MR MAX VEL: 435 CM/SEC
BH CV ECHO MEAS - MR MEAN PG: 46 MMHG
BH CV ECHO MEAS - MR MEAN VEL: 316 CM/SEC
BH CV ECHO MEAS - MR VTI: 119 CM
BH CV ECHO SHUNT ASSESSMENT PERFORMED (HIDDEN SCRIPTING): 1
BUN SERPL-MCNC: 19 MG/DL (ref 6–20)
BUN/CREAT SERPL: 13.5 (ref 7–25)
CALCIUM SPEC-SCNC: 8.5 MG/DL (ref 8.6–10.5)
CHLORIDE SERPL-SCNC: 100 MMOL/L (ref 98–107)
CO2 SERPL-SCNC: 23.2 MMOL/L (ref 22–29)
CREAT SERPL-MCNC: 1.41 MG/DL (ref 0.57–1)
DEPRECATED RDW RBC AUTO: 60.9 FL (ref 37–54)
EGFRCR SERPLBLD CKD-EPI 2021: 46.7 ML/MIN/1.73
EOSINOPHIL # BLD AUTO: 0.09 10*3/MM3 (ref 0–0.4)
EOSINOPHIL NFR BLD AUTO: 0.7 % (ref 0.3–6.2)
ERYTHROCYTE [DISTWIDTH] IN BLOOD BY AUTOMATED COUNT: 18.5 % (ref 12.3–15.4)
GLUCOSE SERPL-MCNC: 138 MG/DL (ref 65–99)
HCT VFR BLD AUTO: 25 % (ref 34–46.6)
HGB BLD-MCNC: 7.3 G/DL (ref 12–15.9)
IMM GRANULOCYTES # BLD AUTO: 0.12 10*3/MM3 (ref 0–0.05)
IMM GRANULOCYTES NFR BLD AUTO: 0.9 % (ref 0–0.5)
LYMPHOCYTES # BLD AUTO: 1.4 10*3/MM3 (ref 0.7–3.1)
LYMPHOCYTES NFR BLD AUTO: 10.9 % (ref 19.6–45.3)
MCH RBC QN AUTO: 27.5 PG (ref 26.6–33)
MCHC RBC AUTO-ENTMCNC: 29.2 G/DL (ref 31.5–35.7)
MCV RBC AUTO: 94.3 FL (ref 79–97)
MONOCYTES # BLD AUTO: 1.32 10*3/MM3 (ref 0.1–0.9)
MONOCYTES NFR BLD AUTO: 10.2 % (ref 5–12)
NEUTROPHILS NFR BLD AUTO: 76.8 % (ref 42.7–76)
NEUTROPHILS NFR BLD AUTO: 9.89 10*3/MM3 (ref 1.7–7)
NRBC BLD AUTO-RTO: 0.3 /100 WBC (ref 0–0.2)
PLATELET # BLD AUTO: 312 10*3/MM3 (ref 140–450)
PMV BLD AUTO: 10.1 FL (ref 6–12)
POTASSIUM SERPL-SCNC: 4.8 MMOL/L (ref 3.5–5.2)
RBC # BLD AUTO: 2.65 10*6/MM3 (ref 3.77–5.28)
SODIUM SERPL-SCNC: 134 MMOL/L (ref 136–145)
WBC NRBC COR # BLD AUTO: 12.88 10*3/MM3 (ref 3.4–10.8)

## 2024-06-07 PROCEDURE — 85025 COMPLETE CBC W/AUTO DIFF WBC: CPT | Performed by: NURSE PRACTITIONER

## 2024-06-07 PROCEDURE — 80048 BASIC METABOLIC PNL TOTAL CA: CPT | Performed by: STUDENT IN AN ORGANIZED HEALTH CARE EDUCATION/TRAINING PROGRAM

## 2024-06-07 PROCEDURE — 99232 SBSQ HOSP IP/OBS MODERATE 35: CPT | Performed by: NURSE PRACTITIONER

## 2024-06-07 PROCEDURE — 94799 UNLISTED PULMONARY SVC/PX: CPT

## 2024-06-07 PROCEDURE — 25810000003 SODIUM CHLORIDE 0.9 % SOLUTION 250 ML FLEX CONT: Performed by: INTERNAL MEDICINE

## 2024-06-07 PROCEDURE — 25010000002 ONDANSETRON PER 1 MG: Performed by: NURSE PRACTITIONER

## 2024-06-07 PROCEDURE — 25010000002 VANCOMYCIN 750 MG RECONSTITUTED SOLUTION 1 EACH VIAL: Performed by: INTERNAL MEDICINE

## 2024-06-07 PROCEDURE — 25010000002 MORPHINE PER 10 MG: Performed by: NURSE PRACTITIONER

## 2024-06-07 PROCEDURE — 25010000002 ENOXAPARIN PER 10 MG: Performed by: NURSE PRACTITIONER

## 2024-06-07 PROCEDURE — 99232 SBSQ HOSP IP/OBS MODERATE 35: CPT | Performed by: INTERNAL MEDICINE

## 2024-06-07 RX ORDER — BISACODYL 10 MG
10 SUPPOSITORY, RECTAL RECTAL DAILY PRN
Start: 2024-06-07

## 2024-06-07 RX ORDER — FAMOTIDINE 40 MG/1
40 TABLET, FILM COATED ORAL DAILY
Start: 2024-06-07

## 2024-06-07 RX ORDER — AMOXICILLIN 250 MG
2 CAPSULE ORAL 2 TIMES DAILY PRN
Start: 2024-06-07 | End: 2024-06-17 | Stop reason: HOSPADM

## 2024-06-07 RX ORDER — GABAPENTIN 100 MG/1
100 CAPSULE ORAL 3 TIMES DAILY
Start: 2024-06-07

## 2024-06-07 RX ORDER — POLYETHYLENE GLYCOL 3350 17 G/17G
17 POWDER, FOR SOLUTION ORAL DAILY PRN
Start: 2024-06-07 | End: 2024-06-17 | Stop reason: HOSPADM

## 2024-06-07 RX ORDER — IPRATROPIUM BROMIDE AND ALBUTEROL SULFATE 2.5; .5 MG/3ML; MG/3ML
3 SOLUTION RESPIRATORY (INHALATION) EVERY 4 HOURS PRN
Start: 2024-06-07

## 2024-06-07 RX ORDER — ACETAMINOPHEN 325 MG/1
650 TABLET ORAL EVERY 4 HOURS PRN
Start: 2024-06-07 | End: 2024-06-17 | Stop reason: HOSPADM

## 2024-06-07 RX ORDER — BISACODYL 5 MG/1
5 TABLET, DELAYED RELEASE ORAL DAILY PRN
Start: 2024-06-07

## 2024-06-07 RX ORDER — ONDANSETRON 2 MG/ML
4 INJECTION INTRAMUSCULAR; INTRAVENOUS EVERY 6 HOURS PRN
Start: 2024-06-07 | End: 2024-06-17 | Stop reason: HOSPADM

## 2024-06-07 RX ORDER — METOPROLOL SUCCINATE 25 MG/1
25 TABLET, EXTENDED RELEASE ORAL
Start: 2024-06-07 | End: 2024-06-17 | Stop reason: HOSPADM

## 2024-06-07 RX ORDER — FUROSEMIDE 40 MG/1
40 TABLET ORAL DAILY
Start: 2024-06-07 | End: 2024-06-17 | Stop reason: HOSPADM

## 2024-06-07 RX ORDER — HYDROXYZINE 50 MG/1
50 TABLET, FILM COATED ORAL 3 TIMES DAILY PRN
Status: ON HOLD
Start: 2024-06-07 | End: 2024-06-17

## 2024-06-07 RX ORDER — NICOTINE 21 MG/24HR
1 PATCH, TRANSDERMAL 24 HOURS TRANSDERMAL
Start: 2024-06-07

## 2024-06-07 RX ORDER — CALCIUM CARBONATE 500 MG/1
2 TABLET, CHEWABLE ORAL 2 TIMES DAILY PRN
Start: 2024-06-07

## 2024-06-07 RX ORDER — SERTRALINE HYDROCHLORIDE 25 MG/1
25 TABLET, FILM COATED ORAL DAILY
Start: 2024-06-07

## 2024-06-07 RX ORDER — DICYCLOMINE HYDROCHLORIDE 10 MG/1
10 CAPSULE ORAL 3 TIMES DAILY PRN
Start: 2024-06-07 | End: 2024-06-17 | Stop reason: HOSPADM

## 2024-06-07 RX ADMIN — VANCOMYCIN HYDROCHLORIDE 750 MG: 750 INJECTION, POWDER, LYOPHILIZED, FOR SOLUTION INTRAVENOUS at 15:16

## 2024-06-07 RX ADMIN — FAMOTIDINE 40 MG: 20 TABLET, FILM COATED ORAL at 07:52

## 2024-06-07 RX ADMIN — ONDANSETRON 4 MG: 2 INJECTION INTRAMUSCULAR; INTRAVENOUS at 11:31

## 2024-06-07 RX ADMIN — Medication 10 ML: at 20:19

## 2024-06-07 RX ADMIN — GABAPENTIN 100 MG: 100 CAPSULE ORAL at 07:53

## 2024-06-07 RX ADMIN — MORPHINE SULFATE 2 MG: 2 INJECTION, SOLUTION INTRAMUSCULAR; INTRAVENOUS at 11:02

## 2024-06-07 RX ADMIN — MORPHINE SULFATE 2 MG: 2 INJECTION, SOLUTION INTRAMUSCULAR; INTRAVENOUS at 02:55

## 2024-06-07 RX ADMIN — Medication 10 ML: at 07:52

## 2024-06-07 RX ADMIN — MORPHINE SULFATE 2 MG: 2 INJECTION, SOLUTION INTRAMUSCULAR; INTRAVENOUS at 06:51

## 2024-06-07 RX ADMIN — MORPHINE SULFATE 2 MG: 2 INJECTION, SOLUTION INTRAMUSCULAR; INTRAVENOUS at 19:13

## 2024-06-07 RX ADMIN — IPRATROPIUM BROMIDE AND ALBUTEROL SULFATE 3 ML: .5; 3 SOLUTION RESPIRATORY (INHALATION) at 11:55

## 2024-06-07 RX ADMIN — MORPHINE SULFATE 2 MG: 2 INJECTION, SOLUTION INTRAMUSCULAR; INTRAVENOUS at 23:14

## 2024-06-07 RX ADMIN — IPRATROPIUM BROMIDE AND ALBUTEROL SULFATE 3 ML: .5; 3 SOLUTION RESPIRATORY (INHALATION) at 23:59

## 2024-06-07 RX ADMIN — VANCOMYCIN HYDROCHLORIDE 750 MG: 750 INJECTION, POWDER, LYOPHILIZED, FOR SOLUTION INTRAVENOUS at 03:15

## 2024-06-07 RX ADMIN — METOPROLOL SUCCINATE 25 MG: 25 TABLET, EXTENDED RELEASE ORAL at 07:52

## 2024-06-07 RX ADMIN — ONDANSETRON 4 MG: 2 INJECTION INTRAMUSCULAR; INTRAVENOUS at 23:14

## 2024-06-07 RX ADMIN — MORPHINE SULFATE 2 MG: 2 INJECTION, SOLUTION INTRAMUSCULAR; INTRAVENOUS at 15:16

## 2024-06-07 RX ADMIN — ENOXAPARIN SODIUM 40 MG: 100 INJECTION SUBCUTANEOUS at 15:17

## 2024-06-07 RX ADMIN — GABAPENTIN 100 MG: 100 CAPSULE ORAL at 20:19

## 2024-06-07 RX ADMIN — Medication 1 PATCH: at 07:52

## 2024-06-07 RX ADMIN — GABAPENTIN 100 MG: 100 CAPSULE ORAL at 15:17

## 2024-06-07 RX ADMIN — SERTRALINE HYDROCHLORIDE 25 MG: 25 TABLET ORAL at 07:52

## 2024-06-07 NOTE — PROGRESS NOTES
[facility] MEDICINE SERVICE  DAILY PROGRESS NOTE    NAME: Марина Tilley  : 1975  MRN: [id number]      LOS: 4 days     PROVIDER OF SERVICE: Tashi Birmingham MD    Chief Complaint: Endocarditis  Марина Tilley is a 48 y.o. female with known history of IV drug use initially came to the ED on 2024 at 1530.  Per ED documentation patient was seen for chest pain and shortness of breath.  Has a known endocarditis.  She has been to multiple hospitals.  She had been seen in [place] and a couple in [facility].  Most recently was at [facility].  She has been intermittently on antibiotics.  States she was discharged from the hospital but was not given any antibiotics.  She reports she had not used since before being in the hospital the last time.       Review of blood cultures from [facility] shows patient grew MRSA on 2024.  Review of echocardiogram from [facility] demonstrated vegetationson the septal leaflet of tricuspid valve, mild to moderate tricuspid regurgitation, small pericardial effusion, and EF at 30 to 35%, moderate to severe global LV hypokinesis and a severely dilated left ventricle. Further review of records from [facility] demonstrated a white count of 15.9, hemoglobin of 10.3, a creatinine of 1.86, chest imaging that demonstrated multifocal pneumonia versus septic emboli. Reportedly signed out AGAINST MEDICAL ADVICE from other hospital before going to the [facility] based on [facility] records.      Patient was seen by the hospitalist at 1700 on 2024.  She was admitted to the PCU at 1751.  At  patient was requesting to leave the unit and go outside and became upset when told that she could not.  At  patient had IV removed and left AGAINST MEDICAL ADVICE.  At 2130 to return to the ED and was subsequently readmitted to the hospitalist team around 2 AM on 6/3/2024 to continue previous  "treatment plan. Pt states \"I can't do this anymore\". Using a cane because SOA with any exertion  Subjective:     Interval History:  History taken from: patient  Patient Complaints: pending transfer to [facility]   Patient Denies: Nausea or vomiting; no change in vision    Review of Systems:   Review of Systems  14 point review of system unremarkable except mentioned above  Objective:     Vital Signs  Temp:  [97.3 °F (36.3 °C)-98.7 °F (37.1 °C)] 97.3 °F (36.3 °C)  Heart Rate:  [80-86] 80  Resp:  [13-30] 28  BP: (104-118)/(42-52) 118/42  Flow (L/min):  [2] 2   Body mass index is 32.91 kg/m².    Physical Exam  Physical Exam  HENT:      Head: Atraumatic.   Eyes:      Pupils: Pupils are equal, round, and reactive to light.   Cardiovascular:      Rate and Rhythm: Normal rate and regular rhythm.   Pulmonary:      Effort: Pulmonary effort is normal.      Breath sounds: Normal breath sounds.   Abdominal:      General: Bowel sounds are normal.      Palpations: Abdomen is soft.   Musculoskeletal:         General: Normal range of motion.      Cervical back: Neck supple.   Skin:     General: Skin is warm.   Neurological:      General: No focal deficit present.      Mental Status: She is alert and oriented to person, place, and time.   Psychiatric:         Judgment: Judgment normal.         Scheduled Meds   enoxaparin, 40 mg, Subcutaneous, Daily  famotidine, 40 mg, Oral, Daily  [Held by provider] furosemide, 40 mg, Oral, Daily  gabapentin, 100 mg, Oral, TID  metoprolol succinate XL, 25 mg, Oral, Q24H  nicotine, 1 patch, Transdermal, Q24H  sertraline, 25 mg, Oral, Daily  sodium chloride, 10 mL, Intravenous, Q12H  vancomycin, 750 mg, Intravenous, Q12H       PRN Meds     acetaminophen    senna-docusate sodium **AND** polyethylene glycol **AND** bisacodyl **AND** bisacodyl    calcium carbonate    [] cloNIDine **FOLLOWED BY** [] cloNIDine **FOLLOWED BY** [] cloNIDine **FOLLOWED BY** [] cloNIDine " **FOLLOWED BY** cloNIDine    dicyclomine    hydrOXYzine    ipratropium-albuterol    Morphine    ondansetron    Pharmacy to dose vancomycin    Potassium Replacement - Follow Nurse / BPA Driven Protocol    sodium chloride    sodium chloride   Infusions  Pharmacy to dose vancomycin,           Diagnostic Data    Results from last 7 days   Lab Units 06/07/24  0347 06/05/24  0023 06/04/24  0042   WBC 10*3/mm3 12.88*   < > 15.59*   HEMOGLOBIN g/dL 7.3*   < > 8.0*   HEMATOCRIT % 25.0*   < > 26.4*   PLATELETS 10*3/mm3 312   < > 386   GLUCOSE mg/dL 138*   < > 165*   CREATININE mg/dL 1.41*   < > 1.71*   BUN mg/dL 19   < > 20   SODIUM mmol/L 134*   < > 137   POTASSIUM mmol/L 4.8   < > 4.3   AST (SGOT) U/L  --   --  32   ALT (SGPT) U/L  --   --  32   ALK PHOS U/L  --   --  107   BILIRUBIN mg/dL  --   --  0.3   ANION GAP mmol/L 10.8   < > 13.3    < > = values in this interval not displayed.       XR Mandible < 4 View    Result Date: 6/5/2024  Impression: There is a large cavity involving the remnant right molar tooth, with lucency adjacent to the root suggesting periapical abscess Electronically Signed: Alfa Walker  6/5/2024 7:20 PM EDT  Workstation ID: [id number]       I reviewed the patient's new clinical results.    Assessment/Plan:     Active and Resolved Problems  Chest pain and dyspnea  Suspect 2/2 untreated Endocarditis and Bacteremia  pulmonary edema vs LLL infiltrate   Noncompliance- pt seen at multiple hospitals and recurrent history of leaving AMA, most recently yesterday from  this hospital   Hx IV Fentanyl use   Hypoxia requiring oxygen supplementation  - Outside records show positive for MRSA bacteremia at [facility]  - per ED provider report Echo from [facility] demonstrated vegetationson the septal leaflet of tricuspid valve, mild to moderate tricuspid regurgitation, small pericardial effusion, and EF at 30 to 35%, moderate to severe global LV hypokinesis and a severely dilated  "left ventricle  -CXR 6/2/24: Vascular congestion. Left lower lobe infiltrate may represent developing pulmonary edema or possibly pneumonia depending on the clinical setting.   -CT of chest without contrast multifocal patchy peripheral nodular densities suspicious for septic emboli, mild small bilateral pleural effusions  Cellulitis bilateral hilar lymphadenopathy  - pt with history of anaphylaxis requiring intubation from oral Keflex  -IDTeam consulted,  d/ganesh daptomycin restarted on vancomycin      hep B C as well as HIV screening     HFreF - outside source documentation with EF 30-35%  - BNP 33,058  - lactic 1.5  - VSS  -2 D echo noted- cardiology to follow  -Continue bronchodilators and inhaled steroids    #Right molar area  periapical abscess- out pt dental work up        Hypokalemia- resolved   - replace per protocol     CATRINA- improving   - scr 1.24,-> 1.71->1.44   - will hold lasix   - monitor   -if Continue to trend up we will consult nephrology     Anemia  - noted hgb drop 9.3-8.9  - no overt sign of bleed  - montior  - transfuse <7.0     Tobacco dependency  - nicotine patch      IV Fentanyl use  - add the clonidine withdrawal protocol for opioid withdrawal     #Generalized anxiety disorder  PTSD  - psych Team consulted patient started on Sertraline       Patient will need 30 days or less of skilled services\".       DVT prophylaxis:  Medical DVT prophylaxis orders are present.         Code status is   Code Status and Medical Interventions:   Ordered at: 06/03/24 0151     Code Status (Patient has no pulse and is not breathing):    CPR (Attempt to Resuscitate)     Medical Interventions (Patient has pulse or is breathing):    Full Support       Plan for disposition: Transfer to [facility] pending availability of bed  Time: 35 minutes    Signature: Electronically signed by Tashi Birmingham MD, 06/07/24, 14:19 EDT.  [facility] Hospitalist Team   "

## 2024-06-07 NOTE — CASE MANAGEMENT/SOCIAL WORK
Case Management Discharge Note      Final Note: [facility]    Provided Post Acute Provider List?: N/A  Provided Post Acute Provider Quality & Resource List?: N/A      Transportation Services  Ambulance: [facility]    Final Discharge Disposition Code: 02 - short term hospital for  care      Continued Stay Note   [place]     Patient Name: Марина Tilley  MRN: [id number]  Today's Date: 6/7/2024    Admit Date: 6/3/2024       Discharge Plan       Row Name 06/07/24 1623       Plan    Plan Comments Notified By RN of room number 2211 (cardiovascular Unit) at [facility].  Bed ready.  Request Sent to [facility] for transport, Medical necessity form completed. Discussed with RN.                           Brittany Blanc RN      Office Phone [phone]  Office Cell [phone]

## 2024-06-07 NOTE — PLAN OF CARE
Goal Outcome Evaluation:      PRN morphine given for c/o chest and back pain.  IV vancomycin.  A & O x 4.

## 2024-06-07 NOTE — CASE MANAGEMENT/SOCIAL WORK
Continued Stay Note  [facility]     Patient Name: Марина Tilley  MRN: [id number]  Today's Date: 6/7/2024    Admit Date: 6/3/2024    Plan: [facility] accepted. Precert approved thru 6/19/24. PASRR approved. From home with sister. Will need 6 weeks IV abx, will need PICC. Plan transfer to [facility] prior to transfer to [facility].   Discharge Plan       Row Name 06/07/24 1534       Plan    Plan [facility] accepted. Precert approved thru 6/19/24. PASRR approved. From home with sister. Will need 6 weeks IV abx, will need PICC. Plan transfer to [facility] prior to transfer to [facility].    Patient/Family in Agreement with Plan yes    Plan Comments Spoke with Es at [facility].([phone]) Precert has been approved 6/7-6/19.  Bed ready.   Notified Es that now plan is for patient to transfer to [facility] for oral surgery. Unsure at this time if patient will go to [facility] then return to [facility] after surgery or if patient will be admitted at [facility].  Once patient is discharge ready, patient can be admitted to [facility]. Discussed with patient.              Discharge Plan       Row Name 06/07/24 1040       Plan    Plan [facility] accepted.  Precert started 6/6 per facility.  PASRR approved.   From home with sister.   Will need 6 weeks IV abx, will need PICC. Possible oral surgery at [facility].    Patient/Family in Agreement with Plan yes    Plan Comments [facility] accepted.  Precert started by facility, as it is anticipated that this precert may take longer due to payor source.  Awaiting CTS plan-  Needs oral surgery prior to surgery- Possibly at [facility]; Needs 6 weeks IV abx- Pending precert to [facility].    Barriers to discharge: IV abx.  CTS following.  Awaiting surgery plans.             Expected Discharge Date and  Time       Expected Discharge Date Expected Discharge Time    Jesse 10, 2024           Brittany Blanc, NACHO     Office Phone [phone]  Office Cell [phone]

## 2024-06-07 NOTE — SIGNIFICANT NOTE
Pt reported that she was feeling bad today, having trouble breathing and feeling bloated.  Pt stated she did not want to work with PT today.

## 2024-06-07 NOTE — PLAN OF CARE
Problem: Adult Inpatient Plan of Care  Goal: Plan of Care Review  Outcome: Ongoing, Progressing  Goal: Patient-Specific Goal (Individualized)  Outcome: Ongoing, Progressing  Goal: Absence of Hospital-Acquired Illness or Injury  Outcome: Ongoing, Progressing  Intervention: Identify and Manage Fall Risk  Goal: Optimal Comfort and Wellbeing  Outcome: Ongoing, Progressing  Goal: Readiness for Transition of Care  Outcome: Ongoing, Progressing     Problem: Chest Pain  Goal: Resolution of Chest Pain Symptoms  Outcome: Ongoing, Progressing     Problem: Skin Injury Risk Increased  Goal: Skin Health and Integrity  Outcome: Ongoing, Progressing     Problem: Adjustment to Illness (Sepsis/Septic Shock)  Goal: Optimal Coping  Outcome: Ongoing, Progressing     Problem: Bleeding (Sepsis/Septic Shock)  Goal: Absence of Bleeding  Outcome: Ongoing, Progressing     Problem: Glycemic Control Impaired (Sepsis/Septic Shock)  Goal: Blood Glucose Level Within Desired Range  Outcome: Ongoing, Progressing     Problem: Infection Progression (Sepsis/Septic Shock)  Goal: Absence of Infection Signs and Symptoms  Outcome: Ongoing, Progressing     Problem: Nutrition Impaired (Sepsis/Septic Shock)  Goal: Optimal Nutrition Intake  Outcome: Ongoing, Progressing     Problem: Fall Injury Risk  Goal: Absence of Fall and Fall-Related Injury  Outcome: Ongoing, Progressing  Intervention: Promote Injury-Free Environment   Goal Outcome Evaluation:         Pt resting in bed, prn pain meds given, on 2 L of o2

## 2024-06-07 NOTE — PROGRESS NOTES
Infectious Diseases Progress Note      LOS: 4 days   Patient Care Team:  Provider, No Known as PCP - General    Chief Complaint: Chest and back pain, fatigue, shortness of breath    Subjective       The patient has been afebrile for the last 24 hours.  The patient is on 2 L of oxygen by nasal cannula hemodynamically stable, and is tolerating antimicrobial therapy.  Still having back pain and shortness of breath      Review of Systems:   Review of Systems   Constitutional:  Positive for fatigue.   HENT: Negative.     Eyes: Negative.    Respiratory:  Positive for shortness of breath.    Cardiovascular:  Positive for chest pain.   Gastrointestinal: Negative.    Endocrine: Negative.    Genitourinary: Negative.    Musculoskeletal:  Positive for back pain.   Skin: Negative.    Neurological: Negative.    Psychiatric/Behavioral: Negative.     All other systems reviewed and are negative.       Objective     Vital Signs  Temp:  [97.3 °F (36.3 °C)-98.7 °F (37.1 °C)] 97.3 °F (36.3 °C)  Heart Rate:  [80-86] 80  Resp:  [13-28] 28  BP: (104-118)/(42-52) 118/42    Physical Exam:  Physical Exam  Vitals and nursing note reviewed.   Constitutional:       General: She is not in acute distress.     Appearance: She is well-developed and normal weight. She is ill-appearing. She is not diaphoretic.   HENT:      Head: Normocephalic and atraumatic.   Eyes:      General: No scleral icterus.     Extraocular Movements: Extraocular movements intact.      Conjunctiva/sclera: Conjunctivae normal.      Pupils: Pupils are equal, round, and reactive to light.   Cardiovascular:      Rate and Rhythm: Normal rate and regular rhythm.      Heart sounds: S1 normal and S2 normal. Murmur heard.   Pulmonary:      Effort: Pulmonary effort is normal. No respiratory distress.      Breath sounds: No stridor. No wheezing or rales.      Comments: Diminished throughout  Chest:      Chest wall: No tenderness.   Abdominal:      General: Bowel sounds are normal. There  is no distension.      Palpations: Abdomen is soft. There is no mass.      Tenderness: There is no abdominal tenderness. There is no guarding.   Musculoskeletal:         General: No swelling, tenderness or deformity. Normal range of motion.      Cervical back: Neck supple.   Skin:     General: Skin is warm and dry.      Coloration: Skin is not pale.      Findings: No bruising, erythema or rash.   Neurological:      General: No focal deficit present.      Mental Status: She is alert and oriented to person, place, and time. Mental status is at baseline.      Cranial Nerves: No cranial nerve deficit.   Psychiatric:         Mood and Affect: Mood normal.          Results Review:    I have reviewed all clinical data, test, lab, and imaging results.     Radiology  No Radiology Exams Resulted Within Past 24 Hours    Cardiology    Laboratory    Results from last 7 days   Lab Units 06/07/24  0347 06/06/24  0330 06/05/24  0023 06/04/24  0042 06/03/24  0209 06/02/24  1344   WBC 10*3/mm3 12.88* 12.00* 14.74* 15.59* 17.54* 18.73*   HEMOGLOBIN g/dL 7.3* 8.6* 8.1* 8.0* 8.9* 9.3*   HEMATOCRIT % 25.0* 29.1* 26.4* 26.4* 29.2* 29.1*   PLATELETS 10*3/mm3 312 252 332 386 457* 436     Results from last 7 days   Lab Units 06/07/24  0347 06/06/24  1002 06/06/24  0330 06/05/24  0023 06/04/24  0042 06/03/24  1445 06/03/24  0209 06/02/24  1344   SODIUM mmol/L 134*  --  133* 135* 137  --  139 139   POTASSIUM mmol/L 4.8 4.6 5.5* 4.5 4.3 4.5 3.5 3.2*   CHLORIDE mmol/L 100  --  100 100 99  --  100 101   CO2 mmol/L 23.2  --  20.1* 20.9* 24.7  --  23.7 24.7   BUN mg/dL 19  --  18 23* 20  --  14 11   CREATININE mg/dL 1.41*  --  1.35* 1.44* 1.71*  --  1.39* 1.24*   GLUCOSE mg/dL 138*  --  138* 142* 165*  --  117* 109*   ALBUMIN g/dL  --   --   --   --  3.1*  --  3.2* 3.3*   BILIRUBIN mg/dL  --   --   --   --  0.3  --  0.3 0.3   ALK PHOS U/L  --   --   --   --  107  --  111 117   AST (SGOT) U/L  --   --   --   --  32  --  23 23   ALT (SGPT) U/L  --    --   --   --  32  --  27 32   CALCIUM mg/dL 8.5*  --  8.3* 7.5* 7.8*  --  8.3* 8.6     Results from last 7 days   Lab Units 24  0042   CK TOTAL U/L 64             Microbiology   Microbiology Results (last 10 days)       Procedure Component Value - Date/Time    Blood Culture - Blood, Arm, Right [[id number]]  (Normal) Collected: 24 1356    Lab Status: Preliminary result Specimen: Blood from Arm, Right Updated: 24 0934     Blood Culture Culture in progress    Narrative:      Less than seven (7) mL's of blood was collected.  Insufficient quantity may yield false negative results.  No growth at 5 days    Blood Culture - Blood, Arm, Left [[id number]]  (Normal) Collected: 24 1344    Lab Status: Preliminary result Specimen: Blood from Arm, Left Updated: 24 0933     Blood Culture Culture in progress    Narrative:      No growth at 5 days            Medication Review:       Schedule Meds  enoxaparin, 40 mg, Subcutaneous, Daily  famotidine, 40 mg, Oral, Daily  [Held by provider] furosemide, 40 mg, Oral, Daily  gabapentin, 100 mg, Oral, TID  metoprolol succinate XL, 25 mg, Oral, Q24H  nicotine, 1 patch, Transdermal, Q24H  sertraline, 25 mg, Oral, Daily  sodium chloride, 10 mL, Intravenous, Q12H  vancomycin, 750 mg, Intravenous, Q12H        Infusion Meds  Pharmacy to dose vancomycin,         PRN Meds    acetaminophen    senna-docusate sodium **AND** polyethylene glycol **AND** bisacodyl **AND** bisacodyl    calcium carbonate    [] cloNIDine **FOLLOWED BY** [] cloNIDine **FOLLOWED BY** [] cloNIDine **FOLLOWED BY** [] cloNIDine **FOLLOWED BY** cloNIDine    dicyclomine    hydrOXYzine    ipratropium-albuterol    Morphine    ondansetron    Pharmacy to dose vancomycin    Potassium Replacement - Follow Nurse / BPA Driven Protocol    sodium chloride    sodium chloride        Assessment & Plan       Antimicrobial Therapy   1.  IV  vancomycin        2.        3.        4.        5.            Assessment       Tricuspid valve endocarditis   Patient had positive blood culture for MRSA from 5/9/2024 and 5/11/2024 according to care everywhere records from [facility].  Cultures had an KRYSTEN of 1 to vancomycin.  LETICIA performed at our facility on 6/4/2024 showed a large vegetation on the tricuspid valve with moderate tricuspid valve regurgitation.  LETICIA also showed severe aortic insufficiency with probably healed vegetation on the aortic valve leaflet.  There was also moderate MR.    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  Claims that she stopped using fentanyl since admission to [facility] in May 2024.  Current drug screen positive for barbiturates and benzodiazepines.  HIV screen was negative.  Hepatitis panel was negative     History of COPD and tobacco abuse-currently on 2 L of oxygen by nasal cannula     Back pain-MRI of the lumbar spine and thoracic spine were negative for signs of discitis/osteomyelitis    Right-sided molar abscess        Plan     Continue IV vancomycin-ask pharmacy to monitor and dose and try to keep vancomycin trough between 15 and 20  Patient will need  6 weeks of IV antimicrobial therapy  Continue supportive care  A.m. labs  Tobacco and illicit drug abuse cessation  Overall prognosis is guarded patient continues use illicit substances continues to be noncompliant    Case discussed with cardiothoracic NP-she is working on transferring patient to [facility] for oral surgery before any valve surgery is performed        Fartun Blankenship, MARY  06/07/24  15:11 EDT    Note is dictated utilizing voice recognition software/Dragon

## 2024-06-07 NOTE — PROGRESS NOTES
LOS: 4 days   Admitting Physician- Tashi Birmingham MD    Reason For Followup:    Aortic regurgitation severe  Moderate to severe mitral regurgitation  Moderate to severe tricuspid regurgitation with large vegetation  Bacterial endocarditis  Congestive heart failure    Subjective     C/o SOA with exertion    Objective     Hemodynamics are stable blood pressure is on the lower side    Review of Systems:   Review of Systems   Constitutional: Negative for chills and fever.   HENT:  Negative for ear discharge and nosebleeds.    Eyes:  Negative for discharge and redness.   Cardiovascular:  Negative for chest pain, orthopnea, palpitations, paroxysmal nocturnal dyspnea and syncope.   Respiratory:  Positive for shortness of breath. Negative for cough and wheezing.    Endocrine: Negative for heat intolerance.   Skin:  Negative for rash.   Musculoskeletal:  Negative for arthritis and myalgias.   Gastrointestinal:  Negative for abdominal pain, melena, nausea and vomiting.   Genitourinary:  Negative for dysuria and hematuria.   Neurological:  Negative for dizziness, light-headedness, numbness and tremors.   Psychiatric/Behavioral:  Negative for depression. The patient is not nervous/anxious.          Vital Signs  Vitals:    06/06/24 2156 06/07/24 0016 06/07/24 0414 06/07/24 0749   BP:  104/43 109/52 112/51   BP Location:  Right arm Right arm Right arm   Patient Position:  Lying Lying Lying   Pulse: 84 83 80 80   Resp: 21 20 24 27   Temp:  97.5 °F (36.4 °C) 98.3 °F (36.8 °C) 98.7 °F (37.1 °C)   TempSrc:  Oral Oral Temporal   SpO2: 100% 97% 96% 98%   Weight:       Height:         Wt Readings from Last 1 Encounters:   06/03/24 92.5 kg (203 lb 14.8 oz)       Intake/Output Summary (Last 24 hours) at 6/7/2024 0934  Last data filed at 6/6/2024 1930  Gross per 24 hour   Intake 240 ml   Output --   Net 240 ml     Physical Exam:  Constitutional:       Appearance: Well-developed.   Eyes:      General: No scleral icterus.        Right  eye: No discharge.   HENT:      Head: Normocephalic and atraumatic.   Neck:      Thyroid: No thyromegaly.      Lymphadenopathy: No cervical adenopathy.   Pulmonary:      Effort: Pulmonary effort is normal. No respiratory distress.      Breath sounds: Normal breath sounds. No wheezing. No rales.   Cardiovascular:      Normal rate. Regular rhythm.      No gallop.    Edema:     Peripheral edema absent.   Abdominal:      Tenderness: There is no abdominal tenderness.   Skin:     Findings: No erythema or rash.   Neurological:      Mental Status: Alert and oriented to person, place, and time.         Results Review:   Lab Results (last 24 hours)       Procedure Component Value Units Date/Time    Basic Metabolic Panel [[id number]]  (Abnormal) Collected: 06/07/24 0347    Specimen: Blood Updated: 06/07/24 0443     Glucose 138 mg/dL      BUN 19 mg/dL      Creatinine 1.41 mg/dL      Sodium 134 mmol/L      Potassium 4.8 mmol/L      Chloride 100 mmol/L      CO2 23.2 mmol/L      Calcium 8.5 mg/dL      BUN/Creatinine Ratio 13.5     Anion Gap 10.8 mmol/L      eGFR 46.7 mL/min/1.73     Narrative:      GFR Normal >60  Chronic Kidney Disease <60  Kidney Failure <15      CBC & Differential [[id number]]  (Abnormal) Collected: 06/07/24 0347    Specimen: Blood Updated: 06/07/24 0415    Narrative:      The following orders were created for panel order CBC & Differential.  Procedure                               Abnormality         Status                     ---------                               -----------         ------                     CBC Auto Differential[[id number]]        Abnormal            Final result                 Please view results for these tests on the individual orders.    CBC Auto Differential [[id number]]  (Abnormal) Collected: 06/07/24 0347    Specimen: Blood Updated: 06/07/24 0415     WBC 12.88 10*3/mm3      RBC 2.65 10*6/mm3      Hemoglobin 7.3 g/dL      Hematocrit 25.0 %      MCV 94.3 fL      MCH 27.5 pg      MCHC  29.2 g/dL      RDW 18.5 %      RDW-SD 60.9 fl      MPV 10.1 fL      Platelets 312 10*3/mm3      Neutrophil % 76.8 %      Lymphocyte % 10.9 %      Monocyte % 10.2 %      Eosinophil % 0.7 %      Basophil % 0.5 %      Immature Grans % 0.9 %      Neutrophils, Absolute 9.89 10*3/mm3      Lymphocytes, Absolute 1.40 10*3/mm3      Monocytes, Absolute 1.32 10*3/mm3      Eosinophils, Absolute 0.09 10*3/mm3      Basophils, Absolute 0.06 10*3/mm3      Immature Grans, Absolute 0.12 10*3/mm3      nRBC 0.3 /100 WBC     Potassium [[id number]]  (Normal) Collected: 06/06/24 1002    Specimen: Blood Updated: 06/06/24 1706     Potassium 4.6 mmol/L     Vancomycin, Trough [[id number]]  (Abnormal) Collected: 06/06/24 1002    Specimen: Blood Updated: 06/06/24 1045     Vancomycin Trough 23.20 mcg/mL     Narrative:      Therapeutic Ranges for Vancomycin    Vancomycin Random   5.0-40.0 mcg/mL  Vancomycin Trough   5.0-20.0 mcg/mL  Vancomycin Peak     20.0-40.0 mcg/mL          Imaging Results (Last 72 Hours)       Procedure Component Value Units Date/Time    XR Mandible < 4 View [[id number]] Collected: 06/05/24 1916     Updated: 06/05/24 1922    Narrative:      XR MANDIBLE < 4 VW    Date of Exam: 6/5/2024 6:25 PM EDT    Indication: poor dentition, endocarditis    Comparison: None available.    Findings:  There is no fracture or aggressive bone destruction. There is some lucency adjacent to the root of a remnant right molar tooth that demonstrates a large cavity. There is high density material dorsal to the roots of a remnant left molar tooth that was   present on the comparison exam      Impression:      Impression:  There is a large cavity involving the remnant right molar tooth, with lucency adjacent to the root suggesting periapical abscess      Electronically Signed: Alfa Walker    6/5/2024 7:20 PM EDT    Workstation ID: [id number]    MRI Lumbar Spine Without Contrast [[id number]] Collected: 06/04/24 1059     Updated: 06/04/24 1106    Narrative:       MRI LUMBAR SPINE WO CONTRAST    Date of Exam: 6/4/2024 10:25 AM EDT    Indication: Rule out discitis.     Comparison: No prior dedicated lumbar imaging for comparison at this institution.    Technique:  Routine multiplanar/multisequence sequence images of the lumbar spine were obtained without contrast administration.        Findings:  Study is degraded by motion. The patient was unable to tolerate repeated imaging due to claustrophobia.    Lumbar vertebral bodies maintain normal height, alignment and marrow signal intensity. Disc heights appear preserved with normal signal intensity. No MR evidence of discitis/osteomyelitis. Conus medullaris terminates at the L1 level with normal signal   intensity. The imaged paraspinal soft tissues appear grossly unremarkable. No high-grade canal stenosis or high-grade neural foraminal stenosis is seen. Mild posterior osteophyte formation is suggested at L3-4 and L4-5. No high-grade neural foraminal   stenosis or lumbar nerve root encroachment is seen. Mild to moderate right neural foraminal stenosis at L4-5.      Impression:      1. The study is mildly degraded by motion.  2. No MR evidence of discitis or osteomyelitis.  3. No high-grade lumbar canal stenosis or high-grade neural foraminal stenosis is evident.        Electronically Signed: Migdalia Godoy MD    6/4/2024 11:04 AM EDT    Workstation ID: [id number]          ECG/EMG Results (most recent)       Procedure Component Value Units Date/Time    ECG 12 Lead Chest Pain [[id number]] Collected: 06/03/24 0132     Updated: 06/03/24 0133     QT Interval 366 ms      QTC Interval 500 ms     Narrative:      HEART RATE= 112  bpm  RR Interval= 536  ms  OK Interval= 145  ms  P Horizontal Axis= 6  deg  P Front Axis= 59  deg  QRSD Interval= 82  ms  QT Interval= 366  ms  QTcB= 500  ms  QRS Axis= 14  deg  T Wave Axis= 171  deg  - ABNORMAL ECG -  Sinus tachycardia  Probable left atrial enlargement  Anteroseptal infarct, age  indeterminate  When compared with ECG of 02-Jun-2024 13:04:43,  No significant change  Electronically Signed By:   Date and Time of Study: 2024-06-03 01:32:22    Telemetry Scan [[id number]] Resulted: 06/03/24     Updated: 06/03/24 0713    Telemetry Scan [[id number]] Resulted: 06/03/24     Updated: 06/03/24 0838    Adult Transthoracic Echo Complete W/ Cont if Necessary Per Protocol [[id number]] Resulted: 06/03/24 1711     Updated: 06/03/24 1711     EF(MOD-bp) 48.9 %      LVIDd 5.8 cm      LVIDs 4.1 cm      IVSd 1.00 cm      LVPWd 1.00 cm      FS 29.3 %      IVS/LVPW 1.00 cm      ESV(cubed) 68.9 ml      LV Sys Vol (BSA corrected) 44.1 cm2      EDV(cubed) 195.1 ml      LV Solorio Vol (BSA corrected) 87.8 cm2      LV mass(C)d 233.1 grams      LVOT area 3.1 cm2      LVOT diam 2.00 cm      EDV(MOD-sp2) 150.0 ml      EDV(MOD-sp4) 177.0 ml      ESV(MOD-sp2) 77.2 ml      ESV(MOD-sp4) 88.9 ml      SV(MOD-sp2) 72.8 ml      SV(MOD-sp4) 88.1 ml      SVi(MOD-SP2) 36.1 ml/m2      SVi(MOD-SP4) 43.7 ml/m2      SVi (LVOT) 34.4 ml/m2      EF(MOD-sp2) 48.5 %      EF(MOD-sp4) 49.8 %      MV E max jimenez 108.0 cm/sec      MV A max jimenez 50.8 cm/sec      MV dec time 0.17 sec      MV E/A 2.13     MV A dur 0.16 sec      LA ESV Index (BP) 40.4 ml/m2      Med Peak E' Jimenez 12.4 cm/sec      Lat Peak E' Jimenez 10.8 cm/sec      TR max jimenez 356.0 cm/sec      Avg E/e' ratio 9.31     SV(LVOT) 69.4 ml      TAPSE (>1.6) 3.0 cm      RV S' 13.4 cm/sec      LA dimension (2D)  4.3 cm      LV V1 max 132.0 cm/sec      LV V1 max PG 7.0 mmHg      LV V1 mean PG 4.0 mmHg      LV V1 VTI 22.1 cm      Ao pk jimenez 158.0 cm/sec      Ao max PG 10.0 mmHg      Ao mean PG 6.0 mmHg      Ao V2 VTI 26.0 cm      SANTIAGO(I,D) 2.7 cm2      AI P1/2t 417.5 msec      MV max PG 5.9 mmHg      MV mean PG 3.0 mmHg      MV V2 VTI 21.8 cm      MV P1/2t 45.7 msec      MVA(P1/2t) 4.8 cm2      MVA(VTI) 3.2 cm2      MV dec slope 762.0 cm/sec2      MR max jimenez 511.0 cm/sec      MR max .4 mmHg      TR max  PG 50.7 mmHg      RV V1 max PG 1.93 mmHg      RV V1 max 69.5 cm/sec      RV V1 VTI 11.5 cm      PA V2 max 102.0 cm/sec      PA acc time 0.08 sec      Sinus 2.8 cm      STJ 2.10 cm      Dimensionless Index 0.84 (DI)     Addenda:            Left ventricular systolic function is mildly decreased. Left   ventricular ejection fraction appears to be 51 - 55%.    Left ventricular diastolic function was normal.    There is calcification of the aortic valve.    Moderate to severe aortic valve regurgitation is present.    Moderate to severe mitral valve regurgitation is present.    There is echodensity attached to septal leaflet of tricuspid valve   measuring 1.2 x 1.5 cm.    Recommend LETICIA to assess echodensity and mitral and aortic valve   regurgitation.  If clinically indicated.    Recommend modified Duke criteria for clinical diagnosis of bacterial   endocarditis    Signed: 06/03/24 1711 by Bryant Frank MD    Telemetry Scan [[id number]] Resulted: 06/03/24     Updated: 06/04/24 0002    Telemetry Scan [[id number]] Resulted: 06/03/24     Updated: 06/04/24 0102    Telemetry Scan [[id number]] Resulted: 06/03/24     Updated: 06/04/24 0139    Telemetry Scan [[id number]] Resulted: 06/03/24     Updated: 06/04/24 0537    Telemetry Scan [[id number]] Resulted: 06/03/24     Updated: 06/04/24 0943    Telemetry Scan [[id number]] Resulted: 06/03/24     Updated: 06/04/24 1157    Adult Transesophageal Echo (LETICIA) W/ Cont if Necessary Per Protocol [[id number]] Resulted: 06/04/24 1410     Updated: 06/04/24 1414      CV ECHO SHUNT ASSESSMENT PERFORMED (HIDDEN SCRIPTING) 1     MR max bartolome 435.0 cm/sec      MR max PG 75.7 mmHg      MR mean bartolome 316.0 cm/sec      MR mean PG 46.0 mmHg      MR .0 cm     Telemetry Scan [[id number]] Resulted: 06/03/24     Updated: 06/04/24 1603    Telemetry Scan [[id number]] Resulted: 06/03/24     Updated: 06/05/24 0058    Telemetry Scan [[id number]] Resulted: 06/03/24     Updated: 06/05/24 0258    Telemetry Scan  [[id number]] Resulted: 06/03/24     Updated: 06/05/24 0602    Telemetry Scan [[id number]] Resulted: 06/03/24     Updated: 06/05/24 0753    Telemetry Scan [[id number]] Resulted: 06/03/24     Updated: 06/05/24 1238    Telemetry Scan [[id number]] Resulted: 06/03/24     Updated: 06/05/24 2319    Telemetry Scan [[id number]] Resulted: 06/03/24     Updated: 06/06/24 0027    Telemetry Scan [[id number]] Resulted: 06/03/24     Updated: 06/06/24 0041    Telemetry Scan [[id number]] Resulted: 06/03/24     Updated: 06/06/24 0419    Telemetry Scan [[id number]] Resulted: 06/03/24     Updated: 06/06/24 1252    Telemetry Scan [[id number]] Resulted: 06/03/24     Updated: 06/06/24 1334    Telemetry Scan [[id number]] Resulted: 06/03/24     Updated: 06/07/24 0143    Telemetry Scan [[id number]] Resulted: 06/03/24     Updated: 06/07/24 0225    Telemetry Scan [[id number]] Resulted: 06/03/24     Updated: 06/07/24 0412    Telemetry Scan [[id number]] Resulted: 06/03/24     Updated: 06/07/24 0534    Telemetry Scan [[id number]] Resulted: 06/03/24     Updated: 06/07/24 0823          CBC    Results from last 7 days   Lab Units 06/07/24  0347 06/06/24  0330 06/05/24  0023 06/04/24  0042 06/03/24  0209 06/02/24  1344   WBC 10*3/mm3 12.88* 12.00* 14.74* 15.59* 17.54* 18.73*   HEMOGLOBIN g/dL 7.3* 8.6* 8.1* 8.0* 8.9* 9.3*   PLATELETS 10*3/mm3 312 252 332 386 457* 436     BMP   Results from last 7 days   Lab Units 06/07/24  0347 06/06/24  1002 06/06/24  0330 06/05/24  0023 06/04/24  0042 06/03/24  1445 06/03/24  0209 06/02/24  1344   SODIUM mmol/L 134*  --  133* 135* 137  --  139 139   POTASSIUM mmol/L 4.8 4.6 5.5* 4.5 4.3 4.5 3.5 3.2*   CHLORIDE mmol/L 100  --  100 100 99  --  100 101   CO2 mmol/L 23.2  --  20.1* 20.9* 24.7  --  23.7 24.7   BUN mg/dL 19  --  18 23* 20  --  14 11   CREATININE mg/dL 1.41*  --  1.35* 1.44* 1.71*  --  1.39* 1.24*   GLUCOSE mg/dL 138*  --  138* 142* 165*  --  117* 109*     CMP   Results from last 7 days   Lab Units 06/07/24  0347  06/06/24  1002 06/06/24  0330 06/05/24  0023 06/04/24  0042 06/03/24  1445 06/03/24  0209 06/02/24  1344   SODIUM mmol/L 134*  --  133* 135* 137  --  139 139   POTASSIUM mmol/L 4.8 4.6 5.5* 4.5 4.3 4.5 3.5 3.2*   CHLORIDE mmol/L 100  --  100 100 99  --  100 101   CO2 mmol/L 23.2  --  20.1* 20.9* 24.7  --  23.7 24.7   BUN mg/dL 19  --  18 23* 20  --  14 11   CREATININE mg/dL 1.41*  --  1.35* 1.44* 1.71*  --  1.39* 1.24*   GLUCOSE mg/dL 138*  --  138* 142* 165*  --  117* 109*   ALBUMIN g/dL  --   --   --   --  3.1*  --  3.2* 3.3*   BILIRUBIN mg/dL  --   --   --   --  0.3  --  0.3 0.3   ALK PHOS U/L  --   --   --   --  107  --  111 117   AST (SGOT) U/L  --   --   --   --  32  --  23 23   ALT (SGPT) U/L  --   --   --   --  32  --  27 32     Cardiac Studies:  Echo- Results for orders placed during the hospital encounter of 06/03/24    Adult Transthoracic Echo Complete W/ Cont if Necessary Per Protocol    Interpretation Summary    Left ventricular systolic function is mildly decreased. Left ventricular ejection fraction appears to be 51 - 55%.    Left ventricular diastolic function was normal.    There is calcification of the aortic valve.    Moderate to severe aortic valve regurgitation is present.    Moderate to severe mitral valve regurgitation is present.    There is echodensity attached to septal leaflet of tricuspid valve measuring 1.2 x 1.5 cm.    Recommend LETICIA to assess echodensity and mitral and aortic valve regurgitation.  If clinically indicated.    Recommend modified Duke criteria for clinical diagnosis of bacterial endocarditis    Stress Myoview-  Cath-      Medication Review:   Scheduled Meds:enoxaparin, 40 mg, Subcutaneous, Daily  famotidine, 40 mg, Oral, Daily  [Held by provider] furosemide, 40 mg, Oral, Daily  gabapentin, 100 mg, Oral, TID  metoprolol succinate XL, 25 mg, Oral, Q24H  nicotine, 1 patch, Transdermal, Q24H  sertraline, 25 mg, Oral, Daily  sodium chloride, 10 mL, Intravenous,  Q12H  vancomycin, 750 mg, Intravenous, Q12H      Continuous Infusions:Pharmacy to dose vancomycin,       PRN Meds:.  acetaminophen    senna-docusate sodium **AND** polyethylene glycol **AND** bisacodyl **AND** bisacodyl    calcium carbonate    [] cloNIDine **FOLLOWED BY** [] cloNIDine **FOLLOWED BY** [] cloNIDine **FOLLOWED BY** [] cloNIDine **FOLLOWED BY** cloNIDine    dicyclomine    hydrOXYzine    ipratropium-albuterol    Morphine    nitroglycerin    ondansetron    Pharmacy to dose vancomycin    Potassium Replacement - Follow Nurse / BPA Driven Protocol    sodium chloride    sodium chloride      Assessment & Plan     Endocarditis    MDM:    1.  Dilated cardiomyopathy:    Continue Toprol-XL patient would need ARB/ARNI once blood pressure is stabilized.    2.  Congestive heart failure:    Continue diuresis    3.  Renal insufficiency:    Creatinine has gone up.  Recommend observation    4.  Bacterial endocarditis:    Patient is on antibiotic    5.  Aortic regurgitation/mitral regurgitation/tricuspid regurgitation:    Patient would need surgical correction of these lesions.  Discussed with surgical team       CTS evaluation underway  Pt has poor dentition and periapical abscess of right molar by xrays  BP remains soft  Additional recommendations per Dr. Frank    Patient is seen and examined and findings are verified.  All data is reviewed by me personally.  Assessment and plan formulated by APC was done after discussion with attending.  I spent more than 50% of time in taking care of the patient.    Patient is feeling much better.  Shortness of breath is improved no leg edema    Hemodynamics are stable.    Normal S1 and S2.  No pericardial rub or murmur abdominal exam is benign.    Patient is now in euvolemic status.  Maintenance diuretics on hold.  Patient is on vancomycin.  It is okay that patient can be discharged to [facility], [place], operated by [facility] for further evaluation and possible surgery in  future.    Discussed with the surgical team they are in process of transferring the patient    Electronically signed by Bryant Frank MD, 06/07/24, 12:44 PM EDT.      MARY Kennedy  06/07/24  09:45 EDT

## 2024-06-07 NOTE — CASE MANAGEMENT/SOCIAL WORK
"Physicians Statement of Medical Necessity for  Ambulance Transportation    GENERAL INFORMATION     Name: Марина Tilley  YOB: 1975  Medicaid HMO #: [id number]   Transport Date: 6/7/24 (Valid for round trips this date, or for scheduled repetitive trips for 60 days from the date signed below.)  Origin: [facility] [address]  Destination: [facility]  Is the Patient's stay covered under Medicare Part A (PPS/DRG?)No   Closest appropriate facility? Yes  If this a hosp-hosp transfer? Yes, describe services needed at 2nd facility not available at 1st facility No oral surgeon available at first facility, patient will be transferred to have oral surgery; also evaluation by Cardiothoracic surgery for Homograft   Is this a hospice patient? No    MEDICAL NECESSITY QUESTIONAIRE    Ambulance Transportation is medically necessary only if other means of transportation are contraindicated or would be potentially harmful to the patient.  To meet this requirement, the patient must be either \"bed confined\" or suffer from a condition such that transport by means other than an ambulance is contraindicated by the patient's condition.  The following questions must be answered by the healthcare professional signing below for this form to be valid:     1) Describe the MEDICAL CONDITION (physical and/or mental) of this patient AT THE TIME OF AMBULANCE TRANSPORT that requires the patient to be transported in an ambulance, and why transport by other means is contraindicated by the patient's condition: Patient to be transferred to [facility] for Oral Surgery- periapical abscess of right molar.   Patient in need of oral surgery prior to Heart Valve surgery, evaluation by Cardiothoracic surgery for Homograft.   Patient needs 6 weeks IV abx. Recent History of drug use.  O2 2L.   Past Medical History:   Diagnosis Date    Anxiety     Asthma     CHF (congestive heart failure)     COPD (chronic " "obstructive pulmonary disease)       Past Surgical History:   Procedure Laterality Date    CHOLECYSTECTOMY      COLONOSCOPY      ENDOSCOPY      HERNIA REPAIR      HYSTERECTOMY        2) Is this patient \"bed confined\" as defined below?No    To be \"bed confined\" the patient must satisfy all three of the following criteria:  (1) unable to get up from bed without assistance; AND (2) unable to ambulate;  AND (3) unable to sit in a chair or wheelchair.  3) Can this patient safely be transported by car or wheelchair van (I.e., may safely sit during transport, without an attendant or monitoring?)No   4. In addition to completing questions 1-3 above, please check any of the following conditions that apply*:          *Note: supporting documentation for any boxes checked must be maintained in the patient's medical records Medical attendant required and Unable to tolerate seated position for time needed to transport      SIGNATURE OF PHYSICIAN OR OTHER AUTHORIZED HEALTHCARE PROFESSIONAL    I certify that the above information is true and correct based on my evaluation of this patient, and represent that the patient requires transport by ambulance and that other forms of transport are contraindicated.  I understand that this information will be used by the Centers for Medicare and Medicaid Services (CMS) to support the determiniation of medical necessity for ambulance services, and I represent that I have personal knowledge of the patient's condition at the time of transport.       If this box is checked, I also certify that the patient is physically or mentally incapable of signing the ambulance service's claim form and that the institution with which I am affiliated has furnished care, services or assistance to the patient.  My signature below is made on behalf of the patient pursuant to 42 .36(b)(4). In accordance with 42 .37, the specific reason(s) that the patient is physically or mentally incapable of signing " the claim for is as follows:     Signature of Physician or Healthcare Professional   Brittany Blanc RN/ Tashi Birmingham MD Date/Time:   6/7/24 1620     (For Scheduled repetitive transport, this form is not valid for transports performed more than 60 days after this date).                                                                                                                                            Brittany Blanc RN/ Tashi Fernando MD --------------------------------------------------------------------------------------------  Printed Name and Credentials of Physician or Authorized Healthcare Professional     *Form must be signed by patient's attending physician for scheduled, repetitive transports,.  For non-repetitive ambulance transports, if unable to obtain the signature of the attending physician, any of the following may sign (please select below):     Physician  Clinical Nurse Specialist  Registered Nurse x    Physician Assistant  Discharge Planner  Licensed Practical Nurse     Nurse Practitioner   x

## 2024-06-08 ENCOUNTER — HOSPITAL ENCOUNTER (INPATIENT)
Facility: HOSPITAL | Age: 49
LOS: 9 days | Discharge: NURSING, REHAB OR LONG-TERM CARE | DRG: 216 | End: 2024-06-17
Attending: THORACIC SURGERY (CARDIOTHORACIC VASCULAR SURGERY) | Admitting: THORACIC SURGERY (CARDIOTHORACIC VASCULAR SURGERY)
Payer: MEDICAID

## 2024-06-08 ENCOUNTER — APPOINTMENT (OUTPATIENT)
Dept: GENERAL RADIOLOGY | Facility: HOSPITAL | Age: 49
DRG: 216 | End: 2024-06-08
Payer: MEDICAID

## 2024-06-08 VITALS
WEIGHT: 214.29 LBS | RESPIRATION RATE: 16 BRPM | SYSTOLIC BLOOD PRESSURE: 114 MMHG | BODY MASS INDEX: 34.44 KG/M2 | HEIGHT: 66 IN | HEART RATE: 80 BPM | TEMPERATURE: 97.9 F | OXYGEN SATURATION: 97 % | DIASTOLIC BLOOD PRESSURE: 54 MMHG

## 2024-06-08 DIAGNOSIS — Z98.890 S/P MVR (MITRAL VALVE REPAIR): ICD-10-CM

## 2024-06-08 DIAGNOSIS — Z95.2 S/P AVR (AORTIC VALVE REPLACEMENT): ICD-10-CM

## 2024-06-08 DIAGNOSIS — I33.0 ACUTE BACTERIAL ENDOCARDITIS: Primary | ICD-10-CM

## 2024-06-08 LAB
ABO GROUP BLD: NORMAL
ALBUMIN SERPL-MCNC: 3 G/DL (ref 3.5–5.2)
ALBUMIN/GLOB SERPL: 0.7 G/DL
ALP SERPL-CCNC: 235 U/L (ref 39–117)
ALT SERPL W P-5'-P-CCNC: 209 U/L (ref 1–33)
ANION GAP SERPL CALCULATED.3IONS-SCNC: 12.5 MMOL/L (ref 5–15)
ANION GAP SERPL CALCULATED.3IONS-SCNC: 12.9 MMOL/L (ref 5–15)
AST SERPL-CCNC: 234 U/L (ref 1–32)
BACTERIA SPEC AEROBE CULT: NORMAL
BACTERIA SPEC AEROBE CULT: NORMAL
BASOPHILS # BLD AUTO: 0.11 10*3/MM3 (ref 0–0.2)
BASOPHILS NFR BLD AUTO: 0.7 % (ref 0–1.5)
BILIRUB SERPL-MCNC: 0.4 MG/DL (ref 0–1.2)
BLD GP AB SCN SERPL QL: NEGATIVE
BUN SERPL-MCNC: 20 MG/DL (ref 6–20)
BUN SERPL-MCNC: 22 MG/DL (ref 6–20)
BUN/CREAT SERPL: 13.2 (ref 7–25)
BUN/CREAT SERPL: 13.9 (ref 7–25)
CALCIUM SPEC-SCNC: 8.5 MG/DL (ref 8.6–10.5)
CALCIUM SPEC-SCNC: 8.7 MG/DL (ref 8.6–10.5)
CHLORIDE SERPL-SCNC: 97 MMOL/L (ref 98–107)
CHLORIDE SERPL-SCNC: 98 MMOL/L (ref 98–107)
CO2 SERPL-SCNC: 21.5 MMOL/L (ref 22–29)
CO2 SERPL-SCNC: 24.1 MMOL/L (ref 22–29)
CREAT SERPL-MCNC: 1.51 MG/DL (ref 0.57–1)
CREAT SERPL-MCNC: 1.58 MG/DL (ref 0.57–1)
DEPRECATED RDW RBC AUTO: 56.2 FL (ref 37–54)
DEPRECATED RDW RBC AUTO: 63.8 FL (ref 37–54)
EGFRCR SERPLBLD CKD-EPI 2021: 40.7 ML/MIN/1.73
EGFRCR SERPLBLD CKD-EPI 2021: 43 ML/MIN/1.73
EOSINOPHIL # BLD AUTO: 0.1 10*3/MM3 (ref 0–0.4)
EOSINOPHIL NFR BLD AUTO: 0.6 % (ref 0.3–6.2)
ERYTHROCYTE [DISTWIDTH] IN BLOOD BY AUTOMATED COUNT: 17.7 % (ref 12.3–15.4)
ERYTHROCYTE [DISTWIDTH] IN BLOOD BY AUTOMATED COUNT: 19.9 % (ref 12.3–15.4)
GLOBULIN UR ELPH-MCNC: 4.4 GM/DL
GLUCOSE BLDC GLUCOMTR-MCNC: 182 MG/DL (ref 70–130)
GLUCOSE BLDC GLUCOMTR-MCNC: 214 MG/DL (ref 70–130)
GLUCOSE SERPL-MCNC: 140 MG/DL (ref 65–99)
GLUCOSE SERPL-MCNC: 152 MG/DL (ref 65–99)
HBA1C MFR BLD: 5.3 % (ref 4.8–5.6)
HCT VFR BLD AUTO: 27.2 % (ref 34–46.6)
HCT VFR BLD AUTO: 30.7 % (ref 34–46.6)
HGB BLD-MCNC: 8.7 G/DL (ref 12–15.9)
HGB BLD-MCNC: 9.3 G/DL (ref 12–15.9)
IMM GRANULOCYTES # BLD AUTO: 0.15 10*3/MM3 (ref 0–0.05)
IMM GRANULOCYTES NFR BLD AUTO: 1 % (ref 0–0.5)
INR PPP: 1.34 (ref 0.9–1.1)
LYMPHOCYTES # BLD AUTO: 1.66 10*3/MM3 (ref 0.7–3.1)
LYMPHOCYTES NFR BLD AUTO: 10.7 % (ref 19.6–45.3)
MCH RBC QN AUTO: 28.4 PG (ref 26.6–33)
MCH RBC QN AUTO: 28.6 PG (ref 26.6–33)
MCHC RBC AUTO-ENTMCNC: 30.3 G/DL (ref 31.5–35.7)
MCHC RBC AUTO-ENTMCNC: 32 G/DL (ref 31.5–35.7)
MCV RBC AUTO: 88.9 FL (ref 79–97)
MCV RBC AUTO: 94.5 FL (ref 79–97)
MONOCYTES # BLD AUTO: 1.45 10*3/MM3 (ref 0.1–0.9)
MONOCYTES NFR BLD AUTO: 9.4 % (ref 5–12)
NEUTROPHILS NFR BLD AUTO: 11.98 10*3/MM3 (ref 1.7–7)
NEUTROPHILS NFR BLD AUTO: 77.6 % (ref 42.7–76)
NRBC BLD AUTO-RTO: 0.3 /100 WBC (ref 0–0.2)
PLATELET # BLD AUTO: 305 10*3/MM3 (ref 140–450)
PLATELET # BLD AUTO: 310 10*3/MM3 (ref 140–450)
PMV BLD AUTO: 10 FL (ref 6–12)
PMV BLD AUTO: 10.4 FL (ref 6–12)
POTASSIUM SERPL-SCNC: 4.5 MMOL/L (ref 3.5–5.2)
POTASSIUM SERPL-SCNC: 4.9 MMOL/L (ref 3.5–5.2)
PROT SERPL-MCNC: 7.4 G/DL (ref 6–8.5)
PROTHROMBIN TIME: 16.8 SECONDS (ref 11.7–14.2)
RBC # BLD AUTO: 3.06 10*6/MM3 (ref 3.77–5.28)
RBC # BLD AUTO: 3.25 10*6/MM3 (ref 3.77–5.28)
RH BLD: POSITIVE
SODIUM SERPL-SCNC: 132 MMOL/L (ref 136–145)
SODIUM SERPL-SCNC: 134 MMOL/L (ref 136–145)
T&S EXPIRATION DATE: NORMAL
VANCOMYCIN SERPL-MCNC: 19.7 MCG/ML (ref 5–40)
WBC NRBC COR # BLD AUTO: 13 10*3/MM3 (ref 3.4–10.8)
WBC NRBC COR # BLD AUTO: 15.45 10*3/MM3 (ref 3.4–10.8)

## 2024-06-08 PROCEDURE — 63710000001 INSULIN LISPRO (HUMAN) PER 5 UNITS: Performed by: PHYSICIAN ASSISTANT

## 2024-06-08 PROCEDURE — 25810000003 SODIUM CHLORIDE 0.9 % SOLUTION 250 ML FLEX CONT: Performed by: THORACIC SURGERY (CARDIOTHORACIC VASCULAR SURGERY)

## 2024-06-08 PROCEDURE — 94799 UNLISTED PULMONARY SVC/PX: CPT

## 2024-06-08 PROCEDURE — 86900 BLOOD TYPING SEROLOGIC ABO: CPT | Performed by: PHYSICIAN ASSISTANT

## 2024-06-08 PROCEDURE — 86850 RBC ANTIBODY SCREEN: CPT | Performed by: PHYSICIAN ASSISTANT

## 2024-06-08 PROCEDURE — 80202 ASSAY OF VANCOMYCIN: CPT | Performed by: STUDENT IN AN ORGANIZED HEALTH CARE EDUCATION/TRAINING PROGRAM

## 2024-06-08 PROCEDURE — 25010000002 MORPHINE PER 10 MG: Performed by: UROLOGY

## 2024-06-08 PROCEDURE — 99255 IP/OBS CONSLTJ NEW/EST HI 80: CPT | Performed by: STUDENT IN AN ORGANIZED HEALTH CARE EDUCATION/TRAINING PROGRAM

## 2024-06-08 PROCEDURE — 80053 COMPREHEN METABOLIC PANEL: CPT | Performed by: STUDENT IN AN ORGANIZED HEALTH CARE EDUCATION/TRAINING PROGRAM

## 2024-06-08 PROCEDURE — 85027 COMPLETE CBC AUTOMATED: CPT | Performed by: PHYSICIAN ASSISTANT

## 2024-06-08 PROCEDURE — 25010000002 VANCOMYCIN 750 MG RECONSTITUTED SOLUTION 1 EACH VIAL: Performed by: THORACIC SURGERY (CARDIOTHORACIC VASCULAR SURGERY)

## 2024-06-08 PROCEDURE — 25010000002 VANCOMYCIN 750 MG RECONSTITUTED SOLUTION 1 EACH VIAL: Performed by: INTERNAL MEDICINE

## 2024-06-08 PROCEDURE — 25010000002 ENOXAPARIN PER 10 MG: Performed by: THORACIC SURGERY (CARDIOTHORACIC VASCULAR SURGERY)

## 2024-06-08 PROCEDURE — 94664 DEMO&/EVAL PT USE INHALER: CPT

## 2024-06-08 PROCEDURE — 82948 REAGENT STRIP/BLOOD GLUCOSE: CPT

## 2024-06-08 PROCEDURE — 85025 COMPLETE CBC W/AUTO DIFF WBC: CPT | Performed by: NURSE PRACTITIONER

## 2024-06-08 PROCEDURE — 85610 PROTHROMBIN TIME: CPT | Performed by: PHYSICIAN ASSISTANT

## 2024-06-08 PROCEDURE — 70355 PANORAMIC X-RAY OF JAWS: CPT

## 2024-06-08 PROCEDURE — 83036 HEMOGLOBIN GLYCOSYLATED A1C: CPT | Performed by: PHYSICIAN ASSISTANT

## 2024-06-08 PROCEDURE — 99233 SBSQ HOSP IP/OBS HIGH 50: CPT | Performed by: INTERNAL MEDICINE

## 2024-06-08 PROCEDURE — 25810000003 SODIUM CHLORIDE 0.9 % SOLUTION 250 ML FLEX CONT: Performed by: INTERNAL MEDICINE

## 2024-06-08 PROCEDURE — 86901 BLOOD TYPING SEROLOGIC RH(D): CPT | Performed by: PHYSICIAN ASSISTANT

## 2024-06-08 RX ORDER — SODIUM CHLORIDE 9 MG/ML
40 INJECTION, SOLUTION INTRAVENOUS AS NEEDED
Status: DISCONTINUED | OUTPATIENT
Start: 2024-06-08 | End: 2024-06-12

## 2024-06-08 RX ORDER — SODIUM CHLORIDE 0.9 % (FLUSH) 0.9 %
10 SYRINGE (ML) INJECTION EVERY 12 HOURS SCHEDULED
Status: DISCONTINUED | OUTPATIENT
Start: 2024-06-08 | End: 2024-06-12

## 2024-06-08 RX ORDER — SODIUM CHLORIDE 0.9 % (FLUSH) 0.9 %
10 SYRINGE (ML) INJECTION AS NEEDED
Status: DISCONTINUED | OUTPATIENT
Start: 2024-06-08 | End: 2024-06-12

## 2024-06-08 RX ORDER — LORAZEPAM 0.5 MG/1
0.5 TABLET ORAL EVERY 8 HOURS PRN
Status: DISCONTINUED | OUTPATIENT
Start: 2024-06-08 | End: 2024-06-12

## 2024-06-08 RX ORDER — HYDROCODONE BITARTRATE AND ACETAMINOPHEN 5; 325 MG/1; MG/1
1 TABLET ORAL EVERY 4 HOURS PRN
Status: DISCONTINUED | OUTPATIENT
Start: 2024-06-08 | End: 2024-06-10

## 2024-06-08 RX ORDER — NICOTINE POLACRILEX 4 MG
15 LOZENGE BUCCAL
Status: DISCONTINUED | OUTPATIENT
Start: 2024-06-08 | End: 2024-06-12

## 2024-06-08 RX ORDER — IPRATROPIUM BROMIDE AND ALBUTEROL SULFATE 2.5; .5 MG/3ML; MG/3ML
3 SOLUTION RESPIRATORY (INHALATION) EVERY 4 HOURS PRN
Status: DISCONTINUED | OUTPATIENT
Start: 2024-06-08 | End: 2024-06-10

## 2024-06-08 RX ORDER — INSULIN LISPRO 100 [IU]/ML
2-7 INJECTION, SOLUTION INTRAVENOUS; SUBCUTANEOUS
Status: DISCONTINUED | OUTPATIENT
Start: 2024-06-08 | End: 2024-06-12

## 2024-06-08 RX ORDER — POTASSIUM CHLORIDE 750 MG/1
10 TABLET, FILM COATED, EXTENDED RELEASE ORAL DAILY
Status: DISCONTINUED | OUTPATIENT
Start: 2024-06-08 | End: 2024-06-12

## 2024-06-08 RX ORDER — HYDROXYZINE 50 MG/1
50 TABLET, FILM COATED ORAL 3 TIMES DAILY PRN
Status: DISCONTINUED | OUTPATIENT
Start: 2024-06-08 | End: 2024-06-12

## 2024-06-08 RX ORDER — NICOTINE 21 MG/24HR
1 PATCH, TRANSDERMAL 24 HOURS TRANSDERMAL
Status: DISCONTINUED | OUTPATIENT
Start: 2024-06-08 | End: 2024-06-12

## 2024-06-08 RX ORDER — POLYETHYLENE GLYCOL 3350 17 G/17G
17 POWDER, FOR SOLUTION ORAL DAILY PRN
Status: DISCONTINUED | OUTPATIENT
Start: 2024-06-08 | End: 2024-06-12

## 2024-06-08 RX ORDER — BISACODYL 5 MG/1
5 TABLET, DELAYED RELEASE ORAL DAILY PRN
Status: DISCONTINUED | OUTPATIENT
Start: 2024-06-08 | End: 2024-06-12

## 2024-06-08 RX ORDER — GABAPENTIN 100 MG/1
100 CAPSULE ORAL 3 TIMES DAILY
Status: DISCONTINUED | OUTPATIENT
Start: 2024-06-08 | End: 2024-06-12

## 2024-06-08 RX ORDER — SERTRALINE HYDROCHLORIDE 25 MG/1
25 TABLET, FILM COATED ORAL DAILY
Status: DISCONTINUED | OUTPATIENT
Start: 2024-06-08 | End: 2024-06-17 | Stop reason: HOSPADM

## 2024-06-08 RX ORDER — ENOXAPARIN SODIUM 100 MG/ML
40 INJECTION SUBCUTANEOUS EVERY 24 HOURS
Status: DISCONTINUED | OUTPATIENT
Start: 2024-06-08 | End: 2024-06-09

## 2024-06-08 RX ORDER — ACETAMINOPHEN 325 MG/1
650 TABLET ORAL EVERY 4 HOURS PRN
Status: DISCONTINUED | OUTPATIENT
Start: 2024-06-08 | End: 2024-06-12

## 2024-06-08 RX ORDER — ONDANSETRON 4 MG/1
4 TABLET, ORALLY DISINTEGRATING ORAL EVERY 6 HOURS PRN
Status: DISCONTINUED | OUTPATIENT
Start: 2024-06-08 | End: 2024-06-10

## 2024-06-08 RX ORDER — DEXTROSE MONOHYDRATE 25 G/50ML
25 INJECTION, SOLUTION INTRAVENOUS
Status: DISCONTINUED | OUTPATIENT
Start: 2024-06-08 | End: 2024-06-12

## 2024-06-08 RX ORDER — FUROSEMIDE 40 MG/1
40 TABLET ORAL DAILY
Status: DISCONTINUED | OUTPATIENT
Start: 2024-06-08 | End: 2024-06-09

## 2024-06-08 RX ORDER — IBUPROFEN 600 MG/1
1 TABLET ORAL
Status: DISCONTINUED | OUTPATIENT
Start: 2024-06-08 | End: 2024-06-12

## 2024-06-08 RX ORDER — METOPROLOL SUCCINATE 25 MG/1
25 TABLET, EXTENDED RELEASE ORAL
Status: DISCONTINUED | OUTPATIENT
Start: 2024-06-08 | End: 2024-06-12

## 2024-06-08 RX ORDER — AMOXICILLIN 250 MG
2 CAPSULE ORAL 2 TIMES DAILY PRN
Status: DISCONTINUED | OUTPATIENT
Start: 2024-06-08 | End: 2024-06-12

## 2024-06-08 RX ORDER — BISACODYL 10 MG
10 SUPPOSITORY, RECTAL RECTAL DAILY PRN
Status: DISCONTINUED | OUTPATIENT
Start: 2024-06-08 | End: 2024-06-12

## 2024-06-08 RX ORDER — MORPHINE SULFATE 2 MG/ML
2 INJECTION, SOLUTION INTRAMUSCULAR; INTRAVENOUS EVERY 4 HOURS PRN
Status: DISCONTINUED | OUTPATIENT
Start: 2024-06-08 | End: 2024-06-08 | Stop reason: HOSPADM

## 2024-06-08 RX ORDER — NITROGLYCERIN 0.4 MG/1
0.4 TABLET SUBLINGUAL
Status: DISCONTINUED | OUTPATIENT
Start: 2024-06-08 | End: 2024-06-10

## 2024-06-08 RX ADMIN — FUROSEMIDE 40 MG: 40 TABLET ORAL at 18:00

## 2024-06-08 RX ADMIN — VANCOMYCIN HYDROCHLORIDE 750 MG: 750 INJECTION, POWDER, LYOPHILIZED, FOR SOLUTION INTRAVENOUS at 04:57

## 2024-06-08 RX ADMIN — HYDROCODONE BITARTRATE AND ACETAMINOPHEN 1 TABLET: 5; 325 TABLET ORAL at 22:21

## 2024-06-08 RX ADMIN — Medication 10 ML: at 22:21

## 2024-06-08 RX ADMIN — VANCOMYCIN HYDROCHLORIDE 750 MG: 750 INJECTION, POWDER, LYOPHILIZED, FOR SOLUTION INTRAVENOUS at 20:09

## 2024-06-08 RX ADMIN — IPRATROPIUM BROMIDE AND ALBUTEROL SULFATE 3 ML: .5; 3 SOLUTION RESPIRATORY (INHALATION) at 12:19

## 2024-06-08 RX ADMIN — MORPHINE SULFATE 2 MG: 2 INJECTION, SOLUTION INTRAMUSCULAR; INTRAVENOUS at 04:17

## 2024-06-08 RX ADMIN — GABAPENTIN 100 MG: 100 CAPSULE ORAL at 18:00

## 2024-06-08 RX ADMIN — INSULIN LISPRO 2 UNITS: 100 INJECTION, SOLUTION INTRAVENOUS; SUBCUTANEOUS at 20:08

## 2024-06-08 RX ADMIN — ENOXAPARIN SODIUM 40 MG: 100 INJECTION SUBCUTANEOUS at 18:00

## 2024-06-08 RX ADMIN — POTASSIUM CHLORIDE 10 MEQ: 750 TABLET, EXTENDED RELEASE ORAL at 18:00

## 2024-06-08 RX ADMIN — FAMOTIDINE 40 MG: 20 TABLET, FILM COATED ORAL at 07:43

## 2024-06-08 RX ADMIN — MORPHINE SULFATE 2 MG: 2 INJECTION, SOLUTION INTRAMUSCULAR; INTRAVENOUS at 09:06

## 2024-06-08 RX ADMIN — Medication 1 PATCH: at 07:43

## 2024-06-08 RX ADMIN — LORAZEPAM 0.5 MG: 0.5 TABLET ORAL at 22:21

## 2024-06-08 RX ADMIN — GABAPENTIN 100 MG: 100 CAPSULE ORAL at 07:43

## 2024-06-08 RX ADMIN — HYDROCODONE BITARTRATE AND ACETAMINOPHEN 1 TABLET: 5; 325 TABLET ORAL at 18:00

## 2024-06-08 RX ADMIN — METOPROLOL SUCCINATE 25 MG: 25 TABLET, EXTENDED RELEASE ORAL at 07:43

## 2024-06-08 RX ADMIN — SERTRALINE HYDROCHLORIDE 25 MG: 25 TABLET ORAL at 07:43

## 2024-06-08 RX ADMIN — Medication 10 ML: at 07:43

## 2024-06-08 NOTE — NURSING NOTE
Access Center attempted to see patient. The patient was in the process of being transferred to [facility]. Access center will attempt evaluation again later.

## 2024-06-08 NOTE — PROGRESS NOTES
Infectious Diseases Progress Note      LOS: 5 days   Patient Care Team:  Provider, No Known as PCP - General    Chief Complaint: Chest and back pain, fatigue, shortness of breath    Subjective       The patient has been afebrile for the last 24 hours.  The patient is on 2 L of oxygen by nasal cannula hemodynamically stable, and is tolerating antimicrobial therapy.        Review of Systems:   Review of Systems   Constitutional:  Positive for fatigue.   HENT: Negative.     Eyes: Negative.    Respiratory:  Positive for shortness of breath.    Cardiovascular:  Positive for chest pain.   Gastrointestinal: Negative.    Endocrine: Negative.    Genitourinary: Negative.    Musculoskeletal:  Positive for back pain.   Skin: Negative.    Neurological: Negative.    Psychiatric/Behavioral: Negative.     All other systems reviewed and are negative.       Objective     Vital Signs  Temp:  [97.5 °F (36.4 °C)-98.3 °F (36.8 °C)] 97.9 °F (36.6 °C)  Heart Rate:  [80-96] 80  Resp:  [14-38] 16  BP: (110-129)/(47-60) 114/54    Physical Exam:  Physical Exam  Vitals and nursing note reviewed.   Constitutional:       General: She is not in acute distress.     Appearance: She is well-developed and normal weight. She is ill-appearing. She is not diaphoretic.   HENT:      Head: Normocephalic and atraumatic.   Eyes:      General: No scleral icterus.     Extraocular Movements: Extraocular movements intact.      Conjunctiva/sclera: Conjunctivae normal.      Pupils: Pupils are equal, round, and reactive to light.   Cardiovascular:      Rate and Rhythm: Normal rate and regular rhythm.      Heart sounds: S1 normal and S2 normal. Murmur heard.   Pulmonary:      Effort: Pulmonary effort is normal. No respiratory distress.      Breath sounds: No stridor. No wheezing or rales.      Comments: Diminished throughout  Chest:      Chest wall: No tenderness.   Abdominal:      General: Bowel sounds are normal. There is no distension.      Palpations: Abdomen is  soft. There is no mass.      Tenderness: There is no abdominal tenderness. There is no guarding.   Musculoskeletal:         General: No swelling, tenderness or deformity. Normal range of motion.      Cervical back: Neck supple.   Skin:     General: Skin is warm and dry.      Coloration: Skin is not pale.      Findings: No bruising, erythema or rash.   Neurological:      General: No focal deficit present.      Mental Status: She is alert and oriented to person, place, and time. Mental status is at baseline.      Cranial Nerves: No cranial nerve deficit.   Psychiatric:         Mood and Affect: Mood normal.          Results Review:    I have reviewed all clinical data, test, lab, and imaging results.     Radiology  No Radiology Exams Resulted Within Past 24 Hours    Cardiology    Laboratory    Results from last 7 days   Lab Units 06/08/24  0503 06/07/24  0347 06/06/24  0330 06/05/24  0023 06/04/24  0042 06/03/24  0209 06/02/24  1344   WBC 10*3/mm3 15.45* 12.88* 12.00* 14.74* 15.59* 17.54* 18.73*   HEMOGLOBIN g/dL 9.3* 7.3* 8.6* 8.1* 8.0* 8.9* 9.3*   HEMATOCRIT % 30.7* 25.0* 29.1* 26.4* 26.4* 29.2* 29.1*   PLATELETS 10*3/mm3 310 312 252 332 386 457* 436     Results from last 7 days   Lab Units 06/08/24  0503 06/07/24  0347 06/06/24  1002 06/06/24  0330 06/05/24  0023 06/04/24  0042 06/03/24  1445 06/03/24  0209 06/02/24  1344   SODIUM mmol/L 134* 134*  --  133* 135* 137  --  139 139   POTASSIUM mmol/L 4.9 4.8 4.6 5.5* 4.5 4.3 4.5 3.5 3.2*   CHLORIDE mmol/L 97* 100  --  100 100 99  --  100 101   CO2 mmol/L 24.1 23.2  --  20.1* 20.9* 24.7  --  23.7 24.7   BUN mg/dL 22* 19  --  18 23* 20  --  14 11   CREATININE mg/dL 1.58* 1.41*  --  1.35* 1.44* 1.71*  --  1.39* 1.24*   GLUCOSE mg/dL 152* 138*  --  138* 142* 165*  --  117* 109*   ALBUMIN g/dL  --   --   --   --   --  3.1*  --  3.2* 3.3*   BILIRUBIN mg/dL  --   --   --   --   --  0.3  --  0.3 0.3   ALK PHOS U/L  --   --   --   --   --  107  --  111 117   AST (SGOT) U/L  --    --   --   --   --  32  --  23 23   ALT (SGPT) U/L  --   --   --   --   --  32  --  27 32   CALCIUM mg/dL 8.7 8.5*  --  8.3* 7.5* 7.8*  --  8.3* 8.6     Results from last 7 days   Lab Units 06/04/24  0042   CK TOTAL U/L 64             Microbiology   Microbiology Results (last 10 days)       Procedure Component Value - Date/Time    Blood Culture - Blood, Arm, Right [[id number]]  (Normal) Collected: 06/02/24 1356    Lab Status: Final result Specimen: Blood from Arm, Right Updated: 06/08/24 1221     Blood Culture No growth at 5 days    Narrative:      Less than seven (7) mL's of blood was collected.  Insufficient quantity may yield false negative results.  No growth at 6 days    Blood Culture - Blood, Arm, Left [[id number]]  (Normal) Collected: 06/02/24 1344    Lab Status: Final result Specimen: Blood from Arm, Left Updated: 06/08/24 1221     Blood Culture No growth at 5 days            Medication Review:       Schedule Meds  enoxaparin, 40 mg, Subcutaneous, Daily  famotidine, 40 mg, Oral, Daily  [Held by provider] furosemide, 40 mg, Oral, Daily  gabapentin, 100 mg, Oral, TID  metoprolol succinate XL, 25 mg, Oral, Q24H  nicotine, 1 patch, Transdermal, Q24H  sertraline, 25 mg, Oral, Daily  sodium chloride, 10 mL, Intravenous, Q12H  vancomycin, 750 mg, Intravenous, Q12H        Infusion Meds  Pharmacy to dose vancomycin,         PRN Meds    acetaminophen    senna-docusate sodium **AND** polyethylene glycol **AND** bisacodyl **AND** bisacodyl    calcium carbonate    ipratropium-albuterol    Morphine    ondansetron    Pharmacy to dose vancomycin    Potassium Replacement - Follow Nurse / BPA Driven Protocol    sodium chloride    sodium chloride        Assessment & Plan       Antimicrobial Therapy   1.  IV vancomycin        2.        3.        4.        5.            Assessment       Tricuspid valve endocarditis   Patient had positive blood culture for MRSA from 5/9/2024 and 5/11/2024 according to care everywhere records from  [facility].  Cultures had an KRYSTEN of 1 to vancomycin.  LETICIA performed at our facility on 6/4/2024 showed a large vegetation on the tricuspid valve with moderate tricuspid valve regurgitation.  LETICIA also showed severe aortic insufficiency with probably healed vegetation on the aortic valve leaflet.  There was also moderate MR.    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  Claims that she stopped using fentanyl since admission to [facility] in May 2024.  Current drug screen positive for barbiturates and benzodiazepines.  HIV screen was negative.  Hepatitis panel was negative     History of COPD and tobacco abuse-currently on 2 L of oxygen by nasal cannula     Back pain-MRI of the lumbar spine and thoracic spine were negative for signs of discitis/osteomyelitis    Right-sided molar abscess        Plan     Continue IV vancomycin-ask pharmacy to monitor and dose and try to keep vancomycin trough between 15 and 20  Patient will need  6 weeks of IV antimicrobial therapy  Continue supportive care  A.m. labs  Tobacco and illicit drug abuse cessation  Overall prognosis is guarded patient continues use illicit substances continues to be noncompliant    The patient is being transferred to [facility] for evaluation by oral surgeon prior to cardiac valve surgery        Andrew Pollack MD  06/08/24  15:06 EDT    Note is dictated utilizing voice recognition software/Dragon

## 2024-06-08 NOTE — PROGRESS NOTES
"Pharmacy Antimicrobial Dosing Service    Subjective:  Марина Tilley is a 48 y.o.female admitted with chest and back pain, shortness of breath. Pharmacy has been consulted to dose Vancomycin for endocarditis.    PMH: previously on daptomycin, switching to vancomycin per ID.       Assessment/Plan    1. Day #5 Vancomycin: Goal -600 mcg*h/mL. Current dose is 750 mg every 12 hours. Random level 6/8 at 1141= 19.7 mcg/ml. Predicted AUC= 603 mcg*h/ml and trough 20.2 mcg/ml. I will leave dose at current dose as I think the bayesian calculator is over-predicting AUC and trough based on the random level that was drawn. Follow up trough ordered for 6/9 at 1500.    Will continue to monitor drug levels, renal function, culture and sensitivities, and patient clinical status.       Objective:  Relevant clinical data and objective history reviewed:  167.6 cm (66\")   97.2 kg (214 lb 4.6 oz)   Ideal body weight: 59.3 kg (130 lb 11.7 oz)  Adjusted ideal body weight: 74.5 kg (164 lb 2.5 oz)  Body mass index is 34.59 kg/m².    Results from last 7 days   Lab Units 06/08/24  1141 06/06/24  1002 06/05/24  0023   VANCOMYCIN TR mcg/mL  --  23.20*  --    VANCOMYCIN RM mcg/mL 19.70  --  27.40     Results from last 7 days   Lab Units 06/08/24  0503 06/07/24  0347 06/06/24  0330   CREATININE mg/dL 1.58* 1.41* 1.35*     Estimated Creatinine Clearance: 52.3 mL/min (A) (by C-G formula based on SCr of 1.58 mg/dL (H)).  I/O last 3 completed shifts:  In: 1794 [P.O.:1794]  Out: -     Results from last 7 days   Lab Units 06/08/24  0503 06/07/24  0347 06/06/24  0330   WBC 10*3/mm3 15.45* 12.88* 12.00*     Temperature    06/08/24 0325 06/08/24 0742 06/08/24 1132   Temp: 98.3 °F (36.8 °C) 97.9 °F (36.6 °C) Comment: pt discharged     Baseline culture/source/susceptibility:  Microbiology Results (last 10 days)       Procedure Component Value - Date/Time    Blood Culture - Blood, Arm, Right [[id number]]  (Normal) Collected: 06/02/24 1356    Lab Status: " Final result Specimen: Blood from Arm, Right Updated: 06/08/24 1221     Blood Culture No growth at 5 days    Narrative:      Less than seven (7) mL's of blood was collected.  Insufficient quantity may yield false negative results.  No growth at 6 days    Blood Culture - Blood, Arm, Left [[id number]]  (Normal) Collected: 06/02/24 1344    Lab Status: Final result Specimen: Blood from Arm, Left Updated: 06/08/24 1221     Blood Culture No growth at 5 days            Brittney Villanueva, PharmD  06/08/24 13:09 EDT

## 2024-06-08 NOTE — CONSULTS
[facility]   Consult Note    Patient Name: Марина Tilley  : 1975  MRN: [id number]  Primary Care Physician:  Provider, No Known  Referring Physician: Eric Skaggs MD  Date of admission: 2024    Consults  Subjective   Subjective     Reason for Consult/ Chief Complaint: Dental pain and swelling    History of Present Illness  Марина Tilley is a 48 y.o. female with a history of narcotic addiction who was transferred for endocarditis.  Tricuspid valve replacement and mitral repair are planned.  Dental clearance requested    Review of Systems history of dental pain and swelling    Personal History     Past Medical History:   Diagnosis Date    Anxiety     Asthma     CHF (congestive heart failure)     COPD (chronic obstructive pulmonary disease)        Past Surgical History:   Procedure Laterality Date    CHOLECYSTECTOMY      COLONOSCOPY      ENDOSCOPY      HERNIA REPAIR      HYSTERECTOMY         Family History: Family history is unknown by patient. Otherwise pertinent FHx was reviewed and not pertinent to current issue.    Social History:  reports that she has been smoking cigarettes. She started smoking about 30 years ago. She has a 91.3 pack-year smoking history. She has never used smokeless tobacco. She reports current drug use. Frequency: 7.00 times per week. Drug: Heroin. She reports that she does not drink alcohol.    Home Medications:   acetaminophen, albuterol sulfate HFA, bisacodyl, calcium carbonate, dicyclomine, famotidine, furosemide, gabapentin, hydrOXYzine, ipratropium-albuterol, metoprolol succinate XL, nicotine, ondansetron, polyethylene glycol, sennosides-docusate, sertraline, and vancomycin 750 mg in sodium chloride 0.9 % 250 mL IVPB    Allergies:  Allergies   Allergen Reactions    Cephalexin Anaphylaxis     Required intubation    Latex Itching       Objective    Objective     Vitals:  Temp:  [97.9 °F (36.6 °C)-98.6 °F (37 °C)] 98.5 °F (36.9 °C)  Heart Rate:  [80-96] 82  Resp:  [16-38]  20  BP: (110-125)/(47-64) 123/63  Flow (L/min):  [2-3] 3    Physical Exam oral mucosa pink and healthy, no swelling, multiple carious teeth and generalized periodontitis    Result Review    Result Review:  I have personally reviewed the results from the time of this admission to 6/8/2024 19:49 EDT and agree with these findings:  [x]  Laboratory list / accordion  []  Microbiology  [x]  Radiology  [x]  EKG/Telemetry   []  Cardiology/Vascular   []  Pathology  []  Old records  []  Other:  Most notable findings include: non restorable dentition      Assessment & Plan   Assessment / Plan     Brief Patient Summary:  Марина Tilley is a 48 y.o. female who is planned for valve replacement and repair.  She has a poor dentition placing her at risk for endocarditis    Active Hospital Problems:  Active Hospital Problems    Diagnosis     **Acute bacterial endocarditis      Plan:   To OR for extraction of all teeth    Sergio Zaidi, DMD

## 2024-06-08 NOTE — PLAN OF CARE
Goal Outcome Evaluation:  Plan of Care Reviewed With: patient        Progress: improving  Outcome Evaluation: Patient alert and oriented. Complains of generalized back pain. Medicated per PRN orders. Awaiting transfer to [facility] today.

## 2024-06-08 NOTE — PLAN OF CARE
Goal Outcome Evaluation:  Plan of Care Reviewed With: patient        Progress: no change  Outcome Evaluation: Pt trasferring to [facility] for oral surgery. Report called to [facility] to transport

## 2024-06-08 NOTE — INTERVAL H&P NOTE
H&P updated. The patient was examined and the following changes are noted:  Transferred for oral surgery and Open heart surgery. Homografts not available at [facility], operated by [facility].

## 2024-06-08 NOTE — PROGRESS NOTES
Referring Provider: Tashi Birmingham MD    Reason for follow-up: Valvular heart disease, endocarditis     Patient Care Team:  Provider, No Known as PCP - General      SUBJECTIVE  Patient is resting comfortably in bed.  Eager to be transferred.     ROS  Review of all systems negative except as indicated.    Since I have last seen, the patient has been without any chest discomfort, shortness of breath, palpitations, dizziness or syncope.  Denies having any headache, abdominal pain, nausea, vomiting, diarrhea, constipation, loss of weight or loss of appetite.  Denies having any excessive bruising, hematuria or blood in the stool.        Personal History:    Past Medical History:   Diagnosis Date    Anxiety     Asthma     CHF (congestive heart failure)     COPD (chronic obstructive pulmonary disease)        Past Surgical History:   Procedure Laterality Date    CHOLECYSTECTOMY      COLONOSCOPY      ENDOSCOPY      HERNIA REPAIR      HYSTERECTOMY         Family History   Family history unknown: Yes       Social History     Tobacco Use    Smoking status: Every Day     Current packs/day: 3.00     Average packs/day: 3.0 packs/day for 30.4 years (91.3 ttl pk-yrs)     Types: Cigarettes     Start date: 1994    Smokeless tobacco: Never   Vaping Use    Vaping status: Never Used   Substance Use Topics    Alcohol use: Never    Drug use: Yes     Frequency: 7.0 times per week     Types: Heroin     Comment: patient states she quit 3 weeks ago        Home meds:  Prior to Admission medications    Medication Sig Start Date End Date Taking? Authorizing Provider   acetaminophen (TYLENOL) 325 MG tablet Take 2 tablets by mouth Every 4 (Four) Hours As Needed for Mild Pain. 6/7/24  Yes Tashi Birmingham MD   albuterol sulfate  (90 Base) MCG/ACT inhaler Inhale 2 puffs Every 4 (Four) Hours As Needed for Wheezing.   Yes ProviderLopez MD   bisacodyl (DULCOLAX) 10 MG suppository Insert 1 suppository into the rectum Daily As Needed for  Constipation (Use if bisacodyl oral is ineffective). 6/7/24  Yes Tashi Birmingham MD   bisacodyl (DULCOLAX) 5 MG EC tablet Take 1 tablet by mouth Daily As Needed for Constipation (Use if polyethylene glycol is ineffective). 6/7/24  Yes Tashi Birmingham MD   calcium carbonate (TUMS) 500 MG chewable tablet Chew 2 tablets 2 (Two) Times a Day As Needed for Heartburn. 6/7/24  Yes Tashi Birmingham MD   dicyclomine (BENTYL) 10 MG capsule Take 1 capsule by mouth 3 (Three) Times a Day As Needed (Abdominal Cramps) for up to 1 day. 6/7/24 6/8/24 Yes Tashi Birmingham MD   famotidine (PEPCID) 40 MG tablet Take 1 tablet by mouth Daily. 6/7/24  Yes Tashi Birmingham MD   furosemide (LASIX) 40 MG tablet Take 1 tablet by mouth Daily. 6/7/24  Yes Tashi Birmingham MD   gabapentin (NEURONTIN) 100 MG capsule Take 1 capsule by mouth 3 (Three) Times a Day. 6/7/24  Yes Tashi Birmingham MD   hydrOXYzine (ATARAX) 50 MG tablet Take 1 tablet by mouth 3 (Three) Times a Day As Needed for Anxiety for up to 1 day. 6/7/24 6/8/24 Yes Tashi Birmingham MD   ipratropium-albuterol (DUO-NEB) 0.5-2.5 mg/3 ml nebulizer Take 3 mL by nebulization Every 4 (Four) Hours As Needed for Shortness of Air or Wheezing. 6/7/24  Yes Tashi Birmingham MD   metoprolol succinate XL (TOPROL-XL) 25 MG 24 hr tablet Take 1 tablet by mouth Daily. 6/7/24  Yes Tashi Birmingham MD   nicotine (NICODERM CQ) 21 MG/24HR patch Place 1 patch on the skin as directed by provider Daily. 6/7/24  Yes Tashi Birmingham MD   ondansetron (ZOFRAN) 2 mg/mL injection Infuse 2 mL into a venous catheter Every 6 (Six) Hours As Needed for Nausea or Vomiting. 6/7/24  Yes Tashi Birmingham MD   polyethylene glycol (MIRALAX) 17 g packet Take 17 g by mouth Daily As Needed (Use if senna-docusate is ineffective). 6/7/24  Yes Tashi Birmingham MD   sennosides-docusate (PERICOLACE) 8.6-50 MG per tablet Take 2 tablets by mouth 2 (Two) Times a Day As Needed for Constipation. 6/7/24  Yes Tashi Birmingham MD   sertraline (ZOLOFT) 25 MG tablet  "Take 1 tablet by mouth Daily. 6/7/24  Yes Tashi Birmingham MD   vancomycin 750 mg in sodium chloride 0.9 % 250 mL IVPB Infuse 750 mg into a venous catheter Every 12 (Twelve) Hours for 76 doses. Indications: Bacteria in the Blood, Endocarditis 6/7/24 7/15/24 Yes Tashi Birmingham MD       Allergies:  Cephalexin and Latex    Scheduled Meds:enoxaparin, 40 mg, Subcutaneous, Daily  famotidine, 40 mg, Oral, Daily  [Held by provider] furosemide, 40 mg, Oral, Daily  gabapentin, 100 mg, Oral, TID  metoprolol succinate XL, 25 mg, Oral, Q24H  nicotine, 1 patch, Transdermal, Q24H  sertraline, 25 mg, Oral, Daily  sodium chloride, 10 mL, Intravenous, Q12H  vancomycin, 750 mg, Intravenous, Q12H      Continuous Infusions:Pharmacy to dose vancomycin,       PRN Meds:.  acetaminophen    senna-docusate sodium **AND** polyethylene glycol **AND** bisacodyl **AND** bisacodyl    calcium carbonate    ipratropium-albuterol    Morphine    ondansetron    Pharmacy to dose vancomycin    Potassium Replacement - Follow Nurse / BPA Driven Protocol    sodium chloride    sodium chloride      OBJECTIVE    Vital Signs  Vitals:    06/07/24 2307 06/07/24 2359 06/08/24 0003 06/08/24 0325   BP: 116/47   110/47   BP Location: Right arm   Right arm   Patient Position: Lying   Lying   Pulse: 96 83 83 80   Resp: 17 20 20 (!) 38   Temp: 98.2 °F (36.8 °C)   98.3 °F (36.8 °C)   TempSrc: Oral   Oral   SpO2: 98% 95% 99% 98%   Weight:    97.2 kg (214 lb 4.6 oz)   Height:           Flowsheet Rows      Flowsheet Row First Filed Value   Admission Height 167.6 cm (66\") Documented at 06/03/2024 0119   Admission Weight 92.5 kg (204 lb) Documented at 06/03/2024 0119              Intake/Output Summary (Last 24 hours) at 6/8/2024 0726  Last data filed at 6/8/2024 0634  Gross per 24 hour   Intake 1554 ml   Output --   Net 1554 ml          Telemetry: Sinus rhythm with heart rate in the 70s and 80s    Physical Exam:  The patient is alert, oriented and in no distress.  Vital signs as " noted above.  Head and neck revealed no carotid bruits or jugular venous distention.  No thyromegaly or lymphadenopathy is present  Lungs clear.  No wheezing.  Breath sounds are normal bilaterally.  Heart normal first and second heart sounds.  Holosystolic murmur. No precordial rub is present.  No gallop is present.  Abdomen soft and nontender.  No organomegaly is present.  Extremities with good peripheral pulses without any pedal edema.  Skin warm and dry.  Musculoskeletal system is grossly normal.  CNS grossly normal.       Results Review:  I have personally reviewed the results from the time of this admission to 6/8/2024 07:26 EDT and agree with these findings:  []  Laboratory  []  Microbiology  []  Radiology  []  EKG/Telemetry   []  Cardiology/Vascular   []  Pathology  []  Old records  []  Other:    Most notable findings include:    Lab Results (last 24 hours)       Procedure Component Value Units Date/Time    Basic Metabolic Panel [[id number]]  (Abnormal) Collected: 06/08/24 0503    Specimen: Blood from Arm, Right Updated: 06/08/24 0557     Glucose 152 mg/dL      BUN 22 mg/dL      Creatinine 1.58 mg/dL      Sodium 134 mmol/L      Potassium 4.9 mmol/L      Comment: Specimen hemolyzed.  Result may be falsely elevated.        Chloride 97 mmol/L      CO2 24.1 mmol/L      Calcium 8.7 mg/dL      BUN/Creatinine Ratio 13.9     Anion Gap 12.9 mmol/L      eGFR 40.7 mL/min/1.73     Narrative:      GFR Normal >60  Chronic Kidney Disease <60  Kidney Failure <15      CBC & Differential [[id number]]  (Abnormal) Collected: 06/08/24 0503    Specimen: Blood from Arm, Right Updated: 06/08/24 0532    Narrative:      The following orders were created for panel order CBC & Differential.  Procedure                               Abnormality         Status                     ---------                               -----------         ------                     CBC Auto Differential[[id number]]        Abnormal            Final result                  Please view results for these tests on the individual orders.    CBC Auto Differential [[id number]]  (Abnormal) Collected: 06/08/24 0503    Specimen: Blood from Arm, Right Updated: 06/08/24 0532     WBC 15.45 10*3/mm3      RBC 3.25 10*6/mm3      Hemoglobin 9.3 g/dL      Comment: Result checked          Hematocrit 30.7 %      MCV 94.5 fL      MCH 28.6 pg      MCHC 30.3 g/dL      RDW 19.9 %      RDW-SD 63.8 fl      MPV 10.4 fL      Platelets 310 10*3/mm3      Neutrophil % 77.6 %      Lymphocyte % 10.7 %      Monocyte % 9.4 %      Eosinophil % 0.6 %      Basophil % 0.7 %      Immature Grans % 1.0 %      Neutrophils, Absolute 11.98 10*3/mm3      Lymphocytes, Absolute 1.66 10*3/mm3      Monocytes, Absolute 1.45 10*3/mm3      Eosinophils, Absolute 0.10 10*3/mm3      Basophils, Absolute 0.11 10*3/mm3      Immature Grans, Absolute 0.15 10*3/mm3      nRBC 0.3 /100 WBC             Imaging Results (Last 24 Hours)       ** No results found for the last 24 hours. **            LAB RESULTS (LAST 7 DAYS)    CBC  Results from last 7 days   Lab Units 06/08/24  0503 06/07/24  0347 06/06/24  0330 06/05/24  0023 06/04/24  0042 06/03/24  0209 06/02/24  1344   WBC 10*3/mm3 15.45* 12.88* 12.00* 14.74* 15.59* 17.54* 18.73*   RBC 10*6/mm3 3.25* 2.65* 3.12* 2.90* 2.89* 3.32* 3.39*   HEMOGLOBIN g/dL 9.3* 7.3* 8.6* 8.1* 8.0* 8.9* 9.3*   HEMATOCRIT % 30.7* 25.0* 29.1* 26.4* 26.4* 29.2* 29.1*   MCV fL 94.5 94.3 93.3 91.0 91.3 88.0 85.8   PLATELETS 10*3/mm3 310 312 252 332 386 457* 436       BMP  Results from last 7 days   Lab Units 06/08/24  0503 06/07/24  0347 06/06/24  1002 06/06/24  0330 06/05/24  0023 06/04/24  0042 06/03/24  1445 06/03/24  0209 06/02/24  1344   SODIUM mmol/L 134* 134*  --  133* 135* 137  --  139 139   POTASSIUM mmol/L 4.9 4.8 4.6 5.5* 4.5 4.3 4.5 3.5 3.2*   CHLORIDE mmol/L 97* 100  --  100 100 99  --  100 101   CO2 mmol/L 24.1 23.2  --  20.1* 20.9* 24.7  --  23.7 24.7   BUN mg/dL 22* 19  --  18 23* 20  --  14 11    CREATININE mg/dL 1.58* 1.41*  --  1.35* 1.44* 1.71*  --  1.39* 1.24*   GLUCOSE mg/dL 152* 138*  --  138* 142* 165*  --  117* 109*       CMP   Results from last 7 days   Lab Units 06/08/24  0503 06/07/24  0347 06/06/24  1002 06/06/24  0330 06/05/24  0023 06/04/24  0042 06/03/24  1445 06/03/24  0209 06/02/24  1344   SODIUM mmol/L 134* 134*  --  133* 135* 137  --  139 139   POTASSIUM mmol/L 4.9 4.8 4.6 5.5* 4.5 4.3 4.5 3.5 3.2*   CHLORIDE mmol/L 97* 100  --  100 100 99  --  100 101   CO2 mmol/L 24.1 23.2  --  20.1* 20.9* 24.7  --  23.7 24.7   BUN mg/dL 22* 19  --  18 23* 20  --  14 11   CREATININE mg/dL 1.58* 1.41*  --  1.35* 1.44* 1.71*  --  1.39* 1.24*   GLUCOSE mg/dL 152* 138*  --  138* 142* 165*  --  117* 109*   ALBUMIN g/dL  --   --   --   --   --  3.1*  --  3.2* 3.3*   BILIRUBIN mg/dL  --   --   --   --   --  0.3  --  0.3 0.3   ALK PHOS U/L  --   --   --   --   --  107  --  111 117   AST (SGOT) U/L  --   --   --   --   --  32  --  23 23   ALT (SGPT) U/L  --   --   --   --   --  32  --  27 32       BNP        TROPONIN  Results from last 7 days   Lab Units 06/04/24  0042 06/03/24  0417   CK TOTAL U/L 64 62   HSTROP T ng/L  --  43*       CoAg        Creatinine Clearance  Estimated Creatinine Clearance: 52.3 mL/min (A) (by C-G formula based on SCr of 1.58 mg/dL (H)).    ABG        Radiology  No radiology results for the last day      EKG  I personally viewed and interpreted the patient's EKG/Telemetry data:  ECG 12 Lead Chest Pain   Preliminary Result   HEART RATE= 112  bpm   RR Interval= 536  ms   NC Interval= 145  ms   P Horizontal Axis= 6  deg   P Front Axis= 59  deg   QRSD Interval= 82  ms   QT Interval= 366  ms   QTcB= 500  ms   QRS Axis= 14  deg   T Wave Axis= 171  deg   - ABNORMAL ECG -   Sinus tachycardia   Probable left atrial enlargement   Anteroseptal infarct, age indeterminate   When compared with ECG of 02-Jun-2024 13:04:43,   No significant change   Electronically Signed By:    Date and Time of Study:  2024-06-03 01:32:22      Telemetry Scan   Final Result      Telemetry Scan   Final Result      Telemetry Scan   Final Result      Telemetry Scan   Final Result      Telemetry Scan   Final Result      Telemetry Scan   Final Result      Telemetry Scan   Final Result      Telemetry Scan   Final Result      Telemetry Scan   Final Result      Telemetry Scan   Final Result      Telemetry Scan   Final Result      Telemetry Scan   Final Result      Telemetry Scan   Final Result      Telemetry Scan   Final Result      Telemetry Scan   Final Result      Telemetry Scan   Final Result      Telemetry Scan   Final Result      Telemetry Scan   Final Result      Telemetry Scan   Final Result      Telemetry Scan   Final Result      Telemetry Scan   Final Result      Telemetry Scan   Final Result      Telemetry Scan   Final Result      Telemetry Scan   Final Result      Telemetry Scan   Final Result      Telemetry Scan   Final Result            Echocardiogram:    Results for orders placed during the hospital encounter of 06/03/24    Adult Transesophageal Echo (LETICIA) W/ Cont if Necessary Per Protocol    Interpretation Summary    Left ventricular systolic function is moderately decreased. Left ventricular ejection fraction appears to be 41 - 45%.    Left ventricular diastolic function was normal.    The left atrial cavity is mild to moderately dilated.    Saline test results are negative.    Severe aortic valve regurgitation is present.  There is healed vegetation on left coronary cusp which is prolapsing and causing eccentric aortic regurgitation jet.    Abnormal mitral valve structure consistent with dilated annulus.    Moderate to severe mitral valve regurgitation is present.    Moderate to severe tricuspid valve regurgitation is present.    There is a large mobile mass on the tricuspid valve affecting the anterior leaflet that is consistent with a vegetation.    There is a trivial pericardial effusion. There is no evidence of  cardiac tamponade.    Recommend modified Duke criteria for clinical diagnosis of bacterial endocarditis        Stress Test:         Cardiac Catheterization:  No results found for this or any previous visit.         Other:         ASSESSMENT & PLAN:    Principal Problem:    Endocarditis    Endocarditis  LETICIA shows severe aortic, mitral and tricuspid valve regurgitation.  Large mobile mass on tricuspid valve  Healed vegetation on left coronary cusp of aortic valve  Plan is to be [facility] clearance followed by surgical replacement of valves  Continue IV antibiotics    Heart failure with midrange ejection fraction  EF of 40 to 45%  Likely secondary to valvular heart disease  Continue Toprol-XL  Add Jardiance  Add ACE inhibitor or Arni if blood pressure allows  Continue diuretics     Chronic kidney disease  Creatinine 1.5, GFR is 43  Closely monitor renal function while on diuretics    Anemia  H&H 8.7/27.2  Transfuse to keep hemoglobin more than 7    Obesity  BMI 35  Lifestyle modifications recommended to the patient    Noncompliance/IV drug use  Multiple hospitalizations and leaving AMA  History of IV fentanyl use  Smoking cessation counseling provided to the patient  Also recommended abstinence from drug use and encourage compliance      Brian Lomeli MD  06/08/24  07:26 EDT

## 2024-06-08 NOTE — LETTER
[facility]  [address] [facility] [id number]  [phone]        Jayne 10, 2024      Patient: Марина Tilley  YOB: 1975  Date of Visit: 6/7/2024      Attention; inpatient authorization request for member ID #[id number],   FORM MAY APPEAR AT END OF THIS FAX. Reply to [facility] DEPT FAX  [phone] OR CALL          Milagro Chung LPN

## 2024-06-08 NOTE — Clinical Note
Hemostasis started on the right radial artery. R-Band was used in achieving hemostasis. Radial compression device applied to vessel. Hemostasis achieved successfully. Closure device additional comment: 11 cc of air in band

## 2024-06-08 NOTE — DISCHARGE SUMMARY
[facility] Medicine Services  Discharge Summary    Date of Service: 24    Patient Name: Марина Tilley  : 1975  MRN: [id number]    Date of Admission: 6/3/2024  Discharge Diagnosis: Infective endocarditis with septic emboli; right molar area periapical abscess  Date of Discharge:  24    Primary Care Physician: Provider, No Known      Presenting Problem:   Endocarditis [I38]  Endocarditis, unspecified chronicity, unspecified endocarditis type [I38]    Active and Resolved Hospital Problems:  Chest pain and dyspnea  Suspect 2/2 untreated Endocarditis and Bacteremia  pulmonary edema vs LLL infiltrate   Noncompliance- pt seen at multiple hospitals and recurrent history of leaving AMA, Hx IV Fentanyl use   Hypoxia requiring oxygen supplementation  -    HFreF - outside source documentation with EF 30-35%   #Right molar area  periapical abscess-     Hypokalemia- resolved   CATRINA- improving   Anemia   Tobacco dependency  IV Fentanyl use  #Generalized anxiety disorder      Hospital Course     HPI:  Per the H&P see HPI      Hospital Course:  Марина Tilley is a 48 y.o. female with known history of IV drug use initially came to the ED on 2024 at 1530.  Per ED documentation patient was seen for chest pain and shortness of breath.  Has a known endocarditis.  She has been to multiple hospitals.  [facility] [place] [facility]  [facility].  She has been intermittently on antibiotics.  States she was discharged from the hospital but was not given any antibiotics.  She reports she had not used since before being in the hospital the last time.       Review of blood cultures from [facility] shows patient grew MRSA on 2024.  Review of echocardiogram from [facility] demonstrated vegetationson the septal leaflet of tricuspid valve, mild to moderate tricuspid regurgitation, small pericardial effusion,  and EF at 30 to 35%, moderate to severe global LV hypokinesis and a severely dilated left ventricle. Further review of records from [facility] demonstrated a white count of 15.9, hemoglobin of 10.3, a creatinine of 1.86, chest imaging that demonstrated multifocal pneumonia versus septic emboli. Reportedly signed out AGAINST MEDICAL ADVICE from other hospital before going to the [facility] based on [facility] records.   Patient was started with IV antibiotics ID team was consulted.  Patient underwent LETICIA noted to haveValve vegetation.  Also noted to have periapical abscess on x-ray.  CT surgery was consulted recommended management of periapical apical abscess before surgical intervention.  Patient transferred to Psychiatric for dental management of periapical abscess before surgical intervention.  Infectious disease, cardiology and CT surgery were involved in the patient's care        DISCHARGE Follow Up Recommendations for labs and diagnostics:   Transferred to [facility]      Reasons For Change In Medications and Indications for New Medications:      Day of Discharge     Vital Signs:  Temp:  [97.5 °F (36.4 °C)-98.3 °F (36.8 °C)] 97.9 °F (36.6 °C)  Heart Rate:  [80-96] 83  Resp:  [14-38] 16  BP: (110-129)/(47-60) 114/54  Flow (L/min):  [2] 2    Physical Exam:  Physical Exam   HENT:      Head: Atraumatic.   Eyes:      Pupils: Pupils are equal, round, and reactive to light.   Cardiovascular:      Rate and Rhythm: Normal rate and regular rhythm.   Pulmonary:      Effort: Pulmonary effort is normal.      Breath sounds: Normal breath sounds.   Abdominal:      General: Bowel sounds are normal.      Palpations: Abdomen is soft.   Musculoskeletal:         General: Normal range of motion.      Cervical back: Neck supple.   Skin:     General: Skin is warm.   Neurological:      General: No focal deficit present.      Mental Status: She is alert and oriented to person, place, and  time.   Psychiatric:         Judgment: Judgment normal.     Pertinent  and/or Most Recent Results     LAB RESULTS:      Lab 06/08/24  0503 06/07/24  0347 06/06/24  0330 06/05/24  0023 06/04/24  0042 06/03/24  0209 06/02/24  1349   WBC 15.45* 12.88* 12.00* 14.74* 15.59*   < >  --    HEMOGLOBIN 9.3* 7.3* 8.6* 8.1* 8.0*   < >  --    HEMATOCRIT 30.7* 25.0* 29.1* 26.4* 26.4*   < >  --    PLATELETS 310 312 252 332 386   < >  --    NEUTROS ABS 11.98* 9.89* 9.34* 11.38* 11.49*   < >  --    IMMATURE GRANS (ABS) 0.15* 0.12* 0.08* 0.16* 0.14*   < >  --    LYMPHS ABS 1.66 1.40 1.54 1.70 2.38   < >  --    MONOS ABS 1.45* 1.32* 0.91* 1.38* 1.40*   < >  --    EOS ABS 0.10 0.09 0.07 0.03 0.10   < >  --    MCV 94.5 94.3 93.3 91.0 91.3   < >  --    LACTATE  --   --   --   --   --   --  1.5    < > = values in this interval not displayed.         Lab 06/08/24  0503 06/07/24  0347 06/06/24  1002 06/06/24  0330 06/05/24  0023 06/04/24  0042   SODIUM 134* 134*  --  133* 135* 137   POTASSIUM 4.9 4.8 4.6 5.5* 4.5 4.3   CHLORIDE 97* 100  --  100 100 99   CO2 24.1 23.2  --  20.1* 20.9* 24.7   ANION GAP 12.9 10.8  --  12.9 14.1 13.3   BUN 22* 19  --  18 23* 20   CREATININE 1.58* 1.41*  --  1.35* 1.44* 1.71*   EGFR 40.7* 46.7*  --  49.2* 45.5* 37.0*   GLUCOSE 152* 138*  --  138* 142* 165*   CALCIUM 8.7 8.5*  --  8.3* 7.5* 7.8*         Lab 06/04/24  0042 06/03/24  0209 06/02/24  1344   TOTAL PROTEIN 7.5 7.8 8.0   ALBUMIN 3.1* 3.2* 3.3*   GLOBULIN 4.4 4.6 4.7   ALT (SGPT) 32 27 32   AST (SGOT) 32 23 23   BILIRUBIN 0.3 0.3 0.3   ALK PHOS 107 111 117         Lab 06/03/24  0417 06/03/24  0209 06/02/24  1344   PROBNP  --   --  33,058.0*   HSTROP T 43* 44* 37*                 Brief Urine Lab Results       None          Microbiology Results (last 10 days)       Procedure Component Value - Date/Time    Blood Culture - Blood, Arm, Right [[id number]]  (Normal) Collected: 06/02/24 1356    Lab Status: Preliminary result Specimen: Blood from Arm, Right  Updated: 06/07/24 0934     Blood Culture Culture in progress    Narrative:      Less than seven (7) mL's of blood was collected.  Insufficient quantity may yield false negative results.  No growth at 5 days    Blood Culture - Blood, Arm, Left [[id number]]  (Normal) Collected: 06/02/24 1344    Lab Status: Preliminary result Specimen: Blood from Arm, Left Updated: 06/07/24 0933     Blood Culture Culture in progress    Narrative:      No growth at 5 days            XR Mandible < 4 View    Result Date: 6/5/2024  Impression: Impression: There is a large cavity involving the remnant right molar tooth, with lucency adjacent to the root suggesting periapical abscess Electronically Signed: Alfa Walker  6/5/2024 7:20 PM EDT  Workstation ID: [id number]    MRI Lumbar Spine Without Contrast    Result Date: 6/4/2024  Impression: 1. The study is mildly degraded by motion. 2. No MR evidence of discitis or osteomyelitis. 3. No high-grade lumbar canal stenosis or high-grade neural foraminal stenosis is evident. Electronically Signed: Migdalia Godoy MD  6/4/2024 11:04 AM EDT  Workstation ID: [id number]    MRI Thoracic Spine Without Contrast    Result Date: 6/4/2024  Impression: Impression: Somewhat motion degraded exam demonstrating no specific findings to suggest osteomyelitis or discitis in the thoracic spine. Electronically Signed: Glen Hartman MD  6/4/2024 8:45 AM EDT  Workstation ID: [id number]    Adult Transthoracic Echo Complete W/ Cont if Necessary Per Protocol    Addendum Date: 6/3/2024      Left ventricular systolic function is mildly decreased. Left ventricular ejection fraction appears to be 51 - 55%.   Left ventricular diastolic function was normal.   There is calcification of the aortic valve.   Moderate to severe aortic valve regurgitation is present.   Moderate to severe mitral valve regurgitation is present.   There is echodensity attached to septal leaflet of tricuspid valve measuring 1.2 x 1.5 cm.   Recommend LETICIA to  assess echodensity and mitral and aortic valve regurgitation.  If clinically indicated.   Recommend modified Duke criteria for clinical diagnosis of bacterial endocarditis     CT Chest Without Contrast Diagnostic    Result Date: 6/3/2024  Impression: 1. Multifocal patchy peripheral nodular densities are scattered throughout both lungs, some which have a cavitary component, which would correspond to the provided clinical suspicion of septic emboli. 2. Incompletely imaged at 8 cm low-density within the mid spleen, suspicious for splenic infarct. 3. Small bilateral pleural effusions. 4. Dense posterior bibasilar airspace disease is nonspecific and may reflect changes of atelectasis or pneumonia. 5. Mild cardiomegaly. Small concentric pericardial effusion. 6. Enlarged bilateral hilar and mediastinal lymph nodes are nonspecific and may be reactive. Attention at CT chest follow-up is recommended Electronically Signed: Migdalia Godoy MD  6/3/2024 2:40 PM EDT  Workstation ID: [id number]    XR Chest 1 View    Result Date: 6/2/2024  Impression: Vascular congestion. Left lower lobe infiltrate may represent developing pulmonary edema or possibly pneumonia depending on the clinical setting. Electronically Signed: Onel Diaz MD  6/2/2024 2:27 PM EDT  Workstation ID: [id number]             Results for orders placed during the hospital encounter of 06/03/24    Adult Transesophageal Echo (LETICIA) W/ Cont if Necessary Per Protocol    Interpretation Summary    Left ventricular systolic function is moderately decreased. Left ventricular ejection fraction appears to be 41 - 45%.    Left ventricular diastolic function was normal.    The left atrial cavity is mild to moderately dilated.    Saline test results are negative.    Severe aortic valve regurgitation is present.  There is healed vegetation on left coronary cusp which is prolapsing and causing eccentric aortic regurgitation jet.    Abnormal mitral valve structure consistent with  dilated annulus.    Moderate to severe mitral valve regurgitation is present.    Moderate to severe tricuspid valve regurgitation is present.    There is a large mobile mass on the tricuspid valve affecting the anterior leaflet that is consistent with a vegetation.    There is a trivial pericardial effusion. There is no evidence of cardiac tamponade.    Recommend modified Duke criteria for clinical diagnosis of bacterial endocarditis      Labs Pending at Discharge:      Procedures Performed           Consults:   Consults       Date and Time Order Name Status Description    6/4/2024  5:14 PM Inpatient Cardiothoracic Surgery Consult      6/4/2024 10:31 AM Inpatient Psychiatrist Consult Completed     6/3/2024  2:10 AM Inpatient Cardiology Consult Completed     6/3/2024  1:57 AM Inpatient Infectious Diseases Consult Completed     6/3/2024  1:41 AM Inpatient Hospitalist Consult                Discharge Details        Discharge Medications        New Medications        Instructions Start Date   acetaminophen 325 MG tablet  Commonly known as: TYLENOL   650 mg, Oral, Every 4 Hours PRN      bisacodyl 5 MG EC tablet  Commonly known as: DULCOLAX   5 mg, Oral, Daily PRN      bisacodyl 10 MG suppository  Commonly known as: DULCOLAX   10 mg, Rectal, Daily PRN      calcium carbonate 500 MG chewable tablet  Commonly known as: TUMS   2 tablets, Oral, 2 Times Daily PRN      dicyclomine 10 MG capsule  Commonly known as: BENTYL   10 mg, Oral, 3 Times Daily PRN      famotidine 40 MG tablet  Commonly known as: PEPCID   40 mg, Oral, Daily      furosemide 40 MG tablet  Commonly known as: LASIX   40 mg, Oral, Daily      gabapentin 100 MG capsule  Commonly known as: NEURONTIN   100 mg, Oral, 3 Times Daily      hydrOXYzine 50 MG tablet  Commonly known as: ATARAX   50 mg, Oral, 3 Times Daily PRN      ipratropium-albuterol 0.5-2.5 mg/3 ml nebulizer  Commonly known as: DUO-NEB   3 mL, Nebulization, Every 4 Hours PRN      metoprolol succinate XL  25 MG 24 hr tablet  Commonly known as: TOPROL-XL   25 mg, Oral, Every 24 Hours Scheduled      nicotine 21 MG/24HR patch  Commonly known as: NICODERM CQ   1 patch, Transdermal, Every 24 Hours Scheduled      ondansetron 2 mg/mL injection  Commonly known as: ZOFRAN   4 mg, Intravenous, Every 6 Hours PRN      polyethylene glycol 17 g packet  Commonly known as: MIRALAX   17 g, Oral, Daily PRN      sennosides-docusate 8.6-50 MG per tablet  Commonly known as: PERICOLACE   2 tablets, Oral, 2 Times Daily PRN      sertraline 25 MG tablet  Commonly known as: ZOLOFT   25 mg, Oral, Daily      vancomycin 750 mg in sodium chloride 0.9 % 250 mL IVPB   750 mg, Intravenous, Every 12 Hours             Continue These Medications        Instructions Start Date   albuterol sulfate  (90 Base) MCG/ACT inhaler  Commonly known as: PROVENTIL HFA;VENTOLIN HFA;PROAIR HFA   2 puffs, Inhalation, Every 4 Hours PRN               Allergies   Allergen Reactions    Cephalexin Anaphylaxis     Required intubation    Latex Hives         Discharge Disposition:   Another Health Care Institution Not Defined    Diet:  Hospital:  Diet Order   Procedures    Diet: Cardiac; Healthy Heart (2-3 Na+); Fluid Consistency: Thin (IDDSI 0)         Discharge Activity:   as tolerated       CODE STATUS:  Code Status and Medical Interventions:   Ordered at: 06/03/24 0151     Code Status (Patient has no pulse and is not breathing):    CPR (Attempt to Resuscitate)     Medical Interventions (Patient has pulse or is breathing):    Full Support         Future Appointments   Date Time Provider Department Center   8/29/2024  3:00 PM Adams Thomson MD [facility]           Time spent on Discharge including face to face service:  >30 minutes    Signature: Electronically signed by Tashi Birmingham MD, 06/08/24, 11:05 EDT.  [facility] Hospitalist Team

## 2024-06-08 NOTE — PROGRESS NOTES
Transfer today.  She will need oral surgery to see.  Continue antibiotics.  Probably will need root replacement with homograft tricuspid replacement or repair and check of the mitral valve.

## 2024-06-08 NOTE — CONSULTS
"Referring Provider: Eric Skaggs MD  Reason for Consultation:     endocarditis, mrsa bacteremia         Subjective   History of present illness: Patient is a 48-year-old female past medical history of IVDU and medical noncompliance with trips to multiple outlying facilities in the setting of endocarditis and MRSA bacteremia.  Now transferred from [facility] for oral surgery evaluation and cardiothoracic surgery.  ID consulted for \"endocarditis, MRSA bacteremia\".    Patient is been followed by infectious disease at [place] on vancomycin for MRSA endocarditis.  Transferred here for oral surgery evaluation in the setting of oral abscess.  Cardiothoracic surgery planning homograft tricuspid replacement or repair and root replacement pending oral surgery.  [facility] blood cultures have been negative however MRSA reported from [facility].  LETICIA was performed at [facility] with findings of moderate to severe mitral and tricuspid valve regurg.  Severe aortic regurg with healed vegetation on the left coronary cusp and large mobile density on the tricuspid valve anterior leaflet consistent with vegetation.      Patient has a history of fentanyl abuse and reportedly last use was in May of this year.  Tolerating vancomycin well.  Most recent level today was 19.  Remains afebrile with continued leukocytosis.  Continues to complain of shortness of breath and dental pain.    Past Medical History:   Diagnosis Date    Anxiety     Asthma     CHF (congestive heart failure)     COPD (chronic obstructive pulmonary disease)        Past Surgical History:   Procedure Laterality Date    CHOLECYSTECTOMY      COLONOSCOPY      ENDOSCOPY      HERNIA REPAIR      HYSTERECTOMY         Family history is unknown by patient.     reports that she has been smoking cigarettes. She started smoking about 30 years ago. She has a 91.3 pack-year smoking history. She has never used smokeless tobacco. She reports current " drug use. Frequency: 7.00 times per week. Drug: Heroin. She reports that she does not drink alcohol.     Allergies   Allergen Reactions    Cephalexin Anaphylaxis     Required intubation    Latex Hives       Medication:  Antibiotics:  Anti-Infectives (From admission, onward)      Ordered     Dose/Rate Route Frequency Start Stop    06/08/24 1603  vancomycin 750 mg in sodium chloride 0.9 % 250 mL IVPB-VTB        Note to Pharmacy: Dose please   Ordering Provider: Jr Maicol Bone MD    750 mg  333.3 mL/hr over 45 Minutes Intravenous Every 12 Hours 06/08/24 1700 06/22/24 1659    06/08/24 1612  Pharmacy to dose vancomycin        Ordering Provider: Jr Maicol Bone MD     Does not apply Continuous PRN 06/08/24 1612 06/22/24 1611              Objective     Physical Exam:   Vital Signs   Temp:  [97.9 °F (36.6 °C)-98.6 °F (37 °C)] 98.6 °F (37 °C)  Heart Rate:  [80-96] 82  Resp:  [16-38] 20  BP: (110-125)/(47-64) 125/64    GENERAL: Awake and alert, in no acute distress.   HEENT: Oropharynx is clear. Hearing is grossly normal.  Extremely poor dentition with areas of swelling on the gumline.  EYES:No conjunctival injection. No lid lag.   LUNGS: Slight increased work of breathing on nasal cannula  GI: Soft, nontender, nondistended.   SKIN: Upper extremity injection marks.  Multiple tattoos.  PSYCHIATRIC: Appropriate mood, affect, insight, and judgment.     Results Review:   I reviewed the patient's new clinical results.  I reviewed the patient's new imaging results and agree with the interpretation.  I reviewed the patient's other test results and agree with the interpretation    Lab Results   Component Value Date    WBC 15.45 (H) 06/08/2024    HGB 9.3 (L) 06/08/2024    HCT 30.7 (L) 06/08/2024    MCV 94.5 06/08/2024     06/08/2024       Lab Results   Component Value Date    VANCOTROUGH 23.20 (H) 06/06/2024    VANCORANDOM 19.70 06/08/2024       Lab Results   Component Value Date    GLUCOSE 152 (H) 06/08/2024     BUN 22 (H) 06/08/2024    CREATININE 1.58 (H) 06/08/2024    EGFRIFAFRI >60 07/22/2021    BCR 13.9 06/08/2024    CO2 24.1 06/08/2024    CALCIUM 8.7 06/08/2024    ALBUMIN 3.1 (L) 06/04/2024    LABIL2 0.9 (L) 07/22/2021    AST 32 06/04/2024    ALT 32 06/04/2024         Estimated Creatinine Clearance: 52.3 mL/min (A) (by C-G formula based on SCr of 1.58 mg/dL (H)).      Microbiology:  6/2 blood cultures no growth    Outside hospital blood cultures  5/9 3 out of 3 MRSA  5/11 1 out of 1 MRSA    Radiology:  6/3 CT chest report reviewed with multifocal patchy peripheral nodular densities scattered throughout both lungs which have cavitary components corresponding with septic emboli.  Possible splenic infarct.  Reactive hilar and mediastinal lymph nodes.    6/3 MRI thoracic and lumbar spine report reviewed with degree dated imaging due to motion however no findings suspicious for osteomyelitis or discitis.    6/5 mandibular x-ray with large cavity involving the ruminant of the right molar tooth with lucency in the root concerning for periapical abscess    Assessment     #MRSA tricuspid valve endocarditis  #MRSA septicemia, blood cultures cleared  #IVDU  #Septic pulmonary emboli  #Right molar periapical abscess  #Acute hypoxic respiratory failure  #CATRINA  #Splenic infarct, likely septic     Continue IV vancomycin goal -600.  Appreciate pharmacy's help with dosing.  Continue to follow levels for therapeutic drug monitoring.    Blood cultures have fortunately cleared.  Will follow-up surgical planning of cardiothoracic surgery and oral surgery.    Continue to stress abstinence from IV drug abuse.  Patient is going to need 6 weeks of antibiotic therapy after valvular replacement for endocarditis.    Thank you for this consult.  We will continue to follow along and tailor antibiotics as the patient's clinical course evolves.

## 2024-06-08 NOTE — PROGRESS NOTES
[facility] Clinical Pharmacy Services: Vancomycin Pharmacokinetic Initial Consult Note    Марина Tilley is a 48 y.o. female who is on pharmacy to dose vancomycin.  Started prior to admission while at [facility] now transferred to [facility].    Indication: Endocarditis  Consulting Provider: Dr Bone  Planned Duration of Therapy: to be determined    Culture/Source: 5/9 Blood Cx MRSA  Target: -600 mg/L.hr   Pertinent Vanc Dosing History: Vancomycin 750mg iv q12h - most recent dose from [facility]  Other Antimicrobials: None    Vitals/Labs  Ht:  ; Wt:    Temp Readings from Last 1 Encounters:   06/08/24 98.6 °F (37 °C) (Oral)    Estimated Creatinine Clearance: 52.3 mL/min (A) (by C-G formula based on SCr of 1.58 mg/dL (H)).        Results from last 7 days   Lab Units 06/08/24  0503 06/07/24  0347 06/06/24  0330   CREATININE mg/dL 1.58* 1.41* 1.35*   WBC 10*3/mm3 15.45* 12.88* 12.00*     Assessment/Plan:    Vancomycin Dose:   750 mg IV every  12  hours  Predictive AUC level for the dose ordered is 603 mg/L.hr, which is within the target of 400-600 mg/L.hr  Vanc Trough has been ordered for 6/11 at 0430     Pharmacy will follow patient's kidney function and will adjust doses and obtain levels as necessary. Thank you for involving pharmacy in this patient's care. Please contact pharmacy with any questions or concerns.                           Maru Valle, Pharm.D., [facility]  Clinical Pharmacist

## 2024-06-09 ENCOUNTER — ANESTHESIA (OUTPATIENT)
Dept: PERIOP | Facility: HOSPITAL | Age: 49
End: 2024-06-09
Payer: MEDICAID

## 2024-06-09 ENCOUNTER — ANESTHESIA EVENT (OUTPATIENT)
Dept: PERIOP | Facility: HOSPITAL | Age: 49
End: 2024-06-09
Payer: MEDICAID

## 2024-06-09 LAB
ANION GAP SERPL CALCULATED.3IONS-SCNC: 10 MMOL/L (ref 5–15)
BACTERIA UR QL AUTO: ABNORMAL /HPF
BILIRUB UR QL STRIP: NEGATIVE
BUN SERPL-MCNC: 23 MG/DL (ref 6–20)
BUN/CREAT SERPL: 16.3 (ref 7–25)
CALCIUM SPEC-SCNC: 8.3 MG/DL (ref 8.6–10.5)
CHLORIDE SERPL-SCNC: 99 MMOL/L (ref 98–107)
CLARITY UR: CLEAR
CO2 SERPL-SCNC: 24 MMOL/L (ref 22–29)
COLOR UR: YELLOW
CREAT SERPL-MCNC: 1.41 MG/DL (ref 0.57–1)
CREAT UR-MCNC: 15.9 MG/DL
EGFRCR SERPLBLD CKD-EPI 2021: 46.7 ML/MIN/1.73
GLUCOSE BLDC GLUCOMTR-MCNC: 114 MG/DL (ref 70–130)
GLUCOSE BLDC GLUCOMTR-MCNC: 120 MG/DL (ref 70–130)
GLUCOSE BLDC GLUCOMTR-MCNC: 120 MG/DL (ref 70–130)
GLUCOSE BLDC GLUCOMTR-MCNC: 170 MG/DL (ref 70–130)
GLUCOSE SERPL-MCNC: 109 MG/DL (ref 65–99)
GLUCOSE UR STRIP-MCNC: NEGATIVE MG/DL
HGB UR QL STRIP.AUTO: ABNORMAL
HYALINE CASTS UR QL AUTO: ABNORMAL /LPF
KETONES UR QL STRIP: NEGATIVE
LEUKOCYTE ESTERASE UR QL STRIP.AUTO: ABNORMAL
NITRITE UR QL STRIP: NEGATIVE
NT-PROBNP SERPL-MCNC: ABNORMAL PG/ML (ref 0–450)
PH UR STRIP.AUTO: <=5 [PH] (ref 5–8)
POTASSIUM SERPL-SCNC: 4.6 MMOL/L (ref 3.5–5.2)
PROT ?TM UR-MCNC: 15.9 MG/DL
PROT UR QL STRIP: ABNORMAL
PROT/CREAT UR: 1000 MG/G CREA (ref 0–200)
RBC # UR STRIP: ABNORMAL /HPF
REF LAB TEST METHOD: ABNORMAL
SARS-COV-2 RNA RESP QL NAA+PROBE: NOT DETECTED
SODIUM SERPL-SCNC: 133 MMOL/L (ref 136–145)
SP GR UR STRIP: 1.01 (ref 1–1.03)
SQUAMOUS #/AREA URNS HPF: ABNORMAL /HPF
UROBILINOGEN UR QL STRIP: ABNORMAL
WBC # UR STRIP: ABNORMAL /HPF

## 2024-06-09 PROCEDURE — 25010000002 GLYCOPYRROLATE 1 MG/5ML SOLUTION: Performed by: NURSE ANESTHETIST, CERTIFIED REGISTERED

## 2024-06-09 PROCEDURE — 63710000001 INSULIN LISPRO (HUMAN) PER 5 UNITS: Performed by: DENTIST

## 2024-06-09 PROCEDURE — 25010000002 ONDANSETRON PER 1 MG: Performed by: NURSE ANESTHETIST, CERTIFIED REGISTERED

## 2024-06-09 PROCEDURE — 94799 UNLISTED PULMONARY SVC/PX: CPT

## 2024-06-09 PROCEDURE — 82948 REAGENT STRIP/BLOOD GLUCOSE: CPT

## 2024-06-09 PROCEDURE — 25010000002 PROPOFOL 200 MG/20ML EMULSION: Performed by: NURSE ANESTHETIST, CERTIFIED REGISTERED

## 2024-06-09 PROCEDURE — 25810000003 SODIUM CHLORIDE 0.9 % SOLUTION 250 ML FLEX CONT: Performed by: DENTIST

## 2024-06-09 PROCEDURE — 25810000003 SODIUM CHLORIDE 0.9 % SOLUTION 250 ML FLEX CONT: Performed by: THORACIC SURGERY (CARDIOTHORACIC VASCULAR SURGERY)

## 2024-06-09 PROCEDURE — 80048 BASIC METABOLIC PNL TOTAL CA: CPT | Performed by: THORACIC SURGERY (CARDIOTHORACIC VASCULAR SURGERY)

## 2024-06-09 PROCEDURE — 84156 ASSAY OF PROTEIN URINE: CPT | Performed by: INTERNAL MEDICINE

## 2024-06-09 PROCEDURE — 99232 SBSQ HOSP IP/OBS MODERATE 35: CPT | Performed by: INTERNAL MEDICINE

## 2024-06-09 PROCEDURE — 25810000003 LACTATED RINGERS PER 1000 ML: Performed by: ANESTHESIOLOGY

## 2024-06-09 PROCEDURE — 25010000002 VANCOMYCIN 750 MG RECONSTITUTED SOLUTION 1 EACH VIAL: Performed by: DENTIST

## 2024-06-09 PROCEDURE — 0CDWXZ2 EXTRACTION OF UPPER TOOTH, ALL, EXTERNAL APPROACH: ICD-10-PCS | Performed by: PEDIATRICS

## 2024-06-09 PROCEDURE — 25010000002 PHENYLEPHRINE 10 MG/ML SOLUTION: Performed by: NURSE ANESTHETIST, CERTIFIED REGISTERED

## 2024-06-09 PROCEDURE — 25010000002 DEXAMETHASONE SODIUM PHOSPHATE 20 MG/5ML SOLUTION: Performed by: NURSE ANESTHETIST, CERTIFIED REGISTERED

## 2024-06-09 PROCEDURE — 25010000002 FENTANYL CITRATE (PF) 50 MCG/ML SOLUTION: Performed by: ANESTHESIOLOGY

## 2024-06-09 PROCEDURE — 25010000002 SUGAMMADEX 200 MG/2ML SOLUTION: Performed by: NURSE ANESTHETIST, CERTIFIED REGISTERED

## 2024-06-09 PROCEDURE — 83880 ASSAY OF NATRIURETIC PEPTIDE: CPT | Performed by: PHYSICIAN ASSISTANT

## 2024-06-09 PROCEDURE — 25010000002 FENTANYL CITRATE (PF) 50 MCG/ML SOLUTION: Performed by: NURSE ANESTHETIST, CERTIFIED REGISTERED

## 2024-06-09 PROCEDURE — 0CDXXZ2 EXTRACTION OF LOWER TOOTH, ALL, EXTERNAL APPROACH: ICD-10-PCS | Performed by: PEDIATRICS

## 2024-06-09 PROCEDURE — 81001 URINALYSIS AUTO W/SCOPE: CPT | Performed by: INTERNAL MEDICINE

## 2024-06-09 PROCEDURE — 25010000002 HYDROMORPHONE PER 4 MG: Performed by: NURSE ANESTHETIST, CERTIFIED REGISTERED

## 2024-06-09 PROCEDURE — 63710000001 ONDANSETRON ODT 4 MG TABLET DISPERSIBLE: Performed by: PHYSICIAN ASSISTANT

## 2024-06-09 PROCEDURE — 82570 ASSAY OF URINE CREATININE: CPT | Performed by: INTERNAL MEDICINE

## 2024-06-09 PROCEDURE — 25010000002 VANCOMYCIN 750 MG RECONSTITUTED SOLUTION 1 EACH VIAL: Performed by: THORACIC SURGERY (CARDIOTHORACIC VASCULAR SURGERY)

## 2024-06-09 PROCEDURE — 87635 SARS-COV-2 COVID-19 AMP PRB: CPT | Performed by: PHYSICIAN ASSISTANT

## 2024-06-09 PROCEDURE — 25010000002 FUROSEMIDE PER 20 MG: Performed by: DENTIST

## 2024-06-09 RX ORDER — DROPERIDOL 2.5 MG/ML
0.62 INJECTION, SOLUTION INTRAMUSCULAR; INTRAVENOUS
Status: DISCONTINUED | OUTPATIENT
Start: 2024-06-09 | End: 2024-06-09 | Stop reason: HOSPADM

## 2024-06-09 RX ORDER — OXYCODONE AND ACETAMINOPHEN 7.5; 325 MG/1; MG/1
1 TABLET ORAL EVERY 4 HOURS PRN
Status: DISCONTINUED | OUTPATIENT
Start: 2024-06-09 | End: 2024-06-09 | Stop reason: HOSPADM

## 2024-06-09 RX ORDER — HYDRALAZINE HYDROCHLORIDE 20 MG/ML
5 INJECTION INTRAMUSCULAR; INTRAVENOUS
Status: DISCONTINUED | OUTPATIENT
Start: 2024-06-09 | End: 2024-06-09 | Stop reason: HOSPADM

## 2024-06-09 RX ORDER — FLUMAZENIL 0.1 MG/ML
0.2 INJECTION INTRAVENOUS AS NEEDED
Status: DISCONTINUED | OUTPATIENT
Start: 2024-06-09 | End: 2024-06-09 | Stop reason: HOSPADM

## 2024-06-09 RX ORDER — MIDAZOLAM HYDROCHLORIDE 1 MG/ML
1 INJECTION INTRAMUSCULAR; INTRAVENOUS
Status: DISCONTINUED | OUTPATIENT
Start: 2024-06-09 | End: 2024-06-09 | Stop reason: HOSPADM

## 2024-06-09 RX ORDER — DIPHENHYDRAMINE HYDROCHLORIDE 50 MG/ML
12.5 INJECTION INTRAMUSCULAR; INTRAVENOUS
Status: DISCONTINUED | OUTPATIENT
Start: 2024-06-09 | End: 2024-06-09 | Stop reason: HOSPADM

## 2024-06-09 RX ORDER — FENTANYL CITRATE 50 UG/ML
50 INJECTION, SOLUTION INTRAMUSCULAR; INTRAVENOUS
Status: DISCONTINUED | OUTPATIENT
Start: 2024-06-09 | End: 2024-06-09 | Stop reason: HOSPADM

## 2024-06-09 RX ORDER — ENOXAPARIN SODIUM 100 MG/ML
40 INJECTION SUBCUTANEOUS EVERY 24 HOURS
Status: DISCONTINUED | OUTPATIENT
Start: 2024-06-10 | End: 2024-06-12

## 2024-06-09 RX ORDER — PROPOFOL 10 MG/ML
INJECTION, EMULSION INTRAVENOUS AS NEEDED
Status: DISCONTINUED | OUTPATIENT
Start: 2024-06-09 | End: 2024-06-09 | Stop reason: SURG

## 2024-06-09 RX ORDER — FAMOTIDINE 10 MG/ML
20 INJECTION, SOLUTION INTRAVENOUS ONCE
Status: COMPLETED | OUTPATIENT
Start: 2024-06-09 | End: 2024-06-09

## 2024-06-09 RX ORDER — LABETALOL HYDROCHLORIDE 5 MG/ML
5 INJECTION, SOLUTION INTRAVENOUS
Status: DISCONTINUED | OUTPATIENT
Start: 2024-06-09 | End: 2024-06-09 | Stop reason: HOSPADM

## 2024-06-09 RX ORDER — MAGNESIUM HYDROXIDE 1200 MG/15ML
LIQUID ORAL AS NEEDED
Status: DISCONTINUED | OUTPATIENT
Start: 2024-06-09 | End: 2024-06-09 | Stop reason: HOSPADM

## 2024-06-09 RX ORDER — DEXAMETHASONE SODIUM PHOSPHATE 4 MG/ML
INJECTION, SOLUTION INTRA-ARTICULAR; INTRALESIONAL; INTRAMUSCULAR; INTRAVENOUS; SOFT TISSUE AS NEEDED
Status: DISCONTINUED | OUTPATIENT
Start: 2024-06-09 | End: 2024-06-09 | Stop reason: SURG

## 2024-06-09 RX ORDER — ACETAMINOPHEN 500 MG
1000 TABLET ORAL ONCE
Status: COMPLETED | OUTPATIENT
Start: 2024-06-09 | End: 2024-06-09

## 2024-06-09 RX ORDER — PROMETHAZINE HYDROCHLORIDE 25 MG/1
25 SUPPOSITORY RECTAL ONCE AS NEEDED
Status: DISCONTINUED | OUTPATIENT
Start: 2024-06-09 | End: 2024-06-09 | Stop reason: HOSPADM

## 2024-06-09 RX ORDER — IPRATROPIUM BROMIDE AND ALBUTEROL SULFATE 2.5; .5 MG/3ML; MG/3ML
3 SOLUTION RESPIRATORY (INHALATION) ONCE AS NEEDED
Status: DISCONTINUED | OUTPATIENT
Start: 2024-06-09 | End: 2024-06-09 | Stop reason: HOSPADM

## 2024-06-09 RX ORDER — HYDROMORPHONE HYDROCHLORIDE 1 MG/ML
0.5 INJECTION, SOLUTION INTRAMUSCULAR; INTRAVENOUS; SUBCUTANEOUS
Status: DISCONTINUED | OUTPATIENT
Start: 2024-06-09 | End: 2024-06-09 | Stop reason: HOSPADM

## 2024-06-09 RX ORDER — ONDANSETRON 2 MG/ML
INJECTION INTRAMUSCULAR; INTRAVENOUS AS NEEDED
Status: DISCONTINUED | OUTPATIENT
Start: 2024-06-09 | End: 2024-06-09 | Stop reason: SURG

## 2024-06-09 RX ORDER — LIDOCAINE HYDROCHLORIDE 20 MG/ML
INJECTION, SOLUTION INFILTRATION; PERINEURAL AS NEEDED
Status: DISCONTINUED | OUTPATIENT
Start: 2024-06-09 | End: 2024-06-09 | Stop reason: SURG

## 2024-06-09 RX ORDER — SODIUM CHLORIDE 0.9 % (FLUSH) 0.9 %
3-10 SYRINGE (ML) INJECTION AS NEEDED
Status: DISCONTINUED | OUTPATIENT
Start: 2024-06-09 | End: 2024-06-09 | Stop reason: HOSPADM

## 2024-06-09 RX ORDER — FUROSEMIDE 10 MG/ML
80 INJECTION INTRAMUSCULAR; INTRAVENOUS
Status: DISCONTINUED | OUTPATIENT
Start: 2024-06-09 | End: 2024-06-10

## 2024-06-09 RX ORDER — NALOXONE HCL 0.4 MG/ML
0.2 VIAL (ML) INJECTION AS NEEDED
Status: DISCONTINUED | OUTPATIENT
Start: 2024-06-09 | End: 2024-06-09 | Stop reason: HOSPADM

## 2024-06-09 RX ORDER — PROMETHAZINE HYDROCHLORIDE 25 MG/1
25 TABLET ORAL ONCE AS NEEDED
Status: DISCONTINUED | OUTPATIENT
Start: 2024-06-09 | End: 2024-06-09 | Stop reason: HOSPADM

## 2024-06-09 RX ORDER — GLYCOPYRROLATE 0.2 MG/ML
INJECTION INTRAMUSCULAR; INTRAVENOUS AS NEEDED
Status: DISCONTINUED | OUTPATIENT
Start: 2024-06-09 | End: 2024-06-09 | Stop reason: SURG

## 2024-06-09 RX ORDER — EPHEDRINE SULFATE 50 MG/ML
5 INJECTION, SOLUTION INTRAVENOUS ONCE AS NEEDED
Status: DISCONTINUED | OUTPATIENT
Start: 2024-06-09 | End: 2024-06-09 | Stop reason: HOSPADM

## 2024-06-09 RX ORDER — HYDROCODONE BITARTRATE AND ACETAMINOPHEN 5; 325 MG/1; MG/1
1 TABLET ORAL ONCE AS NEEDED
Status: DISCONTINUED | OUTPATIENT
Start: 2024-06-09 | End: 2024-06-09 | Stop reason: HOSPADM

## 2024-06-09 RX ORDER — SODIUM CHLORIDE 0.9 % (FLUSH) 0.9 %
3 SYRINGE (ML) INJECTION EVERY 12 HOURS SCHEDULED
Status: DISCONTINUED | OUTPATIENT
Start: 2024-06-09 | End: 2024-06-09 | Stop reason: HOSPADM

## 2024-06-09 RX ORDER — PHENYLEPHRINE HYDROCHLORIDE 10 MG/ML
INJECTION INTRAVENOUS AS NEEDED
Status: DISCONTINUED | OUTPATIENT
Start: 2024-06-09 | End: 2024-06-09 | Stop reason: SURG

## 2024-06-09 RX ORDER — ONDANSETRON 2 MG/ML
4 INJECTION INTRAMUSCULAR; INTRAVENOUS ONCE AS NEEDED
Status: DISCONTINUED | OUTPATIENT
Start: 2024-06-09 | End: 2024-06-09 | Stop reason: HOSPADM

## 2024-06-09 RX ORDER — BUPIVACAINE HYDROCHLORIDE AND EPINEPHRINE 5; 5 MG/ML; UG/ML
INJECTION, SOLUTION PERINEURAL AS NEEDED
Status: DISCONTINUED | OUTPATIENT
Start: 2024-06-09 | End: 2024-06-09 | Stop reason: HOSPADM

## 2024-06-09 RX ORDER — ROCURONIUM BROMIDE 10 MG/ML
INJECTION, SOLUTION INTRAVENOUS AS NEEDED
Status: DISCONTINUED | OUTPATIENT
Start: 2024-06-09 | End: 2024-06-09 | Stop reason: SURG

## 2024-06-09 RX ORDER — SODIUM CHLORIDE, SODIUM LACTATE, POTASSIUM CHLORIDE, CALCIUM CHLORIDE 600; 310; 30; 20 MG/100ML; MG/100ML; MG/100ML; MG/100ML
9 INJECTION, SOLUTION INTRAVENOUS CONTINUOUS
Status: DISCONTINUED | OUTPATIENT
Start: 2024-06-09 | End: 2024-06-11

## 2024-06-09 RX ORDER — SODIUM CHLORIDE 9 MG/ML
40 INJECTION, SOLUTION INTRAVENOUS AS NEEDED
Status: DISCONTINUED | OUTPATIENT
Start: 2024-06-09 | End: 2024-06-09 | Stop reason: HOSPADM

## 2024-06-09 RX ADMIN — METOPROLOL SUCCINATE 25 MG: 25 TABLET, EXTENDED RELEASE ORAL at 08:33

## 2024-06-09 RX ADMIN — ONDANSETRON 4 MG: 2 INJECTION INTRAMUSCULAR; INTRAVENOUS at 14:44

## 2024-06-09 RX ADMIN — FENTANYL CITRATE 50 MCG: 50 INJECTION, SOLUTION INTRAMUSCULAR; INTRAVENOUS at 16:05

## 2024-06-09 RX ADMIN — PHENYLEPHRINE HYDROCHLORIDE 100 MCG: 10 INJECTION INTRAVENOUS at 15:05

## 2024-06-09 RX ADMIN — POTASSIUM CHLORIDE 10 MEQ: 750 TABLET, EXTENDED RELEASE ORAL at 08:33

## 2024-06-09 RX ADMIN — ONDANSETRON 4 MG: 4 TABLET, ORALLY DISINTEGRATING ORAL at 08:36

## 2024-06-09 RX ADMIN — Medication 10 ML: at 20:51

## 2024-06-09 RX ADMIN — HYDROCODONE BITARTRATE AND ACETAMINOPHEN 1 TABLET: 5; 325 TABLET ORAL at 16:53

## 2024-06-09 RX ADMIN — HYDROCODONE BITARTRATE AND ACETAMINOPHEN 1 TABLET: 5; 325 TABLET ORAL at 03:33

## 2024-06-09 RX ADMIN — HYDROCODONE BITARTRATE AND ACETAMINOPHEN 1 TABLET: 5; 325 TABLET ORAL at 08:33

## 2024-06-09 RX ADMIN — FAMOTIDINE 20 MG: 10 INJECTION INTRAVENOUS at 13:58

## 2024-06-09 RX ADMIN — GLYCOPYRROLATE 0.2 MCG: 0.2 INJECTION, SOLUTION INTRAMUSCULAR; INTRAVENOUS at 14:30

## 2024-06-09 RX ADMIN — PHENYLEPHRINE HYDROCHLORIDE 100 MCG: 10 INJECTION INTRAVENOUS at 14:44

## 2024-06-09 RX ADMIN — VANCOMYCIN HYDROCHLORIDE 750 MG: 750 INJECTION, POWDER, LYOPHILIZED, FOR SOLUTION INTRAVENOUS at 20:56

## 2024-06-09 RX ADMIN — PHENYLEPHRINE HYDROCHLORIDE 200 MCG: 10 INJECTION INTRAVENOUS at 14:22

## 2024-06-09 RX ADMIN — GABAPENTIN 100 MG: 100 CAPSULE ORAL at 08:33

## 2024-06-09 RX ADMIN — Medication 10 ML: at 09:09

## 2024-06-09 RX ADMIN — ACETAMINOPHEN 1000 MG: 500 TABLET ORAL at 13:56

## 2024-06-09 RX ADMIN — IPRATROPIUM BROMIDE AND ALBUTEROL SULFATE 3 ML: .5; 3 SOLUTION RESPIRATORY (INHALATION) at 10:29

## 2024-06-09 RX ADMIN — ROCURONIUM BROMIDE 50 MG: 10 INJECTION, SOLUTION INTRAVENOUS at 14:20

## 2024-06-09 RX ADMIN — FUROSEMIDE 40 MG: 40 TABLET ORAL at 08:33

## 2024-06-09 RX ADMIN — VANCOMYCIN HYDROCHLORIDE 750 MG: 750 INJECTION, POWDER, LYOPHILIZED, FOR SOLUTION INTRAVENOUS at 09:28

## 2024-06-09 RX ADMIN — PROPOFOL 200 MG: 10 INJECTION, EMULSION INTRAVENOUS at 14:20

## 2024-06-09 RX ADMIN — SODIUM CHLORIDE, POTASSIUM CHLORIDE, SODIUM LACTATE AND CALCIUM CHLORIDE 9 ML/HR: 600; 310; 30; 20 INJECTION, SOLUTION INTRAVENOUS at 13:50

## 2024-06-09 RX ADMIN — INSULIN LISPRO 2 UNITS: 100 INJECTION, SOLUTION INTRAVENOUS; SUBCUTANEOUS at 20:56

## 2024-06-09 RX ADMIN — GABAPENTIN 100 MG: 100 CAPSULE ORAL at 20:56

## 2024-06-09 RX ADMIN — SERTRALINE 25 MG: 25 TABLET, FILM COATED ORAL at 08:33

## 2024-06-09 RX ADMIN — FENTANYL CITRATE 50 MCG: 50 INJECTION, SOLUTION INTRAMUSCULAR; INTRAVENOUS at 13:58

## 2024-06-09 RX ADMIN — HYDROMORPHONE HYDROCHLORIDE 0.5 MG: 1 INJECTION, SOLUTION INTRAMUSCULAR; INTRAVENOUS; SUBCUTANEOUS at 15:57

## 2024-06-09 RX ADMIN — FUROSEMIDE 80 MG: 10 INJECTION, SOLUTION INTRAMUSCULAR; INTRAVENOUS at 16:53

## 2024-06-09 RX ADMIN — HYDROCODONE BITARTRATE AND ACETAMINOPHEN 1 TABLET: 5; 325 TABLET ORAL at 20:56

## 2024-06-09 RX ADMIN — DEXAMETHASONE SODIUM PHOSPHATE 6 MG: 4 INJECTION, SOLUTION INTRAMUSCULAR; INTRAVENOUS at 14:29

## 2024-06-09 RX ADMIN — FENTANYL CITRATE 50 MCG: 50 INJECTION, SOLUTION INTRAMUSCULAR; INTRAVENOUS at 15:49

## 2024-06-09 RX ADMIN — LIDOCAINE HYDROCHLORIDE 40 MG: 20 INJECTION, SOLUTION INFILTRATION; PERINEURAL at 14:20

## 2024-06-09 RX ADMIN — SUGAMMADEX 200 MG: 100 INJECTION, SOLUTION INTRAVENOUS at 15:26

## 2024-06-09 RX ADMIN — PHENYLEPHRINE HYDROCHLORIDE 100 MCG: 10 INJECTION INTRAVENOUS at 15:19

## 2024-06-09 NOTE — ANESTHESIA PROCEDURE NOTES
Airway  Date/Time: 6/9/2024 2:21 PM  Airway not difficult    General Information and Staff    Patient location during procedure: OR  Anesthesiologist: Wilfrido Santiago MD  CRNA/CAA: Janie Womack CRNA    Indications and Patient Condition  Indications for airway management: airway protection    Preoxygenated: yes  Mask difficulty assessment: 1 - vent by mask    Final Airway Details  Final airway type: endotracheal airway      Successful airway: ETT  Cuffed: yes   Successful intubation technique: video laryngoscopy  Facilitating devices/methods: intubating stylet  Endotracheal tube insertion site: oral  Blade: CMAC  Blade size: D  ETT size (mm): 7.0  Cormack-Lehane Classification: grade I - full view of glottis  Placement verified by: chest auscultation and capnometry   Cuff volume (mL): 8  Measured from: teeth  ETT/EBT  to teeth (cm): 20  Number of attempts at approach: 1  Assessment: lips, teeth, and gum same as pre-op and atraumatic intubation

## 2024-06-09 NOTE — PLAN OF CARE
Goal Outcome Evaluation:         Patient denies pain at this time, vital signs stable, pt on oxygen nasal cannula at 2 liter, SR on the monitor, pt NPO for tooth extraction in the morning, cont on antibiotics, cont to monitor.

## 2024-06-09 NOTE — OP NOTE
PREOPERATIVE DIAGNOSES:   Dental caries.  Periodontitis.  Aortic valve failure.     POSTOPERATIVE DIAGNOSIS:   Dental caries.   Periodontitis.  Aortic valve failure.     PROCEDURE PERFORMED: Extraction of teeth.     SURGEON: Sergio Zaidi DMD    ANESTHESIA: General.     ESTIMATED BLOOD LOSS: Minimal.     SPECIMENS: None.     DRAINS: None.     INDICATIONS AND CONSENT: This is a 48-year-old female who was transferred from [facility] and is planned to have aortic valve replacement and mitral valve repair. Dental clearance was requested. After clinical and radiographic evaluation, she had nonrestorable dentition placing her at risk for endocarditis and it was felt that she would benefit from removal of all of her teeth under general anesthesia. The procedure, potential risks and complications were explained to the patient and both verbal and written consent were obtained.     DESCRIPTION OF PROCEDURE: The patient was taken to the operating room and placed in the supine position on the operating room table. A state of general anesthesia was induced and oral endotracheal tube was placed. Throat pack was placed. The patient was draped in a sterile fashion. The maxilla and mandible were infiltrated with 0.5% Marcaine with 1:200,000 epinephrine. Teeth #6, 7, 11, 12, 13, 15, 18, 21, 22, 23, 24, 25, 26, 27, 28, and 31 were all extracted with forceps. Tooth #22 was removed by reflecting a full thickness mucoperiosteal flap and removing buccal bone with a chante and irrigation. A full thickness mucoperiosteal flap was reflected along the upper left quadrant and lower right and lower left quadrants. Irregular bone was smoothed using a rongeur and bone files. The wounds were irrigated. Each socket was packed with Gelfoam and the mucosa was closed using 3-0 chromic gut in a running fashion. The throat pack was removed. Dry dressings were placed. The patient was awakened, extubated, and taken to the recovery room.  There were no complications. All counts were correct x 3.

## 2024-06-09 NOTE — PLAN OF CARE
Goal Outcome Evaluation:              Outcome Evaluation: S/p teeth extraction. Gums sore, bleeding. Guaze & ice at bedside per provider orders, pt refusing both at this time. PRN norco given per MAR. NSR on tele, all other VSS. On 2L s/p procedure. IV lasix given as ordered. Full liquid diet as tolerated. NPO at midnight tonight per Dr. Skaggs in anticipation of heart cath, pending scheduling. Will continue with current POC and update as needed.

## 2024-06-09 NOTE — BRIEF OP NOTE
TOOTH EXTRACTION  Progress Note    Марина Tilley  6/9/2024    Pre-op Diagnosis:   Dental caries, periodontitis, valvular vegetation       Post-Op Diagnosis Codes:   same    Procedure/CPT® Codes:        Procedure(s):  TOOTH EXTRACTION              Surgeon(s):  Sergio Zaidi DMD    Anesthesia: General    Staff:   Circulator: Gabriel Delong RN  Scrub Person: Jaci Huston; Gustabo Funk         Estimated Blood Loss: minimal    Urine Voided: * No values recorded between 6/9/2024  2:14 PM and 6/9/2024  3:24 PM *    Specimens:                None          Drains: * No LDAs found *    Findings: none        Complications: none          Sergio Zaidi DMD     Date: 6/9/2024  Time: 15:29 EDT

## 2024-06-09 NOTE — CASE MANAGEMENT/SOCIAL WORK
Case Management Discharge Note      Final Note: Transfer to [facility]    Transportation Services  Ambulance: [facility]    Final Discharge Disposition Code: 02 - short term hospital for [facility]

## 2024-06-09 NOTE — ANESTHESIA PREPROCEDURE EVALUATION
Anesthesia Evaluation     Patient summary reviewed   NPO Solid Status: > 8 hours  NPO Liquid Status: > 2 hours           Airway   Mallampati: I  TM distance: >3 FB  Neck ROM: full  No difficulty expected  Dental    (+) poor dentition    Pulmonary    (+) asthma,  (-) COPD  Cardiovascular   Exercise tolerance: poor (<4 METS)    (+) valvular problems/murmurs AI and TI, CHF       Neuro/Psych  GI/Hepatic/Renal/Endo    (+) renal disease- ARF    Musculoskeletal     Abdominal    Substance History   (+) drug use     OB/GYN          Other   blood dyscrasia anemia thrombocytopenia,                 Anesthesia Plan    ASA 4     general     intravenous induction     Anesthetic plan, risks, benefits, and alternatives have been provided, discussed and informed consent has been obtained with: patient.  Pre-procedure education provided    CODE STATUS:    Level Of Support Discussed With: Patient  Code Status (Patient has no pulse and is not breathing): CPR (Attempt to Resuscitate)  Medical Interventions (Patient has pulse or is breathing): Full Support

## 2024-06-09 NOTE — PROGRESS NOTES
Hospital Follow Up    LOS: 1  Patient Name: Марина Tilley  Age/Sex: 48 y.o. female  : 1975  MRN: [id number]    Day of Service: 24   Length of Stay: 1  Encounter Provider: Christopher Neves MD  Place of Service: [facility] CARDIOLOGY  Patient Care Team:  Provider, No Known as PCP - General    Subjective:     Chief Complaint: Endocarditis    Interval History: 48-year-old female who was found to have very poor dentition and subsequently believes to be the source of endocarditis.  Patient was admitted to [facility].  Patient was subsequently transferred to [facility] for further care.  Patient is going to need to have her teeth removed as well as to undergo surgery.  Dr. Pascual was following patient at [place] and he setscrews been transferred.  Patient was followed by cardiology at [place] we were subsequently asked to resume care from a cardiology standpoint while patient is admitted here.  Patient currently has no complaints and she is resting comfortably.  She is very pleasant appears significantly older than stated age.    Objective:     Objective:  Temp:  [97.4 °F (36.3 °C)-98.6 °F (37 °C)] 97.4 °F (36.3 °C)  Heart Rate:  [72-82] 72  Resp:  [14-20] 18  BP: (102-125)/(52-64) 112/54     Intake/Output Summary (Last 24 hours) at 2024 1153  Last data filed at 2024 1851  Gross per 24 hour   Intake 480 ml   Output --   Net 480 ml     Body mass index is 32.94 kg/m².      24  0600   Weight: 92.6 kg (204 lb 1.6 oz)     Weight change:       Physical Exam:   General :  Alert, cooperative, in no acute distress.  Neuro:   Alert,cooperative and oriented.  Lungs:  CTAB. Normal respiratory effort and rate.  CV:  Regular rate and rhythm, normal S1 and S2, no murmurs, gallops or rubs.   ABD:  Soft, nontender, nondistended. Positive bowel sounds.  Extr:  No edema or cyanosis, moves all extremities.    Lab Review:   Results from last 7 days   Lab Units  "06/09/24  0432 06/08/24  1709 06/05/24  0023 06/04/24  0042   SODIUM mmol/L 133* 132*   < > 137   POTASSIUM mmol/L 4.6 4.5   < > 4.3   CHLORIDE mmol/L 99 98   < > 99   CO2 mmol/L 24.0 21.5*   < > 24.7   BUN mg/dL 23* 20   < > 20   CREATININE mg/dL 1.41* 1.51*   < > 1.71*   GLUCOSE mg/dL 109* 140*   < > 165*   CALCIUM mg/dL 8.3* 8.5*   < > 7.8*   AST (SGOT) U/L  --  234*  --  32   ALT (SGPT) U/L  --  209*  --  32    < > = values in this interval not displayed.     Results from last 7 days   Lab Units 06/04/24  0042 06/03/24  0417 06/03/24  0209 06/02/24  1344   CK TOTAL U/L 64 62  --   --    HSTROP T ng/L  --  43* 44* 37*     Results from last 7 days   Lab Units 06/08/24  1709 06/08/24  0503   WBC 10*3/mm3 13.00* 15.45*   HEMOGLOBIN g/dL 8.7* 9.3*   HEMATOCRIT % 27.2* 30.7*   PLATELETS 10*3/mm3 305 310     Results from last 7 days   Lab Units 06/08/24  1709   INR  1.34*               Invalid input(s): \"LDLCALC\"  Results from last 7 days   Lab Units 06/09/24  0432 06/02/24  1344   PROBNP pg/mL 16,030.0* 33,058.0*           Current Medications:   Scheduled Meds:[Held by provider] enoxaparin, 40 mg, Subcutaneous, Q24H  furosemide, 80 mg, Intravenous, BID  gabapentin, 100 mg, Oral, TID  insulin lispro, 2-7 Units, Subcutaneous, 4x Daily AC & at Bedtime  metoprolol succinate XL, 25 mg, Oral, Q24H  nicotine, 1 patch, Transdermal, Q24H  potassium chloride, 10 mEq, Oral, Daily  sertraline, 25 mg, Oral, Daily  sodium chloride, 10 mL, Intravenous, Q12H  sodium chloride, 10 mL, Intravenous, Q12H  vancomycin, 750 mg, Intravenous, Q12H      Continuous Infusions:Pharmacy to dose vancomycin,         Allergies:  Allergies   Allergen Reactions    Cephalexin Anaphylaxis     Required intubation    Latex Itching       Interpretation Summary  6/7/24         Left ventricular systolic function is moderately decreased. Left ventricular ejection fraction appears to be 41 - 45%.    Left ventricular diastolic function was normal.    The left " atrial cavity is mild to moderately dilated.    Saline test results are negative.    Severe aortic valve regurgitation is present.  There is healed vegetation on left coronary cusp which is prolapsing and causing eccentric aortic regurgitation jet.    Abnormal mitral valve structure consistent with dilated annulus.    Moderate to severe mitral valve regurgitation is present.    Moderate to severe tricuspid valve regurgitation is present.    There is a large mobile mass on the tricuspid valve affecting the anterior leaflet that is consistent with a vegetation.    There is a trivial pericardial effusion. There is no evidence of cardiac tamponade.    Recommend modified Duke criteria for clinical diagnosis of bacterial endocarditis  Assessment:       Acute bacterial endocarditis        Plan:   1.  Bacterial endocarditis.  Source appears to be from teeth she is going to have her teeth removed.  2.  Patient had a LETICIA at [place].  Results are noted above.  Patient with significant valvular heart disease as described above.  Plan is to do surgery.  3.  Smoking abuse patient has a patch on.  4.  History of polysubstance abuse.  5.  Bipolar type II.  6.  Acute kidney injury.  Creatinine is improving.  7.  Will follow along.  The patient needs a heart catheterization please contact us we will be happy to set it up.        Christopher Neves MD  06/09/24  11:53 EDT

## 2024-06-09 NOTE — SIGNIFICANT NOTE
06/09/24 1127   OTHER   Discipline physical therapist   Rehab Time/Intention   Session Not Performed other (see comments)  (PT eval order received, noted patient up independently to bathroom, plans for oral surgery and open heart surgery. PT will sign off for now, please reconsult after surgery.)   Therapy Assessment/Plan (PT)   Criteria for Skilled Interventions Met (PT) no problems identified which require skilled intervention

## 2024-06-09 NOTE — ANESTHESIA POSTPROCEDURE EVALUATION
Patient: Марина Tilley    Procedure Summary       Date: 06/09/24 Room / Location: [facility] OR [address] [place] [address] MAIN OR    Anesthesia Start: 1415 Anesthesia Stop: 1542    Procedure: TOOTH EXTRACTION (Mouth) Diagnosis:     Surgeons: Sergio Zaidi DMD Provider: Wilfrido Santiago MD    Anesthesia Type: general ASA Status: 4            Anesthesia Type: general    Vitals  Vitals Value Taken Time   /52 06/09/24 1600   Temp 36.4 °C (97.6 °F) 06/09/24 1541   Pulse 72 06/09/24 1607   Resp 18 06/09/24 1541   SpO2 100 % 06/09/24 1607   Vitals shown include unfiled device data.        Post Anesthesia Care and Evaluation    Patient location during evaluation: bedside  Patient participation: complete - patient participated  Level of consciousness: awake  Pain management: adequate    Airway patency: patent  Anesthetic complications: No anesthetic complications  PONV Status: none  Cardiovascular status: acceptable  Respiratory status: acceptable  Hydration status: acceptable  Post Neuraxial Block status: Motor and sensory function returned to baseline    
No lymphadedenopathy

## 2024-06-09 NOTE — CONSULTS
[facility]"" Consult Note      Patient Name: Марина Tilley  : 1975  MRN: [id number]  Primary Care Physician:  Provider, No Known  Referring Physician: Eric Skaggs MD  Date of admission: 2024    Subjective     Reason for Consult:  CATRINA + vol overload     HPI:   Марина Tilley is a 48 y.o. female with hx IBD, anxiety/depression/bipolar, IVDU (heroin) admitted 24 to [place] with dyspnea and chest pain.  Prior to this apparently was hospitalized at [facility] for endocarditis but left AMA.  Found to have dilated cardiomyopathy and TV endocarditis (+severe AI, mod MR) with MRSA bacteremia, septic pulm emboli & splenic infarct, treated with vancomycin. Transferred to [facility] yesterday for CTS eval.  Remote Cr was normal 0.5 in 2021.  On admission to [place] was 1.2, as high as 1.7 on , fairly stable 1.3 to 1.5 since then (1.4 today).  Also has right molar abscess.  Seen by ID service.  Vanc trough mildly elevated 23 on .  Was hyperkalemic to 5.5 that day, K normal since (and on low dose KCL supplement with PO lasix).  Albumin is low 3.0.  I don't see any urinalysis from OSH.  SBP 100s to 120s on toprol XL.  She reports mild dyspnea, is on 2L O2.  No n/v or diarrhea or dysuria.      Review of Systems:   14 point review of systems is otherwise negative except for mentioned above on HPI    Personal History     Past Medical History:   Diagnosis Date    Anxiety     Asthma     CHF (congestive heart failure)     COPD (chronic obstructive pulmonary disease)        Past Surgical History:   Procedure Laterality Date    CHOLECYSTECTOMY      COLONOSCOPY      ENDOSCOPY      HERNIA REPAIR      HYSTERECTOMY         Family History: Family history is unknown by patient.    Social History:  reports that she has been smoking cigarettes. She started smoking about 30 years ago. She has a 91.3 pack-year smoking history. She has never used smokeless tobacco. She reports current  drug use. Frequency: 7.00 times per week. Drug: Heroin. She reports that she does not drink alcohol.    Home Medications:  Prior to Admission medications    Medication Sig Start Date End Date Taking? Authorizing Provider   acetaminophen (TYLENOL) 325 MG tablet Take 2 tablets by mouth Every 4 (Four) Hours As Needed for Mild Pain. 6/7/24  Yes Tashi Birmingham MD   albuterol sulfate  (90 Base) MCG/ACT inhaler Inhale 2 puffs Every 4 (Four) Hours As Needed for Wheezing.   Yes ProviderLopez MD   bisacodyl (DULCOLAX) 10 MG suppository Insert 1 suppository into the rectum Daily As Needed for Constipation (Use if bisacodyl oral is ineffective). 6/7/24  Yes Tashi Birmingham MD   bisacodyl (DULCOLAX) 5 MG EC tablet Take 1 tablet by mouth Daily As Needed for Constipation (Use if polyethylene glycol is ineffective). 6/7/24  Yes Tashi Birmingham MD   calcium carbonate (TUMS) 500 MG chewable tablet Chew 2 tablets 2 (Two) Times a Day As Needed for Heartburn. 6/7/24  Yes Tashi Birmingham MD   dicyclomine (BENTYL) 10 MG capsule Take 1 capsule by mouth 3 (Three) Times a Day As Needed (Abdominal Cramps) for up to 1 day. 6/7/24 6/8/24 Yes Tashi Birmingham MD   famotidine (PEPCID) 40 MG tablet Take 1 tablet by mouth Daily. 6/7/24  Yes Tashi Birmingham MD   furosemide (LASIX) 40 MG tablet Take 1 tablet by mouth Daily. 6/7/24  Yes Tashi Birmingham MD   gabapentin (NEURONTIN) 100 MG capsule Take 1 capsule by mouth 3 (Three) Times a Day. 6/7/24  Yes Tashi Birmingham MD   hydrOXYzine (ATARAX) 50 MG tablet Take 1 tablet by mouth 3 (Three) Times a Day As Needed for Anxiety for up to 1 day. 6/7/24 6/8/24 Yes Tashi Birmingham MD   ipratropium-albuterol (DUO-NEB) 0.5-2.5 mg/3 ml nebulizer Take 3 mL by nebulization Every 4 (Four) Hours As Needed for Shortness of Air or Wheezing. 6/7/24  Yes Tashi Birmingham MD   metoprolol succinate XL (TOPROL-XL) 25 MG 24 hr tablet Take 1 tablet by mouth Daily. 6/7/24  Yes Tashi Birmingham MD   nicotine (NICODERM CQ) 21  MG/24HR patch Place 1 patch on the skin as directed by provider Daily. 6/7/24  Yes Tashi Birmingham MD   ondansetron (ZOFRAN) 2 mg/mL injection Infuse 2 mL into a venous catheter Every 6 (Six) Hours As Needed for Nausea or Vomiting. 6/7/24  Yes Tashi Birmingham MD   polyethylene glycol (MIRALAX) 17 g packet Take 17 g by mouth Daily As Needed (Use if senna-docusate is ineffective). 6/7/24  Yes Tashi Birmingham MD   sennosides-docusate (PERICOLACE) 8.6-50 MG per tablet Take 2 tablets by mouth 2 (Two) Times a Day As Needed for Constipation. 6/7/24  Yes Tashi Birmingham MD   sertraline (ZOLOFT) 25 MG tablet Take 1 tablet by mouth Daily. 6/7/24  Yes Tashi Birmingham MD   vancomycin 750 mg in sodium chloride 0.9 % 250 mL IVPB Infuse 750 mg into a venous catheter Every 12 (Twelve) Hours for 76 doses. Indications: Bacteria in the Blood, Endocarditis 6/7/24 7/15/24 Yes Tashi Birmingham MD       Allergies:  Allergies   Allergen Reactions    Cephalexin Anaphylaxis     Required intubation    Latex Itching       Objective     Vitals:   Temp:  [97.6 °F (36.4 °C)-98.6 °F (37 °C)] 97.8 °F (36.6 °C)  Heart Rate:  [73-82] 74  Resp:  [14-20] 18  BP: (102-125)/(52-64) 104/59  Flow (L/min):  [2-3] 3    Intake/Output Summary (Last 24 hours) at 6/9/2024 1047  Last data filed at 6/8/2024 1851  Gross per 24 hour   Intake 480 ml   Output --   Net 480 ml       Physical Exam:    General Appearance: chronically ill appearing pleasant WF no distress on NC O2  Skin: warm and dry  HEENT: oral mucosa normal, nonicteric sclera  Neck: supple, no JVD  Lungs: Dec BS bilat no rales  Heart: RRR, normal S1 and S2  Abdomen: soft, nontender, nondistended  : no palpable bladder  Extremities: trace BLE edema, no cyanosis or clubbing  Neuro: normal speech and mental status     Scheduled Meds:     [Held by provider] enoxaparin, 40 mg, Subcutaneous, Q24H  furosemide, 40 mg, Oral, Daily  gabapentin, 100 mg, Oral, TID  insulin lispro, 2-7 Units, Subcutaneous, 4x Daily AC &  at Bedtime  metoprolol succinate XL, 25 mg, Oral, Q24H  nicotine, 1 patch, Transdermal, Q24H  potassium chloride, 10 mEq, Oral, Daily  sertraline, 25 mg, Oral, Daily  sodium chloride, 10 mL, Intravenous, Q12H  sodium chloride, 10 mL, Intravenous, Q12H  vancomycin, 750 mg, Intravenous, Q12H      IV Meds:   Pharmacy to dose vancomycin,         Results Reviewed:   I have personally reviewed the results from the time of this admission to 6/9/2024 10:47 EDT     Lab Results   Component Value Date    GLUCOSE 109 (H) 06/09/2024    CALCIUM 8.3 (L) 06/09/2024     (L) 06/09/2024    K 4.6 06/09/2024    CO2 24.0 06/09/2024    CL 99 06/09/2024    BUN 23 (H) 06/09/2024    CREATININE 1.41 (H) 06/09/2024    EGFRIFAFRI >60 07/22/2021    BCR 16.3 06/09/2024    ANIONGAP 10.0 06/09/2024      Lab Results   Component Value Date    MG 1.5 (L) 07/21/2021    ALBUMIN 3.0 (L) 06/08/2024           Assessment / Plan     ASSESSMENT:  Non olig CATRINA - Cr stable 1.3 to 1.5 range last few days (peak 1.7).  May represent prerenal azotemia due to CHF decompensation, or infectious GN in relation to endocarditis.  Renal fcn already abnormal before vanc started.  Need UA to assess sediment.  Mild hypervolemic hyponatremia  Vol overload - mild periph edema & dyspnea present.  Alb low 3.0.  On 2L O2.  Had central congestion and small pleural effusions on CXR/CT at OSH.  Switch PO lasix to IV  Hyperkalemia, resolved, K 5.5 few days ago, 4.6 today, on sched (low dose) KCL with diuretic  MRSA TV endocarditis + septicemia, septic pulm emboli, splenic infarct.  On vancomycin, had slightly high trough few days ago WBC 13K  Dilated cardiomyopathy, EF 41 to 45% by LETICIA, with mod to severe MR/TR  Right molar abscess - seen by OMFS and plan teeth extraction   Mood disorder, seen by psychiatry   Hx IVDU   Anemia, hgb 8.7    PLAN:  Change lasix PO to IV, 80mg BID to start   Check UA & Pr/Cr ratio   PVR for completeness   Pharmacy dosing vancomycin  Repeat CXR in  AM    Thank you for involving us in the care of Марина Tilley.  Please feel free to call with any questions.    Jaron Miller MD  06/09/24  10:47 EDT    [facility]  [phone]

## 2024-06-09 NOTE — H&P
LOS: 1 day   Patient Care Team:  Provider, No Known as PCP - General    Chief Complaint: Shortness of breath     Subjective     Subjective    Patient currently resting in bed, no new complaints     Objective     Vital Signs  Temp:  [97.6 °F (36.4 °C)-98.6 °F (37 °C)] 97.8 °F (36.6 °C)  Heart Rate:  [73-82] 75  Resp:  [14-20] 14  BP: (102-125)/(52-64) 104/59  Body mass index is 32.94 kg/m².    Intake/Output Summary (Last 24 hours) at 6/9/2024 0907  Last data filed at 6/8/2024 1851  Gross per 24 hour   Intake 480 ml   Output --   Net 480 ml     No intake/output data recorded.      Wt Readings from Last 3 Encounters:   06/09/24 92.6 kg (204 lb 1.6 oz)   06/08/24 97.2 kg (214 lb 4.6 oz)   06/02/24 90 kg (198 lb 6.6 oz)       Flowsheet Rows      Flowsheet Row First Filed Value   Admission Height --   Admission Weight 92.6 kg (204 lb 1.6 oz) Documented at 06/09/2024 0600            Objective:  General Appearance:  Comfortable and in no acute distress.    Vital signs: (most recent): Blood pressure 104/59, pulse 75, temperature 97.8 °F (36.6 °C), temperature source Oral, resp. rate 14, weight 92.6 kg (204 lb 1.6 oz), SpO2 100%.  Vital signs are normal.  No fever.    Lungs:  Normal effort and normal respiratory rate.  There are rales.    Heart: Normal rate.  Regular rhythm.    Extremities: Normal range of motion.  There is dependent edema.    Neurological: Patient is alert and oriented to person, place and time.    Skin:  Warm and dry.                Results Review:        Results from last 7 days   Lab Units 06/08/24  1709 06/08/24  0503 06/07/24  0347   WBC 10*3/mm3 13.00* 15.45* 12.88*   HEMOGLOBIN g/dL 8.7* 9.3* 7.3*   HEMATOCRIT % 27.2* 30.7* 25.0*   PLATELETS 10*3/mm3 305 310 312         PT/INR:    Protime   Date Value Ref Range Status   06/08/2024 16.8 (H) 11.7 - 14.2 Seconds Final   /  INR   Date Value Ref Range Status   06/08/2024 1.34 (H) 0.90 - 1.10 Final       Results from last 7 days   Lab Units 06/09/24  7245  06/08/24  1709 06/08/24  0503   SODIUM mmol/L 133* 132* 134*   POTASSIUM mmol/L 4.6 4.5 4.9   CHLORIDE mmol/L 99 98 97*   CO2 mmol/L 24.0 21.5* 24.1   BUN mg/dL 23* 20 22*   CREATININE mg/dL 1.41* 1.51* 1.58*   GLUCOSE mg/dL 109* 140* 152*   CALCIUM mg/dL 8.3* 8.5* 8.7         Scheduled Meds:  [Held by provider] enoxaparin, 40 mg, Subcutaneous, Q24H  furosemide, 40 mg, Oral, Daily  gabapentin, 100 mg, Oral, TID  insulin lispro, 2-7 Units, Subcutaneous, 4x Daily AC & at Bedtime  metoprolol succinate XL, 25 mg, Oral, Q24H  nicotine, 1 patch, Transdermal, Q24H  potassium chloride, 10 mEq, Oral, Daily  sertraline, 25 mg, Oral, Daily  sodium chloride, 10 mL, Intravenous, Q12H  sodium chloride, 10 mL, Intravenous, Q12H  vancomycin, 750 mg, Intravenous, Q12H        Infusions:  Pharmacy to dose vancomycin,           Assessment & Plan         Acute bacterial endocarditis      Assessment & Plan    - Native tricuspid valve endocarditis, EF 50-55% (echo)--surgical eval in progress  - MRSA bacteremia/ UTI--ID following, on vanc  - Severe AI/ moderate-severe MR  - Acute HFrEF, r/t VHD, NYHA class III--proBNP > 33k  - Dilated CMO  - Polysubstance abuse--IVDA/ tobacco abuse  - PTSD--psych following  - Bipolar disorder, type 2  - CATRINA, likely r/t acute HF  - Anemia  - Tobacco abuse, 91 pack yr hx  - Poor dentition, likely periapical abscess right molar--mandible xrays  - Prolonged QTc--500 ms (6/3 EKG)  - Medical non-compliance    Patient transferred to our facility yesterday evening  Oral surgery saw patient last night, planning for surgery at noon, appreciate their help   ID consulted, on Vanc -- bld cultures on 6/2 NTD  Will consult Nephrology due to CATRINA and diuresis recommendation, consult placed to Cardiology as well   Surgery timing TBD, will possibly need a homograft     Karina Garcia PA-C  06/09/24  09:07 EDT

## 2024-06-09 NOTE — CONSULTS
Attempted Access center consult with patient regarding drug use. Pt with hx of IV fentanyl/heroin use. States it has been at least three weeks since she last used. Pt was [facility] to [facility] to 6/8 and was transferred here for oral surgery. Pt initially admitted with bacterial endocarditis. UDS was positive for benzos and barbiturates. Pt with hx of anxiety, depression, bipolar I, PTSD. Pt consulted with psychiatrist 6/5 through [facility] while admitted there . Orders placed for pt to start zoloft 25mg daily and continue gabapentin 100mg 3x daily. PRN orders available for ativan 0.5mg PO and atarax 50mg 3x daily. Pt was provided with FANG resources from  at [facility]. Pt politely declines assessment and states she feels content with the current plan of care and is hopeful for the newly prescribed medications to start working. Pt aware of delayed onset of medications. Pt declines assessment and declines need for resource information at this time. Denies SI, HI, AVH. AC will sign off per pt request. Pt aware to let staff know if she would like someone to talk to.

## 2024-06-10 ENCOUNTER — APPOINTMENT (OUTPATIENT)
Dept: GENERAL RADIOLOGY | Facility: HOSPITAL | Age: 49
DRG: 216 | End: 2024-06-10
Payer: MEDICAID

## 2024-06-10 LAB
ALBUMIN SERPL-MCNC: 2.9 G/DL (ref 3.5–5.2)
ANION GAP SERPL CALCULATED.3IONS-SCNC: 12.9 MMOL/L (ref 5–15)
APTT PPP: 24.5 SECONDS (ref 22.7–35.4)
ARTERIAL PATENCY WRIST A: ABNORMAL
ATMOSPHERIC PRESS: 745.6 MMHG
BASE EXCESS BLDA CALC-SCNC: 1.2 MMOL/L (ref 0–2)
BASOPHILS # BLD AUTO: 0.03 10*3/MM3 (ref 0–0.2)
BASOPHILS NFR BLD AUTO: 0.2 % (ref 0–1.5)
BDY SITE: ABNORMAL
BUN SERPL-MCNC: 25 MG/DL (ref 6–20)
BUN/CREAT SERPL: 16.2 (ref 7–25)
CALCIUM SPEC-SCNC: 8.7 MG/DL (ref 8.6–10.5)
CHLORIDE SERPL-SCNC: 97 MMOL/L (ref 98–107)
CHOLEST SERPL-MCNC: 97 MG/DL (ref 0–200)
CLOSE TME COLL+ADP + EPINEP PNL BLD: 67 % (ref 86–100)
CO2 BLDA-SCNC: 26.4 MMOL/L (ref 23–27)
CO2 SERPL-SCNC: 23.1 MMOL/L (ref 22–29)
CREAT SERPL-MCNC: 1.54 MG/DL (ref 0.57–1)
DEPRECATED RDW RBC AUTO: 60 FL (ref 37–54)
DEVICE COMMENT: ABNORMAL
EGFRCR SERPLBLD CKD-EPI 2021: 42 ML/MIN/1.73
EOSINOPHIL # BLD AUTO: 0 10*3/MM3 (ref 0–0.4)
EOSINOPHIL NFR BLD AUTO: 0 % (ref 0.3–6.2)
ERYTHROCYTE [DISTWIDTH] IN BLOOD BY AUTOMATED COUNT: 18.9 % (ref 12.3–15.4)
GAS FLOW AIRWAY: 2 LPM
GLUCOSE BLDC GLUCOMTR-MCNC: 111 MG/DL (ref 70–130)
GLUCOSE BLDC GLUCOMTR-MCNC: 129 MG/DL (ref 70–130)
GLUCOSE BLDC GLUCOMTR-MCNC: 150 MG/DL (ref 70–130)
GLUCOSE BLDC GLUCOMTR-MCNC: 166 MG/DL (ref 70–130)
GLUCOSE BLDC GLUCOMTR-MCNC: 167 MG/DL (ref 70–130)
GLUCOSE SERPL-MCNC: 152 MG/DL (ref 65–99)
HCO3 BLDA-SCNC: 25.3 MMOL/L (ref 22–28)
HCT VFR BLD AUTO: 30.8 % (ref 34–46.6)
HDLC SERPL-MCNC: 21 MG/DL (ref 40–60)
HEMODILUTION: NO
HGB BLD-MCNC: 9.5 G/DL (ref 12–15.9)
IMM GRANULOCYTES # BLD AUTO: 0.1 10*3/MM3 (ref 0–0.05)
IMM GRANULOCYTES NFR BLD AUTO: 0.6 % (ref 0–0.5)
INR PPP: 1.23 (ref 0.9–1.1)
LDLC SERPL CALC-MCNC: 48 MG/DL (ref 0–100)
LDLC/HDLC SERPL: 2.07 {RATIO}
LYMPHOCYTES # BLD AUTO: 1.05 10*3/MM3 (ref 0.7–3.1)
LYMPHOCYTES NFR BLD AUTO: 6.5 % (ref 19.6–45.3)
MAGNESIUM SERPL-MCNC: 1.5 MG/DL (ref 1.6–2.6)
MCH RBC QN AUTO: 27.9 PG (ref 26.6–33)
MCHC RBC AUTO-ENTMCNC: 30.8 G/DL (ref 31.5–35.7)
MCV RBC AUTO: 90.3 FL (ref 79–97)
MODALITY: ABNORMAL
MONOCYTES # BLD AUTO: 1.13 10*3/MM3 (ref 0.1–0.9)
MONOCYTES NFR BLD AUTO: 7 % (ref 5–12)
NEUTROPHILS NFR BLD AUTO: 13.92 10*3/MM3 (ref 1.7–7)
NEUTROPHILS NFR BLD AUTO: 85.7 % (ref 42.7–76)
NRBC BLD AUTO-RTO: 0.2 /100 WBC (ref 0–0.2)
PCO2 BLDA: 37.3 MM HG (ref 35–45)
PH BLDA: 7.44 PH UNITS (ref 7.35–7.45)
PHOSPHATE SERPL-MCNC: 4.9 MG/DL (ref 2.5–4.5)
PLATELET # BLD AUTO: 302 10*3/MM3 (ref 140–450)
PMV BLD AUTO: 10.4 FL (ref 6–12)
PO2 BLDA: 107.8 MM HG (ref 80–100)
POTASSIUM SERPL-SCNC: 4.6 MMOL/L (ref 3.5–5.2)
PROTHROMBIN TIME: 15.8 SECONDS (ref 11.7–14.2)
RBC # BLD AUTO: 3.41 10*6/MM3 (ref 3.77–5.28)
SAO2 % BLDCOA: 98.4 % (ref 92–98.5)
SET MECH RESP RATE: 18
SODIUM SERPL-SCNC: 133 MMOL/L (ref 136–145)
TOTAL RATE: 18 BREATHS/MINUTE
TRIGL SERPL-MCNC: 163 MG/DL (ref 0–150)
VANCOMYCIN TROUGH SERPL-MCNC: 22.3 MCG/ML (ref 5–20)
VLDLC SERPL-MCNC: 28 MG/DL (ref 5–40)
WBC NRBC COR # BLD AUTO: 16.23 10*3/MM3 (ref 3.4–10.8)

## 2024-06-10 PROCEDURE — 71045 X-RAY EXAM CHEST 1 VIEW: CPT

## 2024-06-10 PROCEDURE — B2111ZZ FLUOROSCOPY OF MULTIPLE CORONARY ARTERIES USING LOW OSMOLAR CONTRAST: ICD-10-PCS | Performed by: INTERNAL MEDICINE

## 2024-06-10 PROCEDURE — 85610 PROTHROMBIN TIME: CPT | Performed by: THORACIC SURGERY (CARDIOTHORACIC VASCULAR SURGERY)

## 2024-06-10 PROCEDURE — 99233 SBSQ HOSP IP/OBS HIGH 50: CPT | Performed by: INTERNAL MEDICINE

## 2024-06-10 PROCEDURE — 80069 RENAL FUNCTION PANEL: CPT | Performed by: DENTIST

## 2024-06-10 PROCEDURE — 80202 ASSAY OF VANCOMYCIN: CPT | Performed by: DENTIST

## 2024-06-10 PROCEDURE — 85025 COMPLETE CBC W/AUTO DIFF WBC: CPT | Performed by: DENTIST

## 2024-06-10 PROCEDURE — 25010000002 FENTANYL CITRATE (PF) 50 MCG/ML SOLUTION: Performed by: INTERNAL MEDICINE

## 2024-06-10 PROCEDURE — C1769 GUIDE WIRE: HCPCS | Performed by: INTERNAL MEDICINE

## 2024-06-10 PROCEDURE — 99232 SBSQ HOSP IP/OBS MODERATE 35: CPT | Performed by: THORACIC SURGERY (CARDIOTHORACIC VASCULAR SURGERY)

## 2024-06-10 PROCEDURE — 93454 CORONARY ARTERY ANGIO S&I: CPT | Performed by: INTERNAL MEDICINE

## 2024-06-10 PROCEDURE — 25810000003 SODIUM CHLORIDE 0.9 % SOLUTION 250 ML FLEX CONT: Performed by: INTERNAL MEDICINE

## 2024-06-10 PROCEDURE — 82948 REAGENT STRIP/BLOOD GLUCOSE: CPT

## 2024-06-10 PROCEDURE — 25010000002 VANCOMYCIN HCL 1.25 G RECONSTITUTED SOLUTION 1 EACH VIAL: Performed by: INTERNAL MEDICINE

## 2024-06-10 PROCEDURE — C1894 INTRO/SHEATH, NON-LASER: HCPCS | Performed by: INTERNAL MEDICINE

## 2024-06-10 PROCEDURE — 36600 WITHDRAWAL OF ARTERIAL BLOOD: CPT

## 2024-06-10 PROCEDURE — 99232 SBSQ HOSP IP/OBS MODERATE 35: CPT | Performed by: STUDENT IN AN ORGANIZED HEALTH CARE EDUCATION/TRAINING PROGRAM

## 2024-06-10 PROCEDURE — 80061 LIPID PANEL: CPT | Performed by: THORACIC SURGERY (CARDIOTHORACIC VASCULAR SURGERY)

## 2024-06-10 PROCEDURE — 85576 BLOOD PLATELET AGGREGATION: CPT | Performed by: THORACIC SURGERY (CARDIOTHORACIC VASCULAR SURGERY)

## 2024-06-10 PROCEDURE — 83735 ASSAY OF MAGNESIUM: CPT | Performed by: THORACIC SURGERY (CARDIOTHORACIC VASCULAR SURGERY)

## 2024-06-10 PROCEDURE — 25010000002 MIDAZOLAM PER 1 MG: Performed by: INTERNAL MEDICINE

## 2024-06-10 PROCEDURE — 85730 THROMBOPLASTIN TIME PARTIAL: CPT | Performed by: THORACIC SURGERY (CARDIOTHORACIC VASCULAR SURGERY)

## 2024-06-10 PROCEDURE — 82803 BLOOD GASES ANY COMBINATION: CPT

## 2024-06-10 PROCEDURE — 25510000001 IOPAMIDOL PER 1 ML: Performed by: INTERNAL MEDICINE

## 2024-06-10 RX ORDER — FAMOTIDINE 20 MG/1
40 TABLET, FILM COATED ORAL DAILY
Status: DISCONTINUED | OUTPATIENT
Start: 2024-06-11 | End: 2024-06-12

## 2024-06-10 RX ORDER — NITROGLYCERIN 0.4 MG/1
0.4 TABLET SUBLINGUAL
Status: DISCONTINUED | OUTPATIENT
Start: 2024-06-10 | End: 2024-06-12

## 2024-06-10 RX ORDER — FENTANYL CITRATE 50 UG/ML
INJECTION, SOLUTION INTRAMUSCULAR; INTRAVENOUS
Status: DISCONTINUED | OUTPATIENT
Start: 2024-06-10 | End: 2024-06-10 | Stop reason: HOSPADM

## 2024-06-10 RX ORDER — DIPHENHYDRAMINE HCL 25 MG
25 CAPSULE ORAL NIGHTLY PRN
Status: DISCONTINUED | OUTPATIENT
Start: 2024-06-10 | End: 2024-06-12 | Stop reason: HOSPADM

## 2024-06-10 RX ORDER — BISACODYL 5 MG/1
5 TABLET, DELAYED RELEASE ORAL DAILY PRN
Status: DISCONTINUED | OUTPATIENT
Start: 2024-06-10 | End: 2024-06-12

## 2024-06-10 RX ORDER — ACETAMINOPHEN 325 MG/1
650 TABLET ORAL EVERY 4 HOURS PRN
Status: DISCONTINUED | OUTPATIENT
Start: 2024-06-10 | End: 2024-06-10

## 2024-06-10 RX ORDER — CHLORHEXIDINE GLUCONATE ORAL RINSE 1.2 MG/ML
15 SOLUTION DENTAL ONCE
Status: COMPLETED | OUTPATIENT
Start: 2024-06-11 | End: 2024-06-11

## 2024-06-10 RX ORDER — SERTRALINE HYDROCHLORIDE 25 MG/1
25 TABLET, FILM COATED ORAL DAILY
Status: DISCONTINUED | OUTPATIENT
Start: 2024-06-10 | End: 2024-06-10

## 2024-06-10 RX ORDER — SODIUM CHLORIDE 9 MG/ML
40 INJECTION, SOLUTION INTRAVENOUS AS NEEDED
Status: DISCONTINUED | OUTPATIENT
Start: 2024-06-10 | End: 2024-06-12 | Stop reason: HOSPADM

## 2024-06-10 RX ORDER — ONDANSETRON 2 MG/ML
4 INJECTION INTRAMUSCULAR; INTRAVENOUS EVERY 6 HOURS PRN
Status: DISCONTINUED | OUTPATIENT
Start: 2024-06-10 | End: 2024-06-10

## 2024-06-10 RX ORDER — SODIUM CHLORIDE 0.9 % (FLUSH) 0.9 %
10 SYRINGE (ML) INJECTION EVERY 12 HOURS SCHEDULED
Status: DISCONTINUED | OUTPATIENT
Start: 2024-06-10 | End: 2024-06-12 | Stop reason: HOSPADM

## 2024-06-10 RX ORDER — CHLORHEXIDINE GLUCONATE 500 MG/1
1 CLOTH TOPICAL EVERY 12 HOURS PRN
Status: DISCONTINUED | OUTPATIENT
Start: 2024-06-11 | End: 2024-06-12 | Stop reason: HOSPADM

## 2024-06-10 RX ORDER — METOPROLOL SUCCINATE 50 MG/1
25 TABLET, EXTENDED RELEASE ORAL
Status: DISCONTINUED | OUTPATIENT
Start: 2024-06-10 | End: 2024-06-10

## 2024-06-10 RX ORDER — MIDAZOLAM HYDROCHLORIDE 1 MG/ML
INJECTION INTRAMUSCULAR; INTRAVENOUS
Status: DISCONTINUED | OUTPATIENT
Start: 2024-06-10 | End: 2024-06-10 | Stop reason: HOSPADM

## 2024-06-10 RX ORDER — BISACODYL 10 MG
10 SUPPOSITORY, RECTAL RECTAL DAILY PRN
Status: DISCONTINUED | OUTPATIENT
Start: 2024-06-10 | End: 2024-06-12

## 2024-06-10 RX ORDER — SODIUM CHLORIDE 9 MG/ML
75 INJECTION, SOLUTION INTRAVENOUS CONTINUOUS
Status: DISCONTINUED | OUTPATIENT
Start: 2024-06-10 | End: 2024-06-11

## 2024-06-10 RX ORDER — FUROSEMIDE 40 MG/1
40 TABLET ORAL DAILY
Status: DISCONTINUED | OUTPATIENT
Start: 2024-06-10 | End: 2024-06-10

## 2024-06-10 RX ORDER — LIDOCAINE HYDROCHLORIDE 20 MG/ML
INJECTION, SOLUTION INFILTRATION; PERINEURAL
Status: DISCONTINUED | OUTPATIENT
Start: 2024-06-10 | End: 2024-06-10 | Stop reason: HOSPADM

## 2024-06-10 RX ORDER — ACETAMINOPHEN 325 MG/1
650 TABLET ORAL EVERY 4 HOURS PRN
Status: DISCONTINUED | OUTPATIENT
Start: 2024-06-10 | End: 2024-06-12 | Stop reason: HOSPADM

## 2024-06-10 RX ORDER — VANCOMYCIN/0.9 % SOD CHLORIDE 1.5G/250ML
15 PLASTIC BAG, INJECTION (ML) INTRAVENOUS ONCE
Status: COMPLETED | OUTPATIENT
Start: 2024-06-12 | End: 2024-06-12

## 2024-06-10 RX ORDER — NALOXONE HCL 0.4 MG/ML
0.4 VIAL (ML) INJECTION
Status: DISCONTINUED | OUTPATIENT
Start: 2024-06-10 | End: 2024-06-12

## 2024-06-10 RX ORDER — NICOTINE 21 MG/24HR
1 PATCH, TRANSDERMAL 24 HOURS TRANSDERMAL EVERY 24 HOURS
Status: DISCONTINUED | OUTPATIENT
Start: 2024-06-10 | End: 2024-06-10

## 2024-06-10 RX ORDER — ALPRAZOLAM 0.25 MG/1
0.25 TABLET ORAL EVERY 8 HOURS PRN
Status: DISCONTINUED | OUTPATIENT
Start: 2024-06-10 | End: 2024-06-12 | Stop reason: HOSPADM

## 2024-06-10 RX ORDER — GABAPENTIN 100 MG/1
100 CAPSULE ORAL 3 TIMES DAILY
Status: DISCONTINUED | OUTPATIENT
Start: 2024-06-10 | End: 2024-06-10

## 2024-06-10 RX ORDER — ONDANSETRON 2 MG/ML
4 INJECTION INTRAMUSCULAR; INTRAVENOUS EVERY 6 HOURS PRN
Status: DISCONTINUED | OUTPATIENT
Start: 2024-06-10 | End: 2024-06-12

## 2024-06-10 RX ORDER — HYDROCODONE BITARTRATE AND ACETAMINOPHEN 5; 325 MG/1; MG/1
1 TABLET ORAL EVERY 4 HOURS PRN
Status: DISCONTINUED | OUTPATIENT
Start: 2024-06-10 | End: 2024-06-12

## 2024-06-10 RX ORDER — IPRATROPIUM BROMIDE AND ALBUTEROL SULFATE 2.5; .5 MG/3ML; MG/3ML
3 SOLUTION RESPIRATORY (INHALATION) EVERY 4 HOURS PRN
Status: DISCONTINUED | OUTPATIENT
Start: 2024-06-10 | End: 2024-06-12

## 2024-06-10 RX ORDER — SODIUM CHLORIDE 0.9 % (FLUSH) 0.9 %
10 SYRINGE (ML) INJECTION AS NEEDED
Status: DISCONTINUED | OUTPATIENT
Start: 2024-06-10 | End: 2024-06-12 | Stop reason: HOSPADM

## 2024-06-10 RX ORDER — ONDANSETRON 4 MG/1
4 TABLET, ORALLY DISINTEGRATING ORAL EVERY 6 HOURS PRN
Status: DISCONTINUED | OUTPATIENT
Start: 2024-06-10 | End: 2024-06-12

## 2024-06-10 RX ORDER — MORPHINE SULFATE 2 MG/ML
1 INJECTION, SOLUTION INTRAMUSCULAR; INTRAVENOUS EVERY 4 HOURS PRN
Status: DISCONTINUED | OUTPATIENT
Start: 2024-06-10 | End: 2024-06-12

## 2024-06-10 RX ORDER — AMOXICILLIN 250 MG
2 CAPSULE ORAL 2 TIMES DAILY PRN
Status: DISCONTINUED | OUTPATIENT
Start: 2024-06-10 | End: 2024-06-10

## 2024-06-10 RX ORDER — CALCIUM CARBONATE 500 MG/1
2 TABLET, CHEWABLE ORAL 2 TIMES DAILY PRN
Status: DISCONTINUED | OUTPATIENT
Start: 2024-06-10 | End: 2024-06-12

## 2024-06-10 RX ORDER — POLYETHYLENE GLYCOL 3350 17 G/17G
17 POWDER, FOR SOLUTION ORAL DAILY PRN
Status: DISCONTINUED | OUTPATIENT
Start: 2024-06-10 | End: 2024-06-12

## 2024-06-10 RX ADMIN — NICOTINE 1 PATCH: 21 PATCH, EXTENDED RELEASE TRANSDERMAL at 14:47

## 2024-06-10 RX ADMIN — Medication 10 ML: at 09:00

## 2024-06-10 RX ADMIN — GABAPENTIN 100 MG: 100 CAPSULE ORAL at 14:44

## 2024-06-10 RX ADMIN — HYDROCODONE BITARTRATE AND ACETAMINOPHEN 1 TABLET: 5; 325 TABLET ORAL at 21:05

## 2024-06-10 RX ADMIN — SERTRALINE 25 MG: 25 TABLET, FILM COATED ORAL at 14:45

## 2024-06-10 RX ADMIN — GABAPENTIN 100 MG: 100 CAPSULE ORAL at 20:00

## 2024-06-10 RX ADMIN — Medication 10 ML: at 20:00

## 2024-06-10 RX ADMIN — VANCOMYCIN HYDROCHLORIDE 1250 MG: 1.25 INJECTION, POWDER, LYOPHILIZED, FOR SOLUTION INTRAVENOUS at 16:17

## 2024-06-10 RX ADMIN — HYDROCODONE BITARTRATE AND ACETAMINOPHEN 1 TABLET: 5; 325 TABLET ORAL at 17:14

## 2024-06-10 RX ADMIN — HYDROCODONE BITARTRATE AND ACETAMINOPHEN 1 TABLET: 5; 325 TABLET ORAL at 07:29

## 2024-06-10 RX ADMIN — HYDROCODONE BITARTRATE AND ACETAMINOPHEN 1 TABLET: 5; 325 TABLET ORAL at 12:44

## 2024-06-10 RX ADMIN — METOPROLOL SUCCINATE 25 MG: 25 TABLET, EXTENDED RELEASE ORAL at 14:45

## 2024-06-10 NOTE — PLAN OF CARE
Goal Outcome Evaluation:  Plan of Care Reviewed With: (P) patient        Progress: (P) improving     Pt had heart cath 6/10/2024. R radial site C/D/I, 11 cc removed and Tr band removed @ 1739. No hematoma, bleeding, or swelling. Pt on 2 L O2, SR in the 70s, and all other VSS.CABG scheduled for 6/12/2024. Continue w/ plan of care.

## 2024-06-10 NOTE — PAYOR COMM NOTE
"This is discharge notification for James Tilley   Reference/Auth # [id number]   Pt discharged to other acute care facility ([facility]) on 6/8/24.    IRMA Landeros, RN, [facility]  Utilization Review Nurse  [facility]  Direct & confidential phone # [phone]  Fax # [phone]      James Tilley (48 y.o. Female)       Date of Birth   1975    Social Security Number       Address   [address] [place] [address] [place] [address]    Home Phone       MRN   [id number]       [place]   None    Marital Status   Legally                             Admission Date   6/3/24    Admission Type   Emergency    Admitting Provider   Bernabe Michael MD    Attending Provider       Department, Room/Bed   [facility] 2D, 269/1       Discharge Date   6/8/2024    Discharge Disposition   Another Health Care Institution Not Defined    Discharge Destination                                 Attending Provider: (none)   Allergies: Cephalexin, Latex    Isolation: None   Infection: MRSA No Isolation this Admit (06/06/24)   Code Status: CPR    Ht: 167.6 cm (66\")   Wt: 97.2 kg (214 lb 4.6 oz)    Admission Cmt: None   Principal Problem: Endocarditis [I38]                   Active Insurance as of 6/3/2024       Primary Coverage       Payor Plan Insurance Group Employer/Plan Group    [employer] [place] [employer]       Payor Plan Address Payor Plan Phone Number Payor Plan Fax Number Effective Dates    [address]   7/1/2021 - None Entered    [facility] [id number]         Subscriber Name Subscriber Birth Date Member ID       JAMES TILLEY 1975 [id number]                     Emergency Contacts        (Rel.) Home Phone Work Phone Mobile Phone    kate muhammad (Sister) -- -- [phone]                 Discharge Summary        Tashi Birmingham MD at 06/08/24 [facility]                       [facility]  Discharge Summary    Date of Service: " 24    Patient Name: Марина Tilley  : 1975  MRN: [id number]    Date of Admission: 6/3/2024  Discharge Diagnosis: Infective endocarditis with septic emboli; right molar area periapical abscess  Date of Discharge:  24    Primary Care Physician: Provider, No Known      Presenting Problem:   Endocarditis [I38]  Endocarditis, unspecified chronicity, unspecified endocarditis type [I38]    Active and Resolved Hospital Problems:  Chest pain and dyspnea  Suspect 2/2 untreated Endocarditis and Bacteremia  pulmonary edema vs LLL infiltrate   Noncompliance- pt seen at multiple hospitals and recurrent history of leaving AMA, Hx IV Fentanyl use   Hypoxia requiring oxygen supplementation  -    HFreF - outside source documentation with EF 30-35%   #Right molar area  periapical abscess-     Hypokalemia- resolved   CATRINA- improving   Anemia   Tobacco dependency  IV Fentanyl use  #Generalized anxiety disorder      Hospital Course     HPI:  Per the H&P see HPI      Hospital Course:  Марина Tilley is a 48 y.o. female with known history of IV drug use initially came to the ED on 2024 at 1530.  Per ED documentation patient was seen for chest pain and shortness of breath.  Has a known endocarditis.  She has been to multiple hospitals.  [facility] [place] [facility]  [facility].  She has been intermittently on antibiotics.  States she was discharged from the hospital but was not given any antibiotics.  She reports she had not used since before being in the hospital the last time.       Review of blood cultures from [facility] shows patient grew MRSA on 2024.  Review of echocardiogram from [facility] demonstrated vegetationson the septal leaflet of tricuspid valve, mild to moderate tricuspid regurgitation, small pericardial effusion, and EF at 30 to 35%, moderate to severe global LV hypokinesis and a severely dilated  left ventricle. Further review of records from [facility] demonstrated a white count of 15.9, hemoglobin of 10.3, a creatinine of 1.86, chest imaging that demonstrated multifocal pneumonia versus septic emboli. Reportedly signed out AGAINST MEDICAL ADVICE from other hospital before going to the [facility] based on [facility] records.   Patient was started with IV antibiotics ID team was consulted.  Patient underwent LETICIA noted to haveValve vegetation.  Also noted to have periapical abscess on x-ray.  CT surgery was consulted recommended management of periapical apical abscess before surgical intervention.  Patient transferred to [facility] for dental management of periapical abscess before surgical intervention.  Infectious disease, cardiology and CT surgery were involved in the patient's care        DISCHARGE Follow Up Recommendations for labs and diagnostics:   Transferred to [facility]      Reasons For Change In Medications and Indications for New Medications:      Day of Discharge     Vital Signs:  Temp:  [97.5 °F (36.4 °C)-98.3 °F (36.8 °C)] 97.9 °F (36.6 °C)  Heart Rate:  [80-96] 83  Resp:  [14-38] 16  BP: (110-129)/(47-60) 114/54  Flow (L/min):  [2] 2    Physical Exam:  Physical Exam   HENT:      Head: Atraumatic.   Eyes:      Pupils: Pupils are equal, round, and reactive to light.   Cardiovascular:      Rate and Rhythm: Normal rate and regular rhythm.   Pulmonary:      Effort: Pulmonary effort is normal.      Breath sounds: Normal breath sounds.   Abdominal:      General: Bowel sounds are normal.      Palpations: Abdomen is soft.   Musculoskeletal:         General: Normal range of motion.      Cervical back: Neck supple.   Skin:     General: Skin is warm.   Neurological:      General: No focal deficit present.      Mental Status: She is alert and oriented to person, place, and time.   Psychiatric:         Judgment: Judgment normal.     Pertinent  and/or Most Recent  Results     LAB RESULTS:      Lab 06/08/24  0503 06/07/24  0347 06/06/24  0330 06/05/24  0023 06/04/24  0042 06/03/24  0209 06/02/24  1349   WBC 15.45* 12.88* 12.00* 14.74* 15.59*   < >  --    HEMOGLOBIN 9.3* 7.3* 8.6* 8.1* 8.0*   < >  --    HEMATOCRIT 30.7* 25.0* 29.1* 26.4* 26.4*   < >  --    PLATELETS 310 312 252 332 386   < >  --    NEUTROS ABS 11.98* 9.89* 9.34* 11.38* 11.49*   < >  --    IMMATURE GRANS (ABS) 0.15* 0.12* 0.08* 0.16* 0.14*   < >  --    LYMPHS ABS 1.66 1.40 1.54 1.70 2.38   < >  --    MONOS ABS 1.45* 1.32* 0.91* 1.38* 1.40*   < >  --    EOS ABS 0.10 0.09 0.07 0.03 0.10   < >  --    MCV 94.5 94.3 93.3 91.0 91.3   < >  --    LACTATE  --   --   --   --   --   --  1.5    < > = values in this interval not displayed.         Lab 06/08/24  0503 06/07/24  0347 06/06/24  1002 06/06/24  0330 06/05/24  0023 06/04/24  0042   SODIUM 134* 134*  --  133* 135* 137   POTASSIUM 4.9 4.8 4.6 5.5* 4.5 4.3   CHLORIDE 97* 100  --  100 100 99   CO2 24.1 23.2  --  20.1* 20.9* 24.7   ANION GAP 12.9 10.8  --  12.9 14.1 13.3   BUN 22* 19  --  18 23* 20   CREATININE 1.58* 1.41*  --  1.35* 1.44* 1.71*   EGFR 40.7* 46.7*  --  49.2* 45.5* 37.0*   GLUCOSE 152* 138*  --  138* 142* 165*   CALCIUM 8.7 8.5*  --  8.3* 7.5* 7.8*         Lab 06/04/24  0042 06/03/24  0209 06/02/24  1344   TOTAL PROTEIN 7.5 7.8 8.0   ALBUMIN 3.1* 3.2* 3.3*   GLOBULIN 4.4 4.6 4.7   ALT (SGPT) 32 27 32   AST (SGOT) 32 23 23   BILIRUBIN 0.3 0.3 0.3   ALK PHOS 107 111 117         Lab 06/03/24  0417 06/03/24  0209 06/02/24  1344   PROBNP  --   --  33,058.0*   HSTROP T 43* 44* 37*                 Brief Urine Lab Results       None          Microbiology Results (last 10 days)       Procedure Component Value - Date/Time    Blood Culture - Blood, Arm, Right [[id number]]  (Normal) Collected: 06/02/24 1356    Lab Status: Preliminary result Specimen: Blood from Arm, Right Updated: 06/07/24 0934     Blood Culture Culture in progress    Narrative:      Less than seven  (7) mL's of blood was collected.  Insufficient quantity may yield false negative results.  No growth at 5 days    Blood Culture - Blood, Arm, Left [[id number]]  (Normal) Collected: 06/02/24 1344    Lab Status: Preliminary result Specimen: Blood from Arm, Left Updated: 06/07/24 0933     Blood Culture Culture in progress    Narrative:      No growth at 5 days            XR Mandible < 4 View    Result Date: 6/5/2024  Impression: Impression: There is a large cavity involving the remnant right molar tooth, with lucency adjacent to the root suggesting periapical abscess Electronically Signed: Alfa Walker  6/5/2024 7:20 PM EDT  Workstation ID: [id number]    MRI Lumbar Spine Without Contrast    Result Date: 6/4/2024  Impression: 1. The study is mildly degraded by motion. 2. No MR evidence of discitis or osteomyelitis. 3. No high-grade lumbar canal stenosis or high-grade neural foraminal stenosis is evident. Electronically Signed: Migdalia Godoy MD  6/4/2024 11:04 AM EDT  Workstation ID: [id number]    MRI Thoracic Spine Without Contrast    Result Date: 6/4/2024  Impression: Impression: Somewhat motion degraded exam demonstrating no specific findings to suggest osteomyelitis or discitis in the thoracic spine. Electronically Signed: Glen Hartman MD  6/4/2024 8:45 AM EDT  Workstation ID: [id number]    Adult Transthoracic Echo Complete W/ Cont if Necessary Per Protocol    Addendum Date: 6/3/2024      Left ventricular systolic function is mildly decreased. Left ventricular ejection fraction appears to be 51 - 55%.   Left ventricular diastolic function was normal.   There is calcification of the aortic valve.   Moderate to severe aortic valve regurgitation is present.   Moderate to severe mitral valve regurgitation is present.   There is echodensity attached to septal leaflet of tricuspid valve measuring 1.2 x 1.5 cm.   Recommend LETICIA to assess echodensity and mitral and aortic valve regurgitation.  If clinically indicated.    Recommend modified Duke criteria for clinical diagnosis of bacterial endocarditis     CT Chest Without Contrast Diagnostic    Result Date: 6/3/2024  Impression: 1. Multifocal patchy peripheral nodular densities are scattered throughout both lungs, some which have a cavitary component, which would correspond to the provided clinical suspicion of septic emboli. 2. Incompletely imaged at 8 cm low-density within the mid spleen, suspicious for splenic infarct. 3. Small bilateral pleural effusions. 4. Dense posterior bibasilar airspace disease is nonspecific and may reflect changes of atelectasis or pneumonia. 5. Mild cardiomegaly. Small concentric pericardial effusion. 6. Enlarged bilateral hilar and mediastinal lymph nodes are nonspecific and may be reactive. Attention at CT chest follow-up is recommended Electronically Signed: Migdalia Godoy MD  6/3/2024 2:40 PM EDT  Workstation ID: [id number]    XR Chest 1 View    Result Date: 6/2/2024  Impression: Vascular congestion. Left lower lobe infiltrate may represent developing pulmonary edema or possibly pneumonia depending on the clinical setting. Electronically Signed: Onel Diaz MD  6/2/2024 2:27 PM EDT  Workstation ID: [id number]             Results for orders placed during the hospital encounter of 06/03/24    Adult Transesophageal Echo (LETICIA) W/ Cont if Necessary Per Protocol    Interpretation Summary    Left ventricular systolic function is moderately decreased. Left ventricular ejection fraction appears to be 41 - 45%.    Left ventricular diastolic function was normal.    The left atrial cavity is mild to moderately dilated.    Saline test results are negative.    Severe aortic valve regurgitation is present.  There is healed vegetation on left coronary cusp which is prolapsing and causing eccentric aortic regurgitation jet.    Abnormal mitral valve structure consistent with dilated annulus.    Moderate to severe mitral valve regurgitation is present.    Moderate to  severe tricuspid valve regurgitation is present.    There is a large mobile mass on the tricuspid valve affecting the anterior leaflet that is consistent with a vegetation.    There is a trivial pericardial effusion. There is no evidence of cardiac tamponade.    Recommend modified Duke criteria for clinical diagnosis of bacterial endocarditis      Labs Pending at Discharge:      Procedures Performed           Consults:   Consults       Date and Time Order Name Status Description    6/4/2024  5:14 PM Inpatient Cardiothoracic Surgery Consult      6/4/2024 10:31 AM Inpatient Psychiatrist Consult Completed     6/3/2024  2:10 AM Inpatient Cardiology Consult Completed     6/3/2024  1:57 AM Inpatient Infectious Diseases Consult Completed     6/3/2024  1:41 AM Inpatient Hospitalist Consult                Discharge Details        Discharge Medications        New Medications        Instructions Start Date   acetaminophen 325 MG tablet  Commonly known as: TYLENOL   650 mg, Oral, Every 4 Hours PRN      bisacodyl 5 MG EC tablet  Commonly known as: DULCOLAX   5 mg, Oral, Daily PRN      bisacodyl 10 MG suppository  Commonly known as: DULCOLAX   10 mg, Rectal, Daily PRN      calcium carbonate 500 MG chewable tablet  Commonly known as: TUMS   2 tablets, Oral, 2 Times Daily PRN      dicyclomine 10 MG capsule  Commonly known as: BENTYL   10 mg, Oral, 3 Times Daily PRN      famotidine 40 MG tablet  Commonly known as: PEPCID   40 mg, Oral, Daily      furosemide 40 MG tablet  Commonly known as: LASIX   40 mg, Oral, Daily      gabapentin 100 MG capsule  Commonly known as: NEURONTIN   100 mg, Oral, 3 Times Daily      hydrOXYzine 50 MG tablet  Commonly known as: ATARAX   50 mg, Oral, 3 Times Daily PRN      ipratropium-albuterol 0.5-2.5 mg/3 ml nebulizer  Commonly known as: DUO-NEB   3 mL, Nebulization, Every 4 Hours PRN      metoprolol succinate XL 25 MG 24 hr tablet  Commonly known as: TOPROL-XL   25 mg, Oral, Every 24 Hours Scheduled       nicotine 21 MG/24HR patch  Commonly known as: NICODERM CQ   1 patch, Transdermal, Every 24 Hours Scheduled      ondansetron 2 mg/mL injection  Commonly known as: ZOFRAN   4 mg, Intravenous, Every 6 Hours PRN      polyethylene glycol 17 g packet  Commonly known as: MIRALAX   17 g, Oral, Daily PRN      sennosides-docusate 8.6-50 MG per tablet  Commonly known as: PERICOLACE   2 tablets, Oral, 2 Times Daily PRN      sertraline 25 MG tablet  Commonly known as: ZOLOFT   25 mg, Oral, Daily      vancomycin 750 mg in sodium chloride 0.9 % 250 mL IVPB   750 mg, Intravenous, Every 12 Hours             Continue These Medications        Instructions Start Date   albuterol sulfate  (90 Base) MCG/ACT inhaler  Commonly known as: PROVENTIL HFA;VENTOLIN HFA;PROAIR HFA   2 puffs, Inhalation, Every 4 Hours PRN               Allergies   Allergen Reactions    Cephalexin Anaphylaxis     Required intubation    Latex Hives         Discharge Disposition:   Another Health Care Institution Not Defined    Diet:  Hospital:  Diet Order   Procedures    Diet: Cardiac; Healthy Heart (2-3 Na+); Fluid Consistency: Thin (IDDSI 0)         Discharge Activity:   as tolerated       CODE STATUS:  Code Status and Medical Interventions:   Ordered at: 06/03/24 0151     Code Status (Patient has no pulse and is not breathing):    CPR (Attempt to Resuscitate)     Medical Interventions (Patient has pulse or is breathing):    Full Support         Future Appointments   Date Time Provider Department Center   8/29/2024  3:00 PM Adams Thomson MD [facility]           Time spent on Discharge including face to face service:  >30 minutes    Signature: Electronically signed by Tashi Birmingham MD, 06/08/24, 11:05 EDT.  [facility] Hospitalist Team     Electronically signed by Tashi Birmingham MD at 06/08/24 1111

## 2024-06-10 NOTE — PROGRESS NOTES
LOS: 2 days     Chief Complaint: MRSA endocarditis    Interval History: Patient reports doing well this morning.  Status post total mouth dental extraction yesterday.  States he tolerated this well.  Tolerating antibiotics.    Vital Signs  Temp:  [97.4 °F (36.3 °C)-98.2 °F (36.8 °C)] 98.1 °F (36.7 °C)  Heart Rate:  [71-77] 73  Resp:  [16-18] 18  BP: (100-120)/(51-78) 106/55    Physical Exam:  General: In no acute distress  HEENT: Oropharynx clear, moist mucous membranes  Respiratory: Normal work of breathing  Skin: Multiple tattoos  Extremities: No edema, cyanosis  Access: Peripheral IV    Antibiotics:  Anti-Infectives (From admission, onward)      Ordered     Dose/Rate Route Frequency Start Stop    06/10/24 0856  Vancomycin HCl 1,250 mg in sodium chloride 0.9 % 250 mL VTB        Note to Pharmacy: Dose please   Ordering Provider: Sergio Zaidi DMD    1,250 mg  200 mL/hr over 75 Minutes Intravenous Every 24 Hours 06/10/24 1600 06/23/24 1559    06/08/24 1612  Pharmacy to dose vancomycin        Ordering Provider: Sergio Zaidi DMD     Does not apply Continuous PRN 06/08/24 1612 06/22/24 1611             Results Review:     I reviewed the patient's new clinical results.    Lab Results   Component Value Date    WBC 16.23 (H) 06/10/2024    HGB 9.5 (L) 06/10/2024    HCT 30.8 (L) 06/10/2024    MCV 90.3 06/10/2024     06/10/2024     Lab Results   Component Value Date    GLUCOSE 152 (H) 06/10/2024    BUN 25 (H) 06/10/2024    CREATININE 1.54 (H) 06/10/2024    EGFRIFAFRI >60 07/22/2021    BCR 16.2 06/10/2024    CO2 23.1 06/10/2024    CALCIUM 8.7 06/10/2024    ALBUMIN 2.9 (L) 06/10/2024    LABIL2 0.9 (L) 07/22/2021     (H) 06/08/2024     (H) 06/08/2024       Microbiology:  6/2 blood cultures no growth  6/9 COVID-negative    Outside hospital blood cultures  5/9 3 out of 3 MRSA  5/11 1 out of 1 MRSA    Assessment    #MRSA tricuspid valve endocarditis  #MRSA septicemia, blood cultures  cleared  #IVDU  #Septic pulmonary emboli  #Right molar periapical abscess status post home health tooth extraction 6/9  #Acute hypoxic respiratory failure  #CATRINA  #Splenic infarct, likely septic    Continue IV vancomycin goal -600.  Appreciate pharmacy's help with dosing.  Continue to follow levels for therapeutic drug monitoring.    ID will follow.

## 2024-06-10 NOTE — DISCHARGE PLACEMENT REQUEST
"James Mi (48 y.o. Female)       Date of Birth   1975    Social Security Number       Address   [address] [place] [address] [place] [address]    Home Phone       MRN   [id number]       Holiness   None    Marital Status   Legally                             Admission Date   6/8/24    Admission Type   Urgent    Admitting Provider   Eric Skaggs MD    Attending Provider   Eric Skaggs MD    Department, Room/Bed   [facility] CARDIOVASCULAR OUTPATIENT, CVOP Pool/NONE       Discharge Date       Discharge Disposition       Discharge Destination                                 Attending Provider: Eric Skaggs MD    Allergies: Cephalexin, Latex    Isolation: None   Infection: MRSA/History Only (06/10/24)   Code Status: CPR    Ht: 167.6 cm (66\")   Wt: 91.9 kg (202 lb 8 oz)    Admission Cmt: None   Principal Problem: Acute bacterial endocarditis [I33.0]                   Active Insurance as of 6/8/2024       Primary Coverage       Payor Plan Insurance Group Employer/Plan Group    [facility] MEDICAID [facility]       Payor Plan Address Payor Plan Phone Number Payor Plan Fax Number Effective Dates    [address]   7/1/2021 - None Entered    [facility] [id number]         Subscriber Name Subscriber Birth Date Member ID       JAMES MI 1975 [id number]                     Emergency Contacts        (Rel.) Home Phone Work Phone Mobile Phone    kate muhammad (Sister) -- -- [phone]                "

## 2024-06-10 NOTE — PROGRESS NOTES
[facility] Progress Note      Patient Name: Марина Tilley  : 1975  MRN: [id number]  Primary Care Physician:  Provider, No Known  Date of admission: 2024    Subjective     Interval History:   F/u CATRINA     Review of Systems:   I/o 880 /1600+  Wt down 2 lbs to 202 by standing scale  SBP low 100s  CXR pending today   Less dyspnea but still +orthopnea   Going for [facility] today    Objective     Vitals:   Temp:  [97.4 °F (36.3 °C)-98.2 °F (36.8 °C)] 98.2 °F (36.8 °C)  Heart Rate:  [71-77] 71  Resp:  [14-18] 16  BP: (100-120)/(51-78) 100/58  Flow (L/min):  [2-4] 2    Intake/Output Summary (Last 24 hours) at 6/10/2024 0627  Last data filed at 2024 2330  Gross per 24 hour   Intake 880 ml   Output 1600 ml   Net -720 ml       Physical Exam:    General Appearance: chronically ill appearing WF no distress  Neck: supple, no JVD  Lungs: Dec BS no rales  Heart: RRR, normal S1 and S2  Abdomen: soft, nontender, nondistended  Extremities: no edema, cyanosis or clubbing       Scheduled Meds:     enoxaparin, 40 mg, Subcutaneous, Q24H  furosemide, 80 mg, Intravenous, BID  gabapentin, 100 mg, Oral, TID  insulin lispro, 2-7 Units, Subcutaneous, 4x Daily AC & at Bedtime  metoprolol succinate XL, 25 mg, Oral, Q24H  nicotine, 1 patch, Transdermal, Q24H  potassium chloride, 10 mEq, Oral, Daily  sertraline, 25 mg, Oral, Daily  sodium chloride, 10 mL, Intravenous, Q12H  sodium chloride, 10 mL, Intravenous, Q12H  vancomycin, 750 mg, Intravenous, Q12H      IV Meds:   lactated ringers, 9 mL/hr, Last Rate: 9 mL/hr (24 1415)  Pharmacy to dose vancomycin,         Results Reviewed:   I have personally reviewed the results from the time of this admission to 6/10/2024 06:27 EDT     Results from last 7 days   Lab Units 24  0432 24  1709 24  0503 24  0023 24  0042   SODIUM mmol/L 133* 132* 134*   < > 137   POTASSIUM mmol/L 4.6 4.5 4.9   < > 4.3   CHLORIDE mmol/L 99 98 97*   < > 99    CO2 mmol/L 24.0 21.5* 24.1   < > 24.7   BUN mg/dL 23* 20 22*   < > 20   CREATININE mg/dL 1.41* 1.51* 1.58*   < > 1.71*   CALCIUM mg/dL 8.3* 8.5* 8.7   < > 7.8*   BILIRUBIN mg/dL  --  0.4  --   --  0.3   ALK PHOS U/L  --  235*  --   --  107   ALT (SGPT) U/L  --  209*  --   --  32   AST (SGOT) U/L  --  234*  --   --  32   GLUCOSE mg/dL 109* 140* 152*   < > 165*    < > = values in this interval not displayed.     Estimated Creatinine Clearance: 56.9 mL/min (A) (by C-G formula based on SCr of 1.41 mg/dL (H)).          Results from last 7 days   Lab Units 06/08/24  1709 06/08/24  0503 06/07/24  0347 06/06/24  0330 06/05/24  0023   WBC 10*3/mm3 13.00* 15.45* 12.88* 12.00* 14.74*   HEMOGLOBIN g/dL 8.7* 9.3* 7.3* 8.6* 8.1*   PLATELETS 10*3/mm3 305 310 312 252 332     Results from last 7 days   Lab Units 06/08/24  1709   INR  1.34*       Assessment / Plan     ASSESSMENT:  Non olig CATRINA - Cr been stable 1.3 to 1.5 range last few days (peak 1.7).  May represent prerenal azotemia due to CHF decompensation, or infectious GN in relation to endocarditis (UA: trace protein + mod blood).  Renal fcn already abnormal before vanc started.  Need labs today precath (following diuresis).  Mild hypervolemic hyponatremia  Vol overload - mild periph edema & orthopnea present.  Alb low 3.0.  On 2L O2.  Repeat CXR shows mild pulm edema still and small pleural effusions.  Switched lasix PO to IV yesterday with decent response .   Hyperkalemia, resolved, K 5.5 few days ago, 4.6 yesterday, on sched (low dose) KCL with diuretic  MRSA TV endocarditis + septicemia, septic pulm emboli, splenic infarct.  On vancomycin, had slightly high trough few days ago WBC 13K  Dilated cardiomyopathy, EF 41 to 45% by LETICIA, with mod to severe MR/TR  Right molar abscess - seen by OMFS and plan teeth extraction   Mood disorder, seen by psychiatry   Hx IVDU   Anemia, hgb 8.7    PLAN:  Hold IV lasix today with plan for cardiac cath - plan to resume tomorrow  Still need  to draw labs, d/w RN      Jaron Miller MD  06/10/24  06:27 EDT    [facility]  [phone]

## 2024-06-10 NOTE — PROGRESS NOTES
[facility] Clinical Pharmacy Services: Vancomycin Monitoring Note    Марина Tilley is a 48 y.o. female who is on day 2/14 of pharmacy (continuation of therapy from [facility]) to dose vancomycin for MRSA septicemia and MRSA tricuspid valve endocarditis.    ID Recommendations: Patient is going to need 6 weeks of antibiotic therapy after valvular replacement for endocarditis. Stress abstinence from IV drug abuse.     Previous Vancomycin Dose: 750 mg IV every 12 hours    Updated Cultures and Sensitivities:   5/9: BCx-MRSA (Guido)  5/11: BCx-MRSA (Guido)  6/2: BCx-negative  6/4: LETICIA-large mobile mass on tricuspid valve consistent with vegetation    Results from last 7 days   Lab Units 06/10/24  0800 06/08/24  1141 06/06/24  1002 06/05/24  0023   VANCOMYCIN RM mcg/mL  --  19.70  --  27.40   VANCOMYCIN TR mcg/mL 22.30*  --  23.20*  --      Vitals/Labs  Ht:  ; Wt: 91.9 kg (202 lb 8 oz)   Temp Readings from Last 1 Encounters:   06/10/24 98.1 °F (36.7 °C) (Oral)     Estimated Creatinine Clearance: 52.1 mL/min (A) (by C-G formula based on SCr of 1.54 mg/dL (H)).     Results from last 7 days   Lab Units 06/10/24  0800 06/09/24  0432 06/08/24  1709 06/08/24  0503   CREATININE mg/dL 1.54* 1.41* 1.51* 1.58*   WBC 10*3/mm3 16.23*  --  13.00* 15.45*     Assessment/Plan  Trough resulted greater than 20 mcg/L    Current Vancomycin Dose: 750 mg IV every 12 hours; provides a predicted  mg/L.hr  will adjust to 1250 mg IV q24h for new predicted AUC  = 536  Next Level Date and Time: Vanc Trough is scheduled for 6/12 at 1200.  We will continue to monitor patient changes and renal function-SCr decreased overnight from 1.51 to 1.41. BMP has been ordered daily to trend renal function while the patient is on vancomycin.     Thank you for involving pharmacy in this patient's care. Please contact pharmacy with any questions or concerns.       Alfredo Weaver [facility]

## 2024-06-10 NOTE — CASE MANAGEMENT/SOCIAL WORK
Continued Stay Note  [facility]     Patient Name: Марина Tilley  MRN: [id number]  Today's Date: 6/10/2024    Admit Date: 6/8/2024    Plan: Needs screen. In cath lab. Patient has been accepted at [facility] with precert good through 6/19/2024   Discharge Plan       Row Name 06/10/24 1210       Plan    Plan Needs screen. In cath lab. Patient has been accepted at [facility] with precert good through 6/19/2024    Plan Comments Patient needs screen and SDOH. She is surrently in cath lab . Patient has been accepted to [facility] with precert good through 6/19/2024 per kita Suggs at [phone].Will need transport due to drug abuse HX                               Ema Williamson RN

## 2024-06-10 NOTE — PROGRESS NOTES
LOS: 2 days   Patient Care Team:  Provider, No Known as PCP - General    Chief Complaint: Shortness of breath      Subjective  No complaints. States her dental extraction went well yesterday and her cardiac cath went well today.    Objective     Vital Signs  Temp:  [97.4 °F (36.3 °C)-98.2 °F (36.8 °C)] 98.1 °F (36.7 °C)  Heart Rate:  [71-77] 73  Resp:  [16-18] 18  BP: (100-120)/(51-78) 106/55  Body mass index is 32.68 kg/m².    Intake/Output Summary (Last 24 hours) at 6/10/2024 1047  Last data filed at 6/10/2024 0828  Gross per 24 hour   Intake 880 ml   Output 2000 ml   Net -1120 ml     I/O this shift:  In: -   Out: 400 [Urine:400]      Wt Readings from Last 3 Encounters:   06/10/24 91.9 kg (202 lb 8 oz)   06/08/24 97.2 kg (214 lb 4.6 oz)   06/02/24 90 kg (198 lb 6.6 oz)       Flowsheet Rows      Flowsheet Row First Filed Value   Admission Height --   Admission Weight 92.6 kg (204 lb 1.6 oz) Documented at 06/09/2024 0600            Objective:  General Appearance:  Comfortable, well-appearing, in no acute distress and not in pain (Sitting up in bed).    Vital signs: (most recent): Blood pressure 108/50, pulse 77, temperature 98 °F (36.7 °C), temperature source Oral, resp. rate 18, weight 91.9 kg (202 lb 8 oz), SpO2 100%.  Vital signs are normal.  No fever.    Output: Producing urine.    Lungs:  Normal effort and normal respiratory rate.  Breath sounds clear to auscultation.    Heart: Normal rate.  Regular rhythm.  Positive for murmur.    Abdomen: Abdomen is soft.    Extremities: Normal range of motion.    Neurological: Patient is alert and oriented to person, place and time.    Skin:  Warm and dry.                Results Review:        Results from last 7 days   Lab Units 06/10/24  0800 06/08/24  1709 06/08/24  0503   WBC 10*3/mm3 16.23* 13.00* 15.45*   HEMOGLOBIN g/dL 9.5* 8.7* 9.3*   HEMATOCRIT % 30.8* 27.2* 30.7*   PLATELETS 10*3/mm3 302 305 310         PT/INR:    Protime   Date Value Ref Range Status    06/08/2024 16.8 (H) 11.7 - 14.2 Seconds Final   /  INR   Date Value Ref Range Status   06/08/2024 1.34 (H) 0.90 - 1.10 Final       Results from last 7 days   Lab Units 06/10/24  0800 06/09/24  0432 06/08/24  1709   SODIUM mmol/L 133* 133* 132*   POTASSIUM mmol/L 4.6 4.6 4.5   CHLORIDE mmol/L 97* 99 98   CO2 mmol/L 23.1 24.0 21.5*   BUN mg/dL 25* 23* 20   CREATININE mg/dL 1.54* 1.41* 1.51*   GLUCOSE mg/dL 152* 109* 140*   CALCIUM mg/dL 8.7 8.3* 8.5*         Scheduled Meds:  enoxaparin, 40 mg, Subcutaneous, Q24H  gabapentin, 100 mg, Oral, TID  insulin lispro, 2-7 Units, Subcutaneous, 4x Daily AC & at Bedtime  metoprolol succinate XL, 25 mg, Oral, Q24H  nicotine, 1 patch, Transdermal, Q24H  potassium chloride, 10 mEq, Oral, Daily  sertraline, 25 mg, Oral, Daily  sodium chloride, 10 mL, Intravenous, Q12H  sodium chloride, 10 mL, Intravenous, Q12H  vancomycin, 1,250 mg, Intravenous, Q24H        Infusions:  lactated ringers, 9 mL/hr, Last Rate: 9 mL/hr (06/09/24 1415)  Pharmacy to dose vancomycin,         Assessment & Plan    - Native tricuspid valve endocarditis, EF 50-55% (echo)--surgical eval in progress  - MRSA bacteremia/ UTI--ID following, on vanc  - Severe AI/ moderate-severe MR  - Acute HFrEF, r/t VHD, NYHA class III--proBNP > 33k  - Dilated CMO  - Polysubstance abuse--IVDA/ tobacco abuse  - PTSD--psych following  - Bipolar disorder, type 2  - CATRINA, likely r/t acute HF  - Anemia  - Tobacco abuse, 91 pack yr hx  - Poor dentition, likely periapical abscess right molar--s/p dental extraction 6/9  - Prolonged QTc--500 ms (6/3 EKG)  - Medical non-compliance      Patient underwent successful dental extraction yesterday. Cardiac catheterization performed today reveals normal coronary arteries. Plan for AVR/MVR/TVR Wednesday morning with Dr. Skaggs. Pre-op orders have been placed.       ALEXYS Gonzalez  06/10/24  10:47 EDT  Addendum  Patient was seen and examined by me, agree with the findings above.  I have  reviewed and interpreted the cardiac cath myself.  Plan for mitral valve repair, tricuspid valvular replacement and palpation.  I quoted an operative mortality less than 2% and risk complications over 10%.  Plan is to decrease narcotic usage after surgery and transition to nonnarcotics to prevent worsening of her addiction.  Eric Skaggs MD

## 2024-06-10 NOTE — PROGRESS NOTES
[facility]    Patient Name: Марина Tilley  :1975  48 y.o.  LOS: 2  Encounter Provider: Darci Boyce MD      Patient Care Team:  Provider, No Known as PCP - General    Chief Complaint/Consult question: Endocarditis, congestive heart failure    PMH/HPI: 48-year-old female with history of IV fentanyl abuse and poor dentition. Patient was initially admitted to [facility] where TTE and LETICIA showed large vegetation on the tricuspid valve along with evidence of endocarditis of the aortic valve (noncoronary cusp involvement despite reports stating left cusp) resulting in moderate-severe AR and severe TR as well as MR of unclear etiology. Patient was subsequently transferred to [facility] for further care.  Additional workup was notable for MRSA bacteremia as well as imaging evidence of septic pulmonary emboli and splenic infarction.    Interval History: Patient underwent multiple teeth extraction yesterday by oral surgery.  No acute events overnight, breathing somewhat improved although she still cannot lay flat, plan for cath today as part of preoperative evaluation.       Objective   Vital Signs  Temp:  [97.4 °F (36.3 °C)-98.2 °F (36.8 °C)] 98.1 °F (36.7 °C)  Heart Rate:  [71-77] 73  Resp:  [16-18] 18  BP: (100-120)/(51-78) 106/55    Intake/Output Summary (Last 24 hours) at 6/10/2024 1025  Last data filed at 6/10/2024 0828  Gross per 24 hour   Intake 880 ml   Output 2000 ml   Net -1120 ml     Flowsheet Rows      Flowsheet Row First Filed Value   Admission Height --   Admission Weight 92.6 kg (204 lb 1.6 oz) Documented at 2024 0600              Vitals and nursing note reviewed.   Constitutional:       Appearance: Not in distress. Chronically ill-appearing.   Neck:      Vascular: No JVR.   Pulmonary:      Effort: Pulmonary effort is normal.      Breath sounds: Normal breath sounds. No wheezing. No rhonchi. No rales.   Cardiovascular:      Normal rate. Regular rhythm.      " Murmurs: There is no murmur.   Edema:     Peripheral edema absent.   Abdominal:      General: There is no distension.      Palpations: Abdomen is soft.      Tenderness: There is no abdominal tenderness.   Musculoskeletal:      Cervical back: Neck supple. Skin:     General: Skin is cool.   Neurological:      Mental Status: Alert and oriented to person, place and time.           Pertinent Test Results:  Results from last 7 days   Lab Units 06/10/24  0800 06/09/24  0432 06/08/24  1709 06/08/24  0503 06/07/24  0347 06/06/24  1002 06/06/24  0330 06/05/24  0023 06/04/24  0042   SODIUM mmol/L 133* 133* 132* 134* 134*  --  133* 135* 137   POTASSIUM mmol/L 4.6 4.6 4.5 4.9 4.8 4.6 5.5* 4.5 4.3   CHLORIDE mmol/L 97* 99 98 97* 100  --  100 100 99   CO2 mmol/L 23.1 24.0 21.5* 24.1 23.2  --  20.1* 20.9* 24.7   BUN mg/dL 25* 23* 20 22* 19  --  18 23* 20   CREATININE mg/dL 1.54* 1.41* 1.51* 1.58* 1.41*  --  1.35* 1.44* 1.71*   GLUCOSE mg/dL 152* 109* 140* 152* 138*  --  138* 142* 165*   CALCIUM mg/dL 8.7 8.3* 8.5* 8.7 8.5*  --  8.3* 7.5* 7.8*   AST (SGOT) U/L  --   --  234*  --   --   --   --   --  32   ALT (SGPT) U/L  --   --  209*  --   --   --   --   --  32     Results from last 7 days   Lab Units 06/04/24  0042   CK TOTAL U/L 64     Results from last 7 days   Lab Units 06/10/24  0800 06/08/24  1709 06/08/24  0503 06/07/24  0347 06/06/24  0330 06/05/24  0023 06/04/24  0042   WBC 10*3/mm3 16.23* 13.00* 15.45* 12.88* 12.00* 14.74* 15.59*   HEMOGLOBIN g/dL 9.5* 8.7* 9.3* 7.3* 8.6* 8.1* 8.0*   HEMATOCRIT % 30.8* 27.2* 30.7* 25.0* 29.1* 26.4* 26.4*   PLATELETS 10*3/mm3 302 305 310 312 252 332 386     Results from last 7 days   Lab Units 06/08/24  1709   INR  1.34*               Invalid input(s): \"LDLCALC\"  Results from last 7 days   Lab Units 06/09/24  0432   PROBNP pg/mL 16,030.0*               Medication Review:   enoxaparin, 40 mg, Subcutaneous, Q24H  gabapentin, 100 mg, Oral, TID  insulin lispro, 2-7 Units, Subcutaneous, 4x " Daily AC & at Bedtime  metoprolol succinate XL, 25 mg, Oral, Q24H  nicotine, 1 patch, Transdermal, Q24H  potassium chloride, 10 mEq, Oral, Daily  sertraline, 25 mg, Oral, Daily  sodium chloride, 10 mL, Intravenous, Q12H  sodium chloride, 10 mL, Intravenous, Q12H  vancomycin, 1,250 mg, Intravenous, Q24H         lactated ringers, 9 mL/hr, Last Rate: 9 mL/hr (06/09/24 1415)  Pharmacy to dose vancomycin,         Assessment & Plan     Active Hospital Problems    Diagnosis  POA    **Acute bacterial endocarditis [I33.0]  Yes      Resolved Hospital Problems   No resolved problems to display.        Endocarditis/CHF: Patient reports 1 month of progressive symptoms of chest pain and CHF.  Prior evaluation included TTE and LETICIA at [place]; images independently reviewed by me, which showed large vegetation of the tricuspid valve (septal leaflet) as well as (? healing) endocarditis of the aortic valve (noncoronary cusp despite report stating left coronary cusp). Patient had evidence of MRSA bacteremia from 5/2024 (now cleared) and septic pulmonary emboli and splenic infarct on CT but no clinical evidence of stroke.  Patient is displaying signs of angina and progressive congestive heart failure in the setting of severe valvular regurgitation (AR, TR, and MR even though mitral valve appears grossly unaffected) as well as mild-moderately depressed EF.  Holding IV diuretic per nephrology today given rising creatinine.  Plan for cath today.  Appreciate input by cardiothoracic surgery, as patient likely will require surgical intervention.  IV antibiotics per ID, appreciate input.  Poor dentition: Likely source of her endocarditis, s/p teeth extraction yesterday by oral surgery.  Polysubstance abuse: IV fentanyl abuse since age 16.  Bipolar disorder  Hyponatremia  Anemia    High risk due to severe endocarditis with evidence of embolic disease to the spleen and lungs in the setting of IV drug abuse and anemia, age, frailty, multiple  comorbodities predisposing to major adverse events including myocardial infarction, stroke, hospitalization, and multi-agent drug therapy requiring intensive monitoring for toxicity (cast today with likely valve surgery in the near future)      Darci Boyce MD  [facility]  06/10/24  10:25 EDT

## 2024-06-11 ENCOUNTER — APPOINTMENT (OUTPATIENT)
Dept: RESPIRATORY THERAPY | Facility: HOSPITAL | Age: 49
DRG: 216 | End: 2024-06-11
Payer: MEDICAID

## 2024-06-11 ENCOUNTER — ANESTHESIA EVENT (OUTPATIENT)
Dept: PERIOP | Facility: HOSPITAL | Age: 49
DRG: 216 | End: 2024-06-11
Payer: MEDICAID

## 2024-06-11 ENCOUNTER — APPOINTMENT (OUTPATIENT)
Dept: CARDIOLOGY | Facility: HOSPITAL | Age: 49
DRG: 216 | End: 2024-06-11
Payer: MEDICAID

## 2024-06-11 LAB
ABO GROUP BLD: NORMAL
ALBUMIN SERPL-MCNC: 3 G/DL (ref 3.5–5.2)
ANION GAP SERPL CALCULATED.3IONS-SCNC: 10 MMOL/L (ref 5–15)
BASOPHILS # BLD AUTO: 0.05 10*3/MM3 (ref 0–0.2)
BASOPHILS NFR BLD AUTO: 0.4 % (ref 0–1.5)
BH CV XLRA MEAS LEFT DIST CCA EDV: 9.4 CM/SEC
BH CV XLRA MEAS LEFT DIST CCA PSV: 92.7 CM/SEC
BH CV XLRA MEAS LEFT DIST ICA EDV: -8.2 CM/SEC
BH CV XLRA MEAS LEFT DIST ICA PSV: -77.4 CM/SEC
BH CV XLRA MEAS LEFT ICA/CCA RATIO: -0.83
BH CV XLRA MEAS LEFT MID ICA EDV: -9 CM/SEC
BH CV XLRA MEAS LEFT MID ICA PSV: -57.8 CM/SEC
BH CV XLRA MEAS LEFT PROX CCA EDV: 0.79 CM/SEC
BH CV XLRA MEAS LEFT PROX CCA PSV: 137 CM/SEC
BH CV XLRA MEAS LEFT PROX ECA EDV: 0 CM/SEC
BH CV XLRA MEAS LEFT PROX ECA PSV: -68 CM/SEC
BH CV XLRA MEAS LEFT PROX ICA EDV: -11.8 CM/SEC
BH CV XLRA MEAS LEFT PROX ICA PSV: -55 CM/SEC
BH CV XLRA MEAS LEFT PROX SCLA PSV: 142 CM/SEC
BH CV XLRA MEAS LEFT VERTEBRAL A EDV: 0 CM/SEC
BH CV XLRA MEAS LEFT VERTEBRAL A PSV: 64.8 CM/SEC
BH CV XLRA MEAS RIGHT DIST CCA EDV: 10 CM/SEC
BH CV XLRA MEAS RIGHT DIST CCA PSV: 85 CM/SEC
BH CV XLRA MEAS RIGHT DIST ICA EDV: -12.6 CM/SEC
BH CV XLRA MEAS RIGHT DIST ICA PSV: -53.4 CM/SEC
BH CV XLRA MEAS RIGHT ICA/CCA RATIO: -0.83
BH CV XLRA MEAS RIGHT MID ICA EDV: -14.5 CM/SEC
BH CV XLRA MEAS RIGHT MID ICA PSV: -70.3 CM/SEC
BH CV XLRA MEAS RIGHT PROX CCA EDV: 0 CM/SEC
BH CV XLRA MEAS RIGHT PROX CCA PSV: 125 CM/SEC
BH CV XLRA MEAS RIGHT PROX ECA EDV: 0 CM/SEC
BH CV XLRA MEAS RIGHT PROX ECA PSV: -68.4 CM/SEC
BH CV XLRA MEAS RIGHT PROX ICA EDV: -14.1 CM/SEC
BH CV XLRA MEAS RIGHT PROX ICA PSV: -53.8 CM/SEC
BH CV XLRA MEAS RIGHT PROX SCLA PSV: 124 CM/SEC
BH CV XLRA MEAS RIGHT VERTEBRAL A EDV: 0 CM/SEC
BH CV XLRA MEAS RIGHT VERTEBRAL A PSV: -59.7 CM/SEC
BLD GP AB SCN SERPL QL: NEGATIVE
BUN SERPL-MCNC: 29 MG/DL (ref 6–20)
BUN/CREAT SERPL: 19.9 (ref 7–25)
CALCIUM SPEC-SCNC: 8.3 MG/DL (ref 8.6–10.5)
CHLORIDE SERPL-SCNC: 101 MMOL/L (ref 98–107)
CO2 SERPL-SCNC: 26 MMOL/L (ref 22–29)
CREAT SERPL-MCNC: 1.46 MG/DL (ref 0.57–1)
DEPRECATED RDW RBC AUTO: 55.2 FL (ref 37–54)
EGFRCR SERPLBLD CKD-EPI 2021: 44.8 ML/MIN/1.73
EOSINOPHIL # BLD AUTO: 0.08 10*3/MM3 (ref 0–0.4)
EOSINOPHIL NFR BLD AUTO: 0.6 % (ref 0.3–6.2)
ERYTHROCYTE [DISTWIDTH] IN BLOOD BY AUTOMATED COUNT: 18.2 % (ref 12.3–15.4)
GLUCOSE BLDC GLUCOMTR-MCNC: 126 MG/DL (ref 70–130)
GLUCOSE BLDC GLUCOMTR-MCNC: 135 MG/DL (ref 70–130)
GLUCOSE BLDC GLUCOMTR-MCNC: 136 MG/DL (ref 70–130)
GLUCOSE BLDC GLUCOMTR-MCNC: 141 MG/DL (ref 70–130)
GLUCOSE SERPL-MCNC: 116 MG/DL (ref 65–99)
HCT VFR BLD AUTO: 28.4 % (ref 34–46.6)
HGB BLD-MCNC: 9.1 G/DL (ref 12–15.9)
IMM GRANULOCYTES # BLD AUTO: 0.1 10*3/MM3 (ref 0–0.05)
IMM GRANULOCYTES NFR BLD AUTO: 0.8 % (ref 0–0.5)
LYMPHOCYTES # BLD AUTO: 1.42 10*3/MM3 (ref 0.7–3.1)
LYMPHOCYTES NFR BLD AUTO: 10.8 % (ref 19.6–45.3)
MAGNESIUM SERPL-MCNC: 1.7 MG/DL (ref 1.6–2.6)
MCH RBC QN AUTO: 28.2 PG (ref 26.6–33)
MCHC RBC AUTO-ENTMCNC: 32 G/DL (ref 31.5–35.7)
MCV RBC AUTO: 87.9 FL (ref 79–97)
MONOCYTES # BLD AUTO: 1.27 10*3/MM3 (ref 0.1–0.9)
MONOCYTES NFR BLD AUTO: 9.7 % (ref 5–12)
NEUTROPHILS NFR BLD AUTO: 10.24 10*3/MM3 (ref 1.7–7)
NEUTROPHILS NFR BLD AUTO: 77.7 % (ref 42.7–76)
NRBC BLD AUTO-RTO: 0.2 /100 WBC (ref 0–0.2)
PHOSPHATE SERPL-MCNC: 3.6 MG/DL (ref 2.5–4.5)
PLATELET # BLD AUTO: 284 10*3/MM3 (ref 140–450)
PMV BLD AUTO: 10.6 FL (ref 6–12)
POTASSIUM SERPL-SCNC: 4.3 MMOL/L (ref 3.5–5.2)
RBC # BLD AUTO: 3.23 10*6/MM3 (ref 3.77–5.28)
RH BLD: POSITIVE
SODIUM SERPL-SCNC: 137 MMOL/L (ref 136–145)
T&S EXPIRATION DATE: NORMAL
WBC NRBC COR # BLD AUTO: 13.16 10*3/MM3 (ref 3.4–10.8)

## 2024-06-11 PROCEDURE — 86923 COMPATIBILITY TEST ELECTRIC: CPT

## 2024-06-11 PROCEDURE — 93880 EXTRACRANIAL BILAT STUDY: CPT | Performed by: SURGERY

## 2024-06-11 PROCEDURE — 86901 BLOOD TYPING SEROLOGIC RH(D): CPT | Performed by: THORACIC SURGERY (CARDIOTHORACIC VASCULAR SURGERY)

## 2024-06-11 PROCEDURE — 25810000003 SODIUM CHLORIDE 0.9 % SOLUTION 250 ML FLEX CONT: Performed by: INTERNAL MEDICINE

## 2024-06-11 PROCEDURE — 83735 ASSAY OF MAGNESIUM: CPT | Performed by: INTERNAL MEDICINE

## 2024-06-11 PROCEDURE — 25010000002 ENOXAPARIN PER 10 MG: Performed by: INTERNAL MEDICINE

## 2024-06-11 PROCEDURE — 93880 EXTRACRANIAL BILAT STUDY: CPT

## 2024-06-11 PROCEDURE — 82948 REAGENT STRIP/BLOOD GLUCOSE: CPT

## 2024-06-11 PROCEDURE — 86900 BLOOD TYPING SEROLOGIC ABO: CPT | Performed by: THORACIC SURGERY (CARDIOTHORACIC VASCULAR SURGERY)

## 2024-06-11 PROCEDURE — 25010000002 VANCOMYCIN HCL 1.25 G RECONSTITUTED SOLUTION 1 EACH VIAL: Performed by: INTERNAL MEDICINE

## 2024-06-11 PROCEDURE — 94010 BREATHING CAPACITY TEST: CPT

## 2024-06-11 PROCEDURE — 99232 SBSQ HOSP IP/OBS MODERATE 35: CPT | Performed by: INTERNAL MEDICINE

## 2024-06-11 PROCEDURE — 99024 POSTOP FOLLOW-UP VISIT: CPT

## 2024-06-11 PROCEDURE — 85025 COMPLETE CBC W/AUTO DIFF WBC: CPT | Performed by: INTERNAL MEDICINE

## 2024-06-11 PROCEDURE — 80069 RENAL FUNCTION PANEL: CPT | Performed by: INTERNAL MEDICINE

## 2024-06-11 PROCEDURE — 99232 SBSQ HOSP IP/OBS MODERATE 35: CPT | Performed by: STUDENT IN AN ORGANIZED HEALTH CARE EDUCATION/TRAINING PROGRAM

## 2024-06-11 PROCEDURE — 86850 RBC ANTIBODY SCREEN: CPT | Performed by: THORACIC SURGERY (CARDIOTHORACIC VASCULAR SURGERY)

## 2024-06-11 PROCEDURE — 25010000002 FUROSEMIDE PER 20 MG: Performed by: INTERNAL MEDICINE

## 2024-06-11 RX ORDER — FUROSEMIDE 10 MG/ML
40 INJECTION INTRAMUSCULAR; INTRAVENOUS EVERY 12 HOURS
Status: COMPLETED | OUTPATIENT
Start: 2024-06-11 | End: 2024-06-11

## 2024-06-11 RX ORDER — ALBUTEROL SULFATE 2.5 MG/3ML
2.5 SOLUTION RESPIRATORY (INHALATION) ONCE AS NEEDED
Status: DISCONTINUED | OUTPATIENT
Start: 2024-06-11 | End: 2024-06-12

## 2024-06-11 RX ADMIN — FAMOTIDINE 40 MG: 20 TABLET, FILM COATED ORAL at 08:34

## 2024-06-11 RX ADMIN — HYDROCODONE BITARTRATE AND ACETAMINOPHEN 1 TABLET: 5; 325 TABLET ORAL at 21:41

## 2024-06-11 RX ADMIN — NICOTINE 1 PATCH: 21 PATCH, EXTENDED RELEASE TRANSDERMAL at 08:35

## 2024-06-11 RX ADMIN — Medication 10 ML: at 08:41

## 2024-06-11 RX ADMIN — HYDROCODONE BITARTRATE AND ACETAMINOPHEN 1 TABLET: 5; 325 TABLET ORAL at 17:57

## 2024-06-11 RX ADMIN — FUROSEMIDE 40 MG: 10 INJECTION, SOLUTION INTRAMUSCULAR; INTRAVENOUS at 22:15

## 2024-06-11 RX ADMIN — HYDROCODONE BITARTRATE AND ACETAMINOPHEN 1 TABLET: 5; 325 TABLET ORAL at 13:17

## 2024-06-11 RX ADMIN — HYDROCODONE BITARTRATE AND ACETAMINOPHEN 1 TABLET: 5; 325 TABLET ORAL at 03:37

## 2024-06-11 RX ADMIN — 0.12% CHLORHEXIDINE GLUCONATE 15 ML: 1.2 RINSE ORAL at 17:00

## 2024-06-11 RX ADMIN — METOPROLOL SUCCINATE 25 MG: 25 TABLET, EXTENDED RELEASE ORAL at 08:44

## 2024-06-11 RX ADMIN — VANCOMYCIN HYDROCHLORIDE 1250 MG: 1.25 INJECTION, POWDER, LYOPHILIZED, FOR SOLUTION INTRAVENOUS at 17:00

## 2024-06-11 RX ADMIN — Medication 10 ML: at 20:16

## 2024-06-11 RX ADMIN — GABAPENTIN 100 MG: 100 CAPSULE ORAL at 21:43

## 2024-06-11 RX ADMIN — HYDROCODONE BITARTRATE AND ACETAMINOPHEN 1 TABLET: 5; 325 TABLET ORAL at 08:40

## 2024-06-11 RX ADMIN — SERTRALINE 25 MG: 25 TABLET, FILM COATED ORAL at 08:34

## 2024-06-11 RX ADMIN — ENOXAPARIN SODIUM 40 MG: 100 INJECTION SUBCUTANEOUS at 12:23

## 2024-06-11 RX ADMIN — FUROSEMIDE 40 MG: 10 INJECTION, SOLUTION INTRAMUSCULAR; INTRAVENOUS at 12:23

## 2024-06-11 RX ADMIN — GABAPENTIN 100 MG: 100 CAPSULE ORAL at 17:00

## 2024-06-11 RX ADMIN — GABAPENTIN 100 MG: 100 CAPSULE ORAL at 08:34

## 2024-06-11 RX ADMIN — POTASSIUM CHLORIDE 10 MEQ: 750 TABLET, EXTENDED RELEASE ORAL at 08:35

## 2024-06-11 RX ADMIN — MUPIROCIN 1 APPLICATION: 20 OINTMENT TOPICAL at 17:01

## 2024-06-11 NOTE — CASE MANAGEMENT/SOCIAL WORK
Discharge Planning Assessment  [facility]     Patient Name: Марина Tilley  MRN: [id number]  Today's Date: 6/11/2024    Admit Date: 6/8/2024    Plan: Patient has been accepted to [facility] with precert good through 6/19/2024 per kita Suggs at [phone].   Discharge Needs Assessment       Row Name 06/11/24 1515       Living Environment    People in Home sibling(s)    Name(s) of People in Home Sister Naomi and her family    Current Living Arrangements home    Potentially Unsafe Housing Conditions none    Primary Care Provided by self    Provides Primary Care For no one    Family Caregiver if Needed sibling(s)    Family Caregiver Names Naomi    Quality of Family Relationships supportive    Able to Return to Prior Arrangements yes       Resource/Environmental Concerns    Resource/Environmental Concerns none    Transportation Concerns none       Transition Planning    Patient/Family Anticipates Transition to other (see comments)  snf-[facility]    Transportation Anticipated health plan transportation       Discharge Needs Assessment    Equipment Currently Used at Home cane, straight    Concerns to be Addressed discharge planning    Anticipated Changes Related to Illness inability to care for self    Equipment Needed After Discharge other (see comments)  TBD    Discharge Facility/Level of Care Needs nursing facility, skilled    Provided Post Acute Provider List? Yes    Post Acute Provider List Nursing Home    Provided Post Acute Provider Quality & Resource List? Yes    Post Acute Provider Quality and Resource List Nursing Home    Delivered To Patient    Method of Delivery In person    Current Discharge Risk substance use/abuse                   Discharge Plan       Row Name 06/11/24 1516       Plan    Plan Patient has been accepted to [facility] with precert good through 6/19/2024 per kita Suggs at [phone].    Plan Comments Spoke with patient at bedside. Facesheet, PCP and  pharmacy verified. Patient is IADL in the home and community with her cane. Patient with HX drug abuse. Patient lives with her sister Naomi and Naomi's family . Patient is agreeable to dc plan [facility] with precert good through 6/19/2024 . Patient will need long term IV abx. Will need ems transport due to drug abuse.                  Continued Care and Services - Admitted Since 6/8/2024       Destination Coordination complete.      Service Provider Request Status Selected Services Address Phone Fax Patient Preferred    [facility] [address] [place] [address] [phone] --                  Expected Discharge Date and Time       Expected Discharge Date Expected Discharge Time    Jun 19, 2024            Demographic Summary       Row Name 06/11/24 1513       General Information    Admission Type inpatient    Arrived From hospital    Referral Source admission list    Reason for Consult discharge planning    Preferred Language English                   Functional Status       Row Name 06/11/24 1514       Functional Status    Usual Activity Tolerance good    Current Activity Tolerance moderate       Assessment of Health Literacy    How often do you have someone help you read hospital materials? Sometimes    How often do you have problems learning about your medical condition because of difficulty understanding written information? Sometimes    How often do you have a problem understanding what is told to you about your medical condition? Sometimes    How confident are you filling out medical forms by yourself? Somewhat    Health Literacy Moderate       Functional Status, IADL    Medications independent    Meal Preparation assistive equipment    Housekeeping assistive equipment    Laundry assistive equipment    Shopping assistive equipment       Mental Status    General Appearance WDL WDL       Mental Status Summary    Recent Changes in Mental  Status/Cognitive Functioning no changes       Employment/    Employment Status unemployed                  Social Determinants of Health     Tobacco Use: High Risk (6/9/2024)    Patient History     Smoking Tobacco Use: Every Day     Smokeless Tobacco Use: Never     Passive Exposure: Not on file   Alcohol Use: Not At Risk (6/11/2024)    AUDIT-C     Frequency of Alcohol Consumption: Never     Average Number of Drinks: Patient does not drink     Frequency of Binge Drinking: Never   Financial Resource Strain: Low Risk  (6/11/2024)    Overall Financial Resource Strain (CARDIA)     Difficulty of Paying Living Expenses: Not hard at all   Food Insecurity: No Food Insecurity (6/11/2024)    Hunger Vital Sign     Worried About Running Out of Food in the Last Year: Never true     Ran Out of Food in the Last Year: Never true   Transportation Needs: No Transportation Needs (6/11/2024)    PRAPARE - Transportation     Lack of Transportation (Medical): No     Lack of Transportation (Non-Medical): No   Physical Activity: Sufficiently Active (6/11/2024)    Exercise Vital Sign     Days of Exercise per Week: 7 days     Minutes of Exercise per Session: 30 min   Stress: Stress Concern Present (6/11/2024)    [employer] - Occupational Stress Questionnaire     Feeling of Stress : Very much   Social Connections: Not At Risk (6/11/2024)    Family and Community Support     Help with Day-to-Day Activities: I don't need any help     Lonely or Isolated: Rarely   Interpersonal Safety: Not At Risk (6/11/2024)    Abuse Screen     Unsafe at Home or Work/School: no     Feels Threatened by Someone?: no     Does Anyone Keep You from Contacting Others or Doint Things Outside the Home?: no     Physical Sign of Abuse Present: no   Depression: Not at risk (6/11/2024)    PHQ-2     PHQ-2 Score: 1   Housing Stability: Not At Risk (6/11/2024)    Housing Stability     Current Living Arrangements: home     Potentially Unsafe  Housing Conditions: none   Utilities: Not At Risk (6/11/2024)    C Utilities     Threatened with loss of utilities: No   Health Literacy: Not At Risk (6/11/2024)    Education     Help with school or training?: No     Preferred Language: English   Employment: Not At Risk (6/11/2024)    Employment     Do you want help finding or keeping work or a job?: I do not need or want help   Disabilities: Not At Risk (6/11/2024)    Disabilities     Concentrating, Remembering, or Making Decisions Difficulty: no     Doing Errands Independently Difficulty: no                 Ema Williamson, RN

## 2024-06-11 NOTE — PLAN OF CARE
Goal Outcome Evaluation:   Patient slept through the night. Complained of minimal pain. Strict I/O. Bed alarm refused. Assist times one. HOLLI LOUIS

## 2024-06-11 NOTE — PROGRESS NOTES
Hospital Follow Up    LOS: 3  Patient Name: Марина Tilley  Age/Sex: 48 y.o. female  : 1975  MRN: [id number]    Day of Service: 24   Length of Stay: 3  Encounter Provider: Christopher Neves MD  Place of Service: [facility] CARDIOLOGY  Patient Care Team:  Provider, No Known as PCP - General    Subjective:     Chief Complaint: Endocarditis    Interval History: Patient is doing okay.  Cath site looks okay    Objective:     Objective:  Temp:  [97.5 °F (36.4 °C)-98.4 °F (36.9 °C)] 97.8 °F (36.6 °C)  Heart Rate:  [72-77] 73  Resp:  [14-20] 18  BP: (101-121)/(46-58) 121/57     Intake/Output Summary (Last 24 hours) at 2024 0843  Last data filed at 6/10/2024 2100  Gross per 24 hour   Intake 287 ml   Output --   Net 287 ml     Body mass index is 33.22 kg/m².      24  0600 06/10/24  0605 24  0508   Weight: 92.6 kg (204 lb 1.6 oz) 91.9 kg (202 lb 8 oz) 93.4 kg (205 lb 12.8 oz)     Weight change: 1.497 kg (3 lb 4.8 oz)      Physical Exam:   General :  Alert, cooperative, in no acute distress.  Neuro:   Alert,cooperative and oriented.  Lungs:  CTAB. Normal respiratory effort and rate.  CV:  Regular rate and rhythm, normal S1 and S2, no murmurs, gallops or rubs.   ABD:  Soft, nontender, nondistended. Positive bowel sounds.  Extr:  No edema or cyanosis, moves all extremities.    Lab Review:   Results from last 7 days   Lab Units 24  0427 06/10/24  0800 24  0432 24  1709   SODIUM mmol/L 137 133*   < > 132*   POTASSIUM mmol/L 4.3 4.6   < > 4.5   CHLORIDE mmol/L 101 97*   < > 98   CO2 mmol/L 26.0 23.1   < > 21.5*   BUN mg/dL 29* 25*   < > 20   CREATININE mg/dL 1.46* 1.54*   < > 1.51*   GLUCOSE mg/dL 116* 152*   < > 140*   CALCIUM mg/dL 8.3* 8.7   < > 8.5*   AST (SGOT) U/L  --   --   --  234*   ALT (SGPT) U/L  --   --   --  209*    < > = values in this interval not displayed.         Results from last 7 days   Lab Units 24  0427 06/10/24  0800   WBC  10*3/mm3 13.16* 16.23*   HEMOGLOBIN g/dL 9.1* 9.5*   HEMATOCRIT % 28.4* 30.8*   PLATELETS 10*3/mm3 284 302     Results from last 7 days   Lab Units 06/10/24  1828 06/08/24  1709   INR  1.23* 1.34*   APTT seconds 24.5  --      Results from last 7 days   Lab Units 06/11/24  0427 06/10/24  1828   MAGNESIUM mg/dL 1.7 1.5*     Results from last 7 days   Lab Units 06/10/24  1828   CHOLESTEROL mg/dL 97   TRIGLYCERIDES mg/dL 163*   HDL CHOL mg/dL 21*     Results from last 7 days   Lab Units 06/09/24  0432   PROBNP pg/mL 16,030.0*           Current Medications:   Scheduled Meds:chlorhexidine, 15 mL, Mouth/Throat, Once  enoxaparin, 40 mg, Subcutaneous, Q24H  famotidine, 40 mg, Oral, Daily  gabapentin, 100 mg, Oral, TID  insulin lispro, 2-7 Units, Subcutaneous, 4x Daily AC & at Bedtime  metoprolol succinate XL, 25 mg, Oral, Q24H  [START ON 6/12/2024] metoprolol tartrate, 12.5 mg, Oral, On Call to OR  mupirocin, 1 Application, Each Nare, Q12H  nicotine, 1 patch, Transdermal, Q24H  potassium chloride, 10 mEq, Oral, Daily  sertraline, 25 mg, Oral, Daily  sodium chloride, 10 mL, Intravenous, Q12H  sodium chloride, 10 mL, Intravenous, Q12H  sodium chloride, 10 mL, Intravenous, Q12H  vancomycin, 1,250 mg, Intravenous, Q24H  [START ON 6/12/2024] vancomycin, 15 mg/kg, Intravenous, Once      Continuous Infusions:lactated ringers, 9 mL/hr, Last Rate: 9 mL/hr (06/09/24 1415)  Pharmacy to dose vancomycin,         Allergies:  Allergies   Allergen Reactions    Cephalexin Anaphylaxis     Required intubation    Latex Itching       Assessment:       Acute bacterial endocarditis        Plan:     1.  Bacterial endocarditis.  Source appears to be from teeth she is going to have her teeth removed.  2.  Patient had a LETICIA at [place].  Results are noted above.  Patient with significant valvular heart disease as described above.  Plan is to do surgery.  3.  Smoking abuse patient has a patch on.  4.  History of polysubstance abuse.  5.  Bipolar type  II.  6.  Acute kidney injury.  Creatinine is improving.  7.  Surgery is planned for tomorrow.  Will see Thursday postop.    Christopher Neves MD  06/11/24  08:43 EDT

## 2024-06-11 NOTE — ANESTHESIA PREPROCEDURE EVALUATION
Anesthesia Evaluation                  Airway   Mallampati: I  TM distance: >3 FB  Neck ROM: full  Dental    (+) poor dentition    Pulmonary - normal exam   (+) a smoker Current, Abstained day of surgery, COPD, asthma,  Cardiovascular - normal exam    (+) valvular problems/murmurs AI, MR and TI, CHF Systolic <55%    ROS comment: endocarditis    Neuro/Psych  (+) psychiatric history Anxiety  GI/Hepatic/Renal/Endo    (+) renal disease- ARF    Musculoskeletal     Abdominal    Substance History   (+) drug use (stopped heroin 6 weeks ago)     OB/GYN          Other                    Anesthesia Plan    ASA 4     general, CVL, PAC and [place]     (I have reviewed the patient's history with the patient and the chart, including all pertinent laboratory results and imaging. I have explained the risks of anesthesia including but not limited to dental damage, sore throat, nausea, corneal abrasion, nerve injury, MI, stroke, and death.  )    Anesthetic plan, risks, benefits, and alternatives have been provided, discussed and informed consent has been obtained with: patient.    CODE STATUS:    Level Of Support Discussed With: Patient  Code Status (Patient has no pulse and is not breathing): CPR (Attempt to Resuscitate)  Medical Interventions (Patient has pulse or is breathing): Full Support

## 2024-06-11 NOTE — PROGRESS NOTES
LOS: 3 days   Patient Care Team:  Provider, No Known as PCP - General    Chief Complaint: Shortness of breath    Subjective    Patient currently resting in bed, reports she feels well, no new complaints     Objective     Vital Signs  Temp:  [97.5 °F (36.4 °C)-98.4 °F (36.9 °C)] 97.8 °F (36.6 °C)  Heart Rate:  [72-77] 73  Resp:  [14-20] 18  BP: (101-121)/(46-58) 121/57  Body mass index is 33.22 kg/m².    Intake/Output Summary (Last 24 hours) at 6/11/2024 0919  Last data filed at 6/10/2024 2100  Gross per 24 hour   Intake 287 ml   Output --   Net 287 ml     No intake/output data recorded.      Wt Readings from Last 3 Encounters:   06/11/24 93.4 kg (205 lb 12.8 oz)   06/08/24 97.2 kg (214 lb 4.6 oz)   06/02/24 90 kg (198 lb 6.6 oz)       Flowsheet Rows      Flowsheet Row First Filed Value   Admission Height --   Admission Weight 92.6 kg (204 lb 1.6 oz) Documented at 06/09/2024 0600            Objective:  General Appearance:  Comfortable, well-appearing, in no acute distress and not in pain (Sitting up in bed).    Vital signs: (most recent): Blood pressure 121/57, pulse 73, temperature 97.8 °F (36.6 °C), temperature source Oral, resp. rate 18, weight 93.4 kg (205 lb 12.8 oz), SpO2 100%.  Vital signs are normal.  No fever.    Output: Producing urine.    Lungs:  Normal effort and normal respiratory rate.  Breath sounds clear to auscultation.    Heart: Normal rate.  Regular rhythm.  Positive for murmur.    Abdomen: Abdomen is soft.    Extremities: Normal range of motion.    Neurological: Patient is alert and oriented to person, place and time.    Skin:  Warm and dry.                Results Review:        Results from last 7 days   Lab Units 06/11/24  0427 06/10/24  0800 06/08/24  1709   WBC 10*3/mm3 13.16* 16.23* 13.00*   HEMOGLOBIN g/dL 9.1* 9.5* 8.7*   HEMATOCRIT % 28.4* 30.8* 27.2*   PLATELETS 10*3/mm3 284 302 305         PT/INR:    Protime   Date Value Ref Range Status   06/10/2024 15.8 (H) 11.7 - 14.2 Seconds Final    06/08/2024 16.8 (H) 11.7 - 14.2 Seconds Final   /  INR   Date Value Ref Range Status   06/10/2024 1.23 (H) 0.90 - 1.10 Final   06/08/2024 1.34 (H) 0.90 - 1.10 Final       Results from last 7 days   Lab Units 06/11/24  0427 06/10/24  0800 06/09/24  0432   SODIUM mmol/L 137 133* 133*   POTASSIUM mmol/L 4.3 4.6 4.6   CHLORIDE mmol/L 101 97* 99   CO2 mmol/L 26.0 23.1 24.0   BUN mg/dL 29* 25* 23*   CREATININE mg/dL 1.46* 1.54* 1.41*   GLUCOSE mg/dL 116* 152* 109*   CALCIUM mg/dL 8.3* 8.7 8.3*         Scheduled Meds:  chlorhexidine, 15 mL, Mouth/Throat, Once  enoxaparin, 40 mg, Subcutaneous, Q24H  famotidine, 40 mg, Oral, Daily  gabapentin, 100 mg, Oral, TID  insulin lispro, 2-7 Units, Subcutaneous, 4x Daily AC & at Bedtime  metoprolol succinate XL, 25 mg, Oral, Q24H  [START ON 6/12/2024] metoprolol tartrate, 12.5 mg, Oral, On Call to OR  mupirocin, 1 Application, Each Nare, Q12H  nicotine, 1 patch, Transdermal, Q24H  potassium chloride, 10 mEq, Oral, Daily  sertraline, 25 mg, Oral, Daily  sodium chloride, 10 mL, Intravenous, Q12H  sodium chloride, 10 mL, Intravenous, Q12H  sodium chloride, 10 mL, Intravenous, Q12H  vancomycin, 1,250 mg, Intravenous, Q24H  [START ON 6/12/2024] vancomycin, 15 mg/kg, Intravenous, Once        Infusions:  lactated ringers, 9 mL/hr, Last Rate: 9 mL/hr (06/09/24 1415)  Pharmacy to dose vancomycin,         Assessment & Plan    - Native tricuspid valve endocarditis, EF 50-55% (echo)--surgical eval in progress  - MRSA bacteremia/ UTI--ID following, on vanc  - Severe AI/ moderate-severe MR  - Acute HFrEF, r/t VHD, NYHA class III--proBNP > 33k  - Dilated CMO  - Polysubstance abuse--IVDA/ tobacco abuse  - PTSD--psych following  - Bipolar disorder, type 2  - CATRINA, likely r/t acute HF  - Anemia  - Tobacco abuse, 91 pack yr hx  - Poor dentition, likely periapical abscess right molar--s/p dental extraction 6/9  - Prolonged QTc--500 ms (6/3 EKG)  - Medical non-compliance    Patient currently resting in  bed, lungs sound much better today   Patient underwent successful dental extraction on 6/9, healing well and does not report pain/bleeding   Cardiac catheterization performed yesterday, normal coronary arteries  Plan for AVR/MVR/TVR tomorrow morning with Dr. Skaggs, Pre-op orders have been placed    Karina Garcia PA-C  06/11/24  09:19 EDT

## 2024-06-11 NOTE — PROGRESS NOTES
[facility] Progress Note      Patient Name: Марина Tilley  : 1975  MRN: [id number]  Primary Care Physician:  Provider, No Known  Date of admission: 2024    Subjective     Interval History:   Follow-up acute kidney injury.  Eating soft foods.  Slept last night.  Urine not all recorded.  Blood pressure stable.  No fever.  Review of Systems:   As above.    Objective     Vitals:   Temp:  [97.5 °F (36.4 °C)-98.4 °F (36.9 °C)] 97.6 °F (36.4 °C)  Heart Rate:  [72-77] 73  Resp:  [14-20] 18  BP: (101-123)/(46-58) 123/55  Flow (L/min):  [2] 2    Intake/Output Summary (Last 24 hours) at 2024 1238  Last data filed at 6/10/2024 2100  Gross per 24 hour   Intake 287 ml   Output --   Net 287 ml       Physical Exam:    General Appearance: chronically ill appearing WF no distress  Skin: Multiple tattoos.  HEENT.  Edentulous.  Nasal oxygen.  Neck: supple, no JVD  Heart: RRR, 3/6 systolic ejection murmur no rub.  Lungs: Clear to auscultation anteriorly.  Fine rales left base posteriorly.  Abdomen: soft, nontender, nondistended. +bs  Extremities: 1+ lower extremity edema       Scheduled Meds:     chlorhexidine, 15 mL, Mouth/Throat, Once  enoxaparin, 40 mg, Subcutaneous, Q24H  famotidine, 40 mg, Oral, Daily  furosemide, 40 mg, Intravenous, Q12H  gabapentin, 100 mg, Oral, TID  insulin lispro, 2-7 Units, Subcutaneous, 4x Daily AC & at Bedtime  metoprolol succinate XL, 25 mg, Oral, Q24H  [START ON 2024] metoprolol tartrate, 12.5 mg, Oral, On Call to OR  mupirocin, 1 Application, Each Nare, Q12H  nicotine, 1 patch, Transdermal, Q24H  potassium chloride, 10 mEq, Oral, Daily  sertraline, 25 mg, Oral, Daily  sodium chloride, 10 mL, Intravenous, Q12H  sodium chloride, 10 mL, Intravenous, Q12H  sodium chloride, 10 mL, Intravenous, Q12H  vancomycin, 1,250 mg, Intravenous, Q24H  [START ON 2024] vancomycin, 15 mg/kg, Intravenous, Once      IV Meds:   lactated ringers, 9 mL/hr, Last Rate: 9 mL/hr  (06/09/24 1415)  Pharmacy to dose vancomycin,         Results Reviewed:   I have personally reviewed the results from the time of this admission to 6/11/2024 12:38 EDT     Results from last 7 days   Lab Units 06/11/24  0427 06/10/24  0800 06/09/24  0432 06/08/24  1709   SODIUM mmol/L 137 133* 133* 132*   POTASSIUM mmol/L 4.3 4.6 4.6 4.5   CHLORIDE mmol/L 101 97* 99 98   CO2 mmol/L 26.0 23.1 24.0 21.5*   BUN mg/dL 29* 25* 23* 20   CREATININE mg/dL 1.46* 1.54* 1.41* 1.51*   CALCIUM mg/dL 8.3* 8.7 8.3* 8.5*   BILIRUBIN mg/dL  --   --   --  0.4   ALK PHOS U/L  --   --   --  235*   ALT (SGPT) U/L  --   --   --  209*   AST (SGOT) U/L  --   --   --  234*   GLUCOSE mg/dL 116* 152* 109* 140*     Estimated Creatinine Clearance: 55.4 mL/min (A) (by C-G formula based on SCr of 1.46 mg/dL (H)).  Results from last 7 days   Lab Units 06/11/24  0427 06/10/24  1828 06/10/24  0800   MAGNESIUM mg/dL 1.7 1.5*  --    PHOSPHORUS mg/dL 3.6  --  4.9*         Results from last 7 days   Lab Units 06/11/24  0427 06/10/24  0800 06/08/24  1709 06/08/24  0503 06/07/24  0347   WBC 10*3/mm3 13.16* 16.23* 13.00* 15.45* 12.88*   HEMOGLOBIN g/dL 9.1* 9.5* 8.7* 9.3* 7.3*   PLATELETS 10*3/mm3 284 302 305 310 312     Results from last 7 days   Lab Units 06/10/24  1828 06/08/24  1709   INR  1.23* 1.34*       Assessment / Plan     ASSESSMENT:  Non olig CATRINA -creatinine stable.  1.3 to 1.5 range last few days (peak 1.7).  Differential includes prerenal azotemia due acute systolic heart failure,  or infectious GN in relation to endocarditis (UA: trace protein + moderate blood).  Creatinine stable today to slightly improved.  Potassium normal.  Sodium has normalized.  Okay for surgery tomorrow.  Acute systolic heart failure .  Dilated cardiomyopathy.  Valvular heart disease with moderate to severe MR, TR. severe aortic valve regurgitation. Alb low 3.0.  On 2L O2.  Repeat CXR shows mild pulmonary edema and small pleural effusions.  Diuretic held yesterday in  anticipation of cardiac cath.  Will give IV diuretic today.  MRSA TV endocarditis + septicemia, septic pulm emboli, splenic infarct.  On IV vancomycin.  Status post full mouth extraction next 924.  Plan for AVR, MVR TVR tomorrow.  Dilated cardiomyopathy, EF 41 to 45% by LETICIA, with mod to severe MR/TR. severe AI.  Right molar abscess .  Status post full mouth extraction 6/9/2024  Mood disorder, seen by psychiatry .  IV polydrug abuse  Anemia, hgb 9.1    PLAN:  IV diuretic today.      Lyndsay Boo MD  06/11/24  12:38 EDT    [facility]  [phone]

## 2024-06-11 NOTE — PLAN OF CARE
Goal Outcome Evaluation:  Plan of Care Reviewed With: patient           Outcome Evaluation: pt s/p LHC and teeth extraction. Pt c/o [facility] pain and soreness, medicated with Norco per MAR. NSR on the monitor, VS stable, on 2L NC. Vanc given and pt tolerated well. NPO at midnight for scheduled heart surgery tomorrow. Continue with current POC and update as needed.

## 2024-06-11 NOTE — PROGRESS NOTES
LOS: 3 days     Chief Complaint: MRSA endocarditis    Interval History: Resting comfortably this morning.  Tolerating antibiotics.    Vital Signs  Temp:  [97.5 °F (36.4 °C)-98.4 °F (36.9 °C)] 97.8 °F (36.6 °C)  Heart Rate:  [72-77] 73  Resp:  [14-20] 18  BP: (101-121)/(46-58) 121/57    Physical Exam:  General: In no acute distress  HEENT: Oropharynx clear, moist mucous membranes  Respiratory: Normal work of breathing  Skin: Multiple tattoos  Extremities: No edema, cyanosis  Access: Peripheral IV    Antibiotics:  Anti-Infectives (From admission, onward)      Ordered     Dose/Rate Route Frequency Start Stop    06/10/24 1742  vancomycin IVPB 1500 mg in 0.9% NaCl (Premix) 500 mL        Ordering Provider: Eric Skaggs MD    15 mg/kg × 91.9 kg Intravenous Once 06/12/24 0600      06/10/24 0856  Vancomycin HCl 1,250 mg in sodium chloride 0.9 % 250 mL VTB        Note to Pharmacy: Dose please   Ordering Provider: Gary Barton MD    1,250 mg  200 mL/hr over 75 Minutes Intravenous Every 24 Hours 06/10/24 1600 06/23/24 1559    06/08/24 1612  Pharmacy to dose vancomycin        Ordering Provider: Gary Barton MD     Does not apply Continuous PRN 06/08/24 1612 06/22/24 1611             Results Review:     I reviewed the patient's new clinical results.    Lab Results   Component Value Date    WBC 13.16 (H) 06/11/2024    HGB 9.1 (L) 06/11/2024    HCT 28.4 (L) 06/11/2024    MCV 87.9 06/11/2024     06/11/2024     Lab Results   Component Value Date    GLUCOSE 116 (H) 06/11/2024    BUN 29 (H) 06/11/2024    CREATININE 1.46 (H) 06/11/2024    EGFRIFAFRI >60 07/22/2021    BCR 19.9 06/11/2024    CO2 26.0 06/11/2024    CALCIUM 8.3 (L) 06/11/2024    ALBUMIN 3.0 (L) 06/11/2024    LABIL2 0.9 (L) 07/22/2021     (H) 06/08/2024     (H) 06/08/2024       Microbiology:  6/2 blood cultures no growth  6/9 COVID-negative    Outside hospital blood cultures  5/9 3 out of 3 MRSA  5/11 1 out of 1 MRSA    Assessment    #MRSA  tricuspid valve endocarditis  #MRSA septicemia, blood cultures cleared  #IVDU  #Septic pulmonary emboli  #Right molar periapical abscess status post home health tooth extraction 6/9  #Acute hypoxic respiratory failure  #CATRINA  #Splenic infarct, likely septic    Plans for aortic, mitral and tricuspid valve replacement tomorrow.  Continue IV vancomycin goal -600.  Appreciate pharmacy's help with dosing.  Continue to follow levels for therapeutic drug monitoring.     ID will follow.

## 2024-06-12 ENCOUNTER — ANCILLARY PROCEDURE (OUTPATIENT)
Dept: PERIOP | Facility: HOSPITAL | Age: 49
DRG: 216 | End: 2024-06-12
Payer: MEDICAID

## 2024-06-12 ENCOUNTER — APPOINTMENT (OUTPATIENT)
Dept: GENERAL RADIOLOGY | Facility: HOSPITAL | Age: 49
DRG: 216 | End: 2024-06-12
Payer: MEDICAID

## 2024-06-12 ENCOUNTER — ANESTHESIA (OUTPATIENT)
Dept: PERIOP | Facility: HOSPITAL | Age: 49
DRG: 216 | End: 2024-06-12
Payer: MEDICAID

## 2024-06-12 LAB
ACT BLD: 110 SECONDS (ref 82–152)
ACT BLD: 134 SECONDS (ref 82–152)
ACT BLD: 391 SECONDS (ref 82–152)
ACT BLD: 428 SECONDS (ref 82–152)
ACT BLD: 433 SECONDS (ref 82–152)
ACT BLD: 440 SECONDS (ref 82–152)
ACT BLD: 489 SECONDS (ref 82–152)
ACT BLD: 513 SECONDS (ref 82–152)
ALBUMIN SERPL-MCNC: 2.9 G/DL (ref 3.5–5.2)
ALBUMIN SERPL-MCNC: 3.5 G/DL (ref 3.5–5.2)
ALBUMIN SERPL-MCNC: 3.5 G/DL (ref 3.5–5.2)
ANION GAP SERPL CALCULATED.3IONS-SCNC: 14.6 MMOL/L (ref 5–15)
ANION GAP SERPL CALCULATED.3IONS-SCNC: 9.1 MMOL/L (ref 5–15)
ANION GAP SERPL CALCULATED.3IONS-SCNC: 9.3 MMOL/L (ref 5–15)
APTT PPP: 28.7 SECONDS (ref 22.7–35.4)
APTT PPP: 29.8 SECONDS (ref 22.7–35.4)
ARTERIAL PATENCY WRIST A: ABNORMAL
ARTERIAL PATENCY WRIST A: ABNORMAL
ATMOSPHERIC PRESS: 746.5 MMHG
ATMOSPHERIC PRESS: 748.2 MMHG
BASE EXCESS BLDA CALC-SCNC: 0 MMOL/L (ref -5–5)
BASE EXCESS BLDA CALC-SCNC: 0 MMOL/L (ref -5–5)
BASE EXCESS BLDA CALC-SCNC: 2 MMOL/L (ref -5–5)
BASE EXCESS BLDA CALC-SCNC: 2 MMOL/L (ref 0–2)
BASE EXCESS BLDA CALC-SCNC: 2 MMOL/L (ref 0–2)
BASE EXCESS BLDA CALC-SCNC: 3 MMOL/L (ref -5–5)
BASE EXCESS BLDA CALC-SCNC: 4 MMOL/L (ref -5–5)
BASE EXCESS BLDA CALC-SCNC: 5 MMOL/L (ref -5–5)
BASOPHILS # BLD AUTO: 0.03 10*3/MM3 (ref 0–0.2)
BASOPHILS # BLD AUTO: 0.04 10*3/MM3 (ref 0–0.2)
BASOPHILS NFR BLD AUTO: 0.2 % (ref 0–1.5)
BASOPHILS NFR BLD AUTO: 0.4 % (ref 0–1.5)
BDY SITE: ABNORMAL
BDY SITE: ABNORMAL
BUN SERPL-MCNC: 23 MG/DL (ref 6–20)
BUN SERPL-MCNC: 23 MG/DL (ref 6–20)
BUN SERPL-MCNC: 25 MG/DL (ref 6–20)
BUN/CREAT SERPL: 17.3 (ref 7–25)
BUN/CREAT SERPL: 18.1 (ref 7–25)
BUN/CREAT SERPL: 20.2 (ref 7–25)
CA-I BLD-MCNC: 4.2 MG/DL (ref 4.6–5.4)
CA-I SERPL ISE-MCNC: 1.06 MMOL/L (ref 1.15–1.35)
CALCIUM SPEC-SCNC: 8.4 MG/DL (ref 8.6–10.5)
CALCIUM SPEC-SCNC: 8.5 MG/DL (ref 8.6–10.5)
CALCIUM SPEC-SCNC: 9.3 MG/DL (ref 8.6–10.5)
CHLORIDE SERPL-SCNC: 101 MMOL/L (ref 98–107)
CHLORIDE SERPL-SCNC: 103 MMOL/L (ref 98–107)
CHLORIDE SERPL-SCNC: 99 MMOL/L (ref 98–107)
CO2 BLDA-SCNC: 27 MMOL/L (ref 24–29)
CO2 BLDA-SCNC: 28.6 MMOL/L (ref 23–27)
CO2 BLDA-SCNC: 28.7 MMOL/L (ref 23–27)
CO2 BLDA-SCNC: 29 MMOL/L (ref 24–29)
CO2 BLDA-SCNC: 30 MMOL/L (ref 24–29)
CO2 BLDA-SCNC: 31 MMOL/L (ref 24–29)
CO2 BLDA-SCNC: 32 MMOL/L (ref 24–29)
CO2 SERPL-SCNC: 25.4 MMOL/L (ref 22–29)
CO2 SERPL-SCNC: 26.7 MMOL/L (ref 22–29)
CO2 SERPL-SCNC: 28.9 MMOL/L (ref 22–29)
CREAT SERPL-MCNC: 1.24 MG/DL (ref 0.57–1)
CREAT SERPL-MCNC: 1.27 MG/DL (ref 0.57–1)
CREAT SERPL-MCNC: 1.33 MG/DL (ref 0.57–1)
DEPRECATED RDW RBC AUTO: 55 FL (ref 37–54)
DEPRECATED RDW RBC AUTO: 56.2 FL (ref 37–54)
DEPRECATED RDW RBC AUTO: 58.7 FL (ref 37–54)
DEPRECATED RDW RBC AUTO: 60.8 FL (ref 37–54)
DEVICE COMMENT: ABNORMAL
DEVICE COMMENT: ABNORMAL
EGFRCR SERPLBLD CKD-EPI 2021: 50.1 ML/MIN/1.73
EGFRCR SERPLBLD CKD-EPI 2021: 52.9 ML/MIN/1.73
EGFRCR SERPLBLD CKD-EPI 2021: 54.5 ML/MIN/1.73
EOSINOPHIL # BLD AUTO: 0.04 10*3/MM3 (ref 0–0.4)
EOSINOPHIL # BLD AUTO: 0.14 10*3/MM3 (ref 0–0.4)
EOSINOPHIL NFR BLD AUTO: 0.2 % (ref 0.3–6.2)
EOSINOPHIL NFR BLD AUTO: 1.7 % (ref 0.3–6.2)
ERYTHROCYTE [DISTWIDTH] IN BLOOD BY AUTOMATED COUNT: 18.3 % (ref 12.3–15.4)
ERYTHROCYTE [DISTWIDTH] IN BLOOD BY AUTOMATED COUNT: 18.5 % (ref 12.3–15.4)
ERYTHROCYTE [DISTWIDTH] IN BLOOD BY AUTOMATED COUNT: 18.7 % (ref 12.3–15.4)
ERYTHROCYTE [DISTWIDTH] IN BLOOD BY AUTOMATED COUNT: 19 % (ref 12.3–15.4)
FIBRINOGEN PPP-MCNC: 298 MG/DL (ref 219–464)
FIBRINOGEN PPP-MCNC: 327 MG/DL (ref 219–464)
FIBRINOGEN PPP-MCNC: 332 MG/DL (ref 219–464)
GLUCOSE BLDC GLUCOMTR-MCNC: 123 MG/DL (ref 70–130)
GLUCOSE BLDC GLUCOMTR-MCNC: 123 MG/DL (ref 70–130)
GLUCOSE BLDC GLUCOMTR-MCNC: 127 MG/DL (ref 70–130)
GLUCOSE BLDC GLUCOMTR-MCNC: 142 MG/DL (ref 70–130)
GLUCOSE BLDC GLUCOMTR-MCNC: 142 MG/DL (ref 70–130)
GLUCOSE BLDC GLUCOMTR-MCNC: 144 MG/DL (ref 70–130)
GLUCOSE BLDC GLUCOMTR-MCNC: 146 MG/DL (ref 70–130)
GLUCOSE BLDC GLUCOMTR-MCNC: 146 MG/DL (ref 70–130)
GLUCOSE BLDC GLUCOMTR-MCNC: 149 MG/DL (ref 70–130)
GLUCOSE BLDC GLUCOMTR-MCNC: 159 MG/DL (ref 70–130)
GLUCOSE BLDC GLUCOMTR-MCNC: 167 MG/DL (ref 70–130)
GLUCOSE BLDC GLUCOMTR-MCNC: 169 MG/DL (ref 70–130)
GLUCOSE BLDC GLUCOMTR-MCNC: 194 MG/DL (ref 70–130)
GLUCOSE BLDC GLUCOMTR-MCNC: 209 MG/DL (ref 70–130)
GLUCOSE BLDC GLUCOMTR-MCNC: 210 MG/DL (ref 70–130)
GLUCOSE BLDC GLUCOMTR-MCNC: 217 MG/DL (ref 70–130)
GLUCOSE BLDC GLUCOMTR-MCNC: 219 MG/DL (ref 70–130)
GLUCOSE SERPL-MCNC: 111 MG/DL (ref 65–99)
GLUCOSE SERPL-MCNC: 129 MG/DL (ref 65–99)
GLUCOSE SERPL-MCNC: 193 MG/DL (ref 65–99)
HCO3 BLDA-SCNC: 25.8 MMOL/L (ref 22–26)
HCO3 BLDA-SCNC: 26.8 MMOL/L (ref 22–26)
HCO3 BLDA-SCNC: 27.3 MMOL/L (ref 22–28)
HCO3 BLDA-SCNC: 27.3 MMOL/L (ref 22–28)
HCO3 BLDA-SCNC: 28.7 MMOL/L (ref 22–26)
HCO3 BLDA-SCNC: 28.7 MMOL/L (ref 22–26)
HCO3 BLDA-SCNC: 28.8 MMOL/L (ref 22–26)
HCO3 BLDA-SCNC: 28.9 MMOL/L (ref 22–26)
HCO3 BLDA-SCNC: 29.4 MMOL/L (ref 22–26)
HCO3 BLDA-SCNC: 30.4 MMOL/L (ref 22–26)
HCT VFR BLD AUTO: 22.4 % (ref 34–46.6)
HCT VFR BLD AUTO: 23.9 % (ref 34–46.6)
HCT VFR BLD AUTO: 24.5 % (ref 34–46.6)
HCT VFR BLD AUTO: 28.6 % (ref 34–46.6)
HCT VFR BLDA CALC: 22 % (ref 38–51)
HCT VFR BLDA CALC: 23 % (ref 38–51)
HCT VFR BLDA CALC: 23 % (ref 38–51)
HCT VFR BLDA CALC: 25 % (ref 38–51)
HCT VFR BLDA CALC: 25 % (ref 38–51)
HCT VFR BLDA CALC: 26 % (ref 38–51)
HCT VFR BLDA CALC: 26 % (ref 38–51)
HCT VFR BLDA CALC: 30 % (ref 38–51)
HEMODILUTION: NO
HEMODILUTION: NO
HGB BLD-MCNC: 7.2 G/DL (ref 12–15.9)
HGB BLD-MCNC: 7.7 G/DL (ref 12–15.9)
HGB BLD-MCNC: 8 G/DL (ref 12–15.9)
HGB BLD-MCNC: 9.3 G/DL (ref 12–15.9)
HGB BLDA-MCNC: 10.2 G/DL (ref 12–17)
HGB BLDA-MCNC: 7.5 G/DL (ref 12–17)
HGB BLDA-MCNC: 7.8 G/DL (ref 12–17)
HGB BLDA-MCNC: 7.8 G/DL (ref 12–17)
HGB BLDA-MCNC: 8.5 G/DL (ref 12–17)
HGB BLDA-MCNC: 8.5 G/DL (ref 12–17)
HGB BLDA-MCNC: 8.8 G/DL (ref 12–17)
HGB BLDA-MCNC: 8.8 G/DL (ref 12–17)
IMM GRANULOCYTES # BLD AUTO: 0.04 10*3/MM3 (ref 0–0.05)
IMM GRANULOCYTES # BLD AUTO: 0.26 10*3/MM3 (ref 0–0.05)
IMM GRANULOCYTES NFR BLD AUTO: 0.5 % (ref 0–0.5)
IMM GRANULOCYTES NFR BLD AUTO: 1.2 % (ref 0–0.5)
INHALED O2 CONCENTRATION: 100 %
INHALED O2 CONCENTRATION: 40 %
INR PPP: 1.38 (ref 0.9–1.1)
INR PPP: 1.8 (ref 0.8–1.2)
INR PPP: 1.82 (ref 0.9–1.1)
LYMPHOCYTES # BLD AUTO: 0.67 10*3/MM3 (ref 0.7–3.1)
LYMPHOCYTES # BLD AUTO: 1.1 10*3/MM3 (ref 0.7–3.1)
LYMPHOCYTES NFR BLD AUTO: 13 % (ref 19.6–45.3)
LYMPHOCYTES NFR BLD AUTO: 3.1 % (ref 19.6–45.3)
MAGNESIUM SERPL-MCNC: 1.6 MG/DL (ref 1.6–2.6)
MAGNESIUM SERPL-MCNC: 1.8 MG/DL (ref 1.6–2.6)
MAGNESIUM SERPL-MCNC: 1.9 MG/DL (ref 1.6–2.6)
MCH RBC QN AUTO: 28.5 PG (ref 26.6–33)
MCH RBC QN AUTO: 28.6 PG (ref 26.6–33)
MCH RBC QN AUTO: 28.9 PG (ref 26.6–33)
MCH RBC QN AUTO: 29.3 PG (ref 26.6–33)
MCHC RBC AUTO-ENTMCNC: 31.4 G/DL (ref 31.5–35.7)
MCHC RBC AUTO-ENTMCNC: 32.1 G/DL (ref 31.5–35.7)
MCHC RBC AUTO-ENTMCNC: 32.5 G/DL (ref 31.5–35.7)
MCHC RBC AUTO-ENTMCNC: 33.5 G/DL (ref 31.5–35.7)
MCV RBC AUTO: 87.5 FL (ref 79–97)
MCV RBC AUTO: 87.7 FL (ref 79–97)
MCV RBC AUTO: 90 FL (ref 79–97)
MCV RBC AUTO: 91.1 FL (ref 79–97)
MODALITY: ABNORMAL
MODALITY: ABNORMAL
MONOCYTES # BLD AUTO: 0.75 10*3/MM3 (ref 0.1–0.9)
MONOCYTES # BLD AUTO: 1.21 10*3/MM3 (ref 0.1–0.9)
MONOCYTES NFR BLD AUTO: 5.7 % (ref 5–12)
MONOCYTES NFR BLD AUTO: 8.9 % (ref 5–12)
NEUTROPHILS NFR BLD AUTO: 19.09 10*3/MM3 (ref 1.7–7)
NEUTROPHILS NFR BLD AUTO: 6.38 10*3/MM3 (ref 1.7–7)
NEUTROPHILS NFR BLD AUTO: 75.5 % (ref 42.7–76)
NEUTROPHILS NFR BLD AUTO: 89.6 % (ref 42.7–76)
NRBC BLD AUTO-RTO: 0 /100 WBC (ref 0–0.2)
NRBC BLD AUTO-RTO: 0 /100 WBC (ref 0–0.2)
O2 A-A PPRESDIFF RESPIRATORY: 0.3 MMHG
O2 A-A PPRESDIFF RESPIRATORY: 0.4 MMHG
PCO2 BLDA: 44.5 MM HG (ref 35–45)
PCO2 BLDA: 45.2 MM HG (ref 35–45)
PCO2 BLDA: 45.4 MM HG (ref 35–45)
PCO2 BLDA: 45.8 MM HG (ref 35–45)
PCO2 BLDA: 47.8 MM HG (ref 35–45)
PCO2 BLDA: 48.8 MM HG (ref 35–45)
PCO2 BLDA: 54.3 MM HG (ref 35–45)
PCO2 BLDA: 55.4 MM HG (ref 35–45)
PCO2 BLDA: 57.4 MM HG (ref 35–45)
PCO2 BLDA: 68.9 MM HG (ref 35–45)
PEEP RESPIRATORY: 8 CM[H2O]
PEEP RESPIRATORY: 8 CM[H2O]
PH BLDA: 7.27 PH UNITS (ref 7.35–7.6)
PH BLDA: 7.28 PH UNITS (ref 7.35–7.6)
PH BLDA: 7.33 PH UNITS (ref 7.35–7.6)
PH BLDA: 7.35 PH UNITS (ref 7.35–7.6)
PH BLDA: 7.36 PH UNITS (ref 7.35–7.6)
PH BLDA: 7.39 PH UNITS (ref 7.35–7.45)
PH BLDA: 7.39 PH UNITS (ref 7.35–7.6)
PH BLDA: 7.4 PH UNITS (ref 7.35–7.45)
PH BLDA: 7.42 PH UNITS (ref 7.35–7.6)
PH BLDA: 7.45 PH UNITS (ref 7.35–7.6)
PHOSPHATE SERPL-MCNC: 4 MG/DL (ref 2.5–4.5)
PHOSPHATE SERPL-MCNC: 4.2 MG/DL (ref 2.5–4.5)
PHOSPHATE SERPL-MCNC: 4.2 MG/DL (ref 2.5–4.5)
PLATELET # BLD AUTO: 127 10*3/MM3 (ref 140–450)
PLATELET # BLD AUTO: 151 10*3/MM3 (ref 140–450)
PLATELET # BLD AUTO: 154 10*3/MM3 (ref 140–450)
PLATELET # BLD AUTO: 184 10*3/MM3 (ref 140–450)
PLATELET # BLD AUTO: 231 10*3/MM3 (ref 140–450)
PMV BLD AUTO: 10 FL (ref 6–12)
PMV BLD AUTO: 10.1 FL (ref 6–12)
PMV BLD AUTO: 10.1 FL (ref 6–12)
PMV BLD AUTO: 10.2 FL (ref 6–12)
PO2 BLD: 176 MM[HG] (ref 0–500)
PO2 BLD: 245 MM[HG] (ref 0–500)
PO2 BLDA: 241 MMHG (ref 80–105)
PO2 BLDA: 244.5 MM HG (ref 80–100)
PO2 BLDA: 320 MMHG (ref 80–105)
PO2 BLDA: 409 MMHG (ref 80–105)
PO2 BLDA: 409 MMHG (ref 80–105)
PO2 BLDA: 419 MMHG (ref 80–105)
PO2 BLDA: 466 MMHG (ref 80–105)
PO2 BLDA: 51 MMHG (ref 80–105)
PO2 BLDA: 550 MMHG (ref 80–105)
PO2 BLDA: 70.3 MM HG (ref 80–100)
POTASSIUM BLDA-SCNC: 3.3 MMOL/L (ref 3.5–4.9)
POTASSIUM BLDA-SCNC: 3.4 MMOL/L (ref 3.5–4.9)
POTASSIUM BLDA-SCNC: 3.4 MMOL/L (ref 3.5–4.9)
POTASSIUM BLDA-SCNC: 3.5 MMOL/L (ref 3.5–4.9)
POTASSIUM BLDA-SCNC: 3.7 MMOL/L (ref 3.5–4.9)
POTASSIUM BLDA-SCNC: 4 MMOL/L (ref 3.5–4.9)
POTASSIUM BLDA-SCNC: 4.3 MMOL/L (ref 3.5–4.9)
POTASSIUM BLDA-SCNC: 4.6 MMOL/L (ref 3.5–4.9)
POTASSIUM SERPL-SCNC: 3.8 MMOL/L (ref 3.5–5.2)
POTASSIUM SERPL-SCNC: 4 MMOL/L (ref 3.5–5.2)
POTASSIUM SERPL-SCNC: 4 MMOL/L (ref 3.5–5.2)
PROTHROMBIN TIME: 17.2 SECONDS (ref 11.7–14.2)
PROTHROMBIN TIME: 21 SECONDS (ref 12.8–15.2)
PROTHROMBIN TIME: 21.3 SECONDS (ref 11.7–14.2)
PSV: 8 CMH2O
QT INTERVAL: 574 MS
QTC INTERVAL: 663 MS
RBC # BLD AUTO: 2.49 10*6/MM3 (ref 3.77–5.28)
RBC # BLD AUTO: 2.69 10*6/MM3 (ref 3.77–5.28)
RBC # BLD AUTO: 2.73 10*6/MM3 (ref 3.77–5.28)
RBC # BLD AUTO: 3.26 10*6/MM3 (ref 3.77–5.28)
SAO2 % BLDA: 100 % (ref 95–98)
SAO2 % BLDA: 78 % (ref 95–98)
SAO2 % BLDCOA: 93.5 % (ref 92–98.5)
SAO2 % BLDCOA: 99.8 % (ref 92–98.5)
SET MECH RESP RATE: 16
SODIUM SERPL-SCNC: 137 MMOL/L (ref 136–145)
SODIUM SERPL-SCNC: 139 MMOL/L (ref 136–145)
SODIUM SERPL-SCNC: 141 MMOL/L (ref 136–145)
TOTAL RATE: 18 BREATHS/MINUTE
TOTAL RATE: 19 BREATHS/MINUTE
VENTILATOR MODE: ABNORMAL
VENTILATOR MODE: AC
VT ON VENT VENT: 600 ML
WBC NRBC COR # BLD AUTO: 15.34 10*3/MM3 (ref 3.4–10.8)
WBC NRBC COR # BLD AUTO: 21.31 10*3/MM3 (ref 3.4–10.8)
WBC NRBC COR # BLD AUTO: 27.88 10*3/MM3 (ref 3.4–10.8)
WBC NRBC COR # BLD AUTO: 8.44 10*3/MM3 (ref 3.4–10.8)

## 2024-06-12 PROCEDURE — 93318 ECHO TRANSESOPHAGEAL INTRAOP: CPT

## 2024-06-12 PROCEDURE — 25010000002 HEPARIN (PORCINE) PER 1000 UNITS: Performed by: ANESTHESIOLOGY

## 2024-06-12 PROCEDURE — 82330 ASSAY OF CALCIUM: CPT | Performed by: NURSE PRACTITIONER

## 2024-06-12 PROCEDURE — C1889 IMPLANT/INSERT DEVICE, NOC: HCPCS | Performed by: THORACIC SURGERY (CARDIOTHORACIC VASCULAR SURGERY)

## 2024-06-12 PROCEDURE — 71045 X-RAY EXAM CHEST 1 VIEW: CPT

## 2024-06-12 PROCEDURE — P9016 RBC LEUKOCYTES REDUCED: HCPCS

## 2024-06-12 PROCEDURE — 02UG0JZ SUPPLEMENT MITRAL VALVE WITH SYNTHETIC SUBSTITUTE, OPEN APPROACH: ICD-10-PCS | Performed by: THORACIC SURGERY (CARDIOTHORACIC VASCULAR SURGERY)

## 2024-06-12 PROCEDURE — 82803 BLOOD GASES ANY COMBINATION: CPT

## 2024-06-12 PROCEDURE — 93010 ELECTROCARDIOGRAM REPORT: CPT | Performed by: INTERNAL MEDICINE

## 2024-06-12 PROCEDURE — 25010000002 MAGNESIUM SULFATE IN D5W 1G/100ML (PREMIX) 1-5 GM/100ML-% SOLUTION: Performed by: NURSE PRACTITIONER

## 2024-06-12 PROCEDURE — 94799 UNLISTED PULMONARY SVC/PX: CPT

## 2024-06-12 PROCEDURE — C1713 ANCHOR/SCREW BN/BN,TIS/BN: HCPCS | Performed by: THORACIC SURGERY (CARDIOTHORACIC VASCULAR SURGERY)

## 2024-06-12 PROCEDURE — 25010000002 ONDANSETRON PER 1 MG: Performed by: ANESTHESIOLOGY

## 2024-06-12 PROCEDURE — 33405 REPLACEMENT AORTIC VALVE OPN: CPT | Performed by: THORACIC SURGERY (CARDIOTHORACIC VASCULAR SURGERY)

## 2024-06-12 PROCEDURE — P9059 PLASMA, FRZ BETWEEN 8-24HOUR: HCPCS

## 2024-06-12 PROCEDURE — 85610 PROTHROMBIN TIME: CPT | Performed by: THORACIC SURGERY (CARDIOTHORACIC VASCULAR SURGERY)

## 2024-06-12 PROCEDURE — 25010000002 MILRINONE LACTATE 10 MG/10ML SOLUTION 10 ML VIAL: Performed by: ANESTHESIOLOGY

## 2024-06-12 PROCEDURE — 87205 SMEAR GRAM STAIN: CPT | Performed by: THORACIC SURGERY (CARDIOTHORACIC VASCULAR SURGERY)

## 2024-06-12 PROCEDURE — 87176 TISSUE HOMOGENIZATION CULTR: CPT | Performed by: THORACIC SURGERY (CARDIOTHORACIC VASCULAR SURGERY)

## 2024-06-12 PROCEDURE — 25010000002 ACETAMINOPHEN 10 MG/ML SOLUTION: Performed by: NURSE PRACTITIONER

## 2024-06-12 PROCEDURE — 86900 BLOOD TYPING SEROLOGIC ABO: CPT

## 2024-06-12 PROCEDURE — 85014 HEMATOCRIT: CPT | Performed by: THORACIC SURGERY (CARDIOTHORACIC VASCULAR SURGERY)

## 2024-06-12 PROCEDURE — 85384 FIBRINOGEN ACTIVITY: CPT | Performed by: NURSE PRACTITIONER

## 2024-06-12 PROCEDURE — 63710000001 INSULIN REGULAR HUMAN PER 5 UNITS: Performed by: ANESTHESIOLOGY

## 2024-06-12 PROCEDURE — 25010000002 PROPOFOL 10 MG/ML EMULSION: Performed by: ANESTHESIOLOGY

## 2024-06-12 PROCEDURE — 02RJ08Z REPLACEMENT OF TRICUSPID VALVE WITH ZOOPLASTIC TISSUE, OPEN APPROACH: ICD-10-PCS | Performed by: THORACIC SURGERY (CARDIOTHORACIC VASCULAR SURGERY)

## 2024-06-12 PROCEDURE — 85347 COAGULATION TIME ACTIVATED: CPT

## 2024-06-12 PROCEDURE — P9041 ALBUMIN (HUMAN),5%, 50ML: HCPCS | Performed by: NURSE PRACTITIONER

## 2024-06-12 PROCEDURE — 5A1221Z PERFORMANCE OF CARDIAC OUTPUT, CONTINUOUS: ICD-10-PCS | Performed by: THORACIC SURGERY (CARDIOTHORACIC VASCULAR SURGERY)

## 2024-06-12 PROCEDURE — 94761 N-INVAS EAR/PLS OXIMETRY MLT: CPT

## 2024-06-12 PROCEDURE — 85610 PROTHROMBIN TIME: CPT

## 2024-06-12 PROCEDURE — 25010000002 DEXAMETHASONE SODIUM PHOSPHATE 20 MG/5ML SOLUTION: Performed by: ANESTHESIOLOGY

## 2024-06-12 PROCEDURE — 82948 REAGENT STRIP/BLOOD GLUCOSE: CPT

## 2024-06-12 PROCEDURE — 80069 RENAL FUNCTION PANEL: CPT | Performed by: INTERNAL MEDICINE

## 2024-06-12 PROCEDURE — 85014 HEMATOCRIT: CPT

## 2024-06-12 PROCEDURE — 25010000002 VANCOMYCIN 10 G RECONSTITUTED SOLUTION: Performed by: THORACIC SURGERY (CARDIOTHORACIC VASCULAR SURGERY)

## 2024-06-12 PROCEDURE — 33426 REPAIR OF MITRAL VALVE: CPT | Performed by: THORACIC SURGERY (CARDIOTHORACIC VASCULAR SURGERY)

## 2024-06-12 PROCEDURE — C1768 GRAFT, VASCULAR: HCPCS | Performed by: THORACIC SURGERY (CARDIOTHORACIC VASCULAR SURGERY)

## 2024-06-12 PROCEDURE — 02BJ0ZZ EXCISION OF TRICUSPID VALVE, OPEN APPROACH: ICD-10-PCS | Performed by: THORACIC SURGERY (CARDIOTHORACIC VASCULAR SURGERY)

## 2024-06-12 PROCEDURE — 02RF08Z REPLACEMENT OF AORTIC VALVE WITH ZOOPLASTIC TISSUE, OPEN APPROACH: ICD-10-PCS | Performed by: THORACIC SURGERY (CARDIOTHORACIC VASCULAR SURGERY)

## 2024-06-12 PROCEDURE — C1729 CATH, DRAINAGE: HCPCS | Performed by: THORACIC SURGERY (CARDIOTHORACIC VASCULAR SURGERY)

## 2024-06-12 PROCEDURE — 25010000002 METOCLOPRAMIDE PER 10 MG: Performed by: INTERNAL MEDICINE

## 2024-06-12 PROCEDURE — 25010000002 METOCLOPRAMIDE PER 10 MG: Performed by: NURSE PRACTITIONER

## 2024-06-12 PROCEDURE — 85025 COMPLETE CBC W/AUTO DIFF WBC: CPT | Performed by: INTERNAL MEDICINE

## 2024-06-12 PROCEDURE — 25010000002 VANCOMYCIN 1 G RECONSTITUTED SOLUTION 1 EACH VIAL: Performed by: THORACIC SURGERY (CARDIOTHORACIC VASCULAR SURGERY)

## 2024-06-12 PROCEDURE — 02Q50ZZ REPAIR ATRIAL SEPTUM, OPEN APPROACH: ICD-10-PCS | Performed by: THORACIC SURGERY (CARDIOTHORACIC VASCULAR SURGERY)

## 2024-06-12 PROCEDURE — 87070 CULTURE OTHR SPECIMN AEROBIC: CPT | Performed by: THORACIC SURGERY (CARDIOTHORACIC VASCULAR SURGERY)

## 2024-06-12 PROCEDURE — 25010000002 MIDAZOLAM PER 1 MG: Performed by: ANESTHESIOLOGY

## 2024-06-12 PROCEDURE — 25010000002 PROTAMINE SULFATE PER 10 MG: Performed by: ANESTHESIOLOGY

## 2024-06-12 PROCEDURE — 85384 FIBRINOGEN ACTIVITY: CPT | Performed by: THORACIC SURGERY (CARDIOTHORACIC VASCULAR SURGERY)

## 2024-06-12 PROCEDURE — 33641 REPAIR HEART SEPTUM DEFECT: CPT | Performed by: THORACIC SURGERY (CARDIOTHORACIC VASCULAR SURGERY)

## 2024-06-12 PROCEDURE — 80069 RENAL FUNCTION PANEL: CPT | Performed by: NURSE PRACTITIONER

## 2024-06-12 PROCEDURE — 83735 ASSAY OF MAGNESIUM: CPT | Performed by: NURSE PRACTITIONER

## 2024-06-12 PROCEDURE — 93005 ELECTROCARDIOGRAM TRACING: CPT | Performed by: NURSE PRACTITIONER

## 2024-06-12 PROCEDURE — 85049 AUTOMATED PLATELET COUNT: CPT | Performed by: THORACIC SURGERY (CARDIOTHORACIC VASCULAR SURGERY)

## 2024-06-12 PROCEDURE — 25810000003 SODIUM CHLORIDE 0.9 % SOLUTION 250 ML FLEX CONT: Performed by: ANESTHESIOLOGY

## 2024-06-12 PROCEDURE — 85018 HEMOGLOBIN: CPT | Performed by: THORACIC SURGERY (CARDIOTHORACIC VASCULAR SURGERY)

## 2024-06-12 PROCEDURE — 99232 SBSQ HOSP IP/OBS MODERATE 35: CPT | Performed by: STUDENT IN AN ORGANIZED HEALTH CARE EDUCATION/TRAINING PROGRAM

## 2024-06-12 PROCEDURE — 85027 COMPLETE CBC AUTOMATED: CPT | Performed by: NURSE PRACTITIONER

## 2024-06-12 PROCEDURE — 85730 THROMBOPLASTIN TIME PARTIAL: CPT | Performed by: THORACIC SURGERY (CARDIOTHORACIC VASCULAR SURGERY)

## 2024-06-12 PROCEDURE — 25010000002 EPINEPHRINE PER 0.1 MG: Performed by: ANESTHESIOLOGY

## 2024-06-12 PROCEDURE — 82803 BLOOD GASES ANY COMBINATION: CPT | Performed by: NURSE PRACTITIONER

## 2024-06-12 PROCEDURE — 25810000003 SODIUM CHLORIDE 0.9 % SOLUTION: Performed by: THORACIC SURGERY (CARDIOTHORACIC VASCULAR SURGERY)

## 2024-06-12 PROCEDURE — 25010000002 PHENYLEPHRINE 10 MG/ML SOLUTION 5 ML VIAL: Performed by: ANESTHESIOLOGY

## 2024-06-12 PROCEDURE — 86927 PLASMA FRESH FROZEN: CPT

## 2024-06-12 PROCEDURE — 85027 COMPLETE CBC AUTOMATED: CPT | Performed by: THORACIC SURGERY (CARDIOTHORACIC VASCULAR SURGERY)

## 2024-06-12 PROCEDURE — 33465 REPLACE TRICUSPID VALVE: CPT | Performed by: THORACIC SURGERY (CARDIOTHORACIC VASCULAR SURGERY)

## 2024-06-12 PROCEDURE — 36430 TRANSFUSION BLD/BLD COMPNT: CPT

## 2024-06-12 PROCEDURE — 82947 ASSAY GLUCOSE BLOOD QUANT: CPT

## 2024-06-12 PROCEDURE — 02UJ0JZ SUPPLEMENT TRICUSPID VALVE WITH SYNTHETIC SUBSTITUTE, OPEN APPROACH: ICD-10-PCS | Performed by: THORACIC SURGERY (CARDIOTHORACIC VASCULAR SURGERY)

## 2024-06-12 PROCEDURE — 85610 PROTHROMBIN TIME: CPT | Performed by: NURSE PRACTITIONER

## 2024-06-12 PROCEDURE — 85018 HEMOGLOBIN: CPT

## 2024-06-12 PROCEDURE — 02UJ08Z SUPPLEMENT TRICUSPID VALVE WITH ZOOPLASTIC TISSUE, OPEN APPROACH: ICD-10-PCS | Performed by: THORACIC SURGERY (CARDIOTHORACIC VASCULAR SURGERY)

## 2024-06-12 PROCEDURE — 94002 VENT MGMT INPAT INIT DAY: CPT

## 2024-06-12 PROCEDURE — 85730 THROMBOPLASTIN TIME PARTIAL: CPT | Performed by: NURSE PRACTITIONER

## 2024-06-12 PROCEDURE — 85025 COMPLETE CBC W/AUTO DIFF WBC: CPT | Performed by: NURSE PRACTITIONER

## 2024-06-12 PROCEDURE — 25010000002 MILRINONE LACTATE IN DEXTROSE 20-5 MG/100ML-% SOLUTION: Performed by: NURSE PRACTITIONER

## 2024-06-12 PROCEDURE — 4A023N7 MEASUREMENT OF CARDIAC SAMPLING AND PRESSURE, LEFT HEART, PERCUTANEOUS APPROACH: ICD-10-PCS | Performed by: THORACIC SURGERY (CARDIOTHORACIC VASCULAR SURGERY)

## 2024-06-12 PROCEDURE — 25010000002 ALBUMIN HUMAN 5% PER 50 ML: Performed by: NURSE PRACTITIONER

## 2024-06-12 DEVICE — PTCH VASC XENOSURE PLS BIO 2X9CM: Type: IMPLANTABLE DEVICE | Site: HEART | Status: FUNCTIONAL

## 2024-06-12 DEVICE — VLV PERICARD PERIMOUNT MAGNA EASE 23: Type: IMPLANTABLE DEVICE | Site: HEART | Status: FUNCTIONAL

## 2024-06-12 DEVICE — COR-KNOT MINI® COMBO KITBASE PACKAGE TYPE - KITEACH STERILE PACKAGE KIT CONTAINS (2) SINGLE PATIENT USE COR-KNOT MINI® DEVICES AND (12) COR-KNOT® QUICK LOADS®.
Type: IMPLANTABLE DEVICE | Site: HEART | Status: FUNCTIONAL
Brand: COR-KNOT MINI®

## 2024-06-12 DEVICE — DEV CONTRL TISS STRATAFIX SPIRAL MNCRYL UD 3/0 PLS 30CM: Type: IMPLANTABLE DEVICE | Site: HEART | Status: FUNCTIONAL

## 2024-06-12 DEVICE — VLV MITRAL HANCOCK 2 ULTR T510 31MM: Type: IMPLANTABLE DEVICE | Site: HEART | Status: FUNCTIONAL

## 2024-06-12 DEVICE — SS SUTURE, 4 PER SLEEVE
Type: IMPLANTABLE DEVICE | Site: STERNUM | Status: FUNCTIONAL
Brand: MYO/WIRE II

## 2024-06-12 DEVICE — TEMP PACING WIRE
Type: IMPLANTABLE DEVICE | Site: HEART | Status: FUNCTIONAL
Brand: MYO/WIRE

## 2024-06-12 DEVICE — IMPLANTABLE DEVICE: Type: IMPLANTABLE DEVICE | Site: HEART | Status: FUNCTIONAL

## 2024-06-12 DEVICE — COR-KNOT® QUICK LOAD® SINGLES
Type: IMPLANTABLE DEVICE | Site: HEART | Status: FUNCTIONAL
Brand: COR-KNOT® QUICK LOAD®

## 2024-06-12 DEVICE — SS SUTURE, 3 PER SLEEVE
Type: IMPLANTABLE DEVICE | Site: STERNUM | Status: FUNCTIONAL
Brand: MYO/WIRE II

## 2024-06-12 RX ORDER — NITROGLYCERIN 0.4 MG/1
0.4 TABLET SUBLINGUAL
Status: DISCONTINUED | OUTPATIENT
Start: 2024-06-12 | End: 2024-06-17 | Stop reason: HOSPADM

## 2024-06-12 RX ORDER — ACETAMINOPHEN 10 MG/ML
1000 INJECTION, SOLUTION INTRAVENOUS EVERY 8 HOURS
Status: COMPLETED | OUTPATIENT
Start: 2024-06-12 | End: 2024-06-13

## 2024-06-12 RX ORDER — MIDAZOLAM HYDROCHLORIDE 1 MG/ML
INJECTION INTRAMUSCULAR; INTRAVENOUS AS NEEDED
Status: DISCONTINUED | OUTPATIENT
Start: 2024-06-12 | End: 2024-06-12 | Stop reason: SURG

## 2024-06-12 RX ORDER — AMOXICILLIN 250 MG
2 CAPSULE ORAL NIGHTLY
Status: DISCONTINUED | OUTPATIENT
Start: 2024-06-13 | End: 2024-06-17 | Stop reason: HOSPADM

## 2024-06-12 RX ORDER — PANTOPRAZOLE SODIUM 40 MG/1
40 TABLET, DELAYED RELEASE ORAL EVERY MORNING
Status: DISCONTINUED | OUTPATIENT
Start: 2024-06-13 | End: 2024-06-17 | Stop reason: HOSPADM

## 2024-06-12 RX ORDER — SODIUM CHLORIDE 9 MG/ML
INJECTION, SOLUTION INTRAVENOUS CONTINUOUS PRN
Status: DISCONTINUED | OUTPATIENT
Start: 2024-06-12 | End: 2024-06-12 | Stop reason: SURG

## 2024-06-12 RX ORDER — MILRINONE LACTATE 0.2 MG/ML
.25-.375 INJECTION, SOLUTION INTRAVENOUS CONTINUOUS PRN
Status: DISCONTINUED | OUTPATIENT
Start: 2024-06-12 | End: 2024-06-14

## 2024-06-12 RX ORDER — NALOXONE HCL 0.4 MG/ML
0.4 VIAL (ML) INJECTION
Status: DISCONTINUED | OUTPATIENT
Start: 2024-06-12 | End: 2024-06-16

## 2024-06-12 RX ORDER — DEXAMETHASONE SODIUM PHOSPHATE 4 MG/ML
INJECTION, SOLUTION INTRA-ARTICULAR; INTRALESIONAL; INTRAMUSCULAR; INTRAVENOUS; SOFT TISSUE AS NEEDED
Status: DISCONTINUED | OUTPATIENT
Start: 2024-06-12 | End: 2024-06-12 | Stop reason: SURG

## 2024-06-12 RX ORDER — DEXTROSE MONOHYDRATE 25 G/50ML
10-50 INJECTION, SOLUTION INTRAVENOUS
Status: DISCONTINUED | OUTPATIENT
Start: 2024-06-12 | End: 2024-06-13

## 2024-06-12 RX ORDER — MORPHINE SULFATE 2 MG/ML
4 INJECTION, SOLUTION INTRAMUSCULAR; INTRAVENOUS
Status: DISCONTINUED | OUTPATIENT
Start: 2024-06-12 | End: 2024-06-13

## 2024-06-12 RX ORDER — ACETAMINOPHEN 160 MG/5ML
650 SOLUTION ORAL EVERY 4 HOURS PRN
Status: DISCONTINUED | OUTPATIENT
Start: 2024-06-13 | End: 2024-06-17 | Stop reason: HOSPADM

## 2024-06-12 RX ORDER — METOCLOPRAMIDE HYDROCHLORIDE 5 MG/ML
5 INJECTION INTRAMUSCULAR; INTRAVENOUS EVERY 8 HOURS
Status: COMPLETED | OUTPATIENT
Start: 2024-06-12 | End: 2024-06-13

## 2024-06-12 RX ORDER — ACETAMINOPHEN 325 MG/1
650 TABLET ORAL EVERY 4 HOURS PRN
Status: DISCONTINUED | OUTPATIENT
Start: 2024-06-13 | End: 2024-06-17 | Stop reason: HOSPADM

## 2024-06-12 RX ORDER — SODIUM CHLORIDE 9 MG/ML
9 INJECTION, SOLUTION INTRAVENOUS CONTINUOUS
Status: DISCONTINUED | OUTPATIENT
Start: 2024-06-12 | End: 2024-06-13

## 2024-06-12 RX ORDER — PHENYLEPHRINE HCL IN 0.9% NACL 1 MG/10 ML
SYRINGE (ML) INTRAVENOUS AS NEEDED
Status: DISCONTINUED | OUTPATIENT
Start: 2024-06-12 | End: 2024-06-12 | Stop reason: SURG

## 2024-06-12 RX ORDER — BISACODYL 10 MG
10 SUPPOSITORY, RECTAL RECTAL DAILY PRN
Status: DISCONTINUED | OUTPATIENT
Start: 2024-06-13 | End: 2024-06-17 | Stop reason: HOSPADM

## 2024-06-12 RX ORDER — IBUPROFEN 600 MG/1
1 TABLET ORAL
Status: DISCONTINUED | OUTPATIENT
Start: 2024-06-12 | End: 2024-06-13

## 2024-06-12 RX ORDER — ACETAMINOPHEN 325 MG/1
650 TABLET ORAL EVERY 4 HOURS
Status: ACTIVE | OUTPATIENT
Start: 2024-06-12 | End: 2024-06-13

## 2024-06-12 RX ORDER — ROCURONIUM BROMIDE 10 MG/ML
INJECTION, SOLUTION INTRAVENOUS AS NEEDED
Status: DISCONTINUED | OUTPATIENT
Start: 2024-06-12 | End: 2024-06-12 | Stop reason: SURG

## 2024-06-12 RX ORDER — METOCLOPRAMIDE HYDROCHLORIDE 5 MG/ML
10 INJECTION INTRAMUSCULAR; INTRAVENOUS EVERY 6 HOURS
Status: DISCONTINUED | OUTPATIENT
Start: 2024-06-12 | End: 2024-06-12

## 2024-06-12 RX ORDER — DOPAMINE HYDROCHLORIDE 160 MG/100ML
2-20 INJECTION, SOLUTION INTRAVENOUS CONTINUOUS PRN
Status: DISCONTINUED | OUTPATIENT
Start: 2024-06-12 | End: 2024-06-13

## 2024-06-12 RX ORDER — HEPARIN SODIUM 1000 [USP'U]/ML
INJECTION, SOLUTION INTRAVENOUS; SUBCUTANEOUS AS NEEDED
Status: DISCONTINUED | OUTPATIENT
Start: 2024-06-12 | End: 2024-06-12 | Stop reason: SURG

## 2024-06-12 RX ORDER — VANCOMYCIN HYDROCHLORIDE 1 G/20ML
INJECTION, POWDER, LYOPHILIZED, FOR SOLUTION INTRAVENOUS AS NEEDED
Status: DISCONTINUED | OUTPATIENT
Start: 2024-06-12 | End: 2024-06-12 | Stop reason: HOSPADM

## 2024-06-12 RX ORDER — ONDANSETRON 2 MG/ML
INJECTION INTRAMUSCULAR; INTRAVENOUS AS NEEDED
Status: DISCONTINUED | OUTPATIENT
Start: 2024-06-12 | End: 2024-06-12 | Stop reason: SURG

## 2024-06-12 RX ORDER — PROPOFOL 10 MG/ML
VIAL (ML) INTRAVENOUS AS NEEDED
Status: DISCONTINUED | OUTPATIENT
Start: 2024-06-12 | End: 2024-06-12 | Stop reason: SURG

## 2024-06-12 RX ORDER — ALBUMIN, HUMAN INJ 5% 5 %
1000 SOLUTION INTRAVENOUS AS NEEDED
Status: DISPENSED | OUTPATIENT
Start: 2024-06-12 | End: 2024-06-13

## 2024-06-12 RX ORDER — ACETAMINOPHEN 650 MG/1
650 SUPPOSITORY RECTAL EVERY 4 HOURS
Status: ACTIVE | OUTPATIENT
Start: 2024-06-12 | End: 2024-06-13

## 2024-06-12 RX ORDER — ACETAMINOPHEN 160 MG/5ML
650 SOLUTION ORAL EVERY 4 HOURS
Status: ACTIVE | OUTPATIENT
Start: 2024-06-12 | End: 2024-06-13

## 2024-06-12 RX ORDER — OXYCODONE HYDROCHLORIDE 5 MG/1
10 TABLET ORAL EVERY 4 HOURS PRN
Status: DISCONTINUED | OUTPATIENT
Start: 2024-06-12 | End: 2024-06-13

## 2024-06-12 RX ORDER — DEXMEDETOMIDINE HYDROCHLORIDE 4 UG/ML
.2-1.5 INJECTION, SOLUTION INTRAVENOUS
Status: DISCONTINUED | OUTPATIENT
Start: 2024-06-12 | End: 2024-06-13

## 2024-06-12 RX ORDER — BISACODYL 5 MG/1
10 TABLET, DELAYED RELEASE ORAL DAILY PRN
Status: DISCONTINUED | OUTPATIENT
Start: 2024-06-12 | End: 2024-06-17 | Stop reason: HOSPADM

## 2024-06-12 RX ORDER — METHADONE HYDROCHLORIDE 10 MG/ML
INJECTION, SOLUTION INTRAMUSCULAR; INTRAVENOUS; SUBCUTANEOUS AS NEEDED
Status: DISCONTINUED | OUTPATIENT
Start: 2024-06-12 | End: 2024-06-12 | Stop reason: SURG

## 2024-06-12 RX ORDER — ALPRAZOLAM 0.25 MG/1
0.25 TABLET ORAL EVERY 8 HOURS PRN
Status: DISCONTINUED | OUTPATIENT
Start: 2024-06-12 | End: 2024-06-17 | Stop reason: HOSPADM

## 2024-06-12 RX ORDER — CHLORHEXIDINE GLUCONATE ORAL RINSE 1.2 MG/ML
15 SOLUTION DENTAL EVERY 12 HOURS
Status: DISCONTINUED | OUTPATIENT
Start: 2024-06-12 | End: 2024-06-17

## 2024-06-12 RX ORDER — NOREPINEPHRINE BITARTRATE 0.03 MG/ML
.02-.2 INJECTION, SOLUTION INTRAVENOUS CONTINUOUS PRN
Status: DISCONTINUED | OUTPATIENT
Start: 2024-06-12 | End: 2024-06-13

## 2024-06-12 RX ORDER — PANTOPRAZOLE SODIUM 40 MG/10ML
40 INJECTION, POWDER, LYOPHILIZED, FOR SOLUTION INTRAVENOUS ONCE
Status: COMPLETED | OUTPATIENT
Start: 2024-06-12 | End: 2024-06-12

## 2024-06-12 RX ORDER — HYDROCODONE BITARTRATE AND ACETAMINOPHEN 5; 325 MG/1; MG/1
2 TABLET ORAL EVERY 4 HOURS PRN
Status: DISCONTINUED | OUTPATIENT
Start: 2024-06-12 | End: 2024-06-13

## 2024-06-12 RX ORDER — PROTAMINE SULFATE 10 MG/ML
INJECTION, SOLUTION INTRAVENOUS AS NEEDED
Status: DISCONTINUED | OUTPATIENT
Start: 2024-06-12 | End: 2024-06-12 | Stop reason: SURG

## 2024-06-12 RX ORDER — MIDAZOLAM HYDROCHLORIDE 1 MG/ML
2 INJECTION INTRAMUSCULAR; INTRAVENOUS
Status: DISCONTINUED | OUTPATIENT
Start: 2024-06-12 | End: 2024-06-13

## 2024-06-12 RX ORDER — NICOTINE POLACRILEX 4 MG
15 LOZENGE BUCCAL
Status: DISCONTINUED | OUTPATIENT
Start: 2024-06-12 | End: 2024-06-13

## 2024-06-12 RX ORDER — ASPIRIN 81 MG/1
81 TABLET ORAL DAILY
Status: DISCONTINUED | OUTPATIENT
Start: 2024-06-13 | End: 2024-06-17 | Stop reason: HOSPADM

## 2024-06-12 RX ORDER — POLYETHYLENE GLYCOL 3350 17 G/17G
17 POWDER, FOR SOLUTION ORAL DAILY PRN
Status: DISCONTINUED | OUTPATIENT
Start: 2024-06-12 | End: 2024-06-17 | Stop reason: HOSPADM

## 2024-06-12 RX ORDER — VANCOMYCIN/0.9 % SOD CHLORIDE 1.5G/250ML
1500 PLASTIC BAG, INJECTION (ML) INTRAVENOUS EVERY 24 HOURS
Status: DISCONTINUED | OUTPATIENT
Start: 2024-06-13 | End: 2024-06-17

## 2024-06-12 RX ORDER — PHENYLEPHRINE HCL IN 0.9% NACL 0.5 MG/5ML
.2-2 SYRINGE (ML) INTRAVENOUS CONTINUOUS PRN
Status: DISCONTINUED | OUTPATIENT
Start: 2024-06-12 | End: 2024-06-13

## 2024-06-12 RX ORDER — NOREPINEPHRINE BITARTRATE 1 MG/ML
INJECTION, SOLUTION INTRAVENOUS CONTINUOUS PRN
Status: DISCONTINUED | OUTPATIENT
Start: 2024-06-12 | End: 2024-06-12 | Stop reason: SURG

## 2024-06-12 RX ORDER — ACETAMINOPHEN 650 MG/1
650 SUPPOSITORY RECTAL EVERY 4 HOURS PRN
Status: DISCONTINUED | OUTPATIENT
Start: 2024-06-13 | End: 2024-06-17 | Stop reason: HOSPADM

## 2024-06-12 RX ORDER — MAGNESIUM SULFATE 1 G/100ML
1 INJECTION INTRAVENOUS EVERY 8 HOURS
Status: COMPLETED | OUTPATIENT
Start: 2024-06-12 | End: 2024-06-13

## 2024-06-12 RX ORDER — NITROGLYCERIN 20 MG/100ML
5-200 INJECTION INTRAVENOUS
Status: DISCONTINUED | OUTPATIENT
Start: 2024-06-12 | End: 2024-06-13

## 2024-06-12 RX ORDER — MORPHINE SULFATE 2 MG/ML
1 INJECTION, SOLUTION INTRAMUSCULAR; INTRAVENOUS EVERY 4 HOURS PRN
Status: DISCONTINUED | OUTPATIENT
Start: 2024-06-12 | End: 2024-06-16

## 2024-06-12 RX ORDER — CYCLOBENZAPRINE HCL 10 MG
10 TABLET ORAL EVERY 8 HOURS PRN
Status: DISCONTINUED | OUTPATIENT
Start: 2024-06-13 | End: 2024-06-17 | Stop reason: HOSPADM

## 2024-06-12 RX ORDER — MEPERIDINE HYDROCHLORIDE 25 MG/ML
25 INJECTION INTRAMUSCULAR; INTRAVENOUS; SUBCUTANEOUS EVERY 4 HOURS PRN
Status: ACTIVE | OUTPATIENT
Start: 2024-06-12 | End: 2024-06-13

## 2024-06-12 RX ORDER — AMINOCAPROIC ACID 250 MG/ML
INJECTION, SOLUTION INTRAVENOUS AS NEEDED
Status: DISCONTINUED | OUTPATIENT
Start: 2024-06-12 | End: 2024-06-12 | Stop reason: SURG

## 2024-06-12 RX ORDER — ONDANSETRON 2 MG/ML
4 INJECTION INTRAMUSCULAR; INTRAVENOUS EVERY 6 HOURS PRN
Status: DISCONTINUED | OUTPATIENT
Start: 2024-06-12 | End: 2024-06-17 | Stop reason: HOSPADM

## 2024-06-12 RX ORDER — LIDOCAINE HYDROCHLORIDE 20 MG/ML
INJECTION, SOLUTION INFILTRATION; PERINEURAL AS NEEDED
Status: DISCONTINUED | OUTPATIENT
Start: 2024-06-12 | End: 2024-06-12 | Stop reason: SURG

## 2024-06-12 RX ADMIN — MILRINONE LACTATE 0.25 MCG/KG/MIN: 0.2 INJECTION, SOLUTION INTRAVENOUS at 22:03

## 2024-06-12 RX ADMIN — METOCLOPRAMIDE 10 MG: 5 INJECTION, SOLUTION INTRAMUSCULAR; INTRAVENOUS at 14:23

## 2024-06-12 RX ADMIN — MUPIROCIN 1 APPLICATION: 20 OINTMENT TOPICAL at 20:00

## 2024-06-12 RX ADMIN — PANTOPRAZOLE SODIUM 40 MG: 40 INJECTION, POWDER, FOR SOLUTION INTRAVENOUS at 14:23

## 2024-06-12 RX ADMIN — PROTAMINE SULFATE 350 MG: 10 INJECTION, SOLUTION INTRAVENOUS at 11:30

## 2024-06-12 RX ADMIN — SODIUM CHLORIDE 0.25 MCG/KG/MIN: 0.9 INJECTION, SOLUTION INTRAVENOUS at 10:02

## 2024-06-12 RX ADMIN — ONDANSETRON 4 MG: 2 INJECTION INTRAMUSCULAR; INTRAVENOUS at 11:55

## 2024-06-12 RX ADMIN — SODIUM CHLORIDE 7 UNITS: 9 INJECTION, SOLUTION INTRAVENOUS at 11:01

## 2024-06-12 RX ADMIN — MAGNESIUM SULFATE IN DEXTROSE 1 G: 10 INJECTION, SOLUTION INTRAVENOUS at 14:21

## 2024-06-12 RX ADMIN — EPINEPHRINE 0.03 MCG/KG/MIN: 1 INJECTION, SOLUTION, CONCENTRATE INTRAVENOUS at 10:14

## 2024-06-12 RX ADMIN — SODIUM CHLORIDE: 9 INJECTION, SOLUTION INTRAVENOUS at 07:34

## 2024-06-12 RX ADMIN — PROPOFOL 50 MG: 10 INJECTION, EMULSION INTRAVENOUS at 11:39

## 2024-06-12 RX ADMIN — DEXMEDETOMIDINE HYDROCHLORIDE 0.6 MCG/KG/HR: 4 INJECTION, SOLUTION INTRAVENOUS at 21:47

## 2024-06-12 RX ADMIN — ALBUMIN (HUMAN) 250 ML: 12.5 INJECTION, SOLUTION INTRAVENOUS at 18:32

## 2024-06-12 RX ADMIN — SODIUM CHLORIDE: 9 INJECTION, SOLUTION INTRAVENOUS at 06:49

## 2024-06-12 RX ADMIN — HYDROCODONE BITARTRATE AND ACETAMINOPHEN 1 TABLET: 5; 325 TABLET ORAL at 01:13

## 2024-06-12 RX ADMIN — ACETAMINOPHEN 1000 MG: 10 INJECTION INTRAVENOUS at 13:54

## 2024-06-12 RX ADMIN — ROCURONIUM BROMIDE 50 MG: 10 INJECTION, SOLUTION INTRAVENOUS at 10:31

## 2024-06-12 RX ADMIN — ROCURONIUM BROMIDE 20 MG: 10 INJECTION, SOLUTION INTRAVENOUS at 13:05

## 2024-06-12 RX ADMIN — PROPOFOL 150 MG: 10 INJECTION, EMULSION INTRAVENOUS at 07:09

## 2024-06-12 RX ADMIN — HYDROCODONE BITARTRATE AND ACETAMINOPHEN 1 TABLET: 5; 325 TABLET ORAL at 05:03

## 2024-06-12 RX ADMIN — DEXMEDETOMIDINE HYDROCHLORIDE 0.5 MCG/KG/HR: 4 INJECTION, SOLUTION INTRAVENOUS at 13:54

## 2024-06-12 RX ADMIN — DEXAMETHASONE SODIUM PHOSPHATE 4 MG: 4 INJECTION, SOLUTION INTRAMUSCULAR; INTRAVENOUS at 11:55

## 2024-06-12 RX ADMIN — PHENYLEPHRINE HYDROCHLORIDE 0.3 MCG/KG/MIN: 10 INJECTION, SOLUTION INTRAVENOUS at 07:48

## 2024-06-12 RX ADMIN — Medication 100 MCG: at 08:22

## 2024-06-12 RX ADMIN — Medication 100 MCG: at 08:34

## 2024-06-12 RX ADMIN — METOCLOPRAMIDE 5 MG: 5 INJECTION, SOLUTION INTRAMUSCULAR; INTRAVENOUS at 21:48

## 2024-06-12 RX ADMIN — HEPARIN SODIUM 5000 UNITS: 1000 INJECTION, SOLUTION INTRAVENOUS; SUBCUTANEOUS at 08:40

## 2024-06-12 RX ADMIN — METHADONE HYDROCHLORIDE 10 MG: 10 INJECTION, SOLUTION INTRAMUSCULAR; INTRAVENOUS; SUBCUTANEOUS at 06:54

## 2024-06-12 RX ADMIN — MUPIROCIN 1 APPLICATION: 20 OINTMENT TOPICAL at 05:28

## 2024-06-12 RX ADMIN — Medication 1500 MG: at 06:57

## 2024-06-12 RX ADMIN — Medication 100 MCG: at 07:54

## 2024-06-12 RX ADMIN — SODIUM CHLORIDE 1 MCG/KG/HR: 9 INJECTION, SOLUTION INTRAVENOUS at 07:35

## 2024-06-12 RX ADMIN — ROCURONIUM BROMIDE 30 MG: 10 INJECTION, SOLUTION INTRAVENOUS at 10:00

## 2024-06-12 RX ADMIN — MAGNESIUM SULFATE IN DEXTROSE 1 G: 10 INJECTION, SOLUTION INTRAVENOUS at 21:47

## 2024-06-12 RX ADMIN — NOREPINEPHRINE BITARTRATE 0.03 MCG/KG/MIN: 1 SOLUTION INTRAVENOUS at 07:56

## 2024-06-12 RX ADMIN — ACETAMINOPHEN 1000 MG: 10 INJECTION INTRAVENOUS at 21:47

## 2024-06-12 RX ADMIN — LIDOCAINE HYDROCHLORIDE 60 MG: 20 INJECTION, SOLUTION INFILTRATION; PERINEURAL at 07:08

## 2024-06-12 RX ADMIN — Medication 100 MCG: at 07:15

## 2024-06-12 RX ADMIN — METOPROLOL TARTRATE 12.5 MG: 25 TABLET, FILM COATED ORAL at 06:27

## 2024-06-12 RX ADMIN — ROCURONIUM BROMIDE 50 MG: 10 INJECTION, SOLUTION INTRAVENOUS at 07:10

## 2024-06-12 RX ADMIN — MIDAZOLAM 2 MG: 1 INJECTION INTRAMUSCULAR; INTRAVENOUS at 06:56

## 2024-06-12 RX ADMIN — PROPOFOL 35 MCG/KG/MIN: 10 INJECTION, EMULSION INTRAVENOUS at 08:35

## 2024-06-12 RX ADMIN — ROCURONIUM BROMIDE 20 MG: 10 INJECTION, SOLUTION INTRAVENOUS at 08:47

## 2024-06-12 RX ADMIN — 0.12% CHLORHEXIDINE GLUCONATE 15 ML: 1.2 RINSE ORAL at 20:00

## 2024-06-12 RX ADMIN — AMINOCAPROIC ACID 10 G: 250 INJECTION, SOLUTION INTRAVENOUS at 11:37

## 2024-06-12 RX ADMIN — SODIUM CHLORIDE 7 UNITS: 9 INJECTION, SOLUTION INTRAVENOUS at 10:33

## 2024-06-12 RX ADMIN — OXYCODONE HYDROCHLORIDE 10 MG: 5 TABLET ORAL at 19:57

## 2024-06-12 RX ADMIN — METHADONE HYDROCHLORIDE 10 MG: 10 INJECTION, SOLUTION INTRAMUSCULAR; INTRAVENOUS; SUBCUTANEOUS at 07:35

## 2024-06-12 RX ADMIN — Medication 100 MCG: at 07:23

## 2024-06-12 RX ADMIN — HEPARIN SODIUM 28000 UNITS: 1000 INJECTION, SOLUTION INTRAVENOUS; SUBCUTANEOUS at 08:28

## 2024-06-12 NOTE — ANESTHESIA PROCEDURE NOTES
Central Line      Patient reassessed immediately prior to procedure    Patient location during procedure: OR  Start time: 6/12/2024 7:20 AM  Stop Time:6/12/2024 7:30 AM  Staff  Anesthesiologist: Harshil Ramos MD  Preanesthetic Checklist  Completed: patient identified, risks and benefits discussed, surgical consent, pre-op evaluation and timeout performed  Central Line Prep  Sterile Tech:cap, gloves, gown, mask and sterile barriers  Prep: chloraprep  Patient monitoring: blood pressure monitoring, continuous pulse oximetry and EKG  Central Line Procedure  Laterality:right  Location:internal jugular  Catheter Type:double lumen and MAC  Catheter Size:9 Fr  Guidance:ultrasound guided  PROCEDURE NOTE/ULTRASOUND INTERPRETATION.  Using ultrasound guidance the potential vascular sites for insertion of the catheter were visualized to determine the patency of the vessel to be used for vascular access.  After selecting the appropriate site for insertion, the needle was visualized under ultrasound being inserted into the internal jugular vein, followed by ultrasound confirmation of wire and catheter placement. There were no abnormalities seen on ultrasound; an image was taken; and the patient tolerated the procedure with no complications. Images: still images not obtained  Assessment  Post procedure:biopatch applied, line sutured and occlusive dressing applied  Assessement:blood return through all ports and free fluid flow  Complications:no  Patient Tolerance:patient tolerated the procedure well with no apparent complications  Additional Notes  Ultrasound Interpretation:  Using ultrasound guidance the potential vascular sites for insertion of the catheter were visualized to determine the patency of the vessel to be used for vascular access.  After selecting the appropriate site for insertion, the needle was visualized under ultrasound being inserted into the vessel, followed by ultrasound confirmation of wire and catheter  placement.  There were no abnormalities seen on ultrasound; an image was taken/ and the patient tolerated the procedure with no complications.

## 2024-06-12 NOTE — ANESTHESIA PROCEDURE NOTES
Pulmonary artery catheter      Patient reassessed immediately prior to procedure    Start time: 6/12/2024 7:30 AM  Stop Time:6/12/2024 7:32 AM  Staff  Anesthesiologist: Harshil Ramos MD  Preanesthetic Checklist  Completed: patient identified, risks and benefits discussed, surgical consent, pre-op evaluation and timeout performed  Central Line Prep  Sterile Tech:cap, gloves, gown, mask and sterile barriers  Prep: chloraprep  Patient monitoring: blood pressure monitoring, continuous pulse oximetry and EKG  Central Line Procedure  Laterality:right  Location:internal jugular  Catheter Type:Rueter-Kezia  Assessment  Post procedure:biopatch applied, line sutured and occlusive dressing applied  Assessement:blood return through all ports and free fluid flow  Patient Tolerance:patient tolerated the procedure well with no apparent complications

## 2024-06-12 NOTE — PROGRESS NOTES
LOS: 4 days     Chief Complaint: MRSA endocarditis    Interval History: Patient intubated and sedated after the OR this morning.  Tolerating antibiotics.    Vital Signs  Temp:  [97.5 °F (36.4 °C)-98.1 °F (36.7 °C)] 97.5 °F (36.4 °C)  Heart Rate:  [74-90] 80  Resp:  [17-18] 18  BP: (102-124)/(52-76) 102/61  Arterial Line BP: (120-144)/(55-79) 121/57  FiO2 (%):  [99 %] 99 %    Physical Exam:  General: Intubated and sedated  HEENT: ET tube in place  Respiratory: Coarse bilateral breath sounds.  Chest tubes in place.  Skin: Multiple tattoos  Extremities: No edema, cyanosis  Access: IJ introducer and peripheral IV    Antibiotics:  Anti-Infectives (From admission, onward)      Ordered     Dose/Rate Route Frequency Start Stop    06/12/24 1338  vancomycin IVPB 1500 mg in 0.9% NaCl (Premix) 500 mL        Ordering Provider: Gary Barton MD    1,500 mg  333.3 mL/hr over 90 Minutes Intravenous Every 24 Hours 06/13/24 0800 06/24/24 0759    06/12/24 1323  Pharmacy to dose vancomycin        Ordering Provider: Gary Barton MD     Does not apply Continuous PRN 06/12/24 1322 06/23/24 1321    06/10/24 1742  vancomycin IVPB 1500 mg in 0.9% NaCl (Premix) 500 mL        Ordering Provider: Eric Skaggs MD    15 mg/kg × 91.9 kg Intravenous Once 06/12/24 0600 06/12/24 0657             Results Review:     I reviewed the patient's new clinical results.    Lab Results   Component Value Date    WBC 21.31 (H) 06/12/2024    HGB 7.7 (L) 06/12/2024    HCT 24.5 (L) 06/12/2024    MCV 91.1 06/12/2024     06/12/2024     Lab Results   Component Value Date    GLUCOSE 193 (H) 06/12/2024    BUN 23 (H) 06/12/2024    CREATININE 1.33 (H) 06/12/2024    EGFRIFAFRI >60 07/22/2021    BCR 17.3 06/12/2024    CO2 25.4 06/12/2024    CALCIUM 8.5 (L) 06/12/2024    ALBUMIN 3.5 06/12/2024    LABIL2 0.9 (L) 07/22/2021     (H) 06/08/2024     (H) 06/08/2024       Microbiology:  6/2 blood cultures no growth  6/9 COVID-negative  6/12 aortic  valve tissue culture in process    Outside hospital blood cultures  5/9 3 out of 3 MRSA  5/11 1 out of 1 MRSA    Assessment    #MRSA tricuspid valve endocarditis status post AVR/MVR/TVR 6/12  #MRSA septicemia, blood cultures cleared  #IVDU  #Septic pulmonary emboli  #Right molar periapical abscess status post home health tooth extraction 6/9  #Acute hypoxic respiratory failure  #CATRINA  #Splenic infarct, likely septic    Status post AVR/MVR/TVR today.  Continue IV vancomycin goal -600.  Appreciate pharmacy's help with dosing.  Continue to follow levels for therapeutic drug monitoring.  Next vancomycin level schedule to be obtained Friday.  Will continue to follow renal function.    ID will follow.

## 2024-06-12 NOTE — NURSING NOTE
This RN spoke to Dr. Skaggs via telephone at 1852 to discuss repeat hgb 8.0 and hypotension. He prefers to utilize epi if needed, so he is aware it was restarted at 0.01 and ordered 1 more unit PRBC. Dr. Skaggs wants to be cautious with fluid and is aware of total 500 mL albumin given, chest tube outputs, CVP, UOP. He was given the sister's name and phone number so he can call her for updates.

## 2024-06-12 NOTE — PLAN OF CARE
Goal Outcome Evaluation:      Patient to go to surgery today. Pain managed through night see MAR. PT standby assist. Pre-op showers completed as well as morning glucose (WNL). VSS. Denies any needs at this time. WCTM

## 2024-06-12 NOTE — ANESTHESIA PROCEDURE NOTES
Airway  Date/Time: 6/12/2024 7:13 AM  Airway not difficult    General Information and Staff    Patient location during procedure: OR  Anesthesiologist: Harshil Ramos MD    Indications and Patient Condition    Preoxygenated: yes  Mask difficulty assessment: 2 - vent by mask + OA or adjuvant +/- NMBA    Final Airway Details  Final airway type: endotracheal airway      Successful airway: ETT  Cuffed: yes   Successful intubation technique: direct laryngoscopy  Facilitating devices/methods: intubating stylet  Endotracheal tube insertion site: oral  Blade: Henry  Blade size: 2  ETT size (mm): 7.5  Cormack-Lehane Classification: grade I - full view of glottis  Placement verified by: capnometry   Measured from: lips  ETT/EBT  to teeth (cm): 21  ETT/EBT  to lips (cm): 21  Number of attempts at approach: 1  Assessment: lips, teeth, and gum same as pre-op and atraumatic intubation

## 2024-06-12 NOTE — ANESTHESIA PROCEDURE NOTES
Arterial Line      Patient reassessed immediately prior to procedure    Patient location during procedure: OR  Start time: 6/12/2024 7:00 AM  Stop Time:6/12/2024 7:03 AM       Performed By   Anesthesiologist: Harshil Ramos MD   Preanesthetic Checklist  Completed: patient identified, risks and benefits discussed, surgical consent, pre-op evaluation and timeout performed  Arterial Line Prep    Sterile Tech: cap, gloves and mask  Prep: ChloraPrep  Patient monitoring: blood pressure monitoring, continuous pulse oximetry and EKG  Arterial Line Procedure   Laterality:left  Location:  radial artery  Catheter size: 20 G   Guidance: ultrasound guided  PROCEDURE NOTE/ULTRASOUND INTERPRETATION.  Using ultrasound guidance the potential vascular sites for insertion of the catheter were visualized to determine the patency of the vessel to be used for vascular access.  After selecting the appropriate site for insertion, the needle was visualized under ultrasound being inserted into the radial artery, followed by ultrasound confirmation of wire and catheter placement. There were no abnormalities seen on ultrasound; an image was taken; and the patient tolerated the procedure with no complications.   Number of attempts: 1  Successful placement: yes Images: still images not obtained  Post Assessment   Dressing Type: occlusive dressing applied, secured with tape and wrist guard applied.   Complications no   Patient Tolerance: patient tolerated the procedure well with no apparent complications  Additional Notes  Ultrasound Interpretation:  Using ultrasound guidance the potential vascular sites for insertion of the catheter were visualized to determine the patency of the vessel to be used for vascular access.  After selecting the appropriate site for insertion, the needle was visualized under ultrasound being inserted into the vessel, followed by ultrasound confirmation of wire and catheter placement.  There were no abnormalities  seen on ultrasound; an image was taken/ and the patient tolerated the procedure with no complications.

## 2024-06-12 NOTE — PROGRESS NOTES
[facility] Progress Note      Patient Name: Марина Tilley  : 1975  MRN: [id number]  Primary Care Physician:  Provider, No Known  Date of admission: 2024    Subjective     Interval History:   Follow-up acute kidney injury.  Status post aortic valve replacement, tricuspid valve replacement and mitral valve repair today.  On epinephrine and weaning.  Lipids reduced to 9 cc for KVO.  Getting a unit of blood and magnesium IV.  Received 4 units of FFP in the OR.  Hemoconcentrate 2.4 L intraoperatively.  Review of Systems:   As above.    Objective     Vitals:   Temp:  [97.5 °F (36.4 °C)-98.1 °F (36.7 °C)] 97.5 °F (36.4 °C)  Heart Rate:  [74-90] 80  Resp:  [17-18] 18  BP: ()/(47-76) 92/47  Arterial Line BP: (108-144)/(51-79) 108/51  FiO2 (%):  [99 %] 99 %    Intake/Output Summary (Last 24 hours) at 2024 1508  Last data filed at 2024 1421  Gross per 24 hour   Intake 3773 ml   Output 4350 ml   Net -577 ml       Physical Exam:    General Appearance: chronically ill on ventilator.  Sedated.  Skin: Multiple tattoos.  HEENT.  Oral ET tube.  Neck: Right IJ cordis.  Heart: RRR, paced.  3/6 systolic ejection murmur no rub.  mediastinal  and chest tubes.  Pacer wires.  Lungs: Clear to auscultation anteriorly.  Abdomen: soft, nontender, nondistended. +bs  Extremities: 1+ lower extremity edema.  Left upper extremity radial arterial line.   : Doyle catheter    Scheduled Meds:     acetaminophen, 1,000 mg, Intravenous, Q8H  acetaminophen, 650 mg, Oral, Q4H   Or  acetaminophen, 650 mg, Oral, Q4H   Or  acetaminophen, 650 mg, Rectal, Q4H  [START ON 2024] aspirin, 81 mg, Oral, Daily  chlorhexidine, 15 mL, Mouth/Throat, Q12H  magnesium sulfate, 1 g, Intravenous, Q8H  metoclopramide, 10 mg, Intravenous, Q6H  [START ON 2024] metoprolol tartrate, 12.5 mg, Oral, Q12H  mupirocin, , Each Nare, BID  [START ON 2024] pantoprazole, 40 mg, Oral, QAM  [START ON 2024]  senna-docusate sodium, 2 tablet, Oral, Nightly  sertraline, 25 mg, Oral, Daily  [START ON 6/13/2024] vancomycin, 1,500 mg, Intravenous, Q24H      IV Meds:   clevidipine, 2-32 mg/hr  dexmedetomidine, 0.2-1.5 mcg/kg/hr, Last Rate: 0.5 mcg/kg/hr (06/12/24 1354)  DOPamine, 2-20 mcg/kg/min  EPINEPHrine, 0.02-0.1 mcg/kg/min, Last Rate: 0.01 mcg/kg/min (06/12/24 1504)  insulin, 0-100 Units/hr, Last Rate: 3 Units/hr (06/12/24 1355)  milrinone, 0.25-0.375 mcg/kg/min  niCARdipine, 5-15 mg/hr  nitroglycerin, 5-200 mcg/min  norepinephrine, 0.02-0.2 mcg/kg/min  Pharmacy to dose vancomycin,   phenylephrine, 0.2-2 mcg/kg/min  propofol, 5-50 mcg/kg/min  sodium chloride, 30 mL/hr, Last Rate: 30 mL/hr (06/12/24 1359)        Results Reviewed:   I have personally reviewed the results from the time of this admission to 6/12/2024 15:08 EDT     Results from last 7 days   Lab Units 06/12/24  1333 06/12/24  0509 06/11/24  0427 06/09/24  0432 06/08/24  1709   SODIUM mmol/L 141 137 137   < > 132*   POTASSIUM mmol/L 4.0 3.8 4.3   < > 4.5   CHLORIDE mmol/L 101 99 101   < > 98   CO2 mmol/L 25.4 28.9 26.0   < > 21.5*   BUN mg/dL 23* 25* 29*   < > 20   CREATININE mg/dL 1.33* 1.24* 1.46*   < > 1.51*   CALCIUM mg/dL 8.5* 8.4* 8.3*   < > 8.5*   BILIRUBIN mg/dL  --   --   --   --  0.4   ALK PHOS U/L  --   --   --   --  235*   ALT (SGPT) U/L  --   --   --   --  209*   AST (SGOT) U/L  --   --   --   --  234*   GLUCOSE mg/dL 193* 111* 116*   < > 140*    < > = values in this interval not displayed.     Estimated Creatinine Clearance: 59.8 mL/min (A) (by C-G formula based on SCr of 1.33 mg/dL (H)).  Results from last 7 days   Lab Units 06/12/24  1333 06/12/24  0509 06/11/24  0427 06/10/24  1828   MAGNESIUM mg/dL 1.6  --  1.7 1.5*   PHOSPHORUS mg/dL 4.2 4.2 3.6  --          Results from last 7 days   Lab Units 06/12/24  1333 06/12/24  1144 06/12/24  1143 06/12/24  1057 06/12/24  1028 06/12/24  1013 06/12/24  0714 06/12/24  0509 06/11/24  0427 06/10/24  0800    WBC 10*3/mm3 21.31*  --  27.88*  --   --   --   --  8.44 13.16* 16.23*   HEMOGLOBIN g/dL 7.7*  --  7.2*  --   --   --   --  9.3* 9.1* 9.5*   HEMOGLOBIN, POC g/dL  --  8.5*  --  8.8* 8.8*  --    < >  --   --   --    PLATELETS 10*3/mm3 154  --  151  --   --  184  --  231 284 302    < > = values in this interval not displayed.     Results from last 7 days   Lab Units 06/12/24  1333 06/12/24  1146 06/12/24  1143 06/10/24  1828 06/08/24  1709   INR  1.38* 1.8* 1.82* 1.23* 1.34*       Assessment / Plan     ASSESSMENT:  Non olig CATRINA -creatinine stable.  1.3 to 1.5 range last few days (peak 1.7).  Differential includes prerenal azotemia due acute systolic heart failure,  or infectious GN in relation to endocarditis (UA: trace protein + moderate blood).  Creatinine stable today.  Watch volume status closely.  Reduce magnesium replacement after current dose.  Acute systolic heart failure .  Dilated cardiomyopathy.  Valvular heart disease with moderate to severe MR, TR. Severe aortic valve regurgitation.  Now status post mitral valve repair, tricuspid valve and aortic valve replacements.  Hemodynamically stable.  Getting a unit of blood.  MRSA TV endocarditis + septicemia, septic pulm emboli, splenic infarct.  On IV vancomycin.  Status post full mouth extraction next 6 /9/24.  Status post aortic and tricuspid valve replacements and mitral valve repair today.    Dilated cardiomyopathy, EF 41 to 45% by LETICIA, with mod to severe MR/TR. severe AI.  Right molar abscess .  Status post full mouth extraction 6/9/2024  Mood disorder, seen by psychiatry .  IV polydrug abuse  Anemia, hemoglobin 7.7 postop.  Getting a unit of blood.    PLAN:  Monitor chemistries closely.  2.  Discussed with RNs.  3.  Overall stable after extensive surgery.  Lyndsay Boo MD  06/12/24  15:08 EDT    [facility]  [phone]

## 2024-06-12 NOTE — ANESTHESIA PROCEDURE NOTES
Diagnostic IntraOp Jimmy    Procedure Performed: Diagnostic IntraOp Jimmy       Start Time:  6/12/2024 7:40 AM       End Time:   6/12/2024 9:08 AM      General Procedure Information  Physician Requesting Echo: Eric Skaggs MD  Location performed:  OR  Intubated  Bite block placed  Heart visualized  Probe Insertion:  Easy  Modalities:  2D only, color flow mapping, continuous wave Doppler and pulse wave Doppler    Echocardiographic and Doppler Measurements    Ventricles    Right Ventricle:  Cavity size dilated.  Global function normal.    Left Ventricle:  Cavity size normal.  Global Function mildly impaired.  Ejection Fraction 45%.          Valves    Aortic Valve:  Annulus normal.  Stenosis not present.  Regurgitation severe.  Leaflets vegetative.      Mitral Valve:  Annulus normal.  Stenosis not present.  Vena Contracta Width: 0.5 cm.  Regurgitation moderate.  Leaflets normal.      Tricuspid Valve:  Annulus normal.  Regurgitation severe.  Leaflets vegetative.        Aorta    Ascending Aorta:  Size normal.        Atria      Left Atrium:  Size dilated.  Left atrial appendage normal.                  Anesthesia Information      Echocardiogram Comments:       Diagnosis: non-rheumatic mitral regurgitation.      Vegetations noted on anterior and septal leaflets of tricuspid valve.  Vegetation also seen on noncoronary cusp of aortic valve.  No endocarditis seen on mitral valve.  Central MR jet, EROA of MR by PISA was 0.2 sq cm.  Large color jet area MR.  Overall findings consistent with moderate to severe mitral regurgitation.    Post-CPB, trace mitral regurgitation. Mitral annuloplasty band in place.  New bioprosthetic valves in aortic and tricuspid positions.  No paravalvular leaks.  Mean aortic gradient 11 mm Hg.  Mean mitral gradient 3 mm Hg.  EF improved on inotropes

## 2024-06-12 NOTE — PLAN OF CARE
Goal Outcome Evaluation:   Patient stable coming out of surgery. Started on Andrew drip for SBP 80s. Very sleepy after extubation so narcotics have not been administered yet. She is taking ice chips and is responsive and oriented when spoken to. Transfused 1 unit PRBC for post op HGB 7.7.

## 2024-06-12 NOTE — PROGRESS NOTES
[facility]: Vancomycin Monitoring Note    Марина Tilley is a 48 y.o. female who is on day 9/42 of pharmacy (continuation of therapy from [facility]) to dose vancomycin for MRSA septicemia and MRSA tricuspid valve endocarditis.    ID Recommendations: Patient is going to need 6 weeks of antibiotic therapy after valvular replacement for endocarditis. Stress abstinence from IV drug abuse.     Previous Vancomycin Dose: 750 mg IV every 12 hours    Updated Cultures and Sensitivities:   5/9: BCx-MRSA (Guido)  5/11: BCx-MRSA (Guido)  6/2: BCx-negative  6/4: LETICIA-large mobile mass on tricuspid valve consistent with vegetation    Results from last 7 days   Lab Units 06/10/24  0800 06/08/24  1141 06/06/24  1002   VANCOMYCIN RM mcg/mL  --  19.70  --    VANCOMYCIN TR mcg/mL 22.30*  --  23.20*     Vitals/Labs  Ht:  ; Wt: 90.3 kg (199 lb)   Temp Readings from Last 1 Encounters:   06/12/24 98.1 °F (36.7 °C) (Oral)     Estimated Creatinine Clearance: 64.2 mL/min (A) (by C-G formula based on SCr of 1.24 mg/dL (H)).     Results from last 7 days   Lab Units 06/12/24  1143 06/12/24  0509 06/11/24  0427 06/10/24  0800   CREATININE mg/dL  --  1.24* 1.46* 1.54*   WBC 10*3/mm3 27.88* 8.44 13.16* 16.23*     Assessment/Plan  Patient was previously on 750 mg IV every 12 hours with a trough that resulted 22.3 mg/L, which provided a predicted  mg/L.hr. Regimen was adjusted to 1250 mg IV q24h.     A pre-op dose of 1500 mg (~15 mg/kg) IV x1 dose was ordered by CT surgery today and given at 06/12/24 0657 (12 hours after the last 1250 mg dose). [employer] predicts that AUC on a 1500 mg IV q24h regimen is 555 mg/L.hr and that vancomycin serum concentrations will remain >15 mg/L until 24 hours after the 1500 mg dose given this morning. SCr has trended down somewhat.     Based on this information, will adjust vancomycin to this regimen and obtain a level prior to the 3rd dose.     Current Vancomycin Dose: 1500 mg  IV every 12 hours; provides a predicted  mg/L.hr    Next Level Date and Time: Vanc Trough is scheduled for 6/14 at 0730.  SCr ordered daily with AM labs.     Thank you for involving pharmacy in this patient's care. Please contact pharmacy with any questions or concerns.       Kd GarciaD, MORGAN, BCPS, BCCCP  Clinical Pharmacy Specialist, Critical Care   Phone: [phone]  06/12/24 13:39 EDT

## 2024-06-13 ENCOUNTER — APPOINTMENT (OUTPATIENT)
Dept: GENERAL RADIOLOGY | Facility: HOSPITAL | Age: 49
DRG: 216 | End: 2024-06-13
Payer: MEDICAID

## 2024-06-13 LAB
ALBUMIN SERPL-MCNC: 3.6 G/DL (ref 3.5–5.2)
ANION GAP SERPL CALCULATED.3IONS-SCNC: 8.1 MMOL/L (ref 5–15)
BASOPHILS # BLD AUTO: 0.02 10*3/MM3 (ref 0–0.2)
BASOPHILS NFR BLD AUTO: 0.2 % (ref 0–1.5)
BH BB BLOOD EXPIRATION DATE: NORMAL
BH BB BLOOD TYPE BARCODE: 6200
BH BB DISPENSE STATUS: NORMAL
BH BB PRODUCT CODE: NORMAL
BH BB UNIT NUMBER: NORMAL
BUN SERPL-MCNC: 20 MG/DL (ref 6–20)
BUN/CREAT SERPL: 18.3 (ref 7–25)
CA-I BLD-MCNC: 4.9 MG/DL (ref 4.6–5.4)
CA-I SERPL ISE-MCNC: 1.23 MMOL/L (ref 1.15–1.35)
CALCIUM SPEC-SCNC: 9.2 MG/DL (ref 8.6–10.5)
CHLORIDE SERPL-SCNC: 107 MMOL/L (ref 98–107)
CO2 SERPL-SCNC: 27.9 MMOL/L (ref 22–29)
CREAT SERPL-MCNC: 1.09 MG/DL (ref 0.57–1)
CROSSMATCH INTERPRETATION: NORMAL
CROSSMATCH INTERPRETATION: NORMAL
DEPRECATED RDW RBC AUTO: 57.1 FL (ref 37–54)
EGFRCR SERPLBLD CKD-EPI 2021: 63.6 ML/MIN/1.73
EOSINOPHIL # BLD AUTO: 0 10*3/MM3 (ref 0–0.4)
EOSINOPHIL NFR BLD AUTO: 0 % (ref 0.3–6.2)
ERYTHROCYTE [DISTWIDTH] IN BLOOD BY AUTOMATED COUNT: 18.1 % (ref 12.3–15.4)
GLUCOSE BLDC GLUCOMTR-MCNC: 126 MG/DL (ref 70–130)
GLUCOSE BLDC GLUCOMTR-MCNC: 129 MG/DL (ref 70–130)
GLUCOSE BLDC GLUCOMTR-MCNC: 131 MG/DL (ref 70–130)
GLUCOSE BLDC GLUCOMTR-MCNC: 135 MG/DL (ref 70–130)
GLUCOSE BLDC GLUCOMTR-MCNC: 137 MG/DL (ref 70–130)
GLUCOSE BLDC GLUCOMTR-MCNC: 139 MG/DL (ref 70–130)
GLUCOSE BLDC GLUCOMTR-MCNC: 144 MG/DL (ref 70–130)
GLUCOSE BLDC GLUCOMTR-MCNC: 148 MG/DL (ref 70–130)
GLUCOSE SERPL-MCNC: 120 MG/DL (ref 65–99)
HCT VFR BLD AUTO: 25.7 % (ref 34–46.6)
HCT VFR BLD AUTO: 26.1 % (ref 34–46.6)
HGB BLD-MCNC: 8.5 G/DL (ref 12–15.9)
HGB BLD-MCNC: 8.6 G/DL (ref 12–15.9)
IMM GRANULOCYTES # BLD AUTO: 0.1 10*3/MM3 (ref 0–0.05)
IMM GRANULOCYTES NFR BLD AUTO: 0.8 % (ref 0–0.5)
INR PPP: 1.14 (ref 0.9–1.1)
LYMPHOCYTES # BLD AUTO: 0.71 10*3/MM3 (ref 0.7–3.1)
LYMPHOCYTES NFR BLD AUTO: 5.4 % (ref 19.6–45.3)
MCH RBC QN AUTO: 28.8 PG (ref 26.6–33)
MCHC RBC AUTO-ENTMCNC: 32.6 G/DL (ref 31.5–35.7)
MCV RBC AUTO: 88.5 FL (ref 79–97)
MONOCYTES # BLD AUTO: 0.98 10*3/MM3 (ref 0.1–0.9)
MONOCYTES NFR BLD AUTO: 7.5 % (ref 5–12)
NEUTROPHILS NFR BLD AUTO: 11.23 10*3/MM3 (ref 1.7–7)
NEUTROPHILS NFR BLD AUTO: 86.1 % (ref 42.7–76)
NRBC BLD AUTO-RTO: 0 /100 WBC (ref 0–0.2)
PHOSPHATE SERPL-MCNC: 4.3 MG/DL (ref 2.5–4.5)
PLATELET # BLD AUTO: 126 10*3/MM3 (ref 140–450)
PMV BLD AUTO: 10.4 FL (ref 6–12)
POTASSIUM SERPL-SCNC: 4.2 MMOL/L (ref 3.5–5.2)
PROTHROMBIN TIME: 14.8 SECONDS (ref 11.7–14.2)
QT INTERVAL: 474 MS
QTC INTERVAL: 493 MS
RBC # BLD AUTO: 2.95 10*6/MM3 (ref 3.77–5.28)
SODIUM SERPL-SCNC: 143 MMOL/L (ref 136–145)
UNIT  ABO: NORMAL
UNIT  RH: NORMAL
WBC NRBC COR # BLD AUTO: 13.04 10*3/MM3 (ref 3.4–10.8)

## 2024-06-13 PROCEDURE — 85610 PROTHROMBIN TIME: CPT | Performed by: NURSE PRACTITIONER

## 2024-06-13 PROCEDURE — 97530 THERAPEUTIC ACTIVITIES: CPT | Performed by: PHYSICAL THERAPIST

## 2024-06-13 PROCEDURE — 25010000002 BUMETANIDE PER 0.5 MG: Performed by: INTERNAL MEDICINE

## 2024-06-13 PROCEDURE — 93005 ELECTROCARDIOGRAM TRACING: CPT | Performed by: NURSE PRACTITIONER

## 2024-06-13 PROCEDURE — 99232 SBSQ HOSP IP/OBS MODERATE 35: CPT | Performed by: STUDENT IN AN ORGANIZED HEALTH CARE EDUCATION/TRAINING PROGRAM

## 2024-06-13 PROCEDURE — 71045 X-RAY EXAM CHEST 1 VIEW: CPT

## 2024-06-13 PROCEDURE — 25010000002 MAGNESIUM SULFATE IN D5W 1G/100ML (PREMIX) 1-5 GM/100ML-% SOLUTION: Performed by: NURSE PRACTITIONER

## 2024-06-13 PROCEDURE — 25810000003 SODIUM CHLORIDE 0.9 % SOLUTION: Performed by: INTERNAL MEDICINE

## 2024-06-13 PROCEDURE — 82330 ASSAY OF CALCIUM: CPT | Performed by: NURSE PRACTITIONER

## 2024-06-13 PROCEDURE — 99232 SBSQ HOSP IP/OBS MODERATE 35: CPT | Performed by: INTERNAL MEDICINE

## 2024-06-13 PROCEDURE — 25010000002 ACETAMINOPHEN 10 MG/ML SOLUTION: Performed by: NURSE PRACTITIONER

## 2024-06-13 PROCEDURE — 25010000002 METOCLOPRAMIDE PER 10 MG: Performed by: INTERNAL MEDICINE

## 2024-06-13 PROCEDURE — 94799 UNLISTED PULMONARY SVC/PX: CPT

## 2024-06-13 PROCEDURE — 25010000002 VANCOMYCIN 10 G RECONSTITUTED SOLUTION: Performed by: INTERNAL MEDICINE

## 2024-06-13 PROCEDURE — 97162 PT EVAL MOD COMPLEX 30 MIN: CPT | Performed by: PHYSICAL THERAPIST

## 2024-06-13 PROCEDURE — 25010000002 MILRINONE LACTATE IN DEXTROSE 20-5 MG/100ML-% SOLUTION: Performed by: NURSE PRACTITIONER

## 2024-06-13 PROCEDURE — 82948 REAGENT STRIP/BLOOD GLUCOSE: CPT

## 2024-06-13 PROCEDURE — 93010 ELECTROCARDIOGRAM REPORT: CPT | Performed by: INTERNAL MEDICINE

## 2024-06-13 PROCEDURE — 85025 COMPLETE CBC W/AUTO DIFF WBC: CPT | Performed by: NURSE PRACTITIONER

## 2024-06-13 PROCEDURE — 80069 RENAL FUNCTION PANEL: CPT | Performed by: NURSE PRACTITIONER

## 2024-06-13 RX ORDER — IBUPROFEN 600 MG/1
1 TABLET ORAL
Status: DISCONTINUED | OUTPATIENT
Start: 2024-06-13 | End: 2024-06-14

## 2024-06-13 RX ORDER — INSULIN LISPRO 100 [IU]/ML
2-7 INJECTION, SOLUTION INTRAVENOUS; SUBCUTANEOUS
Status: DISCONTINUED | OUTPATIENT
Start: 2024-06-13 | End: 2024-06-14

## 2024-06-13 RX ORDER — DEXTROSE MONOHYDRATE 25 G/50ML
25 INJECTION, SOLUTION INTRAVENOUS
Status: DISCONTINUED | OUTPATIENT
Start: 2024-06-13 | End: 2024-06-14

## 2024-06-13 RX ORDER — HYDROCODONE BITARTRATE AND ACETAMINOPHEN 5; 325 MG/1; MG/1
2 TABLET ORAL EVERY 4 HOURS PRN
Status: DISCONTINUED | OUTPATIENT
Start: 2024-06-13 | End: 2024-06-14

## 2024-06-13 RX ORDER — IPRATROPIUM BROMIDE AND ALBUTEROL SULFATE 2.5; .5 MG/3ML; MG/3ML
3 SOLUTION RESPIRATORY (INHALATION) EVERY 4 HOURS PRN
Status: DISCONTINUED | OUTPATIENT
Start: 2024-06-13 | End: 2024-06-17 | Stop reason: HOSPADM

## 2024-06-13 RX ORDER — NICOTINE POLACRILEX 4 MG
15 LOZENGE BUCCAL
Status: DISCONTINUED | OUTPATIENT
Start: 2024-06-13 | End: 2024-06-14

## 2024-06-13 RX ORDER — BUMETANIDE 0.25 MG/ML
2 INJECTION INTRAMUSCULAR; INTRAVENOUS EVERY 8 HOURS
Status: COMPLETED | OUTPATIENT
Start: 2024-06-13 | End: 2024-06-14

## 2024-06-13 RX ORDER — GABAPENTIN 300 MG/1
300 CAPSULE ORAL EVERY 8 HOURS SCHEDULED
Status: DISCONTINUED | OUTPATIENT
Start: 2024-06-13 | End: 2024-06-14

## 2024-06-13 RX ORDER — TRAMADOL HYDROCHLORIDE 50 MG/1
50 TABLET ORAL EVERY 6 HOURS PRN
Status: DISCONTINUED | OUTPATIENT
Start: 2024-06-13 | End: 2024-06-17 | Stop reason: HOSPADM

## 2024-06-13 RX ORDER — GABAPENTIN 100 MG/1
200 CAPSULE ORAL EVERY 8 HOURS SCHEDULED
Status: DISCONTINUED | OUTPATIENT
Start: 2024-06-13 | End: 2024-06-13

## 2024-06-13 RX ORDER — GABAPENTIN 100 MG/1
100 CAPSULE ORAL EVERY 8 HOURS SCHEDULED
Status: DISCONTINUED | OUTPATIENT
Start: 2024-06-13 | End: 2024-06-13

## 2024-06-13 RX ORDER — BUMETANIDE 0.25 MG/ML
2 INJECTION INTRAMUSCULAR; INTRAVENOUS ONCE
Status: COMPLETED | OUTPATIENT
Start: 2024-06-13 | End: 2024-06-13

## 2024-06-13 RX ORDER — GUAIFENESIN 600 MG/1
1200 TABLET, EXTENDED RELEASE ORAL EVERY 12 HOURS SCHEDULED
Status: DISCONTINUED | OUTPATIENT
Start: 2024-06-13 | End: 2024-06-17 | Stop reason: HOSPADM

## 2024-06-13 RX ADMIN — ASPIRIN 81 MG: 81 TABLET, COATED ORAL at 08:30

## 2024-06-13 RX ADMIN — PANTOPRAZOLE SODIUM 40 MG: 40 TABLET, DELAYED RELEASE ORAL at 06:10

## 2024-06-13 RX ADMIN — METOCLOPRAMIDE 5 MG: 5 INJECTION, SOLUTION INTRAMUSCULAR; INTRAVENOUS at 05:59

## 2024-06-13 RX ADMIN — 0.12% CHLORHEXIDINE GLUCONATE 15 ML: 1.2 RINSE ORAL at 08:30

## 2024-06-13 RX ADMIN — HYDROCODONE BITARTRATE AND ACETAMINOPHEN 2 TABLET: 5; 325 TABLET ORAL at 10:21

## 2024-06-13 RX ADMIN — MILRINONE LACTATE 0.25 MCG/KG/MIN: 0.2 INJECTION, SOLUTION INTRAVENOUS at 13:43

## 2024-06-13 RX ADMIN — BUMETANIDE 2 MG: 0.25 INJECTION INTRAMUSCULAR; INTRAVENOUS at 07:35

## 2024-06-13 RX ADMIN — METOPROLOL TARTRATE 12.5 MG: 25 TABLET, FILM COATED ORAL at 20:29

## 2024-06-13 RX ADMIN — CYCLOBENZAPRINE 10 MG: 10 TABLET, FILM COATED ORAL at 12:45

## 2024-06-13 RX ADMIN — METOCLOPRAMIDE 5 MG: 5 INJECTION, SOLUTION INTRAMUSCULAR; INTRAVENOUS at 13:48

## 2024-06-13 RX ADMIN — MAGNESIUM SULFATE IN DEXTROSE 1 G: 10 INJECTION, SOLUTION INTRAVENOUS at 05:01

## 2024-06-13 RX ADMIN — HYDROCODONE BITARTRATE AND ACETAMINOPHEN 2 TABLET: 5; 325 TABLET ORAL at 22:51

## 2024-06-13 RX ADMIN — GABAPENTIN 300 MG: 300 CAPSULE ORAL at 08:30

## 2024-06-13 RX ADMIN — OXYCODONE HYDROCHLORIDE 10 MG: 5 TABLET ORAL at 05:39

## 2024-06-13 RX ADMIN — ALPRAZOLAM 0.25 MG: 0.25 TABLET ORAL at 02:24

## 2024-06-13 RX ADMIN — CYCLOBENZAPRINE 10 MG: 10 TABLET, FILM COATED ORAL at 02:24

## 2024-06-13 RX ADMIN — MUPIROCIN 1 APPLICATION: 20 OINTMENT TOPICAL at 20:29

## 2024-06-13 RX ADMIN — BUMETANIDE 2 MG: 0.25 INJECTION INTRAMUSCULAR; INTRAVENOUS at 20:28

## 2024-06-13 RX ADMIN — GUAIFENESIN 1200 MG: 600 TABLET, EXTENDED RELEASE ORAL at 20:28

## 2024-06-13 RX ADMIN — HYDROCODONE BITARTRATE AND ACETAMINOPHEN 2 TABLET: 5; 325 TABLET ORAL at 18:56

## 2024-06-13 RX ADMIN — GABAPENTIN 300 MG: 300 CAPSULE ORAL at 21:47

## 2024-06-13 RX ADMIN — VANCOMYCIN HYDROCHLORIDE 1500 MG: 10 INJECTION, POWDER, LYOPHILIZED, FOR SOLUTION INTRAVENOUS at 08:29

## 2024-06-13 RX ADMIN — GABAPENTIN 300 MG: 300 CAPSULE ORAL at 13:48

## 2024-06-13 RX ADMIN — BUMETANIDE 2 MG: 0.25 INJECTION INTRAMUSCULAR; INTRAVENOUS at 12:45

## 2024-06-13 RX ADMIN — MUPIROCIN 1 APPLICATION: 20 OINTMENT TOPICAL at 08:30

## 2024-06-13 RX ADMIN — METOPROLOL TARTRATE 12.5 MG: 25 TABLET, FILM COATED ORAL at 08:30

## 2024-06-13 RX ADMIN — HYDROCODONE BITARTRATE AND ACETAMINOPHEN 2 TABLET: 5; 325 TABLET ORAL at 14:55

## 2024-06-13 RX ADMIN — GUAIFENESIN 1200 MG: 600 TABLET, EXTENDED RELEASE ORAL at 08:29

## 2024-06-13 RX ADMIN — CYCLOBENZAPRINE 10 MG: 10 TABLET, FILM COATED ORAL at 23:26

## 2024-06-13 RX ADMIN — OXYCODONE HYDROCHLORIDE 10 MG: 5 TABLET ORAL at 01:33

## 2024-06-13 RX ADMIN — SENNOSIDES AND DOCUSATE SODIUM 2 TABLET: 50; 8.6 TABLET ORAL at 20:29

## 2024-06-13 RX ADMIN — 0.12% CHLORHEXIDINE GLUCONATE 15 ML: 1.2 RINSE ORAL at 20:29

## 2024-06-13 RX ADMIN — SERTRALINE 25 MG: 25 TABLET, FILM COATED ORAL at 08:30

## 2024-06-13 RX ADMIN — ACETAMINOPHEN 1000 MG: 10 INJECTION INTRAVENOUS at 05:01

## 2024-06-13 NOTE — PLAN OF CARE
Goal Outcome Evaluation:  Plan of Care Reviewed With: patient           Outcome Evaluation: Pt is s/p AVR/MVR/TVR. She was evaluated in CVR. Pt was up n the chair when PT arrived. Pt stood and ambulated with min A x2 and assist for equipment. Pain limited activity tolerance. Pt presents with pain, limited activity tolerance, post-op weakness and unsteady gait. Pt would benefit from PT to address these impairments. Pt was independently moble with a cane prior to admission.      Anticipated Discharge Disposition (PT): inpatient rehabilitation facility, skilled nursing facility

## 2024-06-13 NOTE — PROGRESS NOTES
[facility] Progress Note      Patient Name: Марина Tilley  : 1975  MRN: [id number]  Primary Care Physician:  Provider, No Known  Date of admission: 2024    Subjective     Interval History:   Follow-up acute kidney injury.  Status post aortic valve replacement, tricuspid valve replacement and mitral valve repair 2024.  Doing well.  Sitting up in the chair, extubated.  On 2 L nasal cannula.  600 cc so far out today in response to IV Bumex this morning.  Blood pressure stable only on milrinone drip.  Review of Systems:   As above.    Objective     Vitals:   Temp:  [97.5 °F (36.4 °C)-99 °F (37.2 °C)] 99 °F (37.2 °C)  Heart Rate:  [80-90] 80  Resp:  [16-18] 17  BP: ()/(42-80) 109/80  Arterial Line BP: ()/(46-80) 136/80  Flow (L/min):  [3-4] 3  FiO2 (%):  [40 %-99 %] 40 %    Intake/Output Summary (Last 24 hours) at 2024 0918  Last data filed at 2024 0800  Gross per 24 hour   Intake 7502.38 ml   Output 4420 ml   Net 3082.38 ml       Physical Exam:    General Appearance: chronically ill .sitting up in the chair.  Awake alert responsive.  Skin: Multiple tattoos.  HEENT.  Oral mucosa moist.  Nasal cannula oxygen.  Neck: Right IJ cordis.  Heart: RRR, paced.  3/6 systolic ejection murmur . No rub.  mediastinal  and chest tubes.  Pacer wires.  Lungs: Clear to auscultation anteriorly.  Decreased breath sounds posteriorly.  Fair inspiratory effort.  Abdomen: soft, nontender, nondistended. +bs  Extremities: 2+ lower extremity edema.  1+ upper extremity edema.  Left upper extremity radial arterial line.   : Doyle catheter    Scheduled Meds:     aspirin, 81 mg, Oral, Daily  chlorhexidine, 15 mL, Mouth/Throat, Q12H  gabapentin, 300 mg, Oral, Q8H  guaiFENesin, 1,200 mg, Oral, Q12H  insulin lispro, 2-7 Units, Subcutaneous, 4x Daily AC & at Bedtime  metoclopramide, 5 mg, Intravenous, Q8H  metoprolol tartrate, 12.5 mg, Oral, Q12H  mupirocin, , Each Nare,  BID  pantoprazole, 40 mg, Oral, QAM  senna-docusate sodium, 2 tablet, Oral, Nightly  sertraline, 25 mg, Oral, Daily  vancomycin, 1,500 mg, Intravenous, Q24H      IV Meds:   milrinone, 0.25-0.375 mcg/kg/min, Last Rate: 0.25 mcg/kg/min (06/12/24 2203)  Pharmacy to dose vancomycin,   sodium chloride, 9 mL/hr, Last Rate: 9 mL/hr (06/12/24 1526)        Results Reviewed:   I have personally reviewed the results from the time of this admission to 6/13/2024 09:18 EDT     Results from last 7 days   Lab Units 06/13/24 0257 06/12/24 1736 06/12/24 1333 06/09/24  0432 06/08/24  1709   SODIUM mmol/L 143 139 141   < > 132*   POTASSIUM mmol/L 4.2 4.0 4.0   < > 4.5   CHLORIDE mmol/L 107 103 101   < > 98   CO2 mmol/L 27.9 26.7 25.4   < > 21.5*   BUN mg/dL 20 23* 23*   < > 20   CREATININE mg/dL 1.09* 1.27* 1.33*   < > 1.51*   CALCIUM mg/dL 9.2 9.3 8.5*   < > 8.5*   BILIRUBIN mg/dL  --   --   --   --  0.4   ALK PHOS U/L  --   --   --   --  235*   ALT (SGPT) U/L  --   --   --   --  209*   AST (SGOT) U/L  --   --   --   --  234*   GLUCOSE mg/dL 120* 129* 193*   < > 140*    < > = values in this interval not displayed.     Estimated Creatinine Clearance: 74.4 mL/min (A) (by C-G formula based on SCr of 1.09 mg/dL (H)).  Results from last 7 days   Lab Units 06/13/24 0257 06/12/24 1736 06/12/24 1333 06/12/24  0509   MAGNESIUM mg/dL  --  1.9 1.6 1.8   PHOSPHORUS mg/dL 4.3 4.0 4.2 4.2         Results from last 7 days   Lab Units 06/13/24 0257 06/12/24  2334 06/12/24  1736 06/12/24  1333 06/12/24  1144 06/12/24  1143 06/12/24  1028 06/12/24  1013 06/12/24  0714 06/12/24  0509   WBC 10*3/mm3 13.04*  --  15.34* 21.31*  --  27.88*  --   --   --  8.44   HEMOGLOBIN g/dL 8.5* 8.6* 8.0* 7.7*  --  7.2*  --   --   --  9.3*   HEMOGLOBIN, POC g/dL  --   --   --   --  8.5*  --    < >  --    < >  --    PLATELETS 10*3/mm3 126*  --  127* 154  --  151  --  184  --  231    < > = values in this interval not displayed.     Results from last 7 days   Lab  Units 06/13/24  0257 06/12/24  1333 06/12/24  1146 06/12/24  1143 06/10/24  1828   INR  1.14* 1.38* 1.8* 1.82* 1.23*       Assessment / Plan     ASSESSMENT:  Non olig CATRINA -creatinine stable.  1.3 to 1.5 range last few days (peak 1.7).  Differential includes prerenal azotemia due acute systolic heart failure,  or infectious GN in relation to endocarditis (UA: trace protein + moderate blood).  Creatinine very stable.  Volume excess by exam.  Diuresed today.  Acute systolic heart failure .  Dilated cardiomyopathy.  Valvular heart disease with moderate to severe MR, TR. Severe aortic valve regurgitation.  Now status post mitral valve repair, tricuspid valve and aortic valve replacements.  Hemodynamically stable.  MRSA TV endocarditis + septicemia, septic pulm emboli, splenic infarct.  On IV vancomycin.  Status post full mouth extraction next 6 /9/24.  Status post aortic and tricuspid valve replacements and mitral valve repair 6/12/2024.  Dilated cardiomyopathy, EF 41 to 45% by LETICIA, with mod to severe MR/TR. severe AI.  Right molar abscess .  Status post full mouth extraction 6/9/2024  Mood disorder, seen by psychiatry .  IV polydrug abuse  Anemia, hemoglobin 7.7 postop.  Hemoglobin 8.5 this morning after a unit of blood yesterday.  Mild thrombocytopenia.    PLAN:  Monitor chemistries closely.  2.  IV diuretic today.  3.  Discussed with bedside RN and MARY Garcia.  Lyndsay Boo MD  06/13/24  09:18 EDT    [facility]  [phone]

## 2024-06-13 NOTE — THERAPY EVALUATION
Patient Name: Марина Tilley  : 1975    MRN: [id number]                              Today's Date: 2024       Admit Date: 2024    Visit Dx:     ICD-10-CM ICD-9-CM   1. Acute bacterial endocarditis  I33.0 421.0     Patient Active Problem List   Diagnosis    Endocarditis    Acute bacterial endocarditis     Past Medical History:   Diagnosis Date    Anxiety     Asthma     CHF (congestive heart failure)     COPD (chronic obstructive pulmonary disease)      Past Surgical History:   Procedure Laterality Date    AORTIC VALVE REPAIR/REPLACEMENT MITRAL VALVE REPAIR/REPLACEMENT N/A 2024    Procedure: LETICIA; STERNOTOMY; AORTIC VALVE REPLACEMENT; MITRAL VALVE REPAIR; TRICUSPID VALVE REPLACEMENT; PRP;  Surgeon: Eric Skaggs MD;  Location: [facility] CVOR;  Service: Cardiothoracic;  Laterality: N/A;    CARDIAC CATHETERIZATION N/A 6/10/2024    Procedure: Left Heart Cath;  Surgeon: Gary Barton MD;  Location: [facility] CATH INVASIVE LOCATION;  Service: Cardiology;  Laterality: N/A;    CHOLECYSTECTOMY      COLONOSCOPY      ENDOSCOPY      HERNIA REPAIR      HYSTERECTOMY      TEETH EXTRACTION N/A 2024    Procedure: TOOTH EXTRACTION;  Surgeon: Sergio Zaidi DMD;  Location: [facility] OR;  Service: Oral Surgery;  Laterality: N/A;      General Information       Row Name 24 1254          Physical Therapy Time and Intention    Document Type evaluation  -     Mode of Treatment physical therapy  -       Row Name 24 1254          General Information    Patient Profile Reviewed yes  -     Prior Level of Function all household mobility;independent:;community mobility  -     Existing Precautions/Restrictions oxygen therapy device and L/min  -       Row Name 24 1254          Living Environment    People in Home sibling(s)  -       Row Name 24 1254          Home Main Entrance    Number of Stairs, Main Entrance six  -KH     Stair Railings, Main Entrance none  -       Row Name 24  1254          Cognition    Orientation Status (Cognition) oriented x 4  -       Row Name 06/13/24 1254          Safety Issues, Functional Mobility    Impairments Affecting Function (Mobility) endurance/activity tolerance;shortness of breath;strength  -               User Key  (r) = Recorded By, (t) = Taken By, (c) = Cosigned By      Initials Name Provider Type    Wanda Chambers, PT Physical Therapist                   Mobility       Row Name 06/13/24 1255          Bed Mobility    Comment, (Bed Mobility) up in chair  -[facility] Name 06/13/24 1255          Sit-Stand Transfer    Sit-Stand [place] (Transfers) minimum assist (75% patient effort);2 person assist;1 person to manage equipment  -[facility] Name 06/13/24 1255          Gait/Stairs (Locomotion)    [place] Level (Gait) minimum assist (75% patient effort);1 person to manage equipment  -     Assistive Device (Gait) --  HHA  -     Distance in Feet (Gait) 60  -KH     Deviations/Abnormal Patterns (Gait) antalgic;gait speed decreased  -               User Key  (r) = Recorded By, (t) = Taken By, (c) = Cosigned By      Initials Name Provider Type    aWnda Chambers, PT Physical Therapist                   Obj/Interventions       [facility] Name 06/13/24 1256          Range of Motion Comprehensive    Comment, General Range of Motion WFl, guarded secondary to pain  -[facility] Name 06/13/24 1256          Strength Comprehensive (MMT)    Comment, General Manual Muscle Testing (MMT) Assessment WFL, expected post-op weakness  -[facility] Name 06/13/24 1256          Motor Skills    Therapeutic Exercise --  cariac protocol x 5 reps  -               User Key  (r) = Recorded By, (t) = Taken By, (c) = Cosigned By      Initials Name Provider Type    Wanda Chambers, PT Physical Therapist                   Goals/Plan       [facility] Name 06/13/24 1304          Problem Specific Goal 1 (PT)    Problem Specific Goal 1 (PT) Cardiac  level 5  -     Time Frame (Problem Specific Goal 1, PT) 1 week  -       Row Name 06/13/24 1304          Patient Education Goal (PT)    Activity (Patient Education Goal, PT) HEP  -     Isom/Cues/Accuracy (Memory Goal 2, PT) demonstrates adequately  -     Time Frame (Patient Education Goal, PT) 1 week  -               User Key  (r) = Recorded By, (t) = Taken By, (c) = Cosigned By      Initials Name Provider Type     Wanda Salazar, PT Physical Therapist                   Clinical Impression       [facility] Name 06/13/24 1256          Pain    Pretreatment Pain Rating 6/10  -     Posttreatment Pain Rating 7/10  -     Pain Location incisional  -     Pain Location - chest  -     Pain Intervention(s) Repositioned;Rest  -[facility] Name 06/13/24 1256          Plan of Care Review    Plan of Care Reviewed With patient  -     Outcome Evaluation Pt is s/p AVR/MVR/TVR. She was evaluated in CVR. Pt was up n the chair when PT arrived. Pt stood and ambulated with min A x2 and assist for equipment. Pain limited activity tolerance. Pt presents with pain, limited activity tolerance, post-op weakness and unsteady gait. Pt would benefit from PT to address these impairments. Pt was independently moble with a cane prior to admission.  -       Row Name 06/13/24 1256          Therapy Assessment/Plan (PT)    Rehab Potential (PT) good, to achieve stated therapy goals  -     Criteria for Skilled Interventions Met (PT) yes  -     Therapy Frequency (PT) daily  -       Row Name 06/13/24 1256          Vital Signs    O2 Delivery Pre Treatment supplemental O2  -     O2 Delivery Intra Treatment supplemental O2  -KH     O2 Delivery Post Treatment supplemental O2  -[facility] Name 06/13/24 1256          Positioning and Restraints    Pre-Treatment Position sitting in chair/recliner  -     Post Treatment Position chair  -KH     In Chair reclined;call light within reach;encouraged to call for assist;with  nsg  -               User Key  (r) = Recorded By, (t) = Taken By, (c) = Cosigned By      Initials Name Provider Type    Wanda Chambers PT Physical Therapist                   Outcome Measures       Row Name 06/13/24 0772          How much help from another person do you currently need...    Turning from your back to your side while in flat bed without using bedrails? 3  -KH     Moving from lying on back to sitting on the side of a flat bed without bedrails? 2  -KH     Moving to and from a bed to a chair (including a wheelchair)? 3  -KH     Standing up from a chair using your arms (e.g., wheelchair, bedside chair)? 3  -KH     Climbing 3-5 steps with a railing? 2  -KH     To walk in hospital room? 3  -KH     AM-PAC 6 Clicks Score (PT) 16  -KH     Highest Level of Mobility Goal 5 --> Static standing  -REJI               User Key  (r) = Recorded By, (t) = Taken By, (c) = Cosigned By      Initials Name Provider Type    Wanda Chambers PT Physical Therapist                                 Physical Therapy Education       Title: PT OT SLP Therapies (Not Started)       Topic: Physical Therapy (Not Started)       Point: Mobility training (Not Started)       Learner Progress:  Not documented in this visit.              Point: Home exercise program (Not Started)       Learner Progress:  Not documented in this visit.              Point: Body mechanics (Not Started)       Learner Progress:  Not documented in this visit.              Point: Precautions (Not Started)       Learner Progress:  Not documented in this visit.                                  PT Recommendation and Plan     Plan of Care Reviewed With: patient  Outcome Evaluation: Pt is s/p AVR/MVR/TVR. She was evaluated in CVR. Pt was up n the chair when PT arrived. Pt stood and ambulated with min A x2 and assist for equipment. Pain limited activity tolerance. Pt presents with pain, limited activity tolerance, post-op weakness and unsteady gait.  Pt would benefit from PT to address these impairments. Pt was independently moble with a cane prior to admission.     Time Calculation:         PT Charges       Row Name 06/13/24 1305             Time Calculation    Start Time 0857  -      Stop Time 0915  -      Time Calculation (min) 18 min  -KH      PT Received On 06/13/24  -KH      PT - Next Appointment 06/14/24  -      PT Goal Re-Cert Due Date 06/20/24  -         Time Calculation- PT    Total Timed Code Minutes- PT 8 minute(s)  -         Timed Charges    48321 - PT Therapeutic Activity Minutes 8  -KH         Untimed Charges    PT Eval/Re-eval Minutes 10  -KH         Total Minutes    Timed Charges Total Minutes 8  -KH      Untimed Charges Total Minutes 10  -KH       Total Minutes 18  -KH                User Key  (r) = Recorded By, (t) = Taken By, (c) = Cosigned By      Initials Name Provider Type    Wanda Chambers, PT Physical Therapist                  Therapy Charges for Today       Code Description Service Date Service Provider Modifiers Qty    49997795998 HC PT THERAPEUTIC ACT EA 15 MIN 6/13/2024 Wanda Salazar, PT GP 1    52186714643 HC PT EVAL MOD COMPLEXITY 3 6/13/2024 Wanda Salazar, PT GP 1            PT G-Codes  AM-PAC 6 Clicks Score (PT): 16  PT Discharge Summary  Anticipated Discharge Disposition (PT): inpatient rehabilitation facility, skilled nursing facility    Wanda Salazar, PT  6/13/2024

## 2024-06-13 NOTE — PROGRESS NOTES
Hospital Follow Up    LOS: 5  Patient Name: Марина Tilley  Age/Sex: 48 y.o. female  : 1975  MRN: [id number]    Day of Service: 24   Length of Stay: 5  Encounter Provider: Christopher Neves MD  Place of Service: [facility] CARDIOLOGY  Patient Care Team:  Provider, No Known as PCP - General    Subjective:     Chief Complaint: Endocarditis    Interval History: Patient is status post valvular surgery.  She remains on a little bit of milrinone.  Chest tubes in place she is postop day 1.    Objective:     Objective:  Temp:  [97.5 °F (36.4 °C)-99 °F (37.2 °C)] 99 °F (37.2 °C)  Heart Rate:  [80-90] 80  Resp:  [16-18] 17  BP: ()/(42-92) 122/92  Arterial Line BP: ()/(46-80) 136/80  FiO2 (%):  [40 %-99 %] 40 %     Intake/Output Summary (Last 24 hours) at 2024 1026  Last data filed at 2024 0926  Gross per 24 hour   Intake 7742.38 ml   Output 5120 ml   Net 2622.38 ml     Body mass index is 33.38 kg/m².      24  0508 24  0500 24  0557   Weight: 93.4 kg (205 lb 12.8 oz) 90.3 kg (199 lb) 93.8 kg (206 lb 12.7 oz)     Weight change: 3.534 kg (7 lb 12.7 oz)      Physical Exam:   General :  Alert, cooperative, in no acute distress.  Neuro:   Alert,cooperative and oriented.  Lungs:  CTAB. Normal respiratory effort and rate.  CV:  Regular rate and rhythm, normal S1 and S2, no murmurs, gallops or rubs.   ABD:  Soft, nontender, nondistended. Positive bowel sounds.  Extr:  No edema or cyanosis, moves all extremities.    Lab Review:   Results from last 7 days   Lab Units 24  0257 24  1736 24  0432 24  1709   SODIUM mmol/L 143 139   < > 132*   POTASSIUM mmol/L 4.2 4.0   < > 4.5   CHLORIDE mmol/L 107 103   < > 98   CO2 mmol/L 27.9 26.7   < > 21.5*   BUN mg/dL 20 23*   < > 20   CREATININE mg/dL 1.09* 1.27*   < > 1.51*   GLUCOSE mg/dL 120* 129*   < > 140*   CALCIUM mg/dL 9.2 9.3   < > 8.5*   AST (SGOT) U/L  --   --   --  234*   ALT  (SGPT) U/L  --   --   --  209*    < > = values in this interval not displayed.         Results from last 7 days   Lab Units 06/13/24  0257 06/12/24  2334 06/12/24  1736   WBC 10*3/mm3 13.04*  --  15.34*   HEMOGLOBIN g/dL 8.5* 8.6* 8.0*   HEMATOCRIT % 26.1* 25.7* 23.9*   PLATELETS 10*3/mm3 126*  --  127*     Results from last 7 days   Lab Units 06/13/24  0257 06/12/24  1333 06/12/24  1146 06/12/24  1143   INR  1.14* 1.38*   < > 1.82*   APTT seconds  --  29.8  --  28.7    < > = values in this interval not displayed.     Results from last 7 days   Lab Units 06/12/24  1736 06/12/24  1333   MAGNESIUM mg/dL 1.9 1.6     Results from last 7 days   Lab Units 06/10/24  1828   CHOLESTEROL mg/dL 97   TRIGLYCERIDES mg/dL 163*   HDL CHOL mg/dL 21*     Results from last 7 days   Lab Units 06/09/24  0432   PROBNP pg/mL 16,030.0*           Current Medications:   Scheduled Meds:aspirin, 81 mg, Oral, Daily  bumetanide, 2 mg, Intravenous, Q8H  chlorhexidine, 15 mL, Mouth/Throat, Q12H  gabapentin, 300 mg, Oral, Q8H  guaiFENesin, 1,200 mg, Oral, Q12H  insulin lispro, 2-7 Units, Subcutaneous, 4x Daily AC & at Bedtime  metoclopramide, 5 mg, Intravenous, Q8H  metoprolol tartrate, 12.5 mg, Oral, Q12H  mupirocin, , Each Nare, BID  pantoprazole, 40 mg, Oral, QAM  senna-docusate sodium, 2 tablet, Oral, Nightly  sertraline, 25 mg, Oral, Daily  vancomycin, 1,500 mg, Intravenous, Q24H      Continuous Infusions:milrinone, 0.25-0.375 mcg/kg/min, Last Rate: 0.25 mcg/kg/min (06/12/24 2203)  Pharmacy to dose vancomycin,         Allergies:  Allergies   Allergen Reactions    Cephalexin Anaphylaxis     Required intubation    Latex Itching       Assessment:       Acute bacterial endocarditis        Plan:   1.  Bacterial endocarditis.  All teeth have been removed  2.  Patient is status post surgery postop day 1.  She had an AVR, TVR, and then mitral valve repair.  3.  Smoking abuse patient has a patch on.  4.  History of polysubstance abuse.  5.  Bipolar  type II.  6.  Acute kidney injury.  Creatinine is improving.  7.  For postop day 1 patient actually looks pretty good.  This is a relative term we will continue to follow.        Christopher Neves MD  06/13/24  10:26 EDT

## 2024-06-13 NOTE — PROGRESS NOTES
LOS: 5 days   Patient Care Team:  Provider, No Known as PCP - General    Chief Complaint: post op    Subjective:  Symptoms:  She reports chest pain.  No shortness of breath.    Diet:  No nausea or vomiting.    Activity level: Impaired due to pain.          Vital Signs  Temp:  [97.5 °F (36.4 °C)-99 °F (37.2 °C)] 99 °F (37.2 °C)  Heart Rate:  [80-90] 86  Resp:  [16-18] 17  BP: ()/(42-76) 113/76  Arterial Line BP: ()/(46-79) 126/77  FiO2 (%):  [40 %-99 %] 40 %  Body mass index is 33.38 kg/m².    Intake/Output Summary (Last 24 hours) at 6/13/2024 0729  Last data filed at 6/13/2024 0644  Gross per 24 hour   Intake 7502.38 ml   Output 4320 ml   Net 3182.38 ml     No intake/output data recorded.    Chest tube drainage last 8 hours 60/25        06/11/24  0508 06/12/24  0500 06/13/24  0557   Weight: 93.4 kg (205 lb 12.8 oz) 90.3 kg (199 lb) 93.8 kg (206 lb 12.7 oz)         Objective:  Vital signs: (most recent): Blood pressure 113/76, pulse 86, temperature 99 °F (37.2 °C), temperature source Bladder, resp. rate 17, weight 93.8 kg (206 lb 12.7 oz), SpO2 100%.                Results Review:        WBC WBC   Date Value Ref Range Status   06/13/2024 13.04 (H) 3.40 - 10.80 10*3/mm3 Final   06/12/2024 15.34 (H) 3.40 - 10.80 10*3/mm3 Final   06/12/2024 21.31 (H) 3.40 - 10.80 10*3/mm3 Final   06/12/2024 27.88 (H) 3.40 - 10.80 10*3/mm3 Final   06/12/2024 8.44 3.40 - 10.80 10*3/mm3 Final   06/11/2024 13.16 (H) 3.40 - 10.80 10*3/mm3 Final   06/10/2024 16.23 (H) 3.40 - 10.80 10*3/mm3 Final      HGB Hemoglobin   Date Value Ref Range Status   06/13/2024 8.5 (L) 12.0 - 15.9 g/dL Final   06/12/2024 8.6 (L) 12.0 - 15.9 g/dL Final   06/12/2024 8.0 (L) 12.0 - 15.9 g/dL Final   06/12/2024 7.7 (L) 12.0 - 15.9 g/dL Final   06/12/2024 8.5 (L) 12.0 - 17.0 g/dL Final   06/12/2024 7.2 (L) 12.0 - 15.9 g/dL Final   06/12/2024 8.8 (L) 12.0 - 17.0 g/dL Final   06/12/2024 8.8 (L) 12.0 - 17.0 g/dL Final   06/12/2024 8.5 (L) 12.0 - 17.0 g/dL  Final   06/12/2024 7.8 (C) 12.0 - 17.0 g/dL Final   06/12/2024 7.8 (C) 12.0 - 17.0 g/dL Final   06/12/2024 7.5 (C) 12.0 - 17.0 g/dL Final   06/12/2024 10.2 (L) 12.0 - 17.0 g/dL Final   06/12/2024 9.3 (L) 12.0 - 15.9 g/dL Final   06/11/2024 9.1 (L) 12.0 - 15.9 g/dL Final   06/10/2024 9.5 (L) 12.0 - 15.9 g/dL Final      HCT Hematocrit   Date Value Ref Range Status   06/13/2024 26.1 (L) 34.0 - 46.6 % Final   06/12/2024 25.7 (L) 34.0 - 46.6 % Final   06/12/2024 23.9 (L) 34.0 - 46.6 % Final   06/12/2024 24.5 (L) 34.0 - 46.6 % Final   06/12/2024 25 (L) 38 - 51 % Final   06/12/2024 22.4 (L) 34.0 - 46.6 % Final   06/12/2024 26 (L) 38 - 51 % Final   06/12/2024 26 (L) 38 - 51 % Final   06/12/2024 25 (L) 38 - 51 % Final   06/12/2024 23 (L) 38 - 51 % Final   06/12/2024 23 (L) 38 - 51 % Final   06/12/2024 22 (L) 38 - 51 % Final   06/12/2024 30 (L) 38 - 51 % Final   06/12/2024 28.6 (L) 34.0 - 46.6 % Final   06/11/2024 28.4 (L) 34.0 - 46.6 % Final   06/10/2024 30.8 (L) 34.0 - 46.6 % Final      Platelets Platelets   Date Value Ref Range Status   06/13/2024 126 (L) 140 - 450 10*3/mm3 Final   06/12/2024 127 (L) 140 - 450 10*3/mm3 Final   06/12/2024 154 140 - 450 10*3/mm3 Final   06/12/2024 151 140 - 450 10*3/mm3 Final   06/12/2024 184 140 - 450 10*3/mm3 Final   06/12/2024 231 140 - 450 10*3/mm3 Final   06/11/2024 284 140 - 450 10*3/mm3 Final   06/10/2024 302 140 - 450 10*3/mm3 Final        PT/INR:    Protime   Date Value Ref Range Status   06/13/2024 14.8 (H) 11.7 - 14.2 Seconds Final   06/12/2024 17.2 (H) 11.7 - 14.2 Seconds Final   06/12/2024 21.0 (H) 12.8 - 15.2 seconds Final     Comment:     Serial Number: [id number]:  [id number]   06/12/2024 21.3 (H) 11.7 - 14.2 Seconds Final   06/10/2024 15.8 (H) 11.7 - 14.2 Seconds Final   /  INR   Date Value Ref Range Status   06/13/2024 1.14 (H) 0.90 - 1.10 Final   06/12/2024 1.38 (H) 0.90 - 1.10 Final   06/12/2024 1.8 (H) 0.8 - 1.2 Final   06/12/2024 1.82 (H) 0.90 - 1.10 Final   06/10/2024  1.23 (H) 0.90 - 1.10 Final       Sodium Sodium   Date Value Ref Range Status   06/13/2024 143 136 - 145 mmol/L Final   06/12/2024 139 136 - 145 mmol/L Final   06/12/2024 141 136 - 145 mmol/L Final   06/12/2024 137 136 - 145 mmol/L Final   06/11/2024 137 136 - 145 mmol/L Final   06/10/2024 133 (L) 136 - 145 mmol/L Final      Potassium Potassium   Date Value Ref Range Status   06/13/2024 4.2 3.5 - 5.2 mmol/L Final   06/12/2024 4.0 3.5 - 5.2 mmol/L Final   06/12/2024 4.0 3.5 - 5.2 mmol/L Final   06/12/2024 3.8 3.5 - 5.2 mmol/L Final   06/11/2024 4.3 3.5 - 5.2 mmol/L Final   06/10/2024 4.6 3.5 - 5.2 mmol/L Final      Chloride Chloride   Date Value Ref Range Status   06/13/2024 107 98 - 107 mmol/L Final   06/12/2024 103 98 - 107 mmol/L Final   06/12/2024 101 98 - 107 mmol/L Final   06/12/2024 99 98 - 107 mmol/L Final   06/11/2024 101 98 - 107 mmol/L Final   06/10/2024 97 (L) 98 - 107 mmol/L Final      Bicarbonate CO2   Date Value Ref Range Status   06/13/2024 27.9 22.0 - 29.0 mmol/L Final   06/12/2024 26.7 22.0 - 29.0 mmol/L Final   06/12/2024 25.4 22.0 - 29.0 mmol/L Final   06/12/2024 28.9 22.0 - 29.0 mmol/L Final   06/11/2024 26.0 22.0 - 29.0 mmol/L Final   06/10/2024 23.1 22.0 - 29.0 mmol/L Final      BUN BUN   Date Value Ref Range Status   06/13/2024 20 6 - 20 mg/dL Final   06/12/2024 23 (H) 6 - 20 mg/dL Final   06/12/2024 23 (H) 6 - 20 mg/dL Final   06/12/2024 25 (H) 6 - 20 mg/dL Final   06/11/2024 29 (H) 6 - 20 mg/dL Final   06/10/2024 25 (H) 6 - 20 mg/dL Final      Creatinine Creatinine   Date Value Ref Range Status   06/13/2024 1.09 (H) 0.57 - 1.00 mg/dL Final   06/12/2024 1.27 (H) 0.57 - 1.00 mg/dL Final   06/12/2024 1.33 (H) 0.57 - 1.00 mg/dL Final   06/12/2024 1.24 (H) 0.57 - 1.00 mg/dL Final   06/11/2024 1.46 (H) 0.57 - 1.00 mg/dL Final   06/10/2024 1.54 (H) 0.57 - 1.00 mg/dL Final      Calcium Calcium   Date Value Ref Range Status   06/13/2024 9.2 8.6 - 10.5 mg/dL Final   06/12/2024 9.3 8.6 - 10.5 mg/dL  Final   06/12/2024 8.5 (L) 8.6 - 10.5 mg/dL Final   06/12/2024 8.4 (L) 8.6 - 10.5 mg/dL Final   06/11/2024 8.3 (L) 8.6 - 10.5 mg/dL Final   06/10/2024 8.7 8.6 - 10.5 mg/dL Final      Magnesium Magnesium   Date Value Ref Range Status   06/12/2024 1.9 1.6 - 2.6 mg/dL Final   06/12/2024 1.6 1.6 - 2.6 mg/dL Final   06/12/2024 1.8 1.6 - 2.6 mg/dL Final   06/11/2024 1.7 1.6 - 2.6 mg/dL Final   06/10/2024 1.5 (L) 1.6 - 2.6 mg/dL Final          aspirin, 81 mg, Oral, Daily  bumetanide, 2 mg, Intravenous, Once  chlorhexidine, 15 mL, Mouth/Throat, Q12H  metoclopramide, 5 mg, Intravenous, Q8H  metoprolol tartrate, 12.5 mg, Oral, Q12H  mupirocin, , Each Nare, BID  pantoprazole, 40 mg, Oral, QAM  senna-docusate sodium, 2 tablet, Oral, Nightly  sertraline, 25 mg, Oral, Daily  vancomycin, 1,500 mg, Intravenous, Q24H      clevidipine, 2-32 mg/hr  dexmedetomidine, 0.2-1.5 mcg/kg/hr, Last Rate: Stopped (06/13/24 0400)  DOPamine, 2-20 mcg/kg/min  EPINEPHrine, 0.02-0.1 mcg/kg/min, Last Rate: Stopped (06/13/24 0139)  insulin, 0-100 Units/hr, Last Rate: 0.6 Units/hr (06/13/24 0616)  milrinone, 0.25-0.375 mcg/kg/min, Last Rate: 0.25 mcg/kg/min (06/12/24 2203)  niCARdipine, 5-15 mg/hr  nitroglycerin, 5-200 mcg/min  norepinephrine, 0.02-0.2 mcg/kg/min, Last Rate: Stopped (06/12/24 1600)  Pharmacy to dose vancomycin,   phenylephrine, 0.2-2 mcg/kg/min, Last Rate: Stopped (06/12/24 1840)  propofol, 5-50 mcg/kg/min, Last Rate: 15 mcg/kg/min (06/12/24 1545)  sodium chloride, 9 mL/hr, Last Rate: 9 mL/hr (06/12/24 1526)              Acute bacterial endocarditis      Assessment & Plan    - Native tricuspid valve endocarditis, EF 50-55% (echo)--s/p AVR (tissue), TVR (tissue), MV repair- (no op report at this time) Pagni  - MRSA bacteremia/ UTI--ID following, on vanc  - Severe AI/ moderate-severe MR  - Acute HFrEF, r/t VHD, NYHA class III--proBNP > 33k  - Dilated CMO  - Polysubstance abuse--IVDA/ tobacco abuse  - PTSD--psych following  - Bipolar  disorder, type 2  - CATRINA, likely r/t acute HF  - Anemia  - Tobacco abuse, 91 pack yr hx  - Poor dentition, likely periapical abscess right molar--s/p dental extraction 6/9  - Prolonged QTc--500 ms (6/3 EKG)  - Medical non-compliance     POD#1  Looks good this morning--Up in the chair  4L NC-- wean as able  Sinus rhythm rate 70s-- pacing atrially AAI for higher pressure  Milrinone 0.25-- will leave today  Cultures from the OR NGTD-- continue antibiotics per ID  Encourage pulmonary toilet-- continue IS, will add mucinex and flutter valve.  Mobilize  CXR with congestion--creatinine stable--Plans for IV bumex this morning per nephrology.  [facility]       Mariia Dhaliwal, APRN  06/13/24  07:29 EDT

## 2024-06-13 NOTE — PROGRESS NOTES
LOS: 5 days     Chief Complaint: MRSA endocarditis    Interval History: Extubated.  Leukocytosis improving.  Remains afebrile.  Decreased pressor requirements.    Vital Signs  Temp:  [97.5 °F (36.4 °C)-99 °F (37.2 °C)] 99 °F (37.2 °C)  Heart Rate:  [80-90] 86  Resp:  [16-18] 17  BP: ()/(42-76) 113/76  Arterial Line BP: ()/(46-79) 126/77  FiO2 (%):  [40 %-99 %] 40 %    Physical Exam:  General: No acute distress  HEENT: Nasal cannula in place.  Respiratory: Coarse bilateral breath sounds.  Chest tubes in place.  Skin: Multiple tattoos  Access: IJ introducer and peripheral IV    Antibiotics:  Anti-Infectives (From admission, onward)      Ordered     Dose/Rate Route Frequency Start Stop    06/12/24 1338  vancomycin IVPB 1500 mg in 0.9% NaCl (Premix) 500 mL        Ordering Provider: Gary Barton MD    1,500 mg  333.3 mL/hr over 90 Minutes Intravenous Every 24 Hours 06/13/24 0800 06/24/24 0759    06/12/24 1323  Pharmacy to dose vancomycin        Ordering Provider: Gary Barton MD     Does not apply Continuous PRN 06/12/24 1322 06/23/24 1321    06/10/24 1742  vancomycin IVPB 1500 mg in 0.9% NaCl (Premix) 500 mL        Ordering Provider: Eric Skaggs MD    15 mg/kg × 91.9 kg Intravenous Once 06/12/24 0600 06/12/24 0657             Results Review:     I reviewed the patient's new clinical results.    Lab Results   Component Value Date    WBC 13.04 (H) 06/13/2024    HGB 8.5 (L) 06/13/2024    HCT 26.1 (L) 06/13/2024    MCV 88.5 06/13/2024     (L) 06/13/2024     Lab Results   Component Value Date    GLUCOSE 120 (H) 06/13/2024    BUN 20 06/13/2024    CREATININE 1.09 (H) 06/13/2024    EGFRIFAFRI >60 07/22/2021    BCR 18.3 06/13/2024    CO2 27.9 06/13/2024    CALCIUM 9.2 06/13/2024    ALBUMIN 3.6 06/13/2024    LABIL2 0.9 (L) 07/22/2021     (H) 06/08/2024     (H) 06/08/2024       Microbiology:  6/2 blood cultures no growth  6/9 COVID-negative  6/12 aortic valve tissue culture no growth  to date    Outside hospital blood cultures  5/9 3 out of 3 MRSA  5/11 1 out of 1 MRSA    Assessment    #MRSA tricuspid valve endocarditis status post AVR/MVR/TVR 6/12  #MRSA septicemia, blood cultures cleared  #IVDU  #Septic pulmonary emboli  #Right molar periapical abscess status post home health tooth extraction 6/9  #Acute hypoxic respiratory failure  #CATRINA  #Splenic infarct, likely septic    Operative cultures no growth to date.  Continue IV vancomycin goal -600.  Appreciate pharmacy's help with dosing.  Plans for repeat vancomycin level tomorrow.  Plans are going to be for 6 weeks of vancomycin therapy through end date July 24.    ID will follow.

## 2024-06-13 NOTE — PLAN OF CARE
Goal Outcome Evaluation:  Plan of Care Reviewed With: patient        Progress: improving  Outcome Evaluation: Pt up in chair, on 3L/NC continues on primacor, and insulin. PRN painmedicine given per MAR. Will continue per care plan.

## 2024-06-14 ENCOUNTER — APPOINTMENT (OUTPATIENT)
Dept: GENERAL RADIOLOGY | Facility: HOSPITAL | Age: 49
DRG: 216 | End: 2024-06-14
Payer: MEDICAID

## 2024-06-14 LAB
ALBUMIN SERPL-MCNC: 3.6 G/DL (ref 3.5–5.2)
ANION GAP SERPL CALCULATED.3IONS-SCNC: 10.8 MMOL/L (ref 5–15)
BUN SERPL-MCNC: 16 MG/DL (ref 6–20)
BUN/CREAT SERPL: 16.3 (ref 7–25)
CALCIUM SPEC-SCNC: 8.8 MG/DL (ref 8.6–10.5)
CHLORIDE SERPL-SCNC: 96 MMOL/L (ref 98–107)
CO2 SERPL-SCNC: 28.2 MMOL/L (ref 22–29)
CREAT SERPL-MCNC: 0.98 MG/DL (ref 0.57–1)
DEPRECATED RDW RBC AUTO: 57.4 FL (ref 37–54)
EGFRCR SERPLBLD CKD-EPI 2021: 72.2 ML/MIN/1.73
ERYTHROCYTE [DISTWIDTH] IN BLOOD BY AUTOMATED COUNT: 18.2 % (ref 12.3–15.4)
GLUCOSE BLDC GLUCOMTR-MCNC: 144 MG/DL (ref 70–130)
GLUCOSE SERPL-MCNC: 125 MG/DL (ref 65–99)
HCT VFR BLD AUTO: 27.8 % (ref 34–46.6)
HGB BLD-MCNC: 8.9 G/DL (ref 12–15.9)
MCH RBC QN AUTO: 28.6 PG (ref 26.6–33)
MCHC RBC AUTO-ENTMCNC: 32 G/DL (ref 31.5–35.7)
MCV RBC AUTO: 89.4 FL (ref 79–97)
PHOSPHATE SERPL-MCNC: 3.4 MG/DL (ref 2.5–4.5)
PLATELET # BLD AUTO: 134 10*3/MM3 (ref 140–450)
PMV BLD AUTO: 10.1 FL (ref 6–12)
POTASSIUM SERPL-SCNC: 3.6 MMOL/L (ref 3.5–5.2)
POTASSIUM SERPL-SCNC: 4.2 MMOL/L (ref 3.5–5.2)
QT INTERVAL: 418 MS
QTC INTERVAL: 483 MS
RBC # BLD AUTO: 3.11 10*6/MM3 (ref 3.77–5.28)
SODIUM SERPL-SCNC: 135 MMOL/L (ref 136–145)
VANCOMYCIN TROUGH SERPL-MCNC: 19.4 MCG/ML (ref 5–20)
WBC NRBC COR # BLD AUTO: 13.38 10*3/MM3 (ref 3.4–10.8)

## 2024-06-14 PROCEDURE — 97110 THERAPEUTIC EXERCISES: CPT | Performed by: PHYSICAL THERAPIST

## 2024-06-14 PROCEDURE — 80202 ASSAY OF VANCOMYCIN: CPT | Performed by: INTERNAL MEDICINE

## 2024-06-14 PROCEDURE — 82948 REAGENT STRIP/BLOOD GLUCOSE: CPT

## 2024-06-14 PROCEDURE — 25010000002 BUMETANIDE PER 0.5 MG: Performed by: INTERNAL MEDICINE

## 2024-06-14 PROCEDURE — 99024 POSTOP FOLLOW-UP VISIT: CPT | Performed by: THORACIC SURGERY (CARDIOTHORACIC VASCULAR SURGERY)

## 2024-06-14 PROCEDURE — 99232 SBSQ HOSP IP/OBS MODERATE 35: CPT | Performed by: STUDENT IN AN ORGANIZED HEALTH CARE EDUCATION/TRAINING PROGRAM

## 2024-06-14 PROCEDURE — 25010000002 MILRINONE LACTATE IN DEXTROSE 20-5 MG/100ML-% SOLUTION: Performed by: NURSE PRACTITIONER

## 2024-06-14 PROCEDURE — 25010000002 VANCOMYCIN 10 G RECONSTITUTED SOLUTION: Performed by: INTERNAL MEDICINE

## 2024-06-14 PROCEDURE — 97530 THERAPEUTIC ACTIVITIES: CPT | Performed by: PHYSICAL THERAPIST

## 2024-06-14 PROCEDURE — 85027 COMPLETE CBC AUTOMATED: CPT | Performed by: NURSE PRACTITIONER

## 2024-06-14 PROCEDURE — 71045 X-RAY EXAM CHEST 1 VIEW: CPT

## 2024-06-14 PROCEDURE — 93005 ELECTROCARDIOGRAM TRACING: CPT | Performed by: NURSE PRACTITIONER

## 2024-06-14 PROCEDURE — 80069 RENAL FUNCTION PANEL: CPT | Performed by: INTERNAL MEDICINE

## 2024-06-14 PROCEDURE — 93010 ELECTROCARDIOGRAM REPORT: CPT | Performed by: INTERNAL MEDICINE

## 2024-06-14 PROCEDURE — 84132 ASSAY OF SERUM POTASSIUM: CPT | Performed by: THORACIC SURGERY (CARDIOTHORACIC VASCULAR SURGERY)

## 2024-06-14 PROCEDURE — 25810000003 SODIUM CHLORIDE 0.9 % SOLUTION: Performed by: INTERNAL MEDICINE

## 2024-06-14 PROCEDURE — 99232 SBSQ HOSP IP/OBS MODERATE 35: CPT | Performed by: INTERNAL MEDICINE

## 2024-06-14 PROCEDURE — 25010000002 KETOROLAC TROMETHAMINE PER 15 MG: Performed by: NURSE PRACTITIONER

## 2024-06-14 RX ORDER — GABAPENTIN 400 MG/1
400 CAPSULE ORAL EVERY 8 HOURS SCHEDULED
Status: DISCONTINUED | OUTPATIENT
Start: 2024-06-14 | End: 2024-06-17 | Stop reason: HOSPADM

## 2024-06-14 RX ORDER — POTASSIUM CHLORIDE 750 MG/1
40 TABLET, FILM COATED, EXTENDED RELEASE ORAL EVERY 4 HOURS
Status: COMPLETED | OUTPATIENT
Start: 2024-06-14 | End: 2024-06-14

## 2024-06-14 RX ORDER — NICOTINE 21 MG/24HR
1 PATCH, TRANSDERMAL 24 HOURS TRANSDERMAL
Status: DISCONTINUED | OUTPATIENT
Start: 2024-06-14 | End: 2024-06-17 | Stop reason: HOSPADM

## 2024-06-14 RX ORDER — KETOROLAC TROMETHAMINE 15 MG/ML
15 INJECTION, SOLUTION INTRAMUSCULAR; INTRAVENOUS EVERY 6 HOURS PRN
Status: DISCONTINUED | OUTPATIENT
Start: 2024-06-14 | End: 2024-06-15

## 2024-06-14 RX ORDER — BUMETANIDE 0.25 MG/ML
2 INJECTION INTRAMUSCULAR; INTRAVENOUS
Status: COMPLETED | OUTPATIENT
Start: 2024-06-14 | End: 2024-06-14

## 2024-06-14 RX ORDER — MILRINONE LACTATE 0.2 MG/ML
0.12 INJECTION, SOLUTION INTRAVENOUS CONTINUOUS
Status: ACTIVE | OUTPATIENT
Start: 2024-06-14 | End: 2024-06-14

## 2024-06-14 RX ADMIN — ASPIRIN 81 MG: 81 TABLET, COATED ORAL at 09:13

## 2024-06-14 RX ADMIN — MILRINONE LACTATE 0.12 MCG/KG/MIN: 0.2 INJECTION, SOLUTION INTRAVENOUS at 09:21

## 2024-06-14 RX ADMIN — MUPIROCIN 1 APPLICATION: 20 OINTMENT TOPICAL at 20:48

## 2024-06-14 RX ADMIN — METOPROLOL TARTRATE 12.5 MG: 25 TABLET, FILM COATED ORAL at 20:48

## 2024-06-14 RX ADMIN — GABAPENTIN 300 MG: 300 CAPSULE ORAL at 05:44

## 2024-06-14 RX ADMIN — GUAIFENESIN 1200 MG: 600 TABLET, EXTENDED RELEASE ORAL at 20:48

## 2024-06-14 RX ADMIN — 0.12% CHLORHEXIDINE GLUCONATE 15 ML: 1.2 RINSE ORAL at 09:13

## 2024-06-14 RX ADMIN — HYDROCODONE BITARTRATE AND ACETAMINOPHEN 2 TABLET: 5; 325 TABLET ORAL at 09:13

## 2024-06-14 RX ADMIN — POTASSIUM CHLORIDE 40 MEQ: 750 TABLET, EXTENDED RELEASE ORAL at 11:06

## 2024-06-14 RX ADMIN — HYDROCODONE BITARTRATE AND ACETAMINOPHEN 2 TABLET: 5; 325 TABLET ORAL at 03:43

## 2024-06-14 RX ADMIN — POTASSIUM CHLORIDE 40 MEQ: 750 TABLET, EXTENDED RELEASE ORAL at 13:20

## 2024-06-14 RX ADMIN — GABAPENTIN 400 MG: 400 CAPSULE ORAL at 20:48

## 2024-06-14 RX ADMIN — NICOTINE 1 PATCH: 21 PATCH, EXTENDED RELEASE TRANSDERMAL at 11:06

## 2024-06-14 RX ADMIN — VANCOMYCIN HYDROCHLORIDE 1500 MG: 10 INJECTION, POWDER, LYOPHILIZED, FOR SOLUTION INTRAVENOUS at 09:13

## 2024-06-14 RX ADMIN — GUAIFENESIN 1200 MG: 600 TABLET, EXTENDED RELEASE ORAL at 09:13

## 2024-06-14 RX ADMIN — PANTOPRAZOLE SODIUM 40 MG: 40 TABLET, DELAYED RELEASE ORAL at 05:44

## 2024-06-14 RX ADMIN — MUPIROCIN 1 APPLICATION: 20 OINTMENT TOPICAL at 09:13

## 2024-06-14 RX ADMIN — SERTRALINE 25 MG: 25 TABLET, FILM COATED ORAL at 09:13

## 2024-06-14 RX ADMIN — ALPRAZOLAM 0.25 MG: 0.25 TABLET ORAL at 00:39

## 2024-06-14 RX ADMIN — MILRINONE LACTATE 22.67 MCG/MIN: 0.2 INJECTION, SOLUTION INTRAVENOUS at 03:35

## 2024-06-14 RX ADMIN — METOPROLOL TARTRATE 12.5 MG: 25 TABLET, FILM COATED ORAL at 09:13

## 2024-06-14 RX ADMIN — BUMETANIDE 2 MG: 0.25 INJECTION INTRAMUSCULAR; INTRAVENOUS at 05:44

## 2024-06-14 RX ADMIN — KETOROLAC TROMETHAMINE 15 MG: 15 INJECTION, SOLUTION INTRAMUSCULAR; INTRAVENOUS at 13:20

## 2024-06-14 RX ADMIN — KETOROLAC TROMETHAMINE 15 MG: 15 INJECTION, SOLUTION INTRAMUSCULAR; INTRAVENOUS at 19:51

## 2024-06-14 RX ADMIN — SENNOSIDES AND DOCUSATE SODIUM 2 TABLET: 50; 8.6 TABLET ORAL at 20:48

## 2024-06-14 RX ADMIN — GABAPENTIN 400 MG: 400 CAPSULE ORAL at 13:20

## 2024-06-14 RX ADMIN — BUMETANIDE 2 MG: 0.25 INJECTION INTRAMUSCULAR; INTRAVENOUS at 09:13

## 2024-06-14 RX ADMIN — CYCLOBENZAPRINE 10 MG: 10 TABLET, FILM COATED ORAL at 13:21

## 2024-06-14 NOTE — PLAN OF CARE
Goal Outcome Evaluation:         Patient post op day #2, pt alert and orient x 4, vital signs stable, pt on oxygen at 1 liter nasal cannula,  stand by assist, call light intact, I/v milrinone infusing,  I/v right IJ intact, chest tube intact draining , chen care done, incision dressing dry and intact, cont to monitor

## 2024-06-14 NOTE — PROGRESS NOTES
LOS: 6 days     Chief Complaint: MRSA endocarditis    Interval History: Patient complaining of chest tube discomfort.  Tolerating antibiotics.    Vital Signs  Temp:  [98.6 °F (37 °C)-98.9 °F (37.2 °C)] 98.7 °F (37.1 °C)  Heart Rate:  [80-81] 81  Resp:  [18] 18  BP: (119-127)/(70-84) 127/70    Physical Exam:  General: No acute distress  HEENT: Nasal cannula in place.  Respiratory: Chest tubes in place.  Skin: Multiple tattoos  Access: IJ introducer and peripheral IV    Antibiotics:  Anti-Infectives (From admission, onward)      Ordered     Dose/Rate Route Frequency Start Stop    06/12/24 1338  vancomycin IVPB 1500 mg in 0.9% NaCl (Premix) 500 mL        Ordering Provider: Gary Barton MD    1,500 mg  333.3 mL/hr over 90 Minutes Intravenous Every 24 Hours 06/13/24 0800 06/24/24 0759    06/12/24 1323  Pharmacy to dose vancomycin        Ordering Provider: Gary Barton MD     Does not apply Continuous PRN 06/12/24 1322 06/23/24 1321    06/10/24 1742  vancomycin IVPB 1500 mg in 0.9% NaCl (Premix) 500 mL        Ordering Provider: Eric Skaggs MD    15 mg/kg × 91.9 kg Intravenous Once 06/12/24 0600 06/12/24 0657             Results Review:     I reviewed the patient's new clinical results.    Lab Results   Component Value Date    WBC 13.38 (H) 06/14/2024    HGB 8.9 (L) 06/14/2024    HCT 27.8 (L) 06/14/2024    MCV 89.4 06/14/2024     (L) 06/14/2024     Lab Results   Component Value Date    GLUCOSE 125 (H) 06/14/2024    BUN 16 06/14/2024    CREATININE 0.98 06/14/2024    EGFRIFAFRI >60 07/22/2021    BCR 16.3 06/14/2024    CO2 28.2 06/14/2024    CALCIUM 8.8 06/14/2024    ALBUMIN 3.6 06/14/2024    LABIL2 0.9 (L) 07/22/2021     (H) 06/08/2024     (H) 06/08/2024       Microbiology:  6/2 blood cultures no growth  6/9 COVID-negative  6/12 aortic valve tissue culture no growth to date    Outside hospital blood cultures  5/9 3 out of 3 MRSA  5/11 1 out of 1 MRSA    Assessment    #MRSA tricuspid valve  endocarditis status post AVR/MVR/TVR 6/12  #MRSA septicemia, blood cultures cleared  #IVDU  #Septic pulmonary emboli  #Right molar periapical abscess status post home health tooth extraction 6/9  #Acute hypoxic respiratory failure  #CATRINA  #Splenic infarct, likely septic    Valvular cultures from OR remain no growth.  Vancomycin level today therapeutic.  Continue IV vancomycin goal -600.  Plans are going to be for 6 weeks of vancomycin therapy through end date July 24.    ID will follow.

## 2024-06-14 NOTE — PLAN OF CARE
Goal Outcome Evaluation:  Plan of Care Reviewed With: patient           Outcome Evaluation: Pt was up in the chair when PT arrived. Complains of pain and fatigue, but agreeable to therapy. Toelrated increased distance with less assistance. Fatigued quickly and required a standing rest break. Returned to bed at end of session.      Anticipated Discharge Disposition (PT): inpatient rehabilitation facility, skilled nursing facility

## 2024-06-14 NOTE — OP NOTE
Operative Note    Date of Dictation: 06/13/24    Date of Procedure: 06/12/2024    Referring Physician: Bryant Frank MD    Preoperative diagnosis:   1.  Severe aortic insufficiency  2.  Moderate to severe mitral regurgitation  3.  Severe tricuspid regurgitation  4.  Subacute heart valve endocarditis with MRSA bacteremia  5.  Active intravenous drug use  6.  Incomplete endocarditis treatment with noncompliance  7.  Congestive heart failure class III  8.  Bipolar disorder  9.  Poor dentition status for full mouth extraction    Postoperative diagnosis:   Same    Procedure:   1.  Urgent AVR with a 23 mm magna pericardial prosthesis  2.  Mitral valve annuloplasty with a 30 mm Medtronic flexible band  3.  Complex repair of the tricuspid valve with partial reconstruction of the anterior and septal leaflet with bovine glutaraldehyde pressure pericardium and a 30 mm physio tricuspid ring with residual TR followed by takedown of the repair  4.  Tricuspid valve replacement with a 31 mm Ramirez II porcine prosthesis   5.  Primary closure of patent foramen ovale/ASD  6.  Left atrial appendage endocardial closure    Surgeon: Eric Skaggs MD     Assistants: Assistant: Tessy Funk CSA was responsible for performing the following activities: Retraction, Suction, Irrigation, Suturing, Closing, Placing Dressing, and all aspects of the complex cardiac case  and their skilled assistance was necessary for the success of this case.     Anesthesia: General endotracheal anesthesia and LETICIA    Findings:  Vegetations and leaflet destruction of the anterior and septal tricuspid valve leaflets and known coronary aortic valve leaflets.  Significant hemodynamic decompensation in the operating room requiring rapid cannulation and bypass.  RV dysfunction.  Functional mitral regurgitation and the intraoperative finding over the patent foramen ovale-ASD with suction of air into the venous cannulas suggesting the diagnosis.    Estimated Blood  Loss:  Documented in the anesthesia record    STS Data:    The patient was explained the risks (STS risk score calculated for 1 valve, mitral), benefits and alternatives of surgery and agreed to proceed. The antibiotics and b blockers were given in the STS required window.  Counseling was given about diet, alcohol, drugs and tobacco use as needed.    Description of the procedure:     The patient was placed supine on the operative table. Anesthesia was given and lines placed. The patient was prepped and draped using the usual sterile technique. A median sternotomy was performed with a scalpel and the layers carried down to the sternum using the electrocautery. The sternum was splited in the midline using a vertical oscillating saw. Hemostasis was achieved. 300 units of IV heparin was given to maintain the ACT over 400.  Cannulation sutures were placed in the ascending aorta and both superior and inferior vena cavi. Small cannulas were placed and aorto bicaval cardiopulmonary bypass was started. Cardioplegia cannulas were placed and then the ascending aorta was clamped. One liter of cold blood cardioplegia was given in an antegrade fashion to achieved diastolic arrest and further doses every 15 minutes thereafter also using retrograde infusion.  Cardioplegia was also given to the right coronary ostia.  The left atrium was entering the right interatrial groove immediately after cross-clamp to decompress the heart.  The incision was extended below each cava.  Self retracting blades were placed with exposure of the mitral valve which showed annular dilatation.  There was no infection.  I closed the left atrial appendage with a double layer of 3-0 Prolene continuous suture.  In my opinion the valve was repairable and I avoid suture material in the intertrigonal segment because I did not know at that point the involvement of the aortic valve.  I identify sucking of air into the venous cannulas and I found a 1 cm patent  foramen ovale which I closed with a double layer of 3-0 Prolene continuous suture.  I used 2-0 Ethibond nonpledgeted sutures in a plication fashion from trigone to trigone around the posterior annulus.  I selected a 30 mm Medtronic flexible band and passed the sutures symmetrically through the band and used the core knot device to secure all sutures.  I tested the valve and there was no leakage.  I closed the left atrium using 3-0 Prolene continuous sutures.  Following a  transverse proximal aortotomy was performed and the valve was exposed. The valve was trileaflet and has significant infection in the noncoronary leaflet with destruction as an etiology of the severe AI.  I resected the leaflets and painted the area with rifampin solution.  I change the instruments utilized in the step, and removed gloves and drapes. The annulus was sized to a 23 mm magna Ease pericardial prosthesis that was washed as recommended by the .  There was no significant involvement of the aortic annulus therefore I elected to do a continuous suture.  I used 2-0 Prolene continuous suture to anastomose each third of the valve with the corresponding part of the annulus.  The valve was seated, the sutures were tensed with a nerve hook and the knots secured behind each post.  The aortotomy was closed with a 4.0 prolene continuous suture using the double layer McGoon technique.  Of note, I gave cardioplegia through the right coronary ostia intermittently and then through the aortic cardioplegia cannula to perfuse the right side of the heart intermittently.  Following, I snared the caval tapes and opened the right atrium.  I tacked the edges to the side of the wound with good exposure.  I cut the distal Willard and remove it altogether the proximal part by anesthesia team.  There was significant involvement with destruction of the commissural part of the anterior and septal leaflets.  I resected one third of each leaflet with the  corresponding chordae.  I painted the area aggressively with rifampin solution.  I tried to avoid valve replacement and the need of possible pacemaker.  Instruments, gloves and drapes were changed again.  I selected a glutaraldehyde preserved bovine pericardium and I tailor a patch of approximately 3 cm x 1-1/2 cm semicircular to correspond to the area of resected leaflets.  I used a 5-0 Prolene continuous suture to anastomose the tailored patch to the annulus superficially and then interrupted 5-0 Prolene to each side of the leaflet.  I used a 4-0 Kingsbury-Elivn chordae placed at the base of one of the fibrotic small papillary muscles and attach it to the free edge of the patch.  I used 2-0 Ethibond nonpledgeted sutures in a plication fashion circumferentially on the annulus avoiding the area of the bundle.  I selected a 30 mm physio tricuspid ring and passed the suture through it and then descended the ring in the annulus and tie all the sutures manually.  I tested the valve and there was still a residual leakage more centrally of concern.  At that point I took a part of the repair and resected part of the anterior leaflet leaving the posterior leaflet and the of the septal leaflet and remove part of the patch and the Kingsbury-Elvin material.  I left part of the patch in the area of the conduction bundle and I put the pledgeted suture through that area also to avoid AV block.  I used 2-0 Ethibond pledgeted sutures in a circumferential fashion supra annually and selected a 31 mm Ramirez II porcine prosthesis which was washed as recommended by the .  I passed the suture through the valve sewing cuff and then seated the valve in the annulus and used the core knot device.  I get the warm dose of cardioplegia while I was working on the valve and then remove the aortic root slang with steep suction on the aortic vent.  I completed the tricuspid valve implantation and then the closure of the right atrium using a double  layer of 4-0 Prolene continuous suture and then remove the air from the right atrium and tiedown and remove the air from the right atrium.        Both pleural spaces were suctioned with approximately 1500 cc in addition to the free fluid in the pericardium at entrance.  And the lungs ventilated. The heart was paced till regular atrial rhythm resumed. I allowed the heart to eject  and I de-aired the left heart chambers under LETICIA guidance and once hemodynamics were acceptable, then the CPB was discontinued and the venous and cardioplegia cannulas removed. The matching dose of protamine was given and the aortic cannula removed as well. AV temporary wires and pleural and mediastinal chest tubes were placed and the wound sprayed with platelet rich plasma.  The perioperative LETICIA showed the mitral valve without MR, aortic valve without perivalvular leaks and the tricuspid valve competent without leaks.  I thought a moderate amount of inotropic support with good enhancement of the hemodynamics.  There was thrombocytopenia and elevated INR therefore I gave platelets and 4 units of fresh frozen plasma.  The sternum was closed with single and double wires and soft tissue in layers of reabsorbable material. The wounds were covered with sterile dressings.       Specimen removed: Aortic and tricuspid valve leaflets, sent to pathology.  The Gram stain was negative for bacteria but positive for leukocytes.    CPB time: 170 minutes    Aortic clamp time: 147 minutes    Complications:  none           Disposition: Cardiovascular recovery room           Condition: Critical but stable.

## 2024-06-14 NOTE — PROGRESS NOTES
Hospital Follow Up    LOS: 6  Patient Name: Марина Tilley  Age/Sex: 48 y.o. female  : 1975  MRN: [id number]    Day of Service: 24   Length of Stay: 6  Encounter Provider: Christopher Neves MD  Place of Service: [facility]  Patient Care Team:  Provider, No Known as PCP - General    Subjective:     Chief Complaint: Endocarditis status post valve surgery    Interval History: Patient does look better this morning.  She still having some discomfort from her chest tubes currently tolerable.    Objective:     Objective:  Temp:  [98.6 °F (37 °C)-98.9 °F (37.2 °C)] 98.7 °F (37.1 °C)  Heart Rate:  [80-81] 81  Resp:  [18] 18  BP: (119-127)/(70-84) 127/70     Intake/Output Summary (Last 24 hours) at 2024 0935  Last data filed at 2024 0400  Gross per 24 hour   Intake 1340 ml   Output 5840 ml   Net -4500 ml     Body mass index is 33.35 kg/m².      24  0500 24  0557 24  0700   Weight: 90.3 kg (199 lb) 93.8 kg (206 lb 12.7 oz) 93.7 kg (206 lb 9.6 oz)     Weight change: -0.087 kg (-3.1 oz)      Physical Exam:   General :  Alert, cooperative, in no acute distress.  Neuro:   Alert,cooperative and oriented.  Lungs:  CTAB. Normal respiratory effort and rate.  CV:  Regular rate and rhythm, normal S1 and S2, no murmurs, gallops or rubs.   ABD:  Soft, nontender, nondistended. Positive bowel sounds.  Extr:  No edema or cyanosis, moves all extremities.    Lab Review:   Results from last 7 days   Lab Units 24  0551 24  0257 24  0432 24  1709   SODIUM mmol/L 135* 143   < > 132*   POTASSIUM mmol/L 3.6 4.2   < > 4.5   CHLORIDE mmol/L 96* 107   < > 98   CO2 mmol/L 28.2 27.9   < > 21.5*   BUN mg/dL 16 20   < > 20   CREATININE mg/dL 0.98 1.09*   < > 1.51*   GLUCOSE mg/dL 125* 120*   < > 140*   CALCIUM mg/dL 8.8 9.2   < > 8.5*   AST (SGOT) U/L  --   --   --  234*   ALT (SGPT) U/L  --   --   --  209*    < > = values in this interval not  displayed.         Results from last 7 days   Lab Units 06/14/24  0551 06/13/24  0257   WBC 10*3/mm3 13.38* 13.04*   HEMOGLOBIN g/dL 8.9* 8.5*   HEMATOCRIT % 27.8* 26.1*   PLATELETS 10*3/mm3 134* 126*     Results from last 7 days   Lab Units 06/13/24  0257 06/12/24  1333 06/12/24  1146 06/12/24  1143   INR  1.14* 1.38*   < > 1.82*   APTT seconds  --  29.8  --  28.7    < > = values in this interval not displayed.     Results from last 7 days   Lab Units 06/12/24  1736 06/12/24  1333   MAGNESIUM mg/dL 1.9 1.6     Results from last 7 days   Lab Units 06/10/24  1828   CHOLESTEROL mg/dL 97   TRIGLYCERIDES mg/dL 163*   HDL CHOL mg/dL 21*     Results from last 7 days   Lab Units 06/09/24  0432   PROBNP pg/mL 16,030.0*           Current Medications:   Scheduled Meds:aspirin, 81 mg, Oral, Daily  chlorhexidine, 15 mL, Mouth/Throat, Q12H  gabapentin, 400 mg, Oral, Q8H  guaiFENesin, 1,200 mg, Oral, Q12H  insulin lispro, 2-7 Units, Subcutaneous, 4x Daily AC & at Bedtime  metoprolol tartrate, 12.5 mg, Oral, Q12H  mupirocin, , Each Nare, BID  nicotine, 1 patch, Transdermal, Q24H  pantoprazole, 40 mg, Oral, QAM  potassium chloride ER, 40 mEq, Oral, Q4H  senna-docusate sodium, 2 tablet, Oral, Nightly  sertraline, 25 mg, Oral, Daily  vancomycin, 1,500 mg, Intravenous, Q24H      Continuous Infusions:milrinone, 0.125 mcg/kg/min, Last Rate: 0.125 mcg/kg/min (06/14/24 0921)  Pharmacy to dose vancomycin,         Allergies:  Allergies   Allergen Reactions    Cephalexin Anaphylaxis     Required intubation    Latex Itching       Assessment:       Acute bacterial endocarditis        Plan:   1.  Bacterial endocarditis.  All teeth have been removed  2.  Patient is status post surgery postop day 1.  She had an AVR, TVR, and then mitral valve repair.  3.  Smoking abuse patient has a patch on.  4.  History of polysubstance abuse.  5.  Bipolar type II.  6.  Acute kidney injury.  Creatinine has normalized.  7.  Continue supportive care.         Christopher Neves MD  06/14/24  09:35 EDT

## 2024-06-14 NOTE — PROGRESS NOTES
[facility] Progress Note      Patient Name: Марина Tilley  : 1975  MRN: [id number]  Primary Care Physician:  Provider, No Known  Date of admission: 2024    Subjective     Interval History:   F/u CATRINA    Review of Systems:   I/o 1.6/6.7 (UOP 5.3)  CXR today: no change in vascular john and small bilat pleural effusions  Denies dyspnea, on 1L O2  On milrinone drip    Objective     Vitals:   Temp:  [98.6 °F (37 °C)-98.9 °F (37.2 °C)] 98.7 °F (37.1 °C)  Heart Rate:  [80-81] 80  Resp:  [18] 18  BP: (119-127)/(70-92) 123/75  Flow (L/min):  [1] 1    Intake/Output Summary (Last 24 hours) at 2024 0851  Last data filed at 2024 0400  Gross per 24 hour   Intake 1340 ml   Output 6540 ml   Net -5200 ml       Physical Exam:    General Appearance: in chair, comfortable alert on NC O2  Neck: supple, no JVD  Lungs: Dec BS bilat + chest tubes  Heart: RRR, normal S1 and S2  Abdomen: soft, nontender, nondistended  Extremities: 1+ ankle edema, no cyanosis or clubbing  Neuro: normal speech and mental status     Scheduled Meds:     aspirin, 81 mg, Oral, Daily  chlorhexidine, 15 mL, Mouth/Throat, Q12H  gabapentin, 300 mg, Oral, Q8H  guaiFENesin, 1,200 mg, Oral, Q12H  insulin lispro, 2-7 Units, Subcutaneous, 4x Daily AC & at Bedtime  metoprolol tartrate, 12.5 mg, Oral, Q12H  mupirocin, , Each Nare, BID  pantoprazole, 40 mg, Oral, QAM  senna-docusate sodium, 2 tablet, Oral, Nightly  sertraline, 25 mg, Oral, Daily  vancomycin, 1,500 mg, Intravenous, Q24H      IV Meds:   milrinone, 0.25-0.375 mcg/kg/min, Last Rate: 22.667 mcg/min (24 0335)  Pharmacy to dose vancomycin,         Results Reviewed:   I have personally reviewed the results from the time of this admission to 2024 08:51 EDT     Results from last 7 days   Lab Units 24  0551 24  0257 24  1736 24  0432 24  1709   SODIUM mmol/L 135* 143 139   < > 132*   POTASSIUM mmol/L 3.6 4.2 4.0   < > 4.5    CHLORIDE mmol/L 96* 107 103   < > 98   CO2 mmol/L 28.2 27.9 26.7   < > 21.5*   BUN mg/dL 16 20 23*   < > 20   CREATININE mg/dL 0.98 1.09* 1.27*   < > 1.51*   CALCIUM mg/dL 8.8 9.2 9.3   < > 8.5*   BILIRUBIN mg/dL  --   --   --   --  0.4   ALK PHOS U/L  --   --   --   --  235*   ALT (SGPT) U/L  --   --   --   --  209*   AST (SGOT) U/L  --   --   --   --  234*   GLUCOSE mg/dL 125* 120* 129*   < > 140*    < > = values in this interval not displayed.     Estimated Creatinine Clearance: 82.8 mL/min (by C-G formula based on SCr of 0.98 mg/dL).  Results from last 7 days   Lab Units 06/14/24  0551 06/13/24  0257 06/12/24 1736 06/12/24  1333 06/12/24  0509   MAGNESIUM mg/dL  --   --  1.9 1.6 1.8   PHOSPHORUS mg/dL 3.4 4.3 4.0 4.2 4.2         Results from last 7 days   Lab Units 06/14/24  0551 06/13/24 0257 06/12/24  2334 06/12/24 1736 06/12/24  1333 06/12/24  1144 06/12/24  1143   WBC 10*3/mm3 13.38* 13.04*  --  15.34* 21.31*  --  27.88*   HEMOGLOBIN g/dL 8.9* 8.5* 8.6* 8.0* 7.7*  --  7.2*   HEMOGLOBIN, POC   --   --   --   --   --    < >  --    PLATELETS 10*3/mm3 134* 126*  --  127* 154  --  151    < > = values in this interval not displayed.     Results from last 7 days   Lab Units 06/13/24 0257 06/12/24  1333 06/12/24  1146 06/12/24  1143 06/10/24  1828   INR  1.14* 1.38* 1.8* 1.82* 1.23*       Assessment / Plan     ASSESSMENT:  Non olig CATRINA - resolving, Cr down to 0.98 (peak 1.7).  Differential includes prerenal azotemia due acute systolic heart failure, or infectious GN in relation to endocarditis (UA: trace protein + moderate blood).  Volume excess by exam.  Diuresis bit excessive, UOP ~ 5L/24h.  CXR with vasc congestion. K 3.6.  Bicarb stable  Acute systolic heart failure .  Dilated cardiomyopathy.  Valvular heart disease with moderate to severe MR, TR. Severe aortic valve regurgitation.  Now status post mitral valve repair, tricuspid valve and aortic valve replacements.  On milrinone per CTS  MRSA TV  endocarditis + septicemia, septic pulm emboli, splenic infarct.  On IV vancomycin.  Status post full mouth extraction next 6 /9/24.  Status post aortic and tricuspid valve replacements and mitral valve repair 6/12/2024.  Dilated cardiomyopathy, EF 41 to 45% by LETICIA, with mod to severe MR/TR. severe AI.  Right molar abscess .  Status post full mouth extraction 6/9/2024  Mood disorder, seen by psychiatry .  IV polydrug abuse  Anemia of ABL - transfused 6/12.  Hgb up 8.9  Mild thrombocytopenia.    PLAN:  Continue bumex at lower dose 2mg IV BID x2 today (already received AM dose)  Replace K per protocol       Jaron Miller MD  06/14/24  08:51 EDT    [facility]  [phone]

## 2024-06-14 NOTE — PROGRESS NOTES
[facility] Clinical Pharmacy Services: Vancomycin Monitoring Note    Марина Tilley is a 48 y.o. female who is on day 11/42 of pharmacy (continuation of therapy from [facility] to dose vancomycin for MRSA septicemia and MRSA tricuspid valve endocarditis.    ID Recommendations: Patient is going to need 6 weeks of antibiotic therapy (end date 7/24/24)  after valvular replacement for endocarditis. Stress abstinence from IV drug abuse.     Updated Cultures and Sensitivities:   5/9: BCx-MRSA (Guido)  5/11: BCx-MRSA (Guido)  6/2: BCx x2 sets NGF  6/4: LETICIA-large mobile mass on tricuspid valve consistent with vegetation  6/12 Tissue Cx x2 sets NGTD     Results from last 7 days   Lab Units 06/14/24  0551 06/10/24  0800 06/08/24  1141   VANCOMYCIN RM mcg/mL  --   --  19.70   VANCOMYCIN TR mcg/mL 19.40 22.30*  --      Vitals/Labs  Ht:  ; Wt: 93.7 kg (206 lb 9.6 oz)   Temp Readings from Last 1 Encounters:   06/14/24 98.7 °F (37.1 °C) (Oral)     Estimated Creatinine Clearance: 82.8 mL/min (by C-G formula based on SCr of 0.98 mg/dL).     Results from last 7 days   Lab Units 06/14/24  0551 06/13/24  0257 06/12/24  1736   CREATININE mg/dL 0.98 1.09* 1.27*   WBC 10*3/mm3 13.38* 13.04* 15.34*     Assessment/Plan    Current Vancomycin Dose: 1500 mg IV Q24H; provides a predicted  mg/L.hr  SCr continues to trend down today, however, AUC is still within therapeutic range of 400-600 mg/L.hr.   Next Level Date and Time: Vanc Trough is scheduled for 6/17 at 0730.  SCr ordered daily with AM labs.       Thank you for involving pharmacy in this patient's care. Please contact pharmacy with any questions or concerns.       Carmelina Fajardo RPH  06/14/24 08:38 EDT

## 2024-06-14 NOTE — CASE MANAGEMENT/SOCIAL WORK
Continued Stay Note  [facility]     Patient Name: Марина Tilley  MRN: [id number]  Today's Date: 6/14/2024    Admit Date: 6/8/2024    Plan: [facility] precert good through 6/19/24.  Will need PICC.  Will need transport set-up by [facility] (cannot go by private car).   Discharge Plan       Row Name 06/14/24 1254       Plan    Plan [facility] precert good through 6/19/24.  Will need PICC.  Will need transport set-up by [facility] (cannot go by private car).    Plan Comments [facility] spoke with Pt at bedside and she advised she still wants to go to [facility] at discharge.  [facility] called and spoke with Es/[facility] ([phone]) at 12:50 PM and she advised Pt will have a bed and her precert was already approved and is good through Wednesday 6/19/24.  Per Es, Pt will need a PICC.  Pt will also have to arrive by transport that is arranged by the hospital (w/c van or stretcher), due to hx drug abuse.  Rehab pharmacy added in [place] ([facility] in [place]).  Packet on [facility] desk.  CCP following...........Barb NICE/NILSA                   Discharge Codes    No documentation.                 Expected Discharge Date and Time       Expected Discharge Date Expected Discharge Time    Jun 19, 2024               Barb Celeste RN

## 2024-06-14 NOTE — THERAPY TREATMENT NOTE
Patient Name: Марина Tilley  : 1975    MRN: [id number]                              Today's Date: 2024       Admit Date: 2024    Visit Dx:     ICD-10-CM ICD-9-CM   1. Acute bacterial endocarditis  I33.0 421.0     Patient Active Problem List   Diagnosis    Endocarditis    Acute bacterial endocarditis     Past Medical History:   Diagnosis Date    Anxiety     Asthma     CHF (congestive heart failure)     COPD (chronic obstructive pulmonary disease)      Past Surgical History:   Procedure Laterality Date    AORTIC VALVE REPAIR/REPLACEMENT MITRAL VALVE REPAIR/REPLACEMENT N/A 2024    Procedure: LETICIA; STERNOTOMY; AORTIC VALVE REPLACEMENT; MITRAL VALVE REPAIR; TRICUSPID VALVE REPLACEMENT; PRP;  Surgeon: Eric Skaggs MD;  Location: [facility] CVOR;  Service: Cardiothoracic;  Laterality: N/A;    CARDIAC CATHETERIZATION N/A 6/10/2024    Procedure: Left Heart Cath;  Surgeon: Gary Barton MD;  Location: [facility] CATH INVASIVE LOCATION;  Service: Cardiology;  Laterality: N/A;    CHOLECYSTECTOMY      COLONOSCOPY      ENDOSCOPY      HERNIA REPAIR      HYSTERECTOMY      TEETH EXTRACTION N/A 2024    Procedure: TOOTH EXTRACTION;  Surgeon: Sergio Zaidi DMD;  Location: [facility] OR;  Service: Oral Surgery;  Laterality: N/A;      General Information       Row Name 24 1207          Physical Therapy Time and Intention    Document Type therapy note (daily note)  -     Mode of Treatment physical therapy  -               User Key  (r) = Recorded By, (t) = Taken By, (c) = Cosigned By      Initials Name Provider Type     Wanda Salazar, PT Physical Therapist                   Mobility       Row Name 24 1207          Bed Mobility    Sit-Supine Saline (Bed Mobility) standby assist  -     Comment, (Bed Mobility) up in chair  -       Row Name 24 1207          Sit-Stand Transfer    Sit-Stand Saline (Transfers) contact guard  -     Assistive Device (Sit-Stand  Transfers) --  no AD  -KH       Row Name 06/14/24 1207          Gait/Stairs (Locomotion)    [place] Level (Gait) contact guard  -     Assistive Device (Gait) --  HHA initially, but progressed to no UE support  -     Distance in Feet (Gait) 75  -KH     Deviations/Abnormal Patterns (Gait) antalgic;gait speed decreased  -               User Key  (r) = Recorded By, (t) = Taken By, (c) = Cosigned By      Initials Name Provider Type    Wanda Chambers PT Physical Therapist                   Obj/Interventions       Row Name 06/14/24 1208          Motor Skills    Therapeutic Exercise --  cardiac protocol x 5 reps  -               User Key  (r) = Recorded By, (t) = Taken By, (c) = Cosigned By      Initials Name Provider Type    Wanda Chambers, RONAK Physical Therapist                   Goals/Plan    No documentation.                  Clinical Impression       Row Name 06/14/24 1208          Pain    Pretreatment Pain Rating 3/10  -     Posttreatment Pain Rating 4/10  -     Pain Location incisional  -     Pain Location - chest  -     Pain Intervention(s) Repositioned;Rest  -KH       Row Name 06/14/24 1208          Plan of Care Review    Plan of Care Reviewed With patient  -     Outcome Evaluation Pt was up in the chair when PT arrived. Complains of pain and fatigue, but agreeable to therapy. Toelrated increased distance with less assistance. Fatigued quickly and required a standing rest break. Returned to bed at end of session.  -[facility] Name 06/14/24 1208          Vital Signs    O2 Delivery Pre Treatment supplemental O2  -     O2 Delivery Intra Treatment supplemental O2  -     O2 Delivery Post Treatment supplemental O2  -KH       Row Name 06/14/24 1208          Positioning and Restraints    Pre-Treatment Position sitting in chair/recliner  -     Post Treatment Position bed  -     In Bed fowlers;call light within reach;encouraged to call for assist;exit alarm on;notified  nsg  -               User Key  (r) = Recorded By, (t) = Taken By, (c) = Cosigned By      Initials Name Provider Type    Wanda Chambers, PT Physical Therapist                   Outcome Measures       Row Name 06/14/24 1211          How much help from another person do you currently need...    Turning from your back to your side while in flat bed without using bedrails? 3  -KH     Moving from lying on back to sitting on the side of a flat bed without bedrails? 3  -KH     Moving to and from a bed to a chair (including a wheelchair)? 3  -KH     Standing up from a chair using your arms (e.g., wheelchair, bedside chair)? 3  -KH     Climbing 3-5 steps with a railing? 2  -KH     To walk in hospital room? 3  -KH     AM-PAC 6 Clicks Score (PT) 17  -KH     Highest Level of Mobility Goal 5 --> Static standing  -       Row Name 06/14/24 1211          Functional Assessment    Outcome Measure Options AM-PAC 6 Clicks Basic Mobility (PT)  -               User Key  (r) = Recorded By, (t) = Taken By, (c) = Cosigned By      Initials Name Provider Type    Wanda Chambers, PT Physical Therapist                                 Physical Therapy Education       Title: PT OT SLP Therapies (Not Started)       Topic: Physical Therapy (Not Started)       Point: Mobility training (Not Started)       Learner Progress:  Not documented in this visit.              Point: Home exercise program (Not Started)       Learner Progress:  Not documented in this visit.              Point: Body mechanics (Not Started)       Learner Progress:  Not documented in this visit.              Point: Precautions (Not Started)       Learner Progress:  Not documented in this visit.                                  PT Recommendation and Plan     Plan of Care Reviewed With: patient  Outcome Evaluation: Pt was up in the chair when PT arrived. Complains of pain and fatigue, but agreeable to therapy. Toelrated increased distance with less  assistance. Fatigued quickly and required a standing rest break. Returned to bed at end of session.     Time Calculation:         PT Charges       Row Name 06/14/24 1211             Time Calculation    Start Time 0955  -      Stop Time 1020  -KH      Time Calculation (min) 25 min  -KH      PT Received On 06/14/24  -      PT - Next Appointment 06/15/24  -         Time Calculation- PT    Total Timed Code Minutes- PT 25 minute(s)  -KH         Timed Charges    31856 - PT Therapeutic Exercise Minutes 12  -KH      32012 - PT Therapeutic Activity Minutes 13  -KH         Total Minutes    Timed Charges Total Minutes 25  -KH       Total Minutes 25  -KH                User Key  (r) = Recorded By, (t) = Taken By, (c) = Cosigned By      Initials Name Provider Type    Wanda Chambers, PT Physical Therapist                  Therapy Charges for Today       Code Description Service Date Service Provider Modifiers Qty    85576042445 HC PT THERAPEUTIC ACT EA 15 MIN 6/13/2024 Wanda Salazar, PT GP 1    19540481384 HC PT EVAL MOD COMPLEXITY 3 6/13/2024 Wanda Salazar, PT GP 1    56250087489 HC PT THER PROC EA 15 MIN 6/14/2024 Wanda Salazar, PT GP 1    94919535273 HC PT THERAPEUTIC ACT EA 15 MIN 6/14/2024 Wanda Salazar, PT GP 1            PT G-Codes  Outcome Measure Options: AM-PAC 6 Clicks Basic Mobility (PT)  AM-PAC 6 Clicks Score (PT): 17  PT Discharge Summary  Anticipated Discharge Disposition (PT): inpatient rehabilitation facility, skilled nursing facility    Wanda Salazar PT  6/14/2024

## 2024-06-14 NOTE — PROGRESS NOTES
LOS: 6 days   Patient Care Team:  Provider, No Known as PCP - General    Chief Complaint: post op    Subjective:  Symptoms:  She reports chest pain.  No shortness of breath.    Diet:  No nausea or vomiting.    Activity level: Impaired due to pain.          Vital Signs  Temp:  [98.6 °F (37 °C)-98.9 °F (37.2 °C)] 98.7 °F (37.1 °C)  Heart Rate:  [80-81] 80  Resp:  [18] 18  BP: (109-127)/(70-92) 123/75  Arterial Line BP: (136)/(80) 136/80  Body mass index is 33.35 kg/m².    Intake/Output Summary (Last 24 hours) at 6/14/2024 0741  Last data filed at 6/14/2024 0400  Gross per 24 hour   Intake 1580 ml   Output 6740 ml   Net -5160 ml     No intake/output data recorded.    Chest tube drainage last 8 hours 60/25        06/12/24  0500 06/13/24  0557 06/14/24  0700   Weight: 90.3 kg (199 lb) 93.8 kg (206 lb 12.7 oz) 93.7 kg (206 lb 9.6 oz)         Objective:  Vital signs: (most recent): Blood pressure 123/75, pulse 80, temperature 98.7 °F (37.1 °C), temperature source Oral, resp. rate 18, weight 93.7 kg (206 lb 9.6 oz), SpO2 93%.                Results Review:        WBC WBC   Date Value Ref Range Status   06/14/2024 13.38 (H) 3.40 - 10.80 10*3/mm3 Final   06/13/2024 13.04 (H) 3.40 - 10.80 10*3/mm3 Final   06/12/2024 15.34 (H) 3.40 - 10.80 10*3/mm3 Final   06/12/2024 21.31 (H) 3.40 - 10.80 10*3/mm3 Final   06/12/2024 27.88 (H) 3.40 - 10.80 10*3/mm3 Final   06/12/2024 8.44 3.40 - 10.80 10*3/mm3 Final      HGB Hemoglobin   Date Value Ref Range Status   06/14/2024 8.9 (L) 12.0 - 15.9 g/dL Final   06/13/2024 8.5 (L) 12.0 - 15.9 g/dL Final   06/12/2024 8.6 (L) 12.0 - 15.9 g/dL Final   06/12/2024 8.0 (L) 12.0 - 15.9 g/dL Final   06/12/2024 7.7 (L) 12.0 - 15.9 g/dL Final   06/12/2024 8.5 (L) 12.0 - 17.0 g/dL Final   06/12/2024 7.2 (L) 12.0 - 15.9 g/dL Final   06/12/2024 8.8 (L) 12.0 - 17.0 g/dL Final   06/12/2024 8.8 (L) 12.0 - 17.0 g/dL Final   06/12/2024 8.5 (L) 12.0 - 17.0 g/dL Final   06/12/2024 7.8 (C) 12.0 - 17.0 g/dL Final    06/12/2024 7.8 (C) 12.0 - 17.0 g/dL Final   06/12/2024 7.5 (C) 12.0 - 17.0 g/dL Final   06/12/2024 10.2 (L) 12.0 - 17.0 g/dL Final   06/12/2024 9.3 (L) 12.0 - 15.9 g/dL Final      HCT Hematocrit   Date Value Ref Range Status   06/14/2024 27.8 (L) 34.0 - 46.6 % Final   06/13/2024 26.1 (L) 34.0 - 46.6 % Final   06/12/2024 25.7 (L) 34.0 - 46.6 % Final   06/12/2024 23.9 (L) 34.0 - 46.6 % Final   06/12/2024 24.5 (L) 34.0 - 46.6 % Final   06/12/2024 25 (L) 38 - 51 % Final   06/12/2024 22.4 (L) 34.0 - 46.6 % Final   06/12/2024 26 (L) 38 - 51 % Final   06/12/2024 26 (L) 38 - 51 % Final   06/12/2024 25 (L) 38 - 51 % Final   06/12/2024 23 (L) 38 - 51 % Final   06/12/2024 23 (L) 38 - 51 % Final   06/12/2024 22 (L) 38 - 51 % Final   06/12/2024 30 (L) 38 - 51 % Final   06/12/2024 28.6 (L) 34.0 - 46.6 % Final      Platelets Platelets   Date Value Ref Range Status   06/14/2024 134 (L) 140 - 450 10*3/mm3 Final   06/13/2024 126 (L) 140 - 450 10*3/mm3 Final   06/12/2024 127 (L) 140 - 450 10*3/mm3 Final   06/12/2024 154 140 - 450 10*3/mm3 Final   06/12/2024 151 140 - 450 10*3/mm3 Final   06/12/2024 184 140 - 450 10*3/mm3 Final   06/12/2024 231 140 - 450 10*3/mm3 Final        PT/INR:    Protime   Date Value Ref Range Status   06/13/2024 14.8 (H) 11.7 - 14.2 Seconds Final   06/12/2024 17.2 (H) 11.7 - 14.2 Seconds Final   06/12/2024 21.0 (H) 12.8 - 15.2 seconds Final     Comment:     Serial Number: [id number]:  [id number]   06/12/2024 21.3 (H) 11.7 - 14.2 Seconds Final   /  INR   Date Value Ref Range Status   06/13/2024 1.14 (H) 0.90 - 1.10 Final   06/12/2024 1.38 (H) 0.90 - 1.10 Final   06/12/2024 1.8 (H) 0.8 - 1.2 Final   06/12/2024 1.82 (H) 0.90 - 1.10 Final       Sodium Sodium   Date Value Ref Range Status   06/14/2024 135 (L) 136 - 145 mmol/L Final   06/13/2024 143 136 - 145 mmol/L Final   06/12/2024 139 136 - 145 mmol/L Final   06/12/2024 141 136 - 145 mmol/L Final   06/12/2024 137 136 - 145 mmol/L Final      Potassium Potassium    Date Value Ref Range Status   06/14/2024 3.6 3.5 - 5.2 mmol/L Final   06/13/2024 4.2 3.5 - 5.2 mmol/L Final   06/12/2024 4.0 3.5 - 5.2 mmol/L Final   06/12/2024 4.0 3.5 - 5.2 mmol/L Final   06/12/2024 3.8 3.5 - 5.2 mmol/L Final      Chloride Chloride   Date Value Ref Range Status   06/14/2024 96 (L) 98 - 107 mmol/L Final   06/13/2024 107 98 - 107 mmol/L Final   06/12/2024 103 98 - 107 mmol/L Final   06/12/2024 101 98 - 107 mmol/L Final   06/12/2024 99 98 - 107 mmol/L Final      Bicarbonate CO2   Date Value Ref Range Status   06/14/2024 28.2 22.0 - 29.0 mmol/L Final   06/13/2024 27.9 22.0 - 29.0 mmol/L Final   06/12/2024 26.7 22.0 - 29.0 mmol/L Final   06/12/2024 25.4 22.0 - 29.0 mmol/L Final   06/12/2024 28.9 22.0 - 29.0 mmol/L Final      BUN BUN   Date Value Ref Range Status   06/14/2024 16 6 - 20 mg/dL Final   06/13/2024 20 6 - 20 mg/dL Final   06/12/2024 23 (H) 6 - 20 mg/dL Final   06/12/2024 23 (H) 6 - 20 mg/dL Final   06/12/2024 25 (H) 6 - 20 mg/dL Final      Creatinine Creatinine   Date Value Ref Range Status   06/14/2024 0.98 0.57 - 1.00 mg/dL Final   06/13/2024 1.09 (H) 0.57 - 1.00 mg/dL Final   06/12/2024 1.27 (H) 0.57 - 1.00 mg/dL Final   06/12/2024 1.33 (H) 0.57 - 1.00 mg/dL Final   06/12/2024 1.24 (H) 0.57 - 1.00 mg/dL Final      Calcium Calcium   Date Value Ref Range Status   06/14/2024 8.8 8.6 - 10.5 mg/dL Final   06/13/2024 9.2 8.6 - 10.5 mg/dL Final   06/12/2024 9.3 8.6 - 10.5 mg/dL Final   06/12/2024 8.5 (L) 8.6 - 10.5 mg/dL Final   06/12/2024 8.4 (L) 8.6 - 10.5 mg/dL Final      Magnesium Magnesium   Date Value Ref Range Status   06/12/2024 1.9 1.6 - 2.6 mg/dL Final   06/12/2024 1.6 1.6 - 2.6 mg/dL Final   06/12/2024 1.8 1.6 - 2.6 mg/dL Final          aspirin, 81 mg, Oral, Daily  chlorhexidine, 15 mL, Mouth/Throat, Q12H  gabapentin, 300 mg, Oral, Q8H  guaiFENesin, 1,200 mg, Oral, Q12H  insulin lispro, 2-7 Units, Subcutaneous, 4x Daily AC & at Bedtime  metoprolol tartrate, 12.5 mg, Oral,  Q12H  mupirocin, , Each Nare, BID  pantoprazole, 40 mg, Oral, QAM  senna-docusate sodium, 2 tablet, Oral, Nightly  sertraline, 25 mg, Oral, Daily  vancomycin, 1,500 mg, Intravenous, Q24H      milrinone, 0.25-0.375 mcg/kg/min, Last Rate: 22.667 mcg/min (06/14/24 0335)  Pharmacy to dose vancomycin,               Acute bacterial endocarditis      Assessment & Plan    - Native tricuspid valve endocarditis, EF 50-55% (echo)--s/p AVR (tissue), TVR (tissue), MV repair- (no op report at this time) [place]  - MRSA bacteremia/ UTI--ID following, on vanc  - Severe AI/ moderate-severe MR  - Acute HFrEF, r/t VHD, NYHA class III--proBNP > 33k  - Dilated CMO  - Polysubstance abuse--IVDA/ tobacco abuse  - PTSD--psych following  - Bipolar disorder, type 2  - CATRINA, likely r/t acute HF  - Anemia  - Tobacco abuse, 91 pack yr hx  - Poor dentition, likely periapical abscess right molar--s/p dental extraction 6/9  - Prolonged QTc--500 ms (6/3 EKG)  - Medical non-compliance     POD#2  Looks good this morning--Up in the chair  1L NC-- wean as able  Sinus rhythm rate 80s  Milrinone 0.25--will wean to 0.125-- and probable discontinue later this afternoon  Cultures from the OR NGTD-- continue antibiotics per ID  D/w Dr. Skaggs, all narcotics to be avoided-- will increase gabapentin, okay for a few doses of toradol and ultram  Encourage pulmonary toilet-- continue IS/flutter and mucinex   Mobilize  Discontinue chest tubes  Will leave central line and AV wires  Continue routine care             MARY Montano  06/14/24  07:41 EDT

## 2024-06-15 ENCOUNTER — APPOINTMENT (OUTPATIENT)
Dept: GENERAL RADIOLOGY | Facility: HOSPITAL | Age: 49
DRG: 216 | End: 2024-06-15
Payer: MEDICAID

## 2024-06-15 LAB
ALBUMIN SERPL-MCNC: 3.3 G/DL (ref 3.5–5.2)
ANION GAP SERPL CALCULATED.3IONS-SCNC: 8 MMOL/L (ref 5–15)
BACTERIA SPEC AEROBE CULT: NORMAL
BACTERIA SPEC AEROBE CULT: NORMAL
BUN SERPL-MCNC: 22 MG/DL (ref 6–20)
BUN/CREAT SERPL: 20.8 (ref 7–25)
CALCIUM SPEC-SCNC: 8.3 MG/DL (ref 8.6–10.5)
CHLORIDE SERPL-SCNC: 98 MMOL/L (ref 98–107)
CO2 SERPL-SCNC: 30 MMOL/L (ref 22–29)
CREAT SERPL-MCNC: 1.06 MG/DL (ref 0.57–1)
DEPRECATED RDW RBC AUTO: 56 FL (ref 37–54)
EGFRCR SERPLBLD CKD-EPI 2021: 65.7 ML/MIN/1.73
ERYTHROCYTE [DISTWIDTH] IN BLOOD BY AUTOMATED COUNT: 17.8 % (ref 12.3–15.4)
GLUCOSE SERPL-MCNC: 133 MG/DL (ref 65–99)
GRAM STN SPEC: NORMAL
HCT VFR BLD AUTO: 27.7 % (ref 34–46.6)
HGB BLD-MCNC: 8.8 G/DL (ref 12–15.9)
MCH RBC QN AUTO: 28.1 PG (ref 26.6–33)
MCHC RBC AUTO-ENTMCNC: 31.8 G/DL (ref 31.5–35.7)
MCV RBC AUTO: 88.5 FL (ref 79–97)
PHOSPHATE SERPL-MCNC: 2.8 MG/DL (ref 2.5–4.5)
PLATELET # BLD AUTO: 122 10*3/MM3 (ref 140–450)
PMV BLD AUTO: 10.1 FL (ref 6–12)
POTASSIUM SERPL-SCNC: 4.1 MMOL/L (ref 3.5–5.2)
RBC # BLD AUTO: 3.13 10*6/MM3 (ref 3.77–5.28)
SODIUM SERPL-SCNC: 136 MMOL/L (ref 136–145)
WBC NRBC COR # BLD AUTO: 9.02 10*3/MM3 (ref 3.4–10.8)

## 2024-06-15 PROCEDURE — 85027 COMPLETE CBC AUTOMATED: CPT | Performed by: NURSE PRACTITIONER

## 2024-06-15 PROCEDURE — 25810000003 SODIUM CHLORIDE 0.9 % SOLUTION: Performed by: INTERNAL MEDICINE

## 2024-06-15 PROCEDURE — 71046 X-RAY EXAM CHEST 2 VIEWS: CPT

## 2024-06-15 PROCEDURE — 94799 UNLISTED PULMONARY SVC/PX: CPT

## 2024-06-15 PROCEDURE — 25010000002 MORPHINE PER 10 MG: Performed by: NURSE PRACTITIONER

## 2024-06-15 PROCEDURE — 80069 RENAL FUNCTION PANEL: CPT | Performed by: INTERNAL MEDICINE

## 2024-06-15 PROCEDURE — 25010000002 VANCOMYCIN 10 G RECONSTITUTED SOLUTION: Performed by: INTERNAL MEDICINE

## 2024-06-15 PROCEDURE — 99232 SBSQ HOSP IP/OBS MODERATE 35: CPT | Performed by: INTERNAL MEDICINE

## 2024-06-15 RX ORDER — HYDROXYZINE PAMOATE 50 MG/1
50 CAPSULE ORAL 4 TIMES DAILY PRN
Status: DISCONTINUED | OUTPATIENT
Start: 2024-06-15 | End: 2024-06-17 | Stop reason: HOSPADM

## 2024-06-15 RX ORDER — BUSPIRONE HYDROCHLORIDE 5 MG/1
5 TABLET ORAL 3 TIMES DAILY
Status: DISCONTINUED | OUTPATIENT
Start: 2024-06-15 | End: 2024-06-17 | Stop reason: HOSPADM

## 2024-06-15 RX ORDER — FUROSEMIDE 40 MG/1
40 TABLET ORAL
Status: DISCONTINUED | OUTPATIENT
Start: 2024-06-15 | End: 2024-06-16

## 2024-06-15 RX ADMIN — TRAMADOL HYDROCHLORIDE 50 MG: 50 TABLET ORAL at 00:00

## 2024-06-15 RX ADMIN — GABAPENTIN 400 MG: 400 CAPSULE ORAL at 14:03

## 2024-06-15 RX ADMIN — CYCLOBENZAPRINE 10 MG: 10 TABLET, FILM COATED ORAL at 11:32

## 2024-06-15 RX ADMIN — GABAPENTIN 400 MG: 400 CAPSULE ORAL at 06:11

## 2024-06-15 RX ADMIN — GABAPENTIN 400 MG: 400 CAPSULE ORAL at 20:11

## 2024-06-15 RX ADMIN — PANTOPRAZOLE SODIUM 40 MG: 40 TABLET, DELAYED RELEASE ORAL at 06:11

## 2024-06-15 RX ADMIN — GUAIFENESIN 1200 MG: 600 TABLET, EXTENDED RELEASE ORAL at 08:13

## 2024-06-15 RX ADMIN — MORPHINE SULFATE 1 MG: 2 INJECTION, SOLUTION INTRAMUSCULAR; INTRAVENOUS at 15:25

## 2024-06-15 RX ADMIN — SENNOSIDES AND DOCUSATE SODIUM 2 TABLET: 50; 8.6 TABLET ORAL at 20:12

## 2024-06-15 RX ADMIN — MORPHINE SULFATE 1 MG: 2 INJECTION, SOLUTION INTRAMUSCULAR; INTRAVENOUS at 20:12

## 2024-06-15 RX ADMIN — METOPROLOL TARTRATE 25 MG: 25 TABLET, FILM COATED ORAL at 20:11

## 2024-06-15 RX ADMIN — GUAIFENESIN 1200 MG: 600 TABLET, EXTENDED RELEASE ORAL at 20:12

## 2024-06-15 RX ADMIN — TRAMADOL HYDROCHLORIDE 50 MG: 50 TABLET ORAL at 08:13

## 2024-06-15 RX ADMIN — FUROSEMIDE 40 MG: 40 TABLET ORAL at 11:27

## 2024-06-15 RX ADMIN — BUSPIRONE HYDROCHLORIDE 5 MG: 5 TABLET ORAL at 09:52

## 2024-06-15 RX ADMIN — ALPRAZOLAM 0.25 MG: 0.25 TABLET ORAL at 08:13

## 2024-06-15 RX ADMIN — IPRATROPIUM BROMIDE AND ALBUTEROL SULFATE 3 ML: .5; 3 SOLUTION RESPIRATORY (INHALATION) at 16:01

## 2024-06-15 RX ADMIN — BUSPIRONE HYDROCHLORIDE 5 MG: 5 TABLET ORAL at 20:11

## 2024-06-15 RX ADMIN — VANCOMYCIN HYDROCHLORIDE 1500 MG: 10 INJECTION, POWDER, LYOPHILIZED, FOR SOLUTION INTRAVENOUS at 08:15

## 2024-06-15 RX ADMIN — FUROSEMIDE 40 MG: 40 TABLET ORAL at 18:02

## 2024-06-15 RX ADMIN — SERTRALINE 25 MG: 25 TABLET, FILM COATED ORAL at 08:13

## 2024-06-15 RX ADMIN — ASPIRIN 81 MG: 81 TABLET, COATED ORAL at 08:13

## 2024-06-15 RX ADMIN — 0.12% CHLORHEXIDINE GLUCONATE 15 ML: 1.2 RINSE ORAL at 08:12

## 2024-06-15 RX ADMIN — NICOTINE 1 PATCH: 21 PATCH, EXTENDED RELEASE TRANSDERMAL at 08:19

## 2024-06-15 RX ADMIN — MUPIROCIN 1 APPLICATION: 20 OINTMENT TOPICAL at 08:13

## 2024-06-15 RX ADMIN — MUPIROCIN 1 APPLICATION: 20 OINTMENT TOPICAL at 20:12

## 2024-06-15 RX ADMIN — BUSPIRONE HYDROCHLORIDE 5 MG: 5 TABLET ORAL at 15:32

## 2024-06-15 RX ADMIN — TRAMADOL HYDROCHLORIDE 50 MG: 50 TABLET ORAL at 14:03

## 2024-06-15 NOTE — PROGRESS NOTES
[facility] Progress Note      Patient Name: Марина Tilley  : 1975  MRN: [id number]  Primary Care Physician:  Provider, No Known  Date of admission: 2024    Subjective     Interval History:   F/u CATRINA    Review of Systems:   UOP 3.4 L yesterday; negative fluid balance by 2.3 L  Breathing is comfortable on 2 L/min  Appetite is fine; no N/V    Objective     Vitals:   Temp:  [97.9 °F (36.6 °C)-99.5 °F (37.5 °C)] 98.3 °F (36.8 °C)  Heart Rate:  [80-81] 80  Resp:  [18] 18  BP: (113-144)/(73-92) 144/92  Flow (L/min):  [2] 2    Intake/Output Summary (Last 24 hours) at 6/15/2024 0956  Last data filed at 6/15/2024 0813  Gross per 24 hour   Intake 1340 ml   Output 3400 ml   Net -2060 ml       Physical Exam:    General: Lying in bed nearly flat; not dyspneic; chronically ill  Neck: supple, no JVD  Lungs: Crackles in bases; not labored  Heart: RRR, normal S1 and S2  Abdomen: soft, nontender, nondistended, BS +  Extremities: 1+ ankle edema, no cyanosis or clubbing  Neuro: normal speech and mental status     Scheduled Meds:     aspirin, 81 mg, Oral, Daily  busPIRone, 5 mg, Oral, TID  chlorhexidine, 15 mL, Mouth/Throat, Q12H  gabapentin, 400 mg, Oral, Q8H  guaiFENesin, 1,200 mg, Oral, Q12H  metoprolol tartrate, 25 mg, Oral, Q12H  mupirocin, , Each Nare, BID  nicotine, 1 patch, Transdermal, Q24H  pantoprazole, 40 mg, Oral, QAM  senna-docusate sodium, 2 tablet, Oral, Nightly  sertraline, 25 mg, Oral, Daily  vancomycin, 1,500 mg, Intravenous, Q24H      IV Meds:   Pharmacy to dose vancomycin,         Results Reviewed:   I have personally reviewed the results from the time of this admission to 6/15/2024 09:56 EDT     Results from last 7 days   Lab Units 06/15/24  0312 24  1813 24  0551 24  0257 24  0432 24  1709   SODIUM mmol/L 136  --  135* 143   < > 132*   POTASSIUM mmol/L 4.1 4.2 3.6 4.2   < > 4.5   CHLORIDE mmol/L 98  --  96* 107   < > 98   CO2 mmol/L 30.0*  --   28.2 27.9   < > 21.5*   BUN mg/dL 22*  --  16 20   < > 20   CREATININE mg/dL 1.06*  --  0.98 1.09*   < > 1.51*   CALCIUM mg/dL 8.3*  --  8.8 9.2   < > 8.5*   BILIRUBIN mg/dL  --   --   --   --   --  0.4   ALK PHOS U/L  --   --   --   --   --  235*   ALT (SGPT) U/L  --   --   --   --   --  209*   AST (SGOT) U/L  --   --   --   --   --  234*   GLUCOSE mg/dL 133*  --  125* 120*   < > 140*    < > = values in this interval not displayed.     Estimated Creatinine Clearance: 76.5 mL/min (A) (by C-G formula based on SCr of 1.06 mg/dL (H)).  Results from last 7 days   Lab Units 06/15/24  0312 06/14/24  0551 06/13/24  0257 06/12/24  1736 06/12/24  1333 06/12/24  0509   MAGNESIUM mg/dL  --   --   --  1.9 1.6 1.8   PHOSPHORUS mg/dL 2.8 3.4 4.3 4.0 4.2 4.2         Results from last 7 days   Lab Units 06/15/24  0312 06/14/24  0551 06/13/24  0257 06/12/24  2334 06/12/24  1736 06/12/24  1333   WBC 10*3/mm3 9.02 13.38* 13.04*  --  15.34* 21.31*   HEMOGLOBIN g/dL 8.8* 8.9* 8.5* 8.6* 8.0* 7.7*   PLATELETS 10*3/mm3 122* 134* 126*  --  127* 154     Results from last 7 days   Lab Units 06/13/24  0257 06/12/24  1333 06/12/24  1146 06/12/24  1143 06/10/24  1828   INR  1.14* 1.38* 1.8* 1.82* 1.23*       Assessment / Plan     ASSESSMENT:  Non olig CATRINA, stabilizing (peak SCR 1.7).  Differential includes prerenal azotemia due acute systolic heart failure, or infectious GN in relation to endocarditis (UA: trace protein + moderate blood).  Volume excess by exam.  Stable electrolytes.    Acute systolic heart failure.  Dilated cardiomyopathy.  Valvular heart disease with moderate to severe MR, TR. Severe aortic valve regurgitation.  Now status post mitral valve repair, tricuspid valve and aortic valve replacements  MRSA TV endocarditis + septicemia, septic pulm emboli, splenic infarct.  On IV vancomycin.  S/p full mouth extraction 6/9/24.  Status post aortic and tricuspid valve replacements and mitral valve repair 6/12/2024.  Dilated  cardiomyopathy, EF 41 to 45% by LETICIA, with mod to severe MR/TR. severe AI.  Right molar abscess.  Status post full mouth extraction 6/9/2024  Mood disorder, seen by psychiatry .  IV polydrug abuse  Anemia of ABL - transfused 6/12.  Hgb up 8.9  Mild thrombocytopenia.    PLAN:  1.  Begin oral furosemide 40 mg twice daily  2.  Surveillance labs      Clifton Covarrubias MD  06/15/24  09:56 EDT    [facility]  [phone]

## 2024-06-15 NOTE — NURSING NOTE
Second vape found in patient's bed when she got up to go to the bathroom.  Both are at the nurses station.

## 2024-06-15 NOTE — PLAN OF CARE
Goal Outcome Evaluation:  Plan of Care Reviewed With: patient        Progress: no change  Outcome Evaluation: A&O, medicated for pain per mar with relief, patient found to be using a vape in her room, vape removed, educated patient of possible hinderance of her recovery, another vape found in room, HM notified, both are at the nurses station, RIJ in place, EPM occasiounal apaced on the monitor, VSS, continue plan of care.

## 2024-06-15 NOTE — PROGRESS NOTES
Patient Name: Марина Tilley  :1975  48 y.o.      Patient Care Team:  Provider, No Known as PCP - General    Interval History:   Continues to recover.    Subjective:  Following for endocarditis    Objective   Vital Signs  Temp:  [97.3 °F (36.3 °C)-98.7 °F (37.1 °C)] 97.3 °F (36.3 °C)  Heart Rate:  [80-84] 84  Resp:  [18] 18  BP: (115-144)/(73-92) 125/76    Intake/Output Summary (Last 24 hours) at 6/15/2024 1242  Last data filed at 6/15/2024 1119  Gross per 24 hour   Intake 1340 ml   Output 3900 ml   Net -2560 ml     Flowsheet Rows      Flowsheet Row First Filed Value   Admission Height --   Admission Weight 92.6 kg (204 lb 1.6 oz) Documented at 2024 0600            Physical Exam:   General Appearance:    Alert, cooperative, in no acute distress   Lungs:     Clear to auscultation.  Normal respiratory effort and rate.      Heart:    Regular rhythm and normal rate, normal S1 and S2, no murmurs, gallops or rubs.     Chest Wall:  Healing well   Abdomen:     Soft, nontender, positive bowel sounds.     Extremities:   no cyanosis, clubbing or edema.  No marked joint deformities.  Adequate musculoskeletal strength.       Results Review:    Results from last 7 days   Lab Units 06/15/24  0312   SODIUM mmol/L 136   POTASSIUM mmol/L 4.1   CHLORIDE mmol/L 98   CO2 mmol/L 30.0*   BUN mg/dL 22*   CREATININE mg/dL 1.06*   GLUCOSE mg/dL 133*   CALCIUM mg/dL 8.3*         Results from last 7 days   Lab Units 06/15/24  0312   WBC 10*3/mm3 9.02   HEMOGLOBIN g/dL 8.8*   HEMATOCRIT % 27.7*   PLATELETS 10*3/mm3 122*     Results from last 7 days   Lab Units 24  0257 24  1333 24  1146 24  1143 06/10/24  1828   INR  1.14* 1.38* 1.8* 1.82* 1.23*   APTT seconds  --  29.8  --  28.7 24.5     Results from last 7 days   Lab Units 06/10/24  1828   CHOLESTEROL mg/dL 97     Results from last 7 days   Lab Units 24  1736   MAGNESIUM mg/dL 1.9     Results from last 7 days   Lab Units 06/10/24  1828   CHOLESTEROL  mg/dL 97   TRIGLYCERIDES mg/dL 163*   HDL CHOL mg/dL 21*   LDL CHOL mg/dL 48         Medication Review:   aspirin, 81 mg, Oral, Daily  busPIRone, 5 mg, Oral, TID  chlorhexidine, 15 mL, Mouth/Throat, Q12H  furosemide, 40 mg, Oral, BID  gabapentin, 400 mg, Oral, Q8H  guaiFENesin, 1,200 mg, Oral, Q12H  metoprolol tartrate, 25 mg, Oral, Q12H  mupirocin, , Each Nare, BID  nicotine, 1 patch, Transdermal, Q24H  pantoprazole, 40 mg, Oral, QAM  senna-docusate sodium, 2 tablet, Oral, Nightly  sertraline, 25 mg, Oral, Daily  vancomycin, 1,500 mg, Intravenous, Q24H         Pharmacy to dose vancomycin,         Assessment & Plan     Surgery was on June 12, 2024.    1.  Bacterial endocarditis.  Status post tooth extraction.  2.  Status post aortic valve replacement with a 23 mm magna pericardial prosthesis  3.  Status post mitral valve anoplasty with a 30 mm Medtronic flexible band.  4.  Tricuspid valve repair with partial reconstruction including a septal leaflet with a bovine pericardium and a 30 mm physio tricuspid ring followed by tricuspid valve replacement with a 31 mm Ramirez 2 porcine prosthesis.  5.  PFO/ASD closed.  6.  Left atrial appendage closure.  7.  History of polysubstance abuse.  8.  Tobacco abuse.    Continue postoperative care.      Betty Aguilar MD, [facility]  06/15/24  12:42 EDT

## 2024-06-15 NOTE — PROGRESS NOTES
LOS: 7 days   Patient Care Team:  Provider, No Known as PCP - General    Chief Complaint: post op    Subjective:  Symptoms:  She reports chest pain.  No shortness of breath.    Diet:  No nausea or vomiting.    Activity level: Impaired due to pain.          Vital Signs  Temp:  [97.9 °F (36.6 °C)-99.7 °F (37.6 °C)] 98.3 °F (36.8 °C)  Heart Rate:  [80-81] 80  Resp:  [18] 18  BP: (113-144)/(70-92) 144/92  Body mass index is 33.35 kg/m².    Intake/Output Summary (Last 24 hours) at 6/15/2024 0749  Last data filed at 6/15/2024 0534  Gross per 24 hour   Intake 1080 ml   Output 3400 ml   Net -2320 ml     No intake/output data recorded.    Chest tube drainage last 8 hours 60/25        06/12/24  0500 06/13/24  0557 06/14/24  0700   Weight: 90.3 kg (199 lb) 93.8 kg (206 lb 12.7 oz) 93.7 kg (206 lb 9.6 oz)         Objective:  Vital signs: (most recent): Blood pressure 144/92, pulse 80, temperature 98.3 °F (36.8 °C), temperature source Oral, resp. rate 18, weight 93.7 kg (206 lb 9.6 oz), SpO2 97%.                Results Review:        WBC WBC   Date Value Ref Range Status   06/15/2024 9.02 3.40 - 10.80 10*3/mm3 Final   06/14/2024 13.38 (H) 3.40 - 10.80 10*3/mm3 Final   06/13/2024 13.04 (H) 3.40 - 10.80 10*3/mm3 Final   06/12/2024 15.34 (H) 3.40 - 10.80 10*3/mm3 Final   06/12/2024 21.31 (H) 3.40 - 10.80 10*3/mm3 Final   06/12/2024 27.88 (H) 3.40 - 10.80 10*3/mm3 Final      HGB Hemoglobin   Date Value Ref Range Status   06/15/2024 8.8 (L) 12.0 - 15.9 g/dL Final   06/14/2024 8.9 (L) 12.0 - 15.9 g/dL Final   06/13/2024 8.5 (L) 12.0 - 15.9 g/dL Final   06/12/2024 8.6 (L) 12.0 - 15.9 g/dL Final   06/12/2024 8.0 (L) 12.0 - 15.9 g/dL Final   06/12/2024 7.7 (L) 12.0 - 15.9 g/dL Final   06/12/2024 8.5 (L) 12.0 - 17.0 g/dL Final   06/12/2024 7.2 (L) 12.0 - 15.9 g/dL Final   06/12/2024 8.8 (L) 12.0 - 17.0 g/dL Final   06/12/2024 8.8 (L) 12.0 - 17.0 g/dL Final   06/12/2024 8.5 (L) 12.0 - 17.0 g/dL Final   06/12/2024 7.8 (C) 12.0 - 17.0 g/dL  Final   06/12/2024 7.8 (C) 12.0 - 17.0 g/dL Final   06/12/2024 7.5 (C) 12.0 - 17.0 g/dL Final      HCT Hematocrit   Date Value Ref Range Status   06/15/2024 27.7 (L) 34.0 - 46.6 % Final   06/14/2024 27.8 (L) 34.0 - 46.6 % Final   06/13/2024 26.1 (L) 34.0 - 46.6 % Final   06/12/2024 25.7 (L) 34.0 - 46.6 % Final   06/12/2024 23.9 (L) 34.0 - 46.6 % Final   06/12/2024 24.5 (L) 34.0 - 46.6 % Final   06/12/2024 25 (L) 38 - 51 % Final   06/12/2024 22.4 (L) 34.0 - 46.6 % Final   06/12/2024 26 (L) 38 - 51 % Final   06/12/2024 26 (L) 38 - 51 % Final   06/12/2024 25 (L) 38 - 51 % Final   06/12/2024 23 (L) 38 - 51 % Final   06/12/2024 23 (L) 38 - 51 % Final   06/12/2024 22 (L) 38 - 51 % Final      Platelets Platelets   Date Value Ref Range Status   06/15/2024 122 (L) 140 - 450 10*3/mm3 Final   06/14/2024 134 (L) 140 - 450 10*3/mm3 Final   06/13/2024 126 (L) 140 - 450 10*3/mm3 Final   06/12/2024 127 (L) 140 - 450 10*3/mm3 Final   06/12/2024 154 140 - 450 10*3/mm3 Final   06/12/2024 151 140 - 450 10*3/mm3 Final   06/12/2024 184 140 - 450 10*3/mm3 Final        PT/INR:    Protime   Date Value Ref Range Status   06/13/2024 14.8 (H) 11.7 - 14.2 Seconds Final   06/12/2024 17.2 (H) 11.7 - 14.2 Seconds Final   06/12/2024 21.0 (H) 12.8 - 15.2 seconds Final     Comment:     Serial Number: [id number]:  [id number]   06/12/2024 21.3 (H) 11.7 - 14.2 Seconds Final   /  INR   Date Value Ref Range Status   06/13/2024 1.14 (H) 0.90 - 1.10 Final   06/12/2024 1.38 (H) 0.90 - 1.10 Final   06/12/2024 1.8 (H) 0.8 - 1.2 Final   06/12/2024 1.82 (H) 0.90 - 1.10 Final       Sodium Sodium   Date Value Ref Range Status   06/15/2024 136 136 - 145 mmol/L Final   06/14/2024 135 (L) 136 - 145 mmol/L Final   06/13/2024 143 136 - 145 mmol/L Final   06/12/2024 139 136 - 145 mmol/L Final   06/12/2024 141 136 - 145 mmol/L Final      Potassium Potassium   Date Value Ref Range Status   06/15/2024 4.1 3.5 - 5.2 mmol/L Final   06/14/2024 4.2 3.5 - 5.2 mmol/L Final    06/14/2024 3.6 3.5 - 5.2 mmol/L Final   06/13/2024 4.2 3.5 - 5.2 mmol/L Final   06/12/2024 4.0 3.5 - 5.2 mmol/L Final   06/12/2024 4.0 3.5 - 5.2 mmol/L Final      Chloride Chloride   Date Value Ref Range Status   06/15/2024 98 98 - 107 mmol/L Final   06/14/2024 96 (L) 98 - 107 mmol/L Final   06/13/2024 107 98 - 107 mmol/L Final   06/12/2024 103 98 - 107 mmol/L Final   06/12/2024 101 98 - 107 mmol/L Final      Bicarbonate CO2   Date Value Ref Range Status   06/15/2024 30.0 (H) 22.0 - 29.0 mmol/L Final   06/14/2024 28.2 22.0 - 29.0 mmol/L Final   06/13/2024 27.9 22.0 - 29.0 mmol/L Final   06/12/2024 26.7 22.0 - 29.0 mmol/L Final   06/12/2024 25.4 22.0 - 29.0 mmol/L Final      BUN BUN   Date Value Ref Range Status   06/15/2024 22 (H) 6 - 20 mg/dL Final   06/14/2024 16 6 - 20 mg/dL Final   06/13/2024 20 6 - 20 mg/dL Final   06/12/2024 23 (H) 6 - 20 mg/dL Final   06/12/2024 23 (H) 6 - 20 mg/dL Final      Creatinine Creatinine   Date Value Ref Range Status   06/15/2024 1.06 (H) 0.57 - 1.00 mg/dL Final   06/14/2024 0.98 0.57 - 1.00 mg/dL Final   06/13/2024 1.09 (H) 0.57 - 1.00 mg/dL Final   06/12/2024 1.27 (H) 0.57 - 1.00 mg/dL Final   06/12/2024 1.33 (H) 0.57 - 1.00 mg/dL Final      Calcium Calcium   Date Value Ref Range Status   06/15/2024 8.3 (L) 8.6 - 10.5 mg/dL Final   06/14/2024 8.8 8.6 - 10.5 mg/dL Final   06/13/2024 9.2 8.6 - 10.5 mg/dL Final   06/12/2024 9.3 8.6 - 10.5 mg/dL Final   06/12/2024 8.5 (L) 8.6 - 10.5 mg/dL Final      Magnesium Magnesium   Date Value Ref Range Status   06/12/2024 1.9 1.6 - 2.6 mg/dL Final   06/12/2024 1.6 1.6 - 2.6 mg/dL Final          aspirin, 81 mg, Oral, Daily  chlorhexidine, 15 mL, Mouth/Throat, Q12H  gabapentin, 400 mg, Oral, Q8H  guaiFENesin, 1,200 mg, Oral, Q12H  metoprolol tartrate, 25 mg, Oral, Q12H  mupirocin, , Each Nare, BID  nicotine, 1 patch, Transdermal, Q24H  pantoprazole, 40 mg, Oral, QAM  senna-docusate sodium, 2 tablet, Oral, Nightly  sertraline, 25 mg, Oral,  Daily  vancomycin, 1,500 mg, Intravenous, Q24H      Pharmacy to dose vancomycin,               Acute bacterial endocarditis      Assessment & Plan    - Native tricuspid valve endocarditis, EF 50-55% (echo)--s/p AVR (tissue), TVR (tissue), MV repair- (no op report at this time) [place]  - MRSA bacteremia/ UTI--ID following, on vanc  - Severe AI/ moderate-severe MR  - Acute HFrEF, r/t VHD, NYHA class III--proBNP > 33k  - Dilated CMO  - Polysubstance abuse--IVDA/ tobacco abuse  - PTSD--psych following  - Bipolar disorder, type 2  - CATRINA, likely r/t acute HF  - Anemia  - Tobacco abuse, 91 pack yr hx  - Poor dentition, likely periapical abscess right molar--s/p dental extraction 6/9  - Prolonged QTc--500 ms (6/3 EKG)  - Medical non-compliance     POD#3  Looks good this morning--Up in the chair  1L NC-- wean as able  Sinus rhythm rate 80s-- hypertensive this morning-- she has been tolerating her beta blocker-- will increase to 25mg  Cultures from the OR NGTD-- continue antibiotics per ID  D/w Dr. Skaggs, all narcotics to be avoided-- continue gabapentin and ultram-- toradol on hold due to creatinine increased 1.09  Encourage pulmonary toilet-- continue IS/flutter and mucinex   Mobilize  Will leave AV wires one more day-- will leave central line until PICC line placed, PICC ordered to be placed tomorrow-- hopefully she can be discharged Monday for rehab.  Continue routine care    MARY Montano  06/15/24  07:49 EDT

## 2024-06-15 NOTE — SIGNIFICANT NOTE
06/15/24 1144   OTHER   Discipline physical therapist   Rehab Time/Intention   Session Not Performed patient/family declined treatment  (Pt fatigued after being up in the chair and off the floor to xray earlier. Will follow up as time allows.)

## 2024-06-15 NOTE — NURSING NOTE
Upon entering room it smelled as if the patient had been vaping.  RN asked patient if she was and patient admitted to vaping in room.  Apparatus was in patient's hand, RN educated patient on not being able to vape in room. HM notified.

## 2024-06-15 NOTE — PLAN OF CARE
Goal Outcome Evaluation:  Plan of Care Reviewed With: (P) patient        Progress: (P) improving     Pt VSS, A/O x 4. Pt HR in the 80s. Pain treated per MAR, 1 breathing treatment given today. Pt to have a PICC line placed 6/16/2024. Pt started buspar. Seems to be tolerating well. Pt had 1 bm today 6/15/2024. Continue w/ plan of care.

## 2024-06-16 LAB
ALBUMIN SERPL-MCNC: 3.1 G/DL (ref 3.5–5.2)
AMPHET+METHAMPHET UR QL: NEGATIVE
ANION GAP SERPL CALCULATED.3IONS-SCNC: 11 MMOL/L (ref 5–15)
BARBITURATES UR QL SCN: NEGATIVE
BENZODIAZ UR QL SCN: NEGATIVE
BUN SERPL-MCNC: 17 MG/DL (ref 6–20)
BUN/CREAT SERPL: 19.1 (ref 7–25)
CALCIUM SPEC-SCNC: 8.2 MG/DL (ref 8.6–10.5)
CANNABINOIDS SERPL QL: POSITIVE
CHLORIDE SERPL-SCNC: 95 MMOL/L (ref 98–107)
CO2 SERPL-SCNC: 29 MMOL/L (ref 22–29)
COCAINE UR QL: NEGATIVE
CREAT SERPL-MCNC: 0.89 MG/DL (ref 0.57–1)
DEPRECATED RDW RBC AUTO: 56.3 FL (ref 37–54)
EGFRCR SERPLBLD CKD-EPI 2021: 81.1 ML/MIN/1.73
ERYTHROCYTE [DISTWIDTH] IN BLOOD BY AUTOMATED COUNT: 17.4 % (ref 12.3–15.4)
FENTANYL UR-MCNC: NEGATIVE NG/ML
GLUCOSE SERPL-MCNC: 102 MG/DL (ref 65–99)
HCT VFR BLD AUTO: 26.1 % (ref 34–46.6)
HGB BLD-MCNC: 8.4 G/DL (ref 12–15.9)
MAGNESIUM SERPL-MCNC: 1.3 MG/DL (ref 1.6–2.6)
MCH RBC QN AUTO: 28.6 PG (ref 26.6–33)
MCHC RBC AUTO-ENTMCNC: 32.2 G/DL (ref 31.5–35.7)
MCV RBC AUTO: 88.8 FL (ref 79–97)
METHADONE UR QL SCN: NEGATIVE
OPIATES UR QL: NEGATIVE
OXYCODONE UR QL SCN: NEGATIVE
PHOSPHATE SERPL-MCNC: 2.8 MG/DL (ref 2.5–4.5)
PLATELET # BLD AUTO: 130 10*3/MM3 (ref 140–450)
PMV BLD AUTO: 9.9 FL (ref 6–12)
POTASSIUM SERPL-SCNC: 3.4 MMOL/L (ref 3.5–5.2)
POTASSIUM SERPL-SCNC: 4.6 MMOL/L (ref 3.5–5.2)
RBC # BLD AUTO: 2.94 10*6/MM3 (ref 3.77–5.28)
SODIUM SERPL-SCNC: 135 MMOL/L (ref 136–145)
WBC NRBC COR # BLD AUTO: 7.89 10*3/MM3 (ref 3.4–10.8)

## 2024-06-16 PROCEDURE — 25810000003 SODIUM CHLORIDE 0.9 % SOLUTION: Performed by: STUDENT IN AN ORGANIZED HEALTH CARE EDUCATION/TRAINING PROGRAM

## 2024-06-16 PROCEDURE — 25010000002 MORPHINE PER 10 MG: Performed by: NURSE PRACTITIONER

## 2024-06-16 PROCEDURE — 84132 ASSAY OF SERUM POTASSIUM: CPT | Performed by: INTERNAL MEDICINE

## 2024-06-16 PROCEDURE — 80307 DRUG TEST PRSMV CHEM ANLYZR: CPT | Performed by: NURSE PRACTITIONER

## 2024-06-16 PROCEDURE — 83735 ASSAY OF MAGNESIUM: CPT | Performed by: INTERNAL MEDICINE

## 2024-06-16 PROCEDURE — 99232 SBSQ HOSP IP/OBS MODERATE 35: CPT | Performed by: NURSE PRACTITIONER

## 2024-06-16 PROCEDURE — C1751 CATH, INF, PER/CENT/MIDLINE: HCPCS

## 2024-06-16 PROCEDURE — 94799 UNLISTED PULMONARY SVC/PX: CPT

## 2024-06-16 PROCEDURE — 80069 RENAL FUNCTION PANEL: CPT | Performed by: INTERNAL MEDICINE

## 2024-06-16 PROCEDURE — 25010000002 VANCOMYCIN 10 G RECONSTITUTED SOLUTION: Performed by: STUDENT IN AN ORGANIZED HEALTH CARE EDUCATION/TRAINING PROGRAM

## 2024-06-16 PROCEDURE — 25010000002 KETOROLAC TROMETHAMINE PER 15 MG

## 2024-06-16 PROCEDURE — 25010000002 MAGNESIUM SULFATE 2 GM/50ML SOLUTION: Performed by: NURSE PRACTITIONER

## 2024-06-16 PROCEDURE — 85027 COMPLETE CBC AUTOMATED: CPT | Performed by: NURSE PRACTITIONER

## 2024-06-16 PROCEDURE — 05HY33Z INSERTION OF INFUSION DEVICE INTO UPPER VEIN, PERCUTANEOUS APPROACH: ICD-10-PCS | Performed by: THORACIC SURGERY (CARDIOTHORACIC VASCULAR SURGERY)

## 2024-06-16 RX ORDER — MAGNESIUM SULFATE HEPTAHYDRATE 40 MG/ML
2 INJECTION, SOLUTION INTRAVENOUS ONCE
Status: COMPLETED | OUTPATIENT
Start: 2024-06-16 | End: 2024-06-16

## 2024-06-16 RX ORDER — POTASSIUM CHLORIDE 750 MG/1
40 TABLET, FILM COATED, EXTENDED RELEASE ORAL EVERY 4 HOURS
Status: COMPLETED | OUTPATIENT
Start: 2024-06-16 | End: 2024-06-16

## 2024-06-16 RX ORDER — KETOROLAC TROMETHAMINE 15 MG/ML
15 INJECTION, SOLUTION INTRAMUSCULAR; INTRAVENOUS EVERY 12 HOURS PRN
Status: DISCONTINUED | OUTPATIENT
Start: 2024-06-16 | End: 2024-06-17 | Stop reason: HOSPADM

## 2024-06-16 RX ORDER — FUROSEMIDE 20 MG/1
20 TABLET ORAL
Status: DISCONTINUED | OUTPATIENT
Start: 2024-06-16 | End: 2024-06-17 | Stop reason: HOSPADM

## 2024-06-16 RX ADMIN — TRAMADOL HYDROCHLORIDE 50 MG: 50 TABLET ORAL at 03:30

## 2024-06-16 RX ADMIN — MORPHINE SULFATE 1 MG: 2 INJECTION, SOLUTION INTRAMUSCULAR; INTRAVENOUS at 05:15

## 2024-06-16 RX ADMIN — METOPROLOL TARTRATE 25 MG: 25 TABLET, FILM COATED ORAL at 08:34

## 2024-06-16 RX ADMIN — POTASSIUM CHLORIDE 40 MEQ: 750 TABLET, EXTENDED RELEASE ORAL at 05:15

## 2024-06-16 RX ADMIN — MORPHINE SULFATE 1 MG: 2 INJECTION, SOLUTION INTRAMUSCULAR; INTRAVENOUS at 00:20

## 2024-06-16 RX ADMIN — SENNOSIDES AND DOCUSATE SODIUM 2 TABLET: 50; 8.6 TABLET ORAL at 20:32

## 2024-06-16 RX ADMIN — BUSPIRONE HYDROCHLORIDE 5 MG: 5 TABLET ORAL at 15:48

## 2024-06-16 RX ADMIN — MUPIROCIN 1 APPLICATION: 20 OINTMENT TOPICAL at 20:32

## 2024-06-16 RX ADMIN — GUAIFENESIN 1200 MG: 600 TABLET, EXTENDED RELEASE ORAL at 20:32

## 2024-06-16 RX ADMIN — GUAIFENESIN 1200 MG: 600 TABLET, EXTENDED RELEASE ORAL at 08:33

## 2024-06-16 RX ADMIN — GABAPENTIN 400 MG: 400 CAPSULE ORAL at 05:15

## 2024-06-16 RX ADMIN — PANTOPRAZOLE SODIUM 40 MG: 40 TABLET, DELAYED RELEASE ORAL at 05:15

## 2024-06-16 RX ADMIN — GABAPENTIN 400 MG: 400 CAPSULE ORAL at 21:53

## 2024-06-16 RX ADMIN — TRAMADOL HYDROCHLORIDE 50 MG: 50 TABLET ORAL at 15:48

## 2024-06-16 RX ADMIN — ASPIRIN 81 MG: 81 TABLET, COATED ORAL at 08:33

## 2024-06-16 RX ADMIN — MAGNESIUM SULFATE HEPTAHYDRATE 2 G: 40 INJECTION, SOLUTION INTRAVENOUS at 09:49

## 2024-06-16 RX ADMIN — VANCOMYCIN HYDROCHLORIDE 1500 MG: 10 INJECTION, POWDER, LYOPHILIZED, FOR SOLUTION INTRAVENOUS at 07:48

## 2024-06-16 RX ADMIN — SERTRALINE 25 MG: 25 TABLET, FILM COATED ORAL at 08:33

## 2024-06-16 RX ADMIN — BUSPIRONE HYDROCHLORIDE 5 MG: 5 TABLET ORAL at 20:32

## 2024-06-16 RX ADMIN — NICOTINE 1 PATCH: 21 PATCH, EXTENDED RELEASE TRANSDERMAL at 08:33

## 2024-06-16 RX ADMIN — GABAPENTIN 400 MG: 400 CAPSULE ORAL at 13:31

## 2024-06-16 RX ADMIN — KETOROLAC TROMETHAMINE 15 MG: 15 INJECTION, SOLUTION INTRAMUSCULAR; INTRAVENOUS at 17:03

## 2024-06-16 RX ADMIN — TRAMADOL HYDROCHLORIDE 50 MG: 50 TABLET ORAL at 09:49

## 2024-06-16 RX ADMIN — BUSPIRONE HYDROCHLORIDE 5 MG: 5 TABLET ORAL at 08:33

## 2024-06-16 RX ADMIN — METOPROLOL TARTRATE 25 MG: 25 TABLET, FILM COATED ORAL at 20:32

## 2024-06-16 RX ADMIN — ALPRAZOLAM 0.25 MG: 0.25 TABLET ORAL at 00:20

## 2024-06-16 RX ADMIN — POTASSIUM CHLORIDE 40 MEQ: 750 TABLET, EXTENDED RELEASE ORAL at 08:34

## 2024-06-16 RX ADMIN — FUROSEMIDE 40 MG: 40 TABLET ORAL at 08:33

## 2024-06-16 RX ADMIN — FUROSEMIDE 20 MG: 20 TABLET ORAL at 17:08

## 2024-06-16 RX ADMIN — CYCLOBENZAPRINE 10 MG: 10 TABLET, FILM COATED ORAL at 15:48

## 2024-06-16 NOTE — PLAN OF CARE
Goal Outcome Evaluation:              Outcome Evaluation: AOx4, VSS. IJ and wires removed today per orders.Pain treated per MAR. PICC line placed today. Probable discharge to rehab in the morning. Currently on 1500 ml fluid restriction, Decent appetite today. Will continue with plan of care.

## 2024-06-16 NOTE — NURSING NOTE
Patient had visitors in room, fruity smell noticed upon entering room during rounding, after visitors left patient called out to go to the BR, patient found vaping in bathroom.  Educated on need to stop vaping.

## 2024-06-16 NOTE — SIGNIFICANT NOTE
"   06/16/24 1600   PICC Single Lumen 06/16/24 Right Basilic   Placement date: If unknown, DO NOT use \"Add Comment\" note/Placement time: If unknown, DO NOT use \"Add Comment\" note: 06/16/24 1559   Hand Hygiene Completed: Yes  Size (Fr): 4  Description (optional): Lot# [id number]; Exp: 2025-06-30  Length (cm): 35 cm ...   Site Assessment Clean;Dry;Intact   #1 Lumen Status Blood return noted;Capped;Flushed;Normal saline locked   Length tiffany (cm) 35 cm   Extremity Circumference (cm) 30 cm   Dressing Type Border Dressing;Transparent;Securing device;Antimicrobial dressing/disc   Dressing Status Clean;Dry;Intact   Dressing Change Due 06/23/24   Indication/Daily Review of Necessity long-term IV access >7 days     3 needles, 2 guidewires, 1 scalpel properly disposed of post procedure.     "

## 2024-06-16 NOTE — PLAN OF CARE
Goal Outcome Evaluation:  Plan of Care Reviewed With: patient        Progress: no change  Outcome Evaluation: A&O, frequently asks for her Morphine for pain, visitors in room, patient found to have another vape after visitors left, placed with others in the med room, urinary frequency, patient frequently asks for something to drink, EPM wires isolated, patient kept disconnecting PM from wires, RIJ, dressing to abd at CT sites CDI, VSS, continue plan of care.

## 2024-06-16 NOTE — PROGRESS NOTES
[facility] Progress Note      Patient Name: Марина Tilley  : 1975  MRN: [id number]  Primary Care Physician:  Provider, No Known  Date of admission: 2024    Subjective     Interval History:   F/u CATRINA    Review of Systems:   UOP 4.5 L yesterday; weight down 6 pounds  Breathing is comfortable; feels much less swollen  Many beverages on tray table  Appetite is fine; no N/V    Objective     Vitals:   Temp:  [97.3 °F (36.3 °C)-100.8 °F (38.2 °C)] 98.6 °F (37 °C)  Heart Rate:  [84-88] 85  Resp:  [16-20] 16  BP: (123-134)/(71-82) 123/80  Flow (L/min):  [1-2] 1    Intake/Output Summary (Last 24 hours) at 2024 0858  Last data filed at 2024 0754  Gross per 24 hour   Intake 1000 ml   Output 5300 ml   Net -4300 ml       Physical Exam:    General: Being up in bed; not dyspneic; chronically ill  Neck: supple, no JVD  Lungs: Crackles in bases; not labored  Heart: RRR, normal S1 and S2  Abdomen: soft, nontender, nondistended, BS +  Extremities: 1+ ankle edema, no cyanosis or clubbing  Neuro: normal speech and mental status     Scheduled Meds:     aspirin, 81 mg, Oral, Daily  busPIRone, 5 mg, Oral, TID  chlorhexidine, 15 mL, Mouth/Throat, Q12H  furosemide, 40 mg, Oral, BID  gabapentin, 400 mg, Oral, Q8H  guaiFENesin, 1,200 mg, Oral, Q12H  [START ON 2024] magnesium oxide, 400 mg, Oral, Daily  magnesium sulfate, 2 g, Intravenous, Once  metoprolol tartrate, 25 mg, Oral, Q12H  mupirocin, , Each Nare, BID  nicotine, 1 patch, Transdermal, Q24H  pantoprazole, 40 mg, Oral, QAM  senna-docusate sodium, 2 tablet, Oral, Nightly  sertraline, 25 mg, Oral, Daily  vancomycin, 1,500 mg, Intravenous, Q24H      IV Meds:   Pharmacy to dose vancomycin,         Results Reviewed:   I have personally reviewed the results from the time of this admission to 2024 08:58 EDT     Results from last 7 days   Lab Units 24  0315 06/15/24  0312 24  1813 24  0551   SODIUM mmol/L 135* 136  --   135*   POTASSIUM mmol/L 3.4* 4.1 4.2 3.6   CHLORIDE mmol/L 95* 98  --  96*   CO2 mmol/L 29.0 30.0*  --  28.2   BUN mg/dL 17 22*  --  16   CREATININE mg/dL 0.89 1.06*  --  0.98   CALCIUM mg/dL 8.2* 8.3*  --  8.8   GLUCOSE mg/dL 102* 133*  --  125*     Estimated Creatinine Clearance: 89.7 mL/min (by C-G formula based on SCr of 0.89 mg/dL).  Results from last 7 days   Lab Units 06/16/24  0315 06/15/24  0312 06/14/24  0551 06/13/24  0257 06/12/24  1736 06/12/24  1333   MAGNESIUM mg/dL 1.3*  --   --   --  1.9 1.6   PHOSPHORUS mg/dL 2.8 2.8 3.4   < > 4.0 4.2    < > = values in this interval not displayed.         Results from last 7 days   Lab Units 06/16/24  0315 06/15/24  0312 06/14/24  0551 06/13/24  0257 06/12/24  2334 06/12/24  1736   WBC 10*3/mm3 7.89 9.02 13.38* 13.04*  --  15.34*   HEMOGLOBIN g/dL 8.4* 8.8* 8.9* 8.5* 8.6* 8.0*   PLATELETS 10*3/mm3 130* 122* 134* 126*  --  127*     Results from last 7 days   Lab Units 06/13/24  0257 06/12/24  1333 06/12/24  1146 06/12/24  1143 06/10/24  1828   INR  1.14* 1.38* 1.8* 1.82* 1.23*       Assessment / Plan     ASSESSMENT:  Non olig CATRINA, resolving, due to acute systolic heart failure.  Resolving volume excess; low potassium and magnesium due to diuresis  Acute systolic heart failure.  Dilated cardiomyopathy.  Valvular heart disease with moderate to severe MR, TR. Severe aortic valve regurgitation.  Now status post mitral valve repair, tricuspid valve and aortic valve replacements  MRSA TV endocarditis + septicemia, septic pulm emboli, splenic infarct.  On IV vancomycin.  S/p full mouth extraction 6/9/24.  Status post aortic and tricuspid valve replacements and mitral valve repair 6/12/2024.  Dilated cardiomyopathy, EF 41 to 45% by LETICIA, with mod to severe MR/TR. severe AI.  Right molar abscess.  Status post full mouth extraction 6/9/2024  Mood disorder, seen by psychiatry .  IV polydrug abuse  Anemia of ABL - transfused 6/12.  Hgb up 8.9  Mild  thrombocytopenia.    PLAN:  1.  Reduce oral furosemide to 20 mg twice daily   2.  Fluid restriction, 1500 mL/day  3.  Replace potassium and magnesium per protocol  4.  Will sign off, but please call if any questions      Clifton Covarrubias MD  06/16/24  08:58 EDT    [facility]  [phone]

## 2024-06-16 NOTE — SIGNIFICANT NOTE
06/16/24 1631   Rehab Time/Intention   Session Not Performed patient unavailable for treatment  (Pt in bed/ wires just pulled just prior to PT arrival this AM. Followed up in PM and Picc line was being placed. Rn reports pt walked with her prior to picc placement. Will follow up tomorrow)   Recommendation   PT - Next Appointment 06/17/24

## 2024-06-16 NOTE — PROGRESS NOTES
"CC: Follow-up endocarditis    Interval History: She has had good urine output and her breathing feels less labored today compared to yesterday.  Complains of some sternal pain and shoulder discomfort but the pain medicine helps      Vital Signs  Temp:  [97.3 °F (36.3 °C)-100.8 °F (38.2 °C)] 98.6 °F (37 °C)  Heart Rate:  [84-88] 85  Resp:  [16-20] 16  BP: (123-134)/(71-82) 123/80    Intake/Output Summary (Last 24 hours) at 6/16/2024 0823  Last data filed at 6/16/2024 0754  Gross per 24 hour   Intake 1000 ml   Output 5300 ml   Net -4300 ml     Flowsheet Rows      Flowsheet Row First Filed Value   Admission Height 167.6 cm (66\") Documented at 06/15/2024 1305   Admission Weight 92.6 kg (204 lb 1.6 oz) Documented at 06/09/2024 0600            PHYSICAL EXAM:  General: No acute distress  Resp:NL Rate, unlabored, diminished bases  CV:NL rate and rhythm, NL PMI, Nl S1 and S2, no Murmur, no gallop, no rub, No JVD. Normal pedal pulses  ABD:Nl sounds, no masses or tenderness, nondistended, no guarding or rebound  Neuro: alert,cooperative and oriented  Extr: No edema or cyanosis, moves all extremities      Results Review:    Results from last 7 days   Lab Units 06/16/24  0315   SODIUM mmol/L 135*   POTASSIUM mmol/L 3.4*   CHLORIDE mmol/L 95*   CO2 mmol/L 29.0   BUN mg/dL 17   CREATININE mg/dL 0.89   GLUCOSE mg/dL 102*   CALCIUM mg/dL 8.2*         Results from last 7 days   Lab Units 06/16/24  0315   WBC 10*3/mm3 7.89   HEMOGLOBIN g/dL 8.4*   HEMATOCRIT % 26.1*   PLATELETS 10*3/mm3 130*     Results from last 7 days   Lab Units 06/13/24  0257 06/12/24  1333 06/12/24  1146 06/12/24  1143 06/10/24  1828   INR  1.14* 1.38* 1.8* 1.82* 1.23*   APTT seconds  --  29.8  --  28.7 24.5     Results from last 7 days   Lab Units 06/10/24  1828   CHOLESTEROL mg/dL 97     Results from last 7 days   Lab Units 06/16/24  0315   MAGNESIUM mg/dL 1.3*     Results from last 7 days   Lab Units 06/10/24  1828   CHOLESTEROL mg/dL 97   TRIGLYCERIDES mg/dL " 163*   HDL CHOL mg/dL 21*   LDL CHOL mg/dL 48     I reviewed the patient's new clinical results.  I personally viewed and interpreted the patient's EKG/Telemetry data-normal sinus rhythm        Medication Review:   Meds reviewed    Pharmacy to dose vancomycin,         Assessment/Plan  1.  Bacterial endocarditis.  Status post tooth extraction.  2.  Status post aortic valve replacement with a 23 mm magna pericardial prosthesis 6/12/24  3.  Status post mitral valve anoplasty with a 30 mm Medtronic flexible band.  4.  Tricuspid valve repair with partial reconstruction including a septal leaflet with a bovine pericardium and a 30 mm physio tricuspid ring followed by tricuspid valve replacement with a 31 mm Ramirez 2 porcine prosthesis.  5.  PFO/ASD closed.  6.  Left atrial appendage closure.  7.  History of polysubstance abuse.  8.  Tobacco abuse.   9.  Small to moderate pleural effusion with vascular congestion on chest x-ray yesterday  10.  Hypokalemia-to be replaced    -IV Vancomycin continues    Continue aspirin 81 mg daily, furosemide 40 mg twice daily and metoprolol titrate 25 mg twice daily      MARY Galo  06/16/24  08:23 EDT

## 2024-06-16 NOTE — PROGRESS NOTES
" LOS: 8 days   Patient Care Team:  Provider, No Known as PCP - General    Chief Complaint: post op    Subjective:  Symptoms:  She reports chest pain.  No shortness of breath.    Diet:  No nausea or vomiting.    Activity level: Impaired due to pain.          Vital Signs  Temp:  [97.3 °F (36.3 °C)-100.8 °F (38.2 °C)] 98.2 °F (36.8 °C)  Heart Rate:  [84-88] 88  Resp:  [18-20] 20  BP: (125-134)/(71-82) 128/82  Body mass index is 32.35 kg/m².    Intake/Output Summary (Last 24 hours) at 6/16/2024 0748  Last data filed at 6/16/2024 0516  Gross per 24 hour   Intake 1500 ml   Output 4500 ml   Net -3000 ml     No intake/output data recorded.            06/14/24  0700 06/15/24  1305 06/16/24  0409   Weight: 93.7 kg (206 lb 9.6 oz) 93.7 kg (206 lb 9.1 oz) 90.9 kg (200 lb 6.4 oz)         Objective:  Vital signs: (most recent): Blood pressure 123/80, pulse 85, temperature 98.6 °F (37 °C), temperature source Oral, resp. rate 16, height 167.6 cm (66\"), weight 90.9 kg (200 lb 6.4 oz), SpO2 99%.                Results Review:        WBC WBC   Date Value Ref Range Status   06/16/2024 7.89 3.40 - 10.80 10*3/mm3 Final   06/15/2024 9.02 3.40 - 10.80 10*3/mm3 Final   06/14/2024 13.38 (H) 3.40 - 10.80 10*3/mm3 Final      HGB Hemoglobin   Date Value Ref Range Status   06/16/2024 8.4 (L) 12.0 - 15.9 g/dL Final   06/15/2024 8.8 (L) 12.0 - 15.9 g/dL Final   06/14/2024 8.9 (L) 12.0 - 15.9 g/dL Final      HCT Hematocrit   Date Value Ref Range Status   06/16/2024 26.1 (L) 34.0 - 46.6 % Final   06/15/2024 27.7 (L) 34.0 - 46.6 % Final   06/14/2024 27.8 (L) 34.0 - 46.6 % Final      Platelets Platelets   Date Value Ref Range Status   06/16/2024 130 (L) 140 - 450 10*3/mm3 Final   06/15/2024 122 (L) 140 - 450 10*3/mm3 Final   06/14/2024 134 (L) 140 - 450 10*3/mm3 Final        PT/INR:    No results found for: \"PROTIME\"  /  No results found for: \"INR\"      Sodium Sodium   Date Value Ref Range Status   06/16/2024 135 (L) 136 - 145 mmol/L Final "   06/15/2024 136 136 - 145 mmol/L Final   06/14/2024 135 (L) 136 - 145 mmol/L Final      Potassium Potassium   Date Value Ref Range Status   06/16/2024 3.4 (L) 3.5 - 5.2 mmol/L Final   06/15/2024 4.1 3.5 - 5.2 mmol/L Final   06/14/2024 4.2 3.5 - 5.2 mmol/L Final   06/14/2024 3.6 3.5 - 5.2 mmol/L Final      Chloride Chloride   Date Value Ref Range Status   06/16/2024 95 (L) 98 - 107 mmol/L Final   06/15/2024 98 98 - 107 mmol/L Final   06/14/2024 96 (L) 98 - 107 mmol/L Final      Bicarbonate CO2   Date Value Ref Range Status   06/16/2024 29.0 22.0 - 29.0 mmol/L Final   06/15/2024 30.0 (H) 22.0 - 29.0 mmol/L Final   06/14/2024 28.2 22.0 - 29.0 mmol/L Final      BUN BUN   Date Value Ref Range Status   06/16/2024 17 6 - 20 mg/dL Final   06/15/2024 22 (H) 6 - 20 mg/dL Final   06/14/2024 16 6 - 20 mg/dL Final      Creatinine Creatinine   Date Value Ref Range Status   06/16/2024 0.89 0.57 - 1.00 mg/dL Final   06/15/2024 1.06 (H) 0.57 - 1.00 mg/dL Final   06/14/2024 0.98 0.57 - 1.00 mg/dL Final      Calcium Calcium   Date Value Ref Range Status   06/16/2024 8.2 (L) 8.6 - 10.5 mg/dL Final   06/15/2024 8.3 (L) 8.6 - 10.5 mg/dL Final   06/14/2024 8.8 8.6 - 10.5 mg/dL Final      Magnesium Magnesium   Date Value Ref Range Status   06/16/2024 1.3 (L) 1.6 - 2.6 mg/dL Final          aspirin, 81 mg, Oral, Daily  busPIRone, 5 mg, Oral, TID  chlorhexidine, 15 mL, Mouth/Throat, Q12H  furosemide, 40 mg, Oral, BID  gabapentin, 400 mg, Oral, Q8H  guaiFENesin, 1,200 mg, Oral, Q12H  metoprolol tartrate, 25 mg, Oral, Q12H  mupirocin, , Each Nare, BID  nicotine, 1 patch, Transdermal, Q24H  pantoprazole, 40 mg, Oral, QAM  potassium chloride ER, 40 mEq, Oral, Q4H  senna-docusate sodium, 2 tablet, Oral, Nightly  sertraline, 25 mg, Oral, Daily  vancomycin, 1,500 mg, Intravenous, Q24H      Pharmacy to dose vancomycin,               Acute bacterial endocarditis      Assessment & Plan    - Native tricuspid valve endocarditis, EF 50-55% (echo)--s/p  AVR (tissue), TVR (tissue), MV repair- (no op report at this time) [place]  - MRSA bacteremia/ UTI--ID following, on vanc  - Severe AI/ moderate-severe MR  - Acute HFrEF, r/t VHD, NYHA class III--proBNP > 33k  - Dilated CMO  - Polysubstance abuse--IVDA/ tobacco abuse  - PTSD--psych following  - Bipolar disorder, type 2  - CATRINA, likely r/t acute HF  - Anemia  - Tobacco abuse, 91 pack yr hx  - Poor dentition, likely periapical abscess right molar--s/p dental extraction 6/9  - Prolonged QTc--500 ms (6/3 EKG)  - Medical non-compliance     POD#4  Over night bedside RN had removed a vaping device brought in by her family.  1L NC-- wean as able  Sinus rhythm rate 80s-- hypertensive this morning-- she has been tolerating her beta blocker-- will increase to 25mg  Cultures from the OR NGTD-- continue antibiotics per ID  D/w Dr. Skaggs, all narcotics to be avoided-- continue gabapentin and ultram   Encourage pulmonary toilet-- continue IS/flutter and mucinex   Mobilize  PICC ordered to be placed, okay to remove central line after it was placed -- plan for discharge to rehab tomorrow.   Continue routine care    MARY Montano  06/16/24  07:48 EDT

## 2024-06-17 VITALS
HEART RATE: 70 BPM | HEIGHT: 66 IN | OXYGEN SATURATION: 100 % | BODY MASS INDEX: 31.98 KG/M2 | DIASTOLIC BLOOD PRESSURE: 75 MMHG | SYSTOLIC BLOOD PRESSURE: 108 MMHG | RESPIRATION RATE: 18 BRPM | TEMPERATURE: 98.5 F | WEIGHT: 199 LBS

## 2024-06-17 LAB
ALBUMIN SERPL-MCNC: 3.1 G/DL (ref 3.5–5.2)
ANION GAP SERPL CALCULATED.3IONS-SCNC: 6 MMOL/L (ref 5–15)
ANION GAP SERPL CALCULATED.3IONS-SCNC: 8 MMOL/L (ref 5–15)
BUN SERPL-MCNC: 18 MG/DL (ref 6–20)
BUN SERPL-MCNC: 18 MG/DL (ref 6–20)
BUN/CREAT SERPL: 18.4 (ref 7–25)
BUN/CREAT SERPL: 19.1 (ref 7–25)
CALCIUM SPEC-SCNC: 8.4 MG/DL (ref 8.6–10.5)
CALCIUM SPEC-SCNC: 8.4 MG/DL (ref 8.6–10.5)
CHLORIDE SERPL-SCNC: 100 MMOL/L (ref 98–107)
CHLORIDE SERPL-SCNC: 100 MMOL/L (ref 98–107)
CO2 SERPL-SCNC: 27 MMOL/L (ref 22–29)
CO2 SERPL-SCNC: 29 MMOL/L (ref 22–29)
CREAT SERPL-MCNC: 0.94 MG/DL (ref 0.57–1)
CREAT SERPL-MCNC: 0.98 MG/DL (ref 0.57–1)
EGFRCR SERPLBLD CKD-EPI 2021: 72.2 ML/MIN/1.73
EGFRCR SERPLBLD CKD-EPI 2021: 75.9 ML/MIN/1.73
GLUCOSE SERPL-MCNC: 101 MG/DL (ref 65–99)
GLUCOSE SERPL-MCNC: 104 MG/DL (ref 65–99)
PHOSPHATE SERPL-MCNC: 2.6 MG/DL (ref 2.5–4.5)
POTASSIUM SERPL-SCNC: 4.1 MMOL/L (ref 3.5–5.2)
POTASSIUM SERPL-SCNC: 4.2 MMOL/L (ref 3.5–5.2)
SODIUM SERPL-SCNC: 135 MMOL/L (ref 136–145)
SODIUM SERPL-SCNC: 135 MMOL/L (ref 136–145)
VANCOMYCIN TROUGH SERPL-MCNC: 12 MCG/ML (ref 5–20)

## 2024-06-17 PROCEDURE — 80048 BASIC METABOLIC PNL TOTAL CA: CPT | Performed by: THORACIC SURGERY (CARDIOTHORACIC VASCULAR SURGERY)

## 2024-06-17 PROCEDURE — 25810000003 SODIUM CHLORIDE 0.9 % SOLUTION: Performed by: INTERNAL MEDICINE

## 2024-06-17 PROCEDURE — 94799 UNLISTED PULMONARY SVC/PX: CPT

## 2024-06-17 PROCEDURE — 99232 SBSQ HOSP IP/OBS MODERATE 35: CPT | Performed by: STUDENT IN AN ORGANIZED HEALTH CARE EDUCATION/TRAINING PROGRAM

## 2024-06-17 PROCEDURE — 80202 ASSAY OF VANCOMYCIN: CPT | Performed by: INTERNAL MEDICINE

## 2024-06-17 PROCEDURE — 25010000002 KETOROLAC TROMETHAMINE PER 15 MG

## 2024-06-17 PROCEDURE — 80069 RENAL FUNCTION PANEL: CPT | Performed by: INTERNAL MEDICINE

## 2024-06-17 PROCEDURE — 97110 THERAPEUTIC EXERCISES: CPT | Performed by: PHYSICAL THERAPIST

## 2024-06-17 PROCEDURE — 25010000002 VANCOMYCIN 10 G RECONSTITUTED SOLUTION: Performed by: INTERNAL MEDICINE

## 2024-06-17 PROCEDURE — 99232 SBSQ HOSP IP/OBS MODERATE 35: CPT | Performed by: NURSE PRACTITIONER

## 2024-06-17 PROCEDURE — 25010000002 KETOROLAC TROMETHAMINE PER 15 MG: Performed by: PHYSICIAN ASSISTANT

## 2024-06-17 RX ORDER — TRAMADOL HYDROCHLORIDE 50 MG/1
50 TABLET ORAL EVERY 6 HOURS PRN
Start: 2024-06-17 | End: 2024-06-17

## 2024-06-17 RX ORDER — ASPIRIN 81 MG/1
81 TABLET ORAL DAILY
Qty: 30 TABLET | Refills: 2 | Status: SHIPPED | OUTPATIENT
Start: 2024-06-18

## 2024-06-17 RX ORDER — TRAMADOL HYDROCHLORIDE 50 MG/1
50 TABLET ORAL EVERY 6 HOURS PRN
Start: 2024-06-17 | End: 2024-06-23

## 2024-06-17 RX ORDER — BUSPIRONE HYDROCHLORIDE 5 MG/1
5 TABLET ORAL 3 TIMES DAILY
Qty: 90 TABLET | Refills: 0 | Status: SHIPPED | OUTPATIENT
Start: 2024-06-17 | End: 2024-06-17

## 2024-06-17 RX ORDER — ASPIRIN 81 MG/1
81 TABLET ORAL DAILY
Qty: 30 TABLET | Refills: 2 | Status: SHIPPED | OUTPATIENT
Start: 2024-06-18 | End: 2024-06-17

## 2024-06-17 RX ORDER — SODIUM CHLORIDE 0.9 % (FLUSH) 0.9 %
20 SYRINGE (ML) INJECTION AS NEEDED
Status: DISCONTINUED | OUTPATIENT
Start: 2024-06-17 | End: 2024-06-17 | Stop reason: HOSPADM

## 2024-06-17 RX ORDER — SODIUM CHLORIDE 0.9 % (FLUSH) 0.9 %
10 SYRINGE (ML) INJECTION AS NEEDED
Status: DISCONTINUED | OUTPATIENT
Start: 2024-06-17 | End: 2024-06-17 | Stop reason: HOSPADM

## 2024-06-17 RX ORDER — FUROSEMIDE 20 MG/1
20 TABLET ORAL 2 TIMES DAILY
Qty: 60 TABLET | Refills: 0 | Status: SHIPPED | OUTPATIENT
Start: 2024-06-17 | End: 2024-07-17

## 2024-06-17 RX ORDER — GUAIFENESIN 600 MG/1
1200 TABLET, EXTENDED RELEASE ORAL EVERY 12 HOURS SCHEDULED
Qty: 28 TABLET | Refills: 0 | Status: SHIPPED | OUTPATIENT
Start: 2024-06-17 | End: 2024-06-24

## 2024-06-17 RX ORDER — KETOROLAC TROMETHAMINE 15 MG/ML
15 INJECTION, SOLUTION INTRAMUSCULAR; INTRAVENOUS ONCE AS NEEDED
Status: COMPLETED | OUTPATIENT
Start: 2024-06-17 | End: 2024-06-17

## 2024-06-17 RX ORDER — SODIUM CHLORIDE 0.9 % (FLUSH) 0.9 %
10 SYRINGE (ML) INJECTION EVERY 12 HOURS SCHEDULED
Status: DISCONTINUED | OUTPATIENT
Start: 2024-06-17 | End: 2024-06-17 | Stop reason: HOSPADM

## 2024-06-17 RX ORDER — HYDROXYZINE 50 MG/1
50 TABLET, FILM COATED ORAL 3 TIMES DAILY PRN
Start: 2024-06-17 | End: 2024-06-18

## 2024-06-17 RX ORDER — BUSPIRONE HYDROCHLORIDE 5 MG/1
5 TABLET ORAL 3 TIMES DAILY
Qty: 90 TABLET | Refills: 0 | Status: SHIPPED | OUTPATIENT
Start: 2024-06-17 | End: 2024-07-17

## 2024-06-17 RX ORDER — SODIUM CHLORIDE 9 MG/ML
40 INJECTION, SOLUTION INTRAVENOUS AS NEEDED
Status: DISCONTINUED | OUTPATIENT
Start: 2024-06-17 | End: 2024-06-17 | Stop reason: HOSPADM

## 2024-06-17 RX ADMIN — ASPIRIN 81 MG: 81 TABLET, COATED ORAL at 09:06

## 2024-06-17 RX ADMIN — MUPIROCIN 1 APPLICATION: 20 OINTMENT TOPICAL at 09:06

## 2024-06-17 RX ADMIN — MAGNESIUM OXIDE 400 MG (241.3 MG MAGNESIUM) TABLET 400 MG: TABLET at 09:06

## 2024-06-17 RX ADMIN — BUSPIRONE HYDROCHLORIDE 5 MG: 5 TABLET ORAL at 09:07

## 2024-06-17 RX ADMIN — METOPROLOL TARTRATE 25 MG: 25 TABLET, FILM COATED ORAL at 09:06

## 2024-06-17 RX ADMIN — GABAPENTIN 400 MG: 400 CAPSULE ORAL at 14:05

## 2024-06-17 RX ADMIN — KETOROLAC TROMETHAMINE 15 MG: 15 INJECTION, SOLUTION INTRAMUSCULAR; INTRAVENOUS at 14:58

## 2024-06-17 RX ADMIN — FUROSEMIDE 20 MG: 20 TABLET ORAL at 09:07

## 2024-06-17 RX ADMIN — Medication 10 ML: at 11:03

## 2024-06-17 RX ADMIN — PANTOPRAZOLE SODIUM 40 MG: 40 TABLET, DELAYED RELEASE ORAL at 06:37

## 2024-06-17 RX ADMIN — SERTRALINE 25 MG: 25 TABLET, FILM COATED ORAL at 09:07

## 2024-06-17 RX ADMIN — KETOROLAC TROMETHAMINE 15 MG: 15 INJECTION, SOLUTION INTRAMUSCULAR; INTRAVENOUS at 05:36

## 2024-06-17 RX ADMIN — NICOTINE 1 PATCH: 21 PATCH, EXTENDED RELEASE TRANSDERMAL at 09:07

## 2024-06-17 RX ADMIN — VANCOMYCIN HYDROCHLORIDE 1750 MG: 10 INJECTION, POWDER, LYOPHILIZED, FOR SOLUTION INTRAVENOUS at 11:02

## 2024-06-17 RX ADMIN — GUAIFENESIN 1200 MG: 600 TABLET, EXTENDED RELEASE ORAL at 09:06

## 2024-06-17 RX ADMIN — GABAPENTIN 400 MG: 400 CAPSULE ORAL at 06:37

## 2024-06-17 NOTE — THERAPY TREATMENT NOTE
Patient Name: Марина Tilley  : 1975    MRN: [id number]                              Today's Date: 2024       Admit Date: 2024    Visit Dx:     ICD-10-CM ICD-9-CM   1. Acute bacterial endocarditis  I33.0 421.0   2. S/P AVR (aortic valve replacement)  Z95.2 V43.3   3. S/P MVR (mitral valve repair)  Z98.890 V45.89     Patient Active Problem List   Diagnosis    Endocarditis    Acute bacterial endocarditis     Past Medical History:   Diagnosis Date    Anxiety     Asthma     CHF (congestive heart failure)     COPD (chronic obstructive pulmonary disease)      Past Surgical History:   Procedure Laterality Date    AORTIC VALVE REPAIR/REPLACEMENT MITRAL VALVE REPAIR/REPLACEMENT N/A 2024    Procedure: LETICIA; STERNOTOMY; AORTIC VALVE REPLACEMENT; MITRAL VALVE REPAIR; TRICUSPID VALVE REPLACEMENT; PRP;  Surgeon: Eric Skaggs MD;  Location: [facility] CVOR;  Service: Cardiothoracic;  Laterality: N/A;    CARDIAC CATHETERIZATION N/A 6/10/2024    Procedure: Left Heart Cath;  Surgeon: Gary Barton MD;  Location: [facility] CATH INVASIVE LOCATION;  Service: Cardiology;  Laterality: N/A;    CHOLECYSTECTOMY      COLONOSCOPY      ENDOSCOPY      HERNIA REPAIR      HYSTERECTOMY      TEETH EXTRACTION N/A 2024    Procedure: TOOTH EXTRACTION;  Surgeon: Sergio Zaidi DMD;  Location: [facility] OR;  Service: Oral Surgery;  Laterality: N/A;      General Information       Row Name 24 1300          Physical Therapy Time and Intention    Document Type therapy note (daily note)  -REJI     Mode of Treatment physical therapy  -REJI               User Key  (r) = Recorded By, (t) = Taken By, (c) = Cosigned By      Initials Name Provider Type    Wanda Chambers, RONAK Physical Therapist                   Mobility       Row Name 24 1300          Bed Mobility    Supine-Sit [place] (Bed Mobility) modified independence  -REJI     Sit-Supine [place] (Bed Mobility) modified independence  -REJI     Assistive  Device (Bed Mobility) head of bed elevated  -       Row Name 06/17/24 1300          Sit-Stand Transfer    Sit-Stand [place] (Transfers) standby assist  -       Row Name 06/17/24 1300          Gait/Stairs (Locomotion)    [place] Level (Gait) standby assist  -     Distance in Feet (Gait) 200  -KH     Deviations/Abnormal Patterns (Gait) gait speed decreased  -               User Key  (r) = Recorded By, (t) = Taken By, (c) = Cosigned By      Initials Name Provider Type    Wanda Chambers, PT Physical Therapist                   Obj/Interventions    No documentation.                  Goals/Plan    No documentation.                  Clinical Impression       Row Name 06/17/24 1301          Pain    Pretreatment Pain Rating 3/10  -KH     Posttreatment Pain Rating 3/10  -KH     Pain Location incisional  -     Pain Location - chest  -       Row Name 06/17/24 1301          Plan of Care Review    Plan of Care Reviewed With patient  -     Outcome Evaluation Pt ambulated well with PT this morning. Ambulation distance limited by increased pain with activity. Plans rehab later today.  -       Row Name 06/17/24 1301          Vital Signs    O2 Delivery Pre Treatment supplemental O2  -KH     O2 Delivery Intra Treatment supplemental O2  -KH     O2 Delivery Post Treatment supplemental O2  -KH       [facility] Name 06/17/24 1301          Positioning and Restraints    Pre-Treatment Position in bed  no alarm  -KH     Post Treatment Position bed  -KH     In Bed fowlers;call light within reach;encouraged to call for assist  -               User Key  (r) = Recorded By, (t) = Taken By, (c) = Cosigned By      Initials Name Provider Type    Wanda Chambers, PT Physical Therapist                   Outcome Measures       Row Name 06/17/24 1308 06/17/24 0906       How much help from another person do you currently need...    Turning from your back to your side while in flat bed without using bedrails? 4   -KH 4  -AC    Moving from lying on back to sitting on the side of a flat bed without bedrails? 4  -KH 4  -AC    Moving to and from a bed to a chair (including a wheelchair)? 4  -KH 4  -AC    Standing up from a chair using your arms (e.g., wheelchair, bedside chair)? 4  -KH 4  -AC    Climbing 3-5 steps with a railing? 3  -KH 4  -AC    To walk in hospital room? 4  -KH 4  -AC    AM-PAC 6 Clicks Score (PT) 23  -KH 24  -AC    Highest Level of Mobility Goal 7 --> Walk 25 feet or more  -KH 8 --> Walked 250 feet or more  -AC      Row Name 06/17/24 1308          Functional Assessment    Outcome Measure Options AM-PAC 6 Clicks Basic Mobility (PT)  -               User Key  (r) = Recorded By, (t) = Taken By, (c) = Cosigned By      Initials Name Provider Type    Wanda Chambers, PT Physical Therapist    Simran Hernandez, RN Registered Nurse                                 Physical Therapy Education       Title: PT OT SLP Therapies (Not Started)       Topic: Physical Therapy (Not Started)       Point: Mobility training (Not Started)       Learner Progress:  Not documented in this visit.              Point: Home exercise program (Not Started)       Learner Progress:  Not documented in this visit.              Point: Body mechanics (Not Started)       Learner Progress:  Not documented in this visit.              Point: Precautions (Not Started)       Learner Progress:  Not documented in this visit.                                  PT Recommendation and Plan     Plan of Care Reviewed With: patient  Outcome Evaluation: Pt ambulated well with PT this morning. Ambulation distance limited by increased pain with activity. Plans rehab later today.     Time Calculation:         PT Charges       Row Name 06/17/24 1308             Time Calculation    Start Time 0940  -      Stop Time 0950  -      Time Calculation (min) 10 min  -      PT Received On 06/17/24  -         Time Calculation- PT    Total Timed Code Minutes-  PT 10 minute(s)  -KH         Timed Charges    04948 - PT Therapeutic Exercise Minutes 10  -KH         Total Minutes    Timed Charges Total Minutes 10  -KH       Total Minutes 10  -KH                User Key  (r) = Recorded By, (t) = Taken By, (c) = Cosigned By      Initials Name Provider Type    Wanda Chambers, PT Physical Therapist                  Therapy Charges for Today       Code Description Service Date Service Provider Modifiers Qty    [id number] HC PT THER PROC EA 15 MIN 6/17/2024 Wanda Salazar, PT GP 1            PT G-Codes  Outcome Measure Options: AM-PAC 6 Clicks Basic Mobility (PT)  AM-PAC 6 Clicks Score (PT): 23  PT Discharge Summary  Anticipated Discharge Disposition (PT): inpatient rehabilitation facility, skilled nursing facility    Wanda Salazar, PT  6/17/2024

## 2024-06-17 NOTE — PROGRESS NOTES
LOS: 9 days     Chief Complaint: MRSA endocarditis    Interval History: Patient scott afebrile.  Tolerating antibiotics.    Vital Signs  Temp:  [97.8 °F (36.6 °C)-99.7 °F (37.6 °C)] 98.8 °F (37.1 °C)  Heart Rate:  [73-82] 73  Resp:  [18] 18  BP: (106-126)/(69-82) 126/82    Physical Exam:  General: No acute distress  HEENT: Nasal cannula in place.  Respiratory: Normal work of breathing  Skin: Multiple tattoos  Access: PICC line    Antibiotics:  Anti-Infectives (From admission, onward)      Ordered     Dose/Rate Route Frequency Start Stop    06/17/24 0831  vancomycin 1750 mg/500 mL 0.9% NS IVPB ([facility])        Ordering Provider: Gary Barton MD    1,750 mg  over 105 Minutes Intravenous Every 24 Hours 06/17/24 0930 07/24/24 0929    06/12/24 1323  Pharmacy to dose vancomycin        Ordering Provider: Joseph Epps,      Does not apply Continuous PRN 06/12/24 1322 07/24/24 2359    06/10/24 1742  vancomycin IVPB 1500 mg in 0.9% NaCl (Premix) 500 mL        Ordering Provider: Eric Skaggs MD    15 mg/kg × 91.9 kg Intravenous Once 06/12/24 0600 06/12/24 0657             Results Review:     I reviewed the patient's new clinical results.    Lab Results   Component Value Date    WBC 7.89 06/16/2024    HGB 8.4 (L) 06/16/2024    HCT 26.1 (L) 06/16/2024    MCV 88.8 06/16/2024     (L) 06/16/2024     Lab Results   Component Value Date    GLUCOSE 104 (H) 06/17/2024    BUN 18 06/17/2024    CREATININE 0.98 06/17/2024    EGFRIFAFRI >60 07/22/2021    BCR 18.4 06/17/2024    CO2 29.0 06/17/2024    CALCIUM 8.4 (L) 06/17/2024    ALBUMIN 3.1 (L) 06/17/2024    LABIL2 0.9 (L) 07/22/2021     (H) 06/08/2024     (H) 06/08/2024       Microbiology:  6/2 blood cultures no growth  6/9 COVID-negative  6/12 aortic valve tissue culture no growth     Outside hospital blood cultures  5/9 3 out of 3 MRSA  5/11 1 out of 1 MRSA    Assessment    #MRSA tricuspid valve endocarditis status post AVR/MVR/TVR 6/12  #MRSA  septicemia, blood cultures cleared  #IVDU  #Septic pulmonary emboli  #Right molar periapical abscess status post home health tooth extraction 6/9  #Acute hypoxic respiratory failure  #CATRINA  #Splenic infarct, likely septic    Valvular cultures have remained negative.  Vancomycin level today therapeutic at 12.  Continue IV vancomycin goal -600.  Plans are going to be for 6 weeks of vancomycin therapy through end date July 24.  PICC line has been placed and highly recommend discharge to a facility given her history of IVDU since discharge home would be unsafe with IV access.  Follow-up will be made in clinic.  She will need weekly CBC with differential, creatinine and vancomycin level faxed to [phone] while on therapy.      Thank you for allowing me to be involved in the care of this patient. Infectious diseases will sign off at this time with antibiotics plan in place, but please call me at [phone] if any further ID questions or new ID concerns.

## 2024-06-17 NOTE — PLAN OF CARE
Goal Outcome Evaluation:  Plan of Care Reviewed With: patient           Outcome Evaluation: Pt ambulated well with PT this morning. Ambulation distance limited by increased pain with activity. Plans rehab later today.      Anticipated Discharge Disposition (PT): inpatient rehabilitation facility, skilled nursing facility

## 2024-06-17 NOTE — PROGRESS NOTES
" LOS: 9 days   Patient Care Team:  Provider, No Known as PCP - General    Chief Complaint:   Post-op follow-up, s/p AVR/MVr/TVR    Subjective  Ready for discharge to rehab.     Vital Signs  Temp:  [97.8 °F (36.6 °C)-99.7 °F (37.6 °C)] 98.8 °F (37.1 °C)  Heart Rate:  [73-82] 73  Resp:  [18] 18  BP: (106-126)/(69-82) 126/82      06/15/24  1305 06/16/24  0409 06/17/24  0355   Weight: 93.7 kg (206 lb 9.1 oz) 90.9 kg (200 lb 6.4 oz) 90.3 kg (199 lb)     Body mass index is 32.12 kg/m².    Intake/Output Summary (Last 24 hours) at 6/17/2024 1011  Last data filed at 6/17/2024 0207  Gross per 24 hour   Intake 1060 ml   Output 2200 ml   Net -1140 ml     No intake/output data recorded.      Objective:  Vital signs: (most recent): Blood pressure 126/82, pulse 73, temperature 98.8 °F (37.1 °C), temperature source Oral, resp. rate 18, height 167.6 cm (66\"), weight 90.3 kg (199 lb), SpO2 100%.                Physical Exam:   General Appearance: awake and alert, no acute distress   Lungs: respirations regular and respirations unlabored   Heart: regular rhythm & normal rate and normal S1, S2   Abdomen: soft or nontender, + bowel sounds    Skin: sternal incision clean, dry, intact   Neuro: alert and oriented, no focal deficits.     Results Review:      WBC WBC   Date Value Ref Range Status   06/16/2024 7.89 3.40 - 10.80 10*3/mm3 Final   06/15/2024 9.02 3.40 - 10.80 10*3/mm3 Final      HGB Hemoglobin   Date Value Ref Range Status   06/16/2024 8.4 (L) 12.0 - 15.9 g/dL Final   06/15/2024 8.8 (L) 12.0 - 15.9 g/dL Final      HCT Hematocrit   Date Value Ref Range Status   06/16/2024 26.1 (L) 34.0 - 46.6 % Final   06/15/2024 27.7 (L) 34.0 - 46.6 % Final      Platelets Platelets   Date Value Ref Range Status   06/16/2024 130 (L) 140 - 450 10*3/mm3 Final   06/15/2024 122 (L) 140 - 450 10*3/mm3 Final        PT/INR:  No results found for: \"PROTIME\"/No results found for: \"INR\"    Sodium Sodium   Date Value Ref Range Status   06/17/2024 135 (L) " 136 - 145 mmol/L Final   06/16/2024 135 (L) 136 - 145 mmol/L Final   06/15/2024 136 136 - 145 mmol/L Final      Potassium Potassium   Date Value Ref Range Status   06/17/2024 4.1 3.5 - 5.2 mmol/L Final   06/16/2024 4.6 3.5 - 5.2 mmol/L Final   06/16/2024 3.4 (L) 3.5 - 5.2 mmol/L Final   06/15/2024 4.1 3.5 - 5.2 mmol/L Final   06/14/2024 4.2 3.5 - 5.2 mmol/L Final      Chloride Chloride   Date Value Ref Range Status   06/17/2024 100 98 - 107 mmol/L Final   06/16/2024 95 (L) 98 - 107 mmol/L Final   06/15/2024 98 98 - 107 mmol/L Final      Bicarbonate CO2   Date Value Ref Range Status   06/17/2024 29.0 22.0 - 29.0 mmol/L Final   06/16/2024 29.0 22.0 - 29.0 mmol/L Final   06/15/2024 30.0 (H) 22.0 - 29.0 mmol/L Final      BUN BUN   Date Value Ref Range Status   06/17/2024 18 6 - 20 mg/dL Final   06/16/2024 17 6 - 20 mg/dL Final   06/15/2024 22 (H) 6 - 20 mg/dL Final      Creatinine Creatinine   Date Value Ref Range Status   06/17/2024 0.98 0.57 - 1.00 mg/dL Final   06/16/2024 0.89 0.57 - 1.00 mg/dL Final   06/15/2024 1.06 (H) 0.57 - 1.00 mg/dL Final      Calcium Calcium   Date Value Ref Range Status   06/17/2024 8.4 (L) 8.6 - 10.5 mg/dL Final   06/16/2024 8.2 (L) 8.6 - 10.5 mg/dL Final   06/15/2024 8.3 (L) 8.6 - 10.5 mg/dL Final      Magnesium Magnesium   Date Value Ref Range Status   06/16/2024 1.3 (L) 1.6 - 2.6 mg/dL Final        aspirin, 81 mg, Oral, Daily  busPIRone, 5 mg, Oral, TID  furosemide, 20 mg, Oral, BID  gabapentin, 400 mg, Oral, Q8H  guaiFENesin, 1,200 mg, Oral, Q12H  magnesium oxide, 400 mg, Oral, Daily  metoprolol tartrate, 25 mg, Oral, Q12H  mupirocin, , Each Nare, BID  nicotine, 1 patch, Transdermal, Q24H  pantoprazole, 40 mg, Oral, QAM  senna-docusate sodium, 2 tablet, Oral, Nightly  sertraline, 25 mg, Oral, Daily  sodium chloride, 10 mL, Intravenous, Q12H  vancomycin, 1,750 mg, Intravenous, Q24H      Pharmacy to dose vancomycin,           Acute bacterial endocarditis      Assessment & Plan    - Native  tricuspid valve endocarditis, EF 50-55% (echo)--s/p AVR (tissue), TVR (tissue), MV repair, PFo closure- Pagni  - MRSA bacteremia/ UTI--ID following, on vanc  - Severe AI/ moderate-severe MR  - Acute HFrEF, r/t VHD, NYHA class III--proBNP > 33k  - Dilated CMO  - Polysubstance abuse--IVDA/ tobacco abuse  - PTSD--psych following  - Bipolar disorder, type 2  - CATRINA, likely r/t acute HF  - Anemia  - Tobacco abuse, 91 pack yr hx  - Poor dentition, likely periapical abscess right molar--s/p dental extraction 6/9  - Prolonged QTc--500 ms (6/3 EKG)  - Medical non-compliance     POD#5  Continue routine care.  PICC placed yesterday for long-term IV antibiotics.  IV antibiotics per ID thru 7/24/24.  Weekly CBC, BMP, Vanco trough faxed to infectious disease office.  Plan discharge to rehab.         Samson Ochoa PA-C  06/17/24  10:11 EDT

## 2024-06-17 NOTE — CONSULTS
Met with patient to discuss the benefits of cardiac rehab. Provided phase II information along with the contact information for cardiac rehab at [facility]. Requested for referral to be sent. Explained if receiving home health would not be able to attend cardiac rehab until finished with home health.

## 2024-06-17 NOTE — CASE MANAGEMENT/SOCIAL WORK
Case Management Discharge Note      Final Note: Pt discharged to [facility] via [facility] wheelchair van, 6/17/24..……..Barb NICE/NACHO LENNON    Provided Post Acute Provider List?: Yes  Post Acute Provider List: Nursing Home  Provided Post Acute Provider Quality & Resource List?: Yes  Post Acute Provider Quality and Resource List: Nursing Home  Delivered To: Patient  Method of Delivery: In person    Selected Continued Care - Discharged on 6/17/2024 Admission date: 6/8/2024 - Discharge disposition: Skilled Nursing Facility (DC - External)      Destination Coordination complete.      Service Provider Selected Services Address Phone Fax Patient Preferred    [facility] [address] [place] [address] [phone] --              Durable Medical Equipment    No services have been selected for the patient.                Dialysis/Infusion    No services have been selected for the patient.                Home Medical Care    No services have been selected for the patient.                Therapy    No services have been selected for the patient.                Community Resources    No services have been selected for the patient.                Community & DME    No services have been selected for the patient.                         Final Discharge Disposition Code: 03 - skilled nursing facility (SNF)

## 2024-06-17 NOTE — PROGRESS NOTES
"[facility]: Vancomycin Monitoring Note    Марина Tilley is a 48 y.o. female who is on day 14/42 of pharmacy (continuation of therapy from [facility]) to dose vancomycin for MRSA septicemia and MRSA tricuspid valve endocarditis.    ID Recommendations: Patient is going to need 6 weeks of antibiotic therapy (end date 7/24/24)  after valvular replacement for endocarditis. Stress abstinence from IV drug abuse.     Updated Cultures and Sensitivities:   5/9: BCx-MRSA (Guido)  5/11: BCx-MRSA (Guido)  6/2: BCx x2 sets NGF  6/4: LETICIA-large mobile mass on tricuspid valve consistent with vegetation  6/12 Tissue Cx x2 sets NGTD     Results from last 7 days   Lab Units 06/17/24  0729 06/14/24  0551   VANCOMYCIN TR mcg/mL 12.00 19.40     Vitals/Labs  Ht: 167.6 cm (66\"); Wt: 90.3 kg (199 lb)   Temp Readings from Last 1 Encounters:   06/17/24 98.8 °F (37.1 °C) (Oral)     Estimated Creatinine Clearance: 81.2 mL/min (by C-G formula based on SCr of 0.98 mg/dL).     Results from last 7 days   Lab Units 06/17/24  0729 06/16/24  0315 06/15/24  0312 06/14/24  0551   CREATININE mg/dL 0.98 0.89 1.06* 0.98   WBC 10*3/mm3  --  7.89 9.02 13.38*     Assessment/Plan    Current Vancomycin Dose: 1,750 mg IV Q24H; provides a predicted  mg/L.hr    Next Level Date and Time: Vanc Trough is scheduled for 6/20 at 0900.   SCr ordered daily with AM labs.       Thank you for involving pharmacy in this patient's care. Please contact pharmacy with any questions or concerns.       Carmelina Fajardo RPH  06/17/24 08:25 EDT  "

## 2024-06-17 NOTE — DISCHARGE SUMMARY
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 now s/p AVR (tissue), TVR (tissue), MV repair, PFO closure, [place] closure  - MRSA bacteremia/UTI  - Acute HFrEF, r/t VHD, NYHA class III  - Dilated CMO  - Polysubstance abuse--IVDA/ tobacco abuse  - PTSD  - Bipolar disorder, type 2  - CATRINA, likely r/t acute HF  - Anemia  - Poor dentition, likely periapical abscess right molar now s/p dental extraction 6/9  - Prolonged QTc  - Medical non-compliance    Presenting Problem/History of Present Illness:  - 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 female who was transferred to [facility] from [facility] on 6/8/24 for treatment of MRSA endocarditis, dental caries, and periodontitis. She was seen and evaluated by Oral Surgery on 6/8/24 and taken to the OR on 6/9/24 for full dental extraction. On 6/10/24 she underwent cardiac catheterization and was found to have normal coronary arteries. On 6/12/24 she was taken to the Operating Room and under general anesthesia and via median sternotomy underwent aortic valve replacement with a 23 mm Magna pericardial prosthesis, mitral valve annuloplasty with a 30 mm Medtronic flexible band, complex repair of the tricuspid valve followed by takedown and eventual tricuspid valve replacement with a 31 mm Ramirez II porcine prosthesis, primary closure of a patent foramen ovale/ASD, and left atrial appendage endocardial closure by Dr. Skaggs. Patient tolerated the  procedure well and was transported to the Open Heart Recovery Unit in stable condition. Over the next several hours she was successfully weaned from the ventilator and extubated. On post-operative day #1 she was transferred to the Cardiac step-down unit. On post-operative day #2 she was weaned off Milrinone and her chest tubes were removed. On post-operative day #4 a PICC line was placed in her RUE and her central line was removed. On post-operative day #5, on 6/17/24, she was deemed stable for discharge to [facility] with 6 weeks of IV Vancomycin (end date 7/24/24). She will need weekly CBC with differential, creatinine, and Vancomycin levels faxed to the Infectious Disease Clinic at [phone] while on antibiotic therapy.        Procedures Performed:  6/12/24 Dr. Skaggs  1.  Urgent AVR with a 23 mm magna pericardial prosthesis  2.  Mitral valve annuloplasty with a 30 mm Medtronic flexible band  3.  Complex repair of the tricuspid valve with partial reconstruction of the anterior and septal leaflet with bovine glutaraldehyde pressure pericardium and a 30 mm physio tricuspid ring with residual TR followed by takedown of the repair  4.  Tricuspid valve replacement with a 31 mm Ramirez II porcine prosthesis   5.  Primary closure of patent foramen ovale/ASD  6.  Left atrial appendage endocardial closure  6/9/24 Dr. Zaidi  Extraction of teeth (6, 7, 11, 12, 13, 15, 18, 21, 22, 23, 24, 25, 26, 27, 28, and 31)       Consults:   Consults       Date and Time Order Name Status Description    6/9/2024 10:19 AM Inpatient Nephrology Consult Completed     6/8/2024  4:12 PM Inpatient Infectious Diseases Consult Completed     6/4/2024 10:31 AM Inpatient Psychiatrist Consult Completed     6/3/2024  2:10 AM Inpatient Cardiology Consult Completed     6/3/2024  1:57 AM Inpatient Infectious Diseases Consult Completed         Infectious disease-Dr. Epps  Oral surgery-Dr. Zaidi  Nephrology-Dr. Miller      Pertinent  Test Results:    Lab Results   Component Value Date    WBC 7.89 06/16/2024    HGB 8.4 (L) 06/16/2024    HCT 26.1 (L) 06/16/2024    MCV 88.8 06/16/2024     (L) 06/16/2024      Lab Results   Component Value Date    GLUCOSE 104 (H) 06/17/2024    GLUCOSE 101 (H) 06/17/2024    CALCIUM 8.4 (L) 06/17/2024    CALCIUM 8.4 (L) 06/17/2024     (L) 06/17/2024     (L) 06/17/2024    K 4.1 06/17/2024    K 4.2 06/17/2024    CO2 29.0 06/17/2024    CO2 27.0 06/17/2024     06/17/2024     06/17/2024    BUN 18 06/17/2024    BUN 18 06/17/2024    CREATININE 0.98 06/17/2024    CREATININE 0.94 06/17/2024    EGFRIFAFRI >60 07/22/2021    BCR 18.4 06/17/2024    BCR 19.1 06/17/2024    ANIONGAP 6.0 06/17/2024    ANIONGAP 8.0 06/17/2024     Lab Results   Component Value Date    INR 1.14 (H) 06/13/2024    PROTIME 14.8 (H) 06/13/2024       Condition on Discharge: Stable     Vital Signs  Temp:  [97.8 °F (36.6 °C)-99.7 °F (37.6 °C)] 98.5 °F (36.9 °C)  Heart Rate:  [70-82] 70  Resp:  [18] 18  BP: (106-126)/(69-82) 108/75  Body mass index is 32.12 kg/m².    Discharge Disposition  Skilled Nursing Facility (DC - External)    Discharge Medications     Discharge Medications        New Medications        Instructions Start Date   aspirin 81 MG EC tablet   81 mg, Oral, Daily   Start Date: June 18, 2024     busPIRone 5 MG tablet  Commonly known as: BUSPAR   5 mg, Oral, 3 Times Daily      guaiFENesin 600 MG 12 hr tablet  Commonly known as: MUCINEX   1,200 mg, Oral, Every 12 Hours Scheduled      magnesium oxide 400 tablet tablet  Commonly known as: MAG-OX   400 mg, Oral, Daily   Start Date: June 18, 2024     metoprolol tartrate 25 MG tablet  Commonly known as: LOPRESSOR   25 mg, Oral, Every 12 Hours Scheduled      traMADol 50 MG tablet  Commonly known as: ULTRAM   50 mg, Oral, Every 6 Hours PRN      vancomycin   1,750 mg, Intravenous, Every 24 Hours             Changes to Medications        Instructions Start Date   furosemide 20  MG tablet  Commonly known as: LASIX  What changed:   medication strength  how much to take  when to take this   20 mg, Oral, 2 Times Daily             Continue These Medications        Instructions Start Date   albuterol sulfate  (90 Base) MCG/ACT inhaler  Commonly known as: PROVENTIL HFA;VENTOLIN HFA;PROAIR HFA   2 puffs, Inhalation, Every 4 Hours PRN      bisacodyl 5 MG EC tablet  Commonly known as: DULCOLAX   5 mg, Oral, Daily PRN      bisacodyl 10 MG suppository  Commonly known as: DULCOLAX   10 mg, Rectal, Daily PRN      calcium carbonate 500 MG chewable tablet  Commonly known as: TUMS   2 tablets, Oral, 2 Times Daily PRN      famotidine 40 MG tablet  Commonly known as: PEPCID   40 mg, Oral, Daily      gabapentin 100 MG capsule  Commonly known as: NEURONTIN   100 mg, Oral, 3 Times Daily      hydrOXYzine 50 MG tablet  Commonly known as: ATARAX   50 mg, Oral, 3 Times Daily PRN      ipratropium-albuterol 0.5-2.5 mg/3 ml nebulizer  Commonly known as: DUO-NEB   3 mL, Nebulization, Every 4 Hours PRN      nicotine 21 MG/24HR patch  Commonly known as: NICODERM CQ   1 patch, Transdermal, Every 24 Hours Scheduled      sertraline 25 MG tablet  Commonly known as: ZOLOFT   25 mg, Oral, Daily             Stop These Medications      acetaminophen 325 MG tablet  Commonly known as: TYLENOL     dicyclomine 10 MG capsule  Commonly known as: BENTYL     metoprolol succinate XL 25 MG 24 hr tablet  Commonly known as: TOPROL-XL     ondansetron 2 mg/mL injection  Commonly known as: ZOFRAN     polyethylene glycol 17 g packet  Commonly known as: MIRALAX     sennosides-docusate 8.6-50 MG per tablet  Commonly known as: PERICOLACE     vancomycin 750 mg in sodium chloride 0.9 % 250 mL IVPB              Discharge Diet:   Diet Instructions    Healthy heart diet           Activity at Discharge:   Activity Instructions    Activity as tolerated       1. No driving for 2 weeks and off narcotic pain medications.  2. Shower daily. Clean  incisions with warm water and antibacterial soap only. Do not put any lotion or ointments on incisions.  3. Ambulate for 10 minutes at least 3 times a day.  4. No heavy lifting > 10lbs until seen in office.   5. Take all medications as prescribed.       Follow-up Appointments  Future Appointments   Date Time Provider Department Center   7/18/2024  1:00 PM Stacey Ambriz APRN [facility] [place] [facility]   8/29/2024  3:00 PM Adams Thomson MD [facility]     Additional Instructions for the Follow-ups that You Need to Schedule       Ambulatory Referral to Cardiac Rehab   As directed      Call MD With Problems / Concerns   As directed      Instructions:  Call office at [phone] for any drainage, increased redness, or fever over 100.5    Order Comments: Instructions:  Call office at [phone] for any drainage, increased redness, or fever over 100.5         Discharge Follow-up with PCP   As directed       Currently Documented PCP:    Provider, No Known    PCP Phone Number:    None     Follow Up Details: in 1 week        Discharge Follow-up with Specialty: Cardiologist APRN/PA; 1 Week   As directed      Specialty: Cardiologist APRN/PA   Follow Up: 1 Week   Follow Up Details: bring all prescription bottles to appointment, call for appointment        Discharge Follow-up with Specified Provider: Cardiologist; 1 Month   As directed      To: Cardiologist   Follow Up: 1 Month   Follow Up Details: call for appointment, bring all medication bottles to appointment        Discharge Follow-up with Specified Provider: cardiac surgery office   As directed      To: cardiac surgery office   Follow Up Details: 7/16/24 at 1PM, bring all current medications to appointment        Basic Metabolic Panel  (Once a week)  Jun 17, 2025      Weekly BMP with results faxed to infectious disease office [phone]    Order Comments: Weekly BMP with results faxed to infectious disease office [phone]    Release to patient: Routine  Release        CBC & Differential  (Once a week)  Jun 17, 2025      Weekly CBC with results faxed to Infectious Disease office [phone]    Order Comments: Weekly CBC with results faxed to Infectious Disease office [phone]    Manual Differential: No   Release to patient: Routine Release        Vancomycin, Trough  (Once a week)  Jun 17, 2025      Weekly vanc trough, Results faxed to Infectious Disease office [phone]    Order Comments: Weekly vanc trough, Results faxed to Infectious Disease office [phone]    Release to patient: Routine Release              Tests Pending at Discharge:  Weekly CBC with differential, creatinine, and Vancomycin levels faxed to the Infectious Disease Clinic at [phone] while on antibiotic therapy.

## 2024-06-17 NOTE — PLAN OF CARE
Goal Outcome Evaluation:  Plan of Care Reviewed With: patient        Progress: no change  Outcome Evaluation: A&O, PICC line placed yesterday for long term antibiotics, up ad marisa, VSS, dc to rehab today.

## 2024-06-17 NOTE — CASE MANAGEMENT/SOCIAL WORK
Continued Stay Note  [facility]     Patient Name: Марина Tilley  MRN: [id number]  Today's Date: 6/17/2024    Admit Date: 6/8/2024    Plan: [facility] via [employer] [facility] at 3:00 PM today, 6/17/24   Discharge Plan       Row Name 06/17/24 1043       Plan    Plan [facility] via [employer] at 3:00 PM today, 6/17/24    Plan Comments Received call from RN that Pt discharging today.  Pt has to transport via wheelchair van to [facility].  [facility] is their pharmacy.  S/W Renisha/[employer] [phone] at 10:43 AM and scheduled Pt w/c van for 3PM today to rehab.  Reservation number [id number].  CCP awaiting D/C summary..............Barb NICE/NILSA                   Discharge Codes    No documentation.                 Expected Discharge Date and Time       Expected Discharge Date Expected Discharge Time    Jun 17, 2024               Barb Celeste RN

## 2024-06-24 NOTE — PAYOR COMM NOTE
"James Tilley (48 y.o. Female)                                      ATTENTION; ;DISCHARGE CASE REF [id number]                                   REPLY TO UR DEPT  [phone]           Date of Birth   1975    Social Security Number       Address   [address] [place] [address] [place] [address]    Home Phone       MRN   [id number]       Anglican   None    Marital Status   Legally                             Admission Date   6/8/24    Admission Type   Urgent    Admitting Provider   Eric Skaggs MD    Attending Provider       Department, Room/Bed   [facility] CARDIOVASC UNIT, 2216/1       Discharge Date   6/17/2024    Discharge Disposition   Skilled Nursing Facility (DC - External)    Discharge Destination                                 Attending Provider: (none)   Allergies: Cephalexin, Latex    Isolation: None   Infection: MRSA/History Only (06/10/24)   Code Status: Prior    Ht: 167.6 cm (66\")   Wt: 90.3 kg (199 lb)    Admission Cmt: None   Principal Problem: Acute bacterial endocarditis [I33.0]                   Active Insurance as of 6/8/2024       Primary Coverage       Payor Plan Insurance Group Employer/Plan Group    [employer] [place] [place]       Payor Plan Address Payor Plan Phone Number Payor Plan Fax Number Effective Dates    [address]   7/1/2021 - None Entered    [facility] [id number]         Subscriber Name Subscriber Birth Date Member ID       JAMES TILLEY 1975 [id number]                     Emergency Contacts        (Rel.) Home Phone Work Phone Mobile Phone    Naomi Schafer (Sister) [phone] -- [phone]                 Discharge Summary        Samson Ochoa PA-C at 06/17/24 0930       Attestation signed by Eric Skaggs MD at 06/18/24 1328    I have reviewed this documentation and agree.                  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 now s/p AVR (tissue), TVR (tissue), MV repair, PFO closure, ALEXANDRIA closure  - MRSA bacteremia/UTI  - Acute HFrEF, r/t VHD, NYHA class III  - Dilated CMO  - Polysubstance abuse--IVDA/ tobacco abuse  - PTSD  - Bipolar disorder, type 2  - CATRINA, likely r/t acute HF  - Anemia  - Poor dentition, likely periapical abscess right molar now s/p dental extraction 6/9  - Prolonged QTc  - Medical non-compliance    Presenting Problem/History of Present Illness:  - 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 female who was transferred to [facility] from [facility] on 6/8/24 for treatment of MRSA endocarditis, dental caries, and periodontitis. She was seen and evaluated by Oral Surgery on 6/8/24 and taken to the OR on 6/9/24 for full dental extraction. On 6/10/24 she underwent cardiac catheterization and was found to have normal coronary arteries. On 6/12/24 she was taken to the Operating Room and under general anesthesia and via median sternotomy underwent aortic valve replacement with a 23 mm Magna pericardial prosthesis, mitral valve annuloplasty with a 30 mm Medtronic flexible band, complex repair of the tricuspid valve followed by takedown and eventual tricuspid valve replacement with a 31 mm Ramirez II porcine prosthesis, primary closure of a patent foramen ovale/ASD, and left atrial appendage endocardial closure by Dr. Skaggs. Patient tolerated the procedure well and was transported to the Open Heart Recovery Unit in stable condition. Over the next several hours  she was successfully weaned from the ventilator and extubated. On post-operative day #1 she was transferred to the Cardiac step-down unit. On post-operative day #2 she was weaned off Milrinone and her chest tubes were removed. On post-operative day #4 a PICC line was placed in her RUE and her central line was removed. On post-operative day #5, on 6/17/24, she was deemed stable for discharge to [facility] with 6 weeks of IV Vancomycin (end date 7/24/24). She will need weekly CBC with differential, creatinine, and Vancomycin levels faxed to the Infectious Disease Clinic at [phone] while on antibiotic therapy.        Procedures Performed:  6/12/24 Dr. Skaggs  1.  Urgent AVR with a 23 mm magna pericardial prosthesis  2.  Mitral valve annuloplasty with a 30 mm Medtronic flexible band  3.  Complex repair of the tricuspid valve with partial reconstruction of the anterior and septal leaflet with bovine glutaraldehyde pressure pericardium and a 30 mm physio tricuspid ring with residual TR followed by takedown of the repair  4.  Tricuspid valve replacement with a 31 mm Ramirez II porcine prosthesis   5.  Primary closure of patent foramen ovale/ASD  6.  Left atrial appendage endocardial closure  6/9/24 Dr. Zaidi  Extraction of teeth (6, 7, 11, 12, 13, 15, 18, 21, 22, 23, 24, 25, 26, 27, 28, and 31)       Consults:   Consults       Date and Time Order Name Status Description    6/9/2024 10:19 AM Inpatient Nephrology Consult Completed     6/8/2024  4:12 PM Inpatient Infectious Diseases Consult Completed     6/4/2024 10:31 AM Inpatient Psychiatrist Consult Completed     6/3/2024  2:10 AM Inpatient Cardiology Consult Completed     6/3/2024  1:57 AM Inpatient Infectious Diseases Consult Completed         Infectious disease-Dr. Epps  Oral surgery-Dr. Zaidi  Nephrology-Dr. Miller      Pertinent Test Results:    Lab Results   Component Value Date    WBC 7.89 06/16/2024    HGB 8.4 (L) 06/16/2024    HCT 26.1 (L)  06/16/2024    MCV 88.8 06/16/2024     (L) 06/16/2024      Lab Results   Component Value Date    GLUCOSE 104 (H) 06/17/2024    GLUCOSE 101 (H) 06/17/2024    CALCIUM 8.4 (L) 06/17/2024    CALCIUM 8.4 (L) 06/17/2024     (L) 06/17/2024     (L) 06/17/2024    K 4.1 06/17/2024    K 4.2 06/17/2024    CO2 29.0 06/17/2024    CO2 27.0 06/17/2024     06/17/2024     06/17/2024    BUN 18 06/17/2024    BUN 18 06/17/2024    CREATININE 0.98 06/17/2024    CREATININE 0.94 06/17/2024    EGFRIFAFRI >60 07/22/2021    BCR 18.4 06/17/2024    BCR 19.1 06/17/2024    ANIONGAP 6.0 06/17/2024    ANIONGAP 8.0 06/17/2024     Lab Results   Component Value Date    INR 1.14 (H) 06/13/2024    PROTIME 14.8 (H) 06/13/2024       Condition on Discharge: Stable     Vital Signs  Temp:  [97.8 °F (36.6 °C)-99.7 °F (37.6 °C)] 98.5 °F (36.9 °C)  Heart Rate:  [70-82] 70  Resp:  [18] 18  BP: (106-126)/(69-82) 108/75  Body mass index is 32.12 kg/m².    Discharge Disposition  Skilled Nursing Facility (DC - External)    Discharge Medications     Discharge Medications        New Medications        Instructions Start Date   aspirin 81 MG EC tablet   81 mg, Oral, Daily   Start Date: June 18, 2024     busPIRone 5 MG tablet  Commonly known as: BUSPAR   5 mg, Oral, 3 Times Daily      guaiFENesin 600 MG 12 hr tablet  Commonly known as: MUCINEX   1,200 mg, Oral, Every 12 Hours Scheduled      magnesium oxide 400 tablet tablet  Commonly known as: MAG-OX   400 mg, Oral, Daily   Start Date: June 18, 2024     metoprolol tartrate 25 MG tablet  Commonly known as: LOPRESSOR   25 mg, Oral, Every 12 Hours Scheduled      traMADol 50 MG tablet  Commonly known as: ULTRAM   50 mg, Oral, Every 6 Hours PRN      vancomycin   1,750 mg, Intravenous, Every 24 Hours             Changes to Medications        Instructions Start Date   furosemide 20 MG tablet  Commonly known as: LASIX  What changed:   medication strength  how much to take  when to take this   20 mg,  Oral, 2 Times Daily             Continue These Medications        Instructions Start Date   albuterol sulfate  (90 Base) MCG/ACT inhaler  Commonly known as: PROVENTIL HFA;VENTOLIN HFA;PROAIR HFA   2 puffs, Inhalation, Every 4 Hours PRN      bisacodyl 5 MG EC tablet  Commonly known as: DULCOLAX   5 mg, Oral, Daily PRN      bisacodyl 10 MG suppository  Commonly known as: DULCOLAX   10 mg, Rectal, Daily PRN      calcium carbonate 500 MG chewable tablet  Commonly known as: TUMS   2 tablets, Oral, 2 Times Daily PRN      famotidine 40 MG tablet  Commonly known as: PEPCID   40 mg, Oral, Daily      gabapentin 100 MG capsule  Commonly known as: NEURONTIN   100 mg, Oral, 3 Times Daily      hydrOXYzine 50 MG tablet  Commonly known as: ATARAX   50 mg, Oral, 3 Times Daily PRN      ipratropium-albuterol 0.5-2.5 mg/3 ml nebulizer  Commonly known as: DUO-NEB   3 mL, Nebulization, Every 4 Hours PRN      nicotine 21 MG/24HR patch  Commonly known as: NICODERM CQ   1 patch, Transdermal, Every 24 Hours Scheduled      sertraline 25 MG tablet  Commonly known as: ZOLOFT   25 mg, Oral, Daily             Stop These Medications      acetaminophen 325 MG tablet  Commonly known as: TYLENOL     dicyclomine 10 MG capsule  Commonly known as: BENTYL     metoprolol succinate XL 25 MG 24 hr tablet  Commonly known as: TOPROL-XL     ondansetron 2 mg/mL injection  Commonly known as: ZOFRAN     polyethylene glycol 17 g packet  Commonly known as: MIRALAX     sennosides-docusate 8.6-50 MG per tablet  Commonly known as: PERICOLACE     vancomycin 750 mg in sodium chloride 0.9 % 250 mL IVPB              Discharge Diet:   Diet Instructions    Healthy heart diet           Activity at Discharge:   Activity Instructions    Activity as tolerated       1. No driving for 2 weeks and off narcotic pain medications.  2. Shower daily. Clean incisions with warm water and antibacterial soap only. Do not put any lotion or ointments on incisions.  3. Ambulate for 10  minutes at least 3 times a day.  4. No heavy lifting > 10lbs until seen in office.   5. Take all medications as prescribed.       Follow-up Appointments  Future Appointments   Date Time Provider Department [place]   7/18/2024  1:00 PM Stacey Ambriz APRN [facility] [place] [facility]   8/29/2024  3:00 PM Adams Thomson MD [facility] [place] [facility]     Additional Instructions for the Follow-ups that You Need to Schedule       Ambulatory Referral to Cardiac Rehab   As directed      Call MD With Problems / Concerns   As directed      Instructions:  Call office at [phone] for any drainage, increased redness, or fever over 100.5    Order Comments: Instructions:  Call office at [phone] for any drainage, increased redness, or fever over 100.5         Discharge Follow-up with PCP   As directed       Currently Documented PCP:    Provider, No Known    PCP Phone Number:    None     Follow Up Details: in 1 week        Discharge Follow-up with Specialty: Cardiologist APRN/PA; 1 Week   As directed      Specialty: Cardiologist APRN/PA   Follow Up: 1 Week   Follow Up Details: bring all prescription bottles to appointment, call for appointment        Discharge Follow-up with Specified Provider: Cardiologist; 1 Month   As directed      To: Cardiologist   Follow Up: 1 Month   Follow Up Details: call for appointment, bring all medication bottles to appointment        Discharge Follow-up with Specified Provider: cardiac surgery office   As directed      To: cardiac surgery office   Follow Up Details: 7/16/24 at 1PM, bring all current medications to appointment        Basic Metabolic Panel  (Once a week)  Jun 17, 2025      Weekly BMP with results faxed to infectious disease office [phone]    Order Comments: Weekly BMP with results faxed to infectious disease office [phone]    Release to patient: Routine Release        CBC & Differential  (Once a week)  Jun 17, 2025      Weekly CBC with results faxed to Infectious Disease  office [phone]    Order Comments: Weekly CBC with results faxed to Infectious Disease office [phone]    Manual Differential: No   Release to patient: Routine Release        Vancomycin, Trough  (Once a week)  Jun 17, 2025      Weekly vanc trough, Results faxed to Infectious Disease office [phone]    Order Comments: Weekly vanc trough, Results faxed to Infectious Disease office [phone]    Release to patient: Routine Release              Tests Pending at Discharge:  Weekly CBC with differential, creatinine, and Vancomycin levels faxed to the Infectious Disease Clinic at [phone] while on antibiotic therapy.                  Electronically signed by Eric Skaggs MD at 06/18/24 8695

## 2024-06-28 ENCOUNTER — OFFICE VISIT (OUTPATIENT)
Dept: CARDIOLOGY | Facility: CLINIC | Age: 49
End: 2024-06-28
Payer: MEDICAID

## 2024-06-28 VITALS
BODY MASS INDEX: 31.02 KG/M2 | HEART RATE: 71 BPM | WEIGHT: 193 LBS | SYSTOLIC BLOOD PRESSURE: 146 MMHG | DIASTOLIC BLOOD PRESSURE: 92 MMHG | HEIGHT: 66 IN | OXYGEN SATURATION: 92 %

## 2024-06-28 DIAGNOSIS — I34.0 NONRHEUMATIC MITRAL VALVE REGURGITATION: ICD-10-CM

## 2024-06-28 DIAGNOSIS — Z98.890 S/P MITRAL VALVE REPAIR: ICD-10-CM

## 2024-06-28 DIAGNOSIS — Z95.2 S/P AVR: ICD-10-CM

## 2024-06-28 DIAGNOSIS — I33.0 ACUTE BACTERIAL ENDOCARDITIS: Primary | ICD-10-CM

## 2024-06-28 DIAGNOSIS — I35.1 NONRHEUMATIC AORTIC VALVE INSUFFICIENCY: ICD-10-CM

## 2024-06-28 DIAGNOSIS — Z95.4 S/P TRICUSPID VALVE REPLACEMENT: ICD-10-CM

## 2024-06-28 DIAGNOSIS — Z72.0 TOBACCO ABUSE: ICD-10-CM

## 2024-06-28 DIAGNOSIS — I42.8 NICM (NONISCHEMIC CARDIOMYOPATHY): ICD-10-CM

## 2024-06-28 RX ORDER — METOPROLOL SUCCINATE 50 MG/1
50 TABLET, EXTENDED RELEASE ORAL DAILY
Qty: 90 TABLET | Refills: 3 | Status: SHIPPED | OUTPATIENT
Start: 2024-06-28

## 2024-06-28 RX ORDER — HYDROXYZINE 50 MG/1
50 TABLET, FILM COATED ORAL 3 TIMES DAILY PRN
COMMUNITY

## 2024-06-28 RX ORDER — TRAMADOL HYDROCHLORIDE 50 MG/1
50 TABLET ORAL EVERY 6 HOURS PRN
COMMUNITY

## 2024-06-28 NOTE — PROGRESS NOTES
Date of Office Visit: 2024  Encounter Provider: MARY Oates  Place of Service: [facility] CARDIOLOGY  Patient Name: Марина Tilley  :1975    Chief Complaint   Patient presents with    Cardiac Valve Problem   :     HPI: Марина Tilley is a 48 y.o. female patient with no prior cardiac history.  She has a history of IV drug use since the age of 16.  On 6/3, she presented to [facility] with shortness of breath and chest pain.  Of note, it sounds like she had been in numerous hospitals with similar concerns and was diagnosed with endocarditis of the tricuspid valve.  Unfortunately, she left AMA at both [facility] and [place].  She underwent a LETICIA demonstrating decreased LV systolic function, severe aortic regurgitation, a healed vegetation on the left coronary cusp prolapsing and causing eccentric aortic regurgitation jet, moderate to severe MR, moderate to severe TR, and a large mobile mass on the tricuspid valve consistent with vegetation.  Ultimately, she was transferred to [facility] for further care.    On , she was taken to the OR for full dental extraction.  On 6/10, cardiac catheterization demonstrated normal coronaries.  On , she underwent aortic valve replacement, mitral valve annuloplasty, and complex repair of the tricuspid valve followed by takedown and eventual tricuspid valve replacement, primary closure of a PFO/ASD, and left atrial appendage closure.  , she was stable for discharge.  She is here today for follow-up.    She has been feeling well.  She denies any chest pain, palpitations, edema, dizziness, or syncope.  She is still at rehab dilatation.  She participates in physical therapy every day.  During therapy she has mild dyspnea.  She denies any PND, orthopnea, or edema.  She is smoking 8 cigarettes a day.  Her blood pressure is a little high today.  However, she reports that has been normal at the rehab facility.    Past  Medical History:   Diagnosis Date    Anxiety     Asthma     CHF (congestive heart failure)     COPD (chronic obstructive pulmonary disease)        Past Surgical History:   Procedure Laterality Date    AORTIC VALVE REPAIR/REPLACEMENT MITRAL VALVE REPAIR/REPLACEMENT N/A 6/12/2024    Procedure: LETICIA; STERNOTOMY; AORTIC VALVE REPLACEMENT; MITRAL VALVE REPAIR; TRICUSPID VALVE REPLACEMENT; PRP;  Surgeon: Eric Skaggs MD;  Location: [facility] CVOR;  Service: Cardiothoracic;  Laterality: N/A;    CARDIAC CATHETERIZATION N/A 6/10/2024    Procedure: Left Heart Cath;  Surgeon: Gary Barton MD;  Location: [facility] CATH INVASIVE LOCATION;  Service: Cardiology;  Laterality: N/A;    CHOLECYSTECTOMY      COLONOSCOPY      ENDOSCOPY      HERNIA REPAIR      HYSTERECTOMY      TEETH EXTRACTION N/A 6/9/2024    Procedure: TOOTH EXTRACTION;  Surgeon: Sergio Zaidi DMD;  Location: [facility] OR;  Service: Oral Surgery;  Laterality: N/A;       Social History     Socioeconomic History    Marital status: Legally    Tobacco Use    Smoking status: Every Day     Current packs/day: 3.00     Average packs/day: 3.0 packs/day for 30.5 years (91.5 ttl pk-yrs)     Types: Cigarettes     Start date: 1994     Passive exposure: Current    Smokeless tobacco: Never   Vaping Use    Vaping status: Never Used   Substance and Sexual Activity    Alcohol use: Never    Drug use: Yes     Frequency: 7.0 times per week     Types: Heroin     Comment: patient states she quit 3 weeks ago    Sexual activity: Defer       Family History   Problem Relation Age of Onset    Malig Hyperthermia Neg Hx        Review of Systems   Constitutional: Negative.   Cardiovascular:  Positive for dyspnea on exertion. Negative for chest pain, leg swelling, orthopnea, paroxysmal nocturnal dyspnea and syncope.   Respiratory: Negative.     Hematologic/Lymphatic: Negative for bleeding problem.   Musculoskeletal:  Negative for falls.   Gastrointestinal:  Negative for melena.    Neurological:  Negative for dizziness and light-headedness.       Allergies   Allergen Reactions    Cephalexin Anaphylaxis     Required intubation    Latex Itching         Current Outpatient Medications:     albuterol sulfate  (90 Base) MCG/ACT inhaler, Inhale 2 puffs Every 4 (Four) Hours As Needed for Wheezing., Disp: , Rfl:     aspirin 81 MG EC tablet, Take 1 tablet by mouth Daily., Disp: 30 tablet, Rfl: 2    bisacodyl (DULCOLAX) 10 MG suppository, Insert 1 suppository into the rectum Daily As Needed for Constipation (Use if bisacodyl oral is ineffective)., Disp: , Rfl:     bisacodyl (DULCOLAX) 5 MG EC tablet, Take 1 tablet by mouth Daily As Needed for Constipation (Use if polyethylene glycol is ineffective)., Disp: , Rfl:     busPIRone (BUSPAR) 5 MG tablet, Take 1 tablet by mouth 3 (Three) Times a Day for 30 days., Disp: 90 tablet, Rfl: 0    calcium carbonate (TUMS) 500 MG chewable tablet, Chew 2 tablets 2 (Two) Times a Day As Needed for Heartburn., Disp: , Rfl:     Cyanocobalamin (VITAMIN B-12 PO), Take  by mouth., Disp: , Rfl:     famotidine (PEPCID) 40 MG tablet, Take 1 tablet by mouth Daily., Disp: , Rfl:     furosemide (LASIX) 20 MG tablet, Take 1 tablet by mouth 2 (Two) Times a Day for 30 days., Disp: 60 tablet, Rfl: 0    gabapentin (NEURONTIN) 100 MG capsule, Take 1 capsule by mouth 3 (Three) Times a Day., Disp: , Rfl:     hydrOXYzine (ATARAX) 50 MG tablet, Take 1 tablet by mouth 3 (Three) Times a Day As Needed for Itching., Disp: , Rfl:     ipratropium-albuterol (DUO-NEB) 0.5-2.5 mg/3 ml nebulizer, Take 3 mL by nebulization Every 4 (Four) Hours As Needed for Shortness of Air or Wheezing., Disp: , Rfl:     magnesium oxide (MAG-OX) 400 tablet tablet, Take 1 tablet by mouth Daily for 30 days., Disp: 30 tablet, Rfl: 0    sertraline (ZOLOFT) 25 MG tablet, Take 1 tablet by mouth Daily., Disp: , Rfl:     traMADol (ULTRAM) 50 MG tablet, Take 1 tablet by mouth Every 6 (Six) Hours As Needed for Moderate  "Pain., Disp: , Rfl:     vancomycin 1750 mg/500 mL 0.9% NS IVPB ([facility]), Infuse 500 mL into a venous catheter Daily for 37 doses. Indications: Endocarditis, Disp: , Rfl:     metoprolol succinate XL (TOPROL-XL) 50 MG 24 hr tablet, Take 1 tablet by mouth Daily., Disp: 90 tablet, Rfl: 3      Objective:     Vitals:    06/28/24 1017   BP: 146/92   Pulse: 71   SpO2: 92%   Weight: 87.5 kg (193 lb)   Height: 167.6 cm (66\")     Body mass index is 31.15 kg/m².    PHYSICAL EXAM:    Neck:      Vascular: No JVD.   Pulmonary:      Effort: Pulmonary effort is normal.      Breath sounds: Normal breath sounds.   Cardiovascular:      Normal rate. Regular rhythm.      Murmurs: There is no murmur.      No gallop.  No click. No rub.   Pulses:     Intact distal pulses.   Skin:     Comments: Midsternal incision well-healed. No erythema or edema.              ECG 12 Lead    Date/Time: 6/28/2024 10:32 AM  Performed by: Frances Gan APRN    Authorized by: Frances Gan APRN  Comparison: compared with previous ECG from 6/12/2024  Similar to previous ECG  Rhythm: sinus rhythm  Rate: normal  BPM: 71  T inversion: aVL, V1, V2, V3 and V4            Assessment:       Diagnosis Plan   1. Acute bacterial endocarditis  ECG 12 Lead      2. S/P tricuspid valve replacement        3. Nonrheumatic aortic valve insufficiency        4. S/P AVR        5. Nonrheumatic mitral valve regurgitation        6. S/P mitral valve repair        7. NICM (nonischemic cardiomyopathy)        8. Tobacco abuse          Orders Placed This Encounter   Procedures    ECG 12 Lead     This order was created via procedure documentation     Order Specific Question:   Release to patient     Answer:   Routine Release [[id number]]          Plan:       1.  Tricuspid valve bacterial endocarditis.  Status post tricuspid valve replacement.  She remains on IV antibiotics per infectious disease.      2.  Aortic regurgitation/mitral regurgitation.  Status post aortic valve " replacement and mitral valve repair.      3.  Nonischemic cardiomyopathy.  EF 45%.  She appears euvolemic on exam.  Recommended transitioning her from metoprolol to tartrate to metoprolol succinate.  This should also help her blood pressure.  I did speak with the nurse at the rehab facility regarding this change.  Could consider addition of ACE or ARB at the next visit.      4.  Tobacco abuse.  She is working on quitting.  Recommended continued cessation efforts.      Overall, I think she is doing well.  She will follow-up with Dr. Neves in 1 month.      As always, it has been a pleasure to participate in your patient's care.      Sincerely,         MARY Limon

## 2024-06-28 NOTE — PROGRESS NOTES
"Enter Query Response Below      Query Response: septic pulmonary emboli             If applicable, please update the problem list.     Patient: Марина Tilley        : 1975  Account: [id number]           Admit Date: 2024        How to Respond to this query:       a. Click New Note     b. Answer query within the yellow box.                c. Update the Problem List, if applicable.      If you have any questions about this query contact me at: [email or web address]     Dr. Skaggs/Mr. Ochoa    48-year-old female with history of IVDU admitted 24 with \"Native tricuspid valve endocarditis/severe AI/mod-sev MR\" per the discharge summary.  infectious disease consult notes \" 6/3 CT chest report reviewed with multifocal patchy peripheral nodular densities scattered throughout both lungs which have cavitary components corresponding with septic emboli\".  Treatment:  AVR (tissue), TVR (tissue), MV repair, PFO closure, ALEXANDRIA closure; IV antibiotics    Please clarify the following:  Septic emboli ruled in  Septic emboli ruled out  Other- specify______  Unable to determine    By submitting this query, we are merely seeking further clarification of documentation to accurately reflect all conditions that you are monitoring, evaluating, treating or that extend the hospitalization or utilize additional resources of care. Please utilize your independent clinical judgment when addressing the question(s) above.     This query and your response, once completed, will be entered into the legal medical record.    Septic emboli ruled in to the best of my knowledge    Electronically signed by Eric Skaggs MD, 24, 3:57 PM EDT.   Sincerely,  Batsheva Suarez MSN, RN, CCDS  Clinical Documentation Integrity Program     "

## 2024-07-08 ENCOUNTER — TELEPHONE (OUTPATIENT)
Dept: CARDIOLOGY | Facility: CLINIC | Age: 49
End: 2024-07-08
Payer: MEDICAID

## 2024-07-08 ENCOUNTER — TELEPHONE (OUTPATIENT)
Dept: INFECTIOUS DISEASES | Facility: CLINIC | Age: 49
End: 2024-07-08
Payer: MEDICAID

## 2024-07-08 RX ORDER — METOPROLOL SUCCINATE 50 MG/1
50 TABLET, EXTENDED RELEASE ORAL DAILY
Qty: 90 TABLET | Refills: 3 | Status: SHIPPED | OUTPATIENT
Start: 2024-07-08 | End: 2024-07-10 | Stop reason: SDUPTHER

## 2024-07-08 RX ORDER — ASPIRIN 81 MG/1
81 TABLET ORAL DAILY
Qty: 90 TABLET | Refills: 2 | Status: SHIPPED | OUTPATIENT
Start: 2024-07-08 | End: 2024-07-10 | Stop reason: SDUPTHER

## 2024-07-08 RX ORDER — FUROSEMIDE 20 MG/1
20 TABLET ORAL 2 TIMES DAILY
Qty: 180 TABLET | Refills: 3 | Status: SHIPPED | OUTPATIENT
Start: 2024-07-08 | End: 2024-07-10 | Stop reason: SDUPTHER

## 2024-07-08 NOTE — TELEPHONE ENCOUNTER
The pt ccalled requesting refills on all heart meds to [facility] in [place]. [facility] She will call them and see if they are still there

## 2024-07-08 NOTE — TELEPHONE ENCOUNTER
Received fax today from [facility] rehab/[facility] that Ms. Tilley left AMA on 7/7/24. They indicated that her PICC line was removed and that the antibiotics were not completed.   Thank you.

## 2024-07-08 NOTE — TELEPHONE ENCOUNTER
Caller: Марина Tilley    Relationship: Self    Best call back number: [phone]    Who are you requesting to speak with (clinical staff, provider,  specific staff member): CLINICAL    What was the call regarding: PT IS CALLING TO SEE IF MEDICATIONS CAN BE SENT IN TO [facility] [place], [place]. SHE SAID THEY STILL HAVENT RECEIVED PRESCRIPTIONS, PT NEEDS:    METOPROLOL SUCCINATE XL 50 MG  ASPIRIN 81 MG  MAGNESIUM OXIDE 400 MG     PT IS OUT OF ALL MEDICATIONS, PLEASE ADVISE

## 2024-07-10 RX ORDER — METOPROLOL SUCCINATE 50 MG/1
50 TABLET, EXTENDED RELEASE ORAL DAILY
Qty: 90 TABLET | Refills: 3 | Status: SHIPPED | OUTPATIENT
Start: 2024-07-10 | End: 2024-07-10 | Stop reason: SDUPTHER

## 2024-07-10 RX ORDER — FUROSEMIDE 20 MG/1
20 TABLET ORAL 2 TIMES DAILY
Qty: 60 TABLET | Refills: 0 | Status: SHIPPED | OUTPATIENT
Start: 2024-07-10

## 2024-07-10 RX ORDER — METOPROLOL SUCCINATE 50 MG/1
50 TABLET, EXTENDED RELEASE ORAL DAILY
Qty: 30 TABLET | Refills: 0 | Status: SHIPPED | OUTPATIENT
Start: 2024-07-10

## 2024-07-10 RX ORDER — ASPIRIN 81 MG/1
81 TABLET ORAL DAILY
Qty: 30 TABLET | Refills: 0 | Status: SHIPPED | OUTPATIENT
Start: 2024-07-10

## 2024-07-16 ENCOUNTER — OFFICE VISIT (OUTPATIENT)
Dept: CARDIOLOGY | Facility: CLINIC | Age: 49
End: 2024-07-16
Payer: MEDICAID

## 2024-07-16 VITALS
HEIGHT: 66 IN | WEIGHT: 193.6 LBS | OXYGEN SATURATION: 97 % | BODY MASS INDEX: 31.12 KG/M2 | DIASTOLIC BLOOD PRESSURE: 80 MMHG | HEART RATE: 91 BPM | SYSTOLIC BLOOD PRESSURE: 128 MMHG | RESPIRATION RATE: 18 BRPM

## 2024-07-16 DIAGNOSIS — Z98.890 S/P MITRAL VALVE REPAIR: ICD-10-CM

## 2024-07-16 DIAGNOSIS — I33.0 ACUTE BACTERIAL ENDOCARDITIS: Primary | ICD-10-CM

## 2024-07-16 DIAGNOSIS — Z95.2 S/P AVR: ICD-10-CM

## 2024-07-16 DIAGNOSIS — Z95.4 S/P TRICUSPID VALVE REPLACEMENT: ICD-10-CM

## 2024-07-16 PROCEDURE — 1159F MED LIST DOCD IN RCRD: CPT | Performed by: NURSE PRACTITIONER

## 2024-07-16 PROCEDURE — 93000 ELECTROCARDIOGRAM COMPLETE: CPT | Performed by: NURSE PRACTITIONER

## 2024-07-16 PROCEDURE — 1160F RVW MEDS BY RX/DR IN RCRD: CPT | Performed by: NURSE PRACTITIONER

## 2024-07-16 PROCEDURE — 99214 OFFICE O/P EST MOD 30 MIN: CPT | Performed by: NURSE PRACTITIONER

## 2024-07-16 RX ORDER — METOPROLOL SUCCINATE 50 MG/1
50 TABLET, EXTENDED RELEASE ORAL DAILY
Qty: 90 TABLET | Refills: 3 | Status: SHIPPED | OUTPATIENT
Start: 2024-07-16

## 2024-07-16 RX ORDER — SERTRALINE HYDROCHLORIDE 25 MG/1
25 TABLET, FILM COATED ORAL DAILY
Qty: 90 TABLET | Refills: 3 | Status: SHIPPED | OUTPATIENT
Start: 2024-07-16

## 2024-07-16 RX ORDER — FUROSEMIDE 20 MG/1
20 TABLET ORAL 2 TIMES DAILY
Qty: 180 TABLET | Refills: 3 | Status: SHIPPED | OUTPATIENT
Start: 2024-07-16

## 2024-07-16 NOTE — PROGRESS NOTES
Cardiology Office Follow Up Visit      Primary Care Provider:  Betty Damon PA    Reason for f/u:     Spinal follow-up  Status post AVR, mitral valve annuloplasty, tricuspid valve repair which progressed to tricuspid valve replacement and PFO closure      Subjective     CC:    Denies chest pain or dyspnea    History of Present Illness       Марина Tilley is a 49 y.o. female.  Patient is a very pleasant 49-year-old female who    Presents today for follow-up.  She had a prolonged hospitalization at [facility] following a hospitalization at [facility] and then was in rehab.    She presented to [facility] in with shortness of breath and found to be in congestive heart failure.  She underwent a LETICIA showing decreased LV systolic function, severe aortic regurgitation, a healed vegetation on the left coronary cusp prolapsing and causing eccentric aortic regurgitation jet, moderate to severe MR, moderate to severe TR and large mobile mass on the tricuspid valve consistent with vegetation.    Patient underwent full dental extraction in the OR.  She went on to have cardiac catheterization which demonstrated normal coronary arteries.  On June 12, 2024 she underwent aortic valve replacement, mitral valve annuloplasty and complex repair of the tricuspid valve followed by takedown and eventual tricuspid valve replacement.  She also had primary closure of a PFO/ASD and left atrial appendage closure.    She was discharged to rehab.  She has since been discharged home.    The patient tells me that she has been clean and sober since her hospital admission.  She denies any chest pain.  She reports compliance with prescribed medical therapy.  She is not having chest pain or shortness of breath currently.      ASSESSMENT/PLAN:      Diagnoses and all orders for this visit:    1. Acute bacterial endocarditis (Primary)    2. S/P AVR    3. S/P mitral valve repair    4. S/P tricuspid valve replacement    Other  orders  -     furosemide (LASIX) 20 MG tablet; Take 1 tablet by mouth 2 (Two) Times a Day.  Dispense: 180 tablet; Refill: 3  -     magnesium oxide (MAG-OX) 400 tablet tablet; Take 1 tablet by mouth Daily.  Dispense: 30 tablet; Refill: 0  -     metoprolol succinate XL (TOPROL-XL) 50 MG 24 hr tablet; Take 1 tablet by mouth Daily.  Dispense: 90 tablet; Refill: 3  -     sertraline (ZOLOFT) 25 MG tablet; Take 1 tablet by mouth Daily.  Dispense: 90 tablet; Refill: 3            MEDICAL DECISION MAKING:    Patient appears to be well compensated.  She is currently clean and sober.  We have a long discussion about her continued sobriety.  On exam today her lungs are clear.  Her heart is regular.  Will continue the current medications.  Her blood pressure stable.  We have discussed starting cardiac rehab.  I will place a referral.    I will schedule her back for an office follow-up in approximately 3 months and at that time would consider repeating echocardiogram to reassess LV function.  If she develops any new or worsening problems of asked her to contact the office sooner.        Past Medical History:   Diagnosis Date    Anxiety     Asthma     CHF (congestive heart failure)     COPD (chronic obstructive pulmonary disease)        Past Surgical History:   Procedure Laterality Date    AORTIC VALVE REPAIR/REPLACEMENT MITRAL VALVE REPAIR/REPLACEMENT N/A 6/12/2024    Procedure: LETICIA; STERNOTOMY; AORTIC VALVE REPLACEMENT; MITRAL VALVE REPAIR; TRICUSPID VALVE REPLACEMENT; PRP;  Surgeon: Eric Skaggs MD;  [facility];  Service: Cardiothoracic;  Laterality: N/A;    CARDIAC CATHETERIZATION N/A 6/10/2024    Procedure: Left Heart Cath;  Surgeon: Gary Barton MD;  Location: [facility] INVASIVE LOCATION;  Service: Cardiology;  Laterality: N/A;    CHOLECYSTECTOMY      COLONOSCOPY      ENDOSCOPY      HERNIA REPAIR      HYSTERECTOMY      TEETH EXTRACTION N/A 6/9/2024    Procedure: TOOTH EXTRACTION;  Surgeon: Sergio Zaidi  A, DMD;  Location: [facility] MAIN OR;  Service: Oral Surgery;  Laterality: N/A;         Current Outpatient Medications:     albuterol sulfate  (90 Base) MCG/ACT inhaler, Inhale 2 puffs Every 4 (Four) Hours As Needed for Wheezing., Disp: , Rfl:     aspirin 81 MG EC tablet, Take 1 tablet by mouth Daily., Disp: 30 tablet, Rfl: 0    busPIRone (BUSPAR) 5 MG tablet, Take 1 tablet by mouth 3 (Three) Times a Day for 30 days., Disp: 90 tablet, Rfl: 0    calcium carbonate (TUMS) 500 MG chewable tablet, Chew 2 tablets 2 (Two) Times a Day As Needed for Heartburn., Disp: , Rfl:     furosemide (LASIX) 20 MG tablet, Take 1 tablet by mouth 2 (Two) Times a Day., Disp: 180 tablet, Rfl: 3    gabapentin (NEURONTIN) 100 MG capsule, Take 1 capsule by mouth 3 (Three) Times a Day., Disp: , Rfl:     hydrOXYzine (ATARAX) 50 MG tablet, Take 1 tablet by mouth 3 (Three) Times a Day As Needed for Itching., Disp: , Rfl:     ipratropium-albuterol (DUO-NEB) 0.5-2.5 mg/3 ml nebulizer, Take 3 mL by nebulization Every 4 (Four) Hours As Needed for Shortness of Air or Wheezing., Disp: , Rfl:     magnesium oxide (MAG-OX) 400 tablet tablet, Take 1 tablet by mouth Daily., Disp: 30 tablet, Rfl: 0    metoprolol succinate XL (TOPROL-XL) 50 MG 24 hr tablet, Take 1 tablet by mouth Daily., Disp: 90 tablet, Rfl: 3    sertraline (ZOLOFT) 25 MG tablet, Take 1 tablet by mouth Daily., Disp: 90 tablet, Rfl: 3    traMADol (ULTRAM) 50 MG tablet, Take 1 tablet by mouth Every 6 (Six) Hours As Needed for Moderate Pain., Disp: , Rfl:     Cyanocobalamin (VITAMIN B-12 PO), Take  by mouth., Disp: , Rfl:     Social History     Socioeconomic History    Marital status: Legally    Tobacco Use    Smoking status: Every Day     Current packs/day: 3.00     Average packs/day: 3.0 packs/day for 30.5 years (91.6 ttl pk-yrs)     Types: Cigarettes     Start date: 1994     Passive exposure: Current    Smokeless tobacco: Never   Vaping Use    Vaping status: Never Used  "  Substance and Sexual Activity    Alcohol use: Never    Drug use: Yes     Frequency: 7.0 times per week     Types: Heroin     Comment: patient states she quit 3 weeks ago    Sexual activity: Defer       Family History   Problem Relation Age of Onset    Malig Hyperthermia Neg Hx        The following portions of the patient's history were reviewed and updated as appropriate: allergies, current medications, past family history, past medical history, past social history, past surgical history and problem list.    Review of Systems   Constitutional: Positive for malaise/fatigue.   Neurological:  Positive for weakness.   All other systems reviewed and are negative.      Pertinent items are noted in HPI, all other systems reviewed and negative    /80 (BP Location: Right arm, Patient Position: Sitting, Cuff Size: Adult)   Pulse 91   Resp 18   Ht 167.6 cm (66\")   Wt 87.8 kg (193 lb 9.6 oz)   SpO2 97%   BMI 31.25 kg/m² .  Objective     Vitals reviewed.   Constitutional:       General: Not in acute distress.     Appearance: Normal appearance. Well-developed.   Eyes:      Pupils: Pupils are equal, round, and reactive to light.   HENT:      Head: Normocephalic and atraumatic.   Neck:      Vascular: No JVD.   Pulmonary:      Effort: Pulmonary effort is normal.      Breath sounds: Normal breath sounds.   Cardiovascular:      Normal rate. Regular rhythm.   Edema:     Peripheral edema absent.   Abdominal:      General: There is no distension.      Palpations: Abdomen is soft.      Tenderness: There is no abdominal tenderness.   Musculoskeletal: Normal range of motion.      Cervical back: Normal range of motion and neck supple. Skin:     General: Skin is warm and dry.   Neurological:      Mental Status: Alert and oriented to person, place, and time.             ECG 12 Lead    Date/Time: 7/16/2024 2:22 PM  Performed by: Nimco Ortez APRN    Authorized by: Nimco Ortez APRN  Comparison: compared with previous ECG "   Similar to previous ECG  Rhythm: sinus rhythm  Rate: normal  BPM: 91  Conduction: incomplete right bundle branch block  ST Segments: ST segments normal  T Waves: T waves normal  QRS axis: normal  Other findings: non-specific ST-T wave changes    Clinical impression: abnormal EKG          EKG ordered by and reviewed by me in office

## 2024-07-18 ENCOUNTER — OFFICE VISIT (OUTPATIENT)
Dept: CARDIAC SURGERY | Facility: CLINIC | Age: 49
End: 2024-07-18
Payer: MEDICAID

## 2024-07-18 VITALS
WEIGHT: 198 LBS | OXYGEN SATURATION: 96 % | RESPIRATION RATE: 20 BRPM | TEMPERATURE: 97.3 F | HEIGHT: 66 IN | HEART RATE: 75 BPM | DIASTOLIC BLOOD PRESSURE: 83 MMHG | SYSTOLIC BLOOD PRESSURE: 131 MMHG | BODY MASS INDEX: 31.82 KG/M2

## 2024-07-18 DIAGNOSIS — Z95.2 S/P AVR: Primary | ICD-10-CM

## 2024-07-18 DIAGNOSIS — Z95.4 S/P TRICUSPID VALVE REPLACEMENT: ICD-10-CM

## 2024-07-18 DIAGNOSIS — Z98.890 S/P MITRAL VALVE REPAIR: ICD-10-CM

## 2024-07-18 PROCEDURE — 99024 POSTOP FOLLOW-UP VISIT: CPT

## 2024-07-18 NOTE — PROGRESS NOTES
"CARDIOVASCULAR SURGERY FOLLOW-UP PROGRESS NOTE  Chief Complaint: Post-op Follow Up        HPI:   Dear Dr. Damon, ALEXYS Friedman and colleagues:    It was nice to see Марина Tilley in follow up today after cardiac surgery. As you know, she is a 49 y.o. female with PMH of tricuspid valve endocarditis, severe aortic insufficiency, severe mitral valve regurgitation, MRSA bacteremia, polysubstance abuse, IVDA, tobacco abuse, bipolar disorder II, PTSD, and cardiomyopathy. Patient underwent tissue AVR, tissue TVR, mitral valve repair, PFO closure, and ALEX[place]RIA ligation at [facility] by Dr. Skaggs on 6/9/24. Patient did well postoperatively and continues to do well. She comes in today complaining of nothing. Her activity level has been good. Patient was recently seen in Dr. Frank's office, they continues Toprol and diuretic. Patient was sent to rehab facility at discharge where she completed 6 week course of IV vancomycin. Patient to follow up with ID, looks like the appt. has been canceled. Patient reports that she will call them to make appointment soon. Patient reports that she feels great and ambulates without issue. Patient denies any fever or illness. Patient denies any drainage or redness from incision. Patient is edentulous since having teeth removed at [facility] prior to operation. Patient denies any soreness of gums/lesion/ulceration.     Physical Exam:         /83 (BP Location: Left arm, Patient Position: Sitting, Cuff Size: Adult)   Pulse 75   Temp 97.3 °F (36.3 °C)   Resp 20   Ht 167.6 cm (66\")   Wt 89.8 kg (198 lb)   SpO2 96%   BMI 31.96 kg/m²   Heart:  regular rate and rhythm, S1, S2 normal, no murmur, click, rub or gallop  Lungs:  clear to auscultation bilaterally  Extremities:  trace pretibial pitting edema, bilaterally  Incision(s):  sternum stable, incision without erythema, edema, or drainage     Assessment/Plan:     S/P tissue AVR, tissue TVR, mitral valve repair, PFO closure, and ALEX[place]RIA " ligation. Patient is doing well and ambulating without problem. Patient to follow up with ID.     No significant post-op complications    Patient to continue lifting restriction of 10lbs until 6 weeks and 50lbs until 12 weeks from the date of surgery. No twisting or jarring motions until 12 weeks from the date of surgery  Antibiotic prophylaxis required prior to any dental procedure  Patient to not submerge in water/pool until sternal wound healed  Keep incisions clean and dry  OK to drive if not taking narcotic pain medicine  OK to begin cardiac rehab  Follow-up as scheduled with cardiology  Follow-up as scheduled with PCP  Follow-up with CT surgery prn    No restrictions of activity.    Thank you for allowing me to participate in the care of your patient.    Regards,  Karina Garcia PA-C

## 2024-07-29 RX ORDER — MAGNESIUM OXIDE 400 MG/1
1 TABLET ORAL DAILY
Qty: 30 TABLET | Refills: 0 | Status: SHIPPED | OUTPATIENT
Start: 2024-07-29

## 2024-07-30 ENCOUNTER — TELEPHONE (OUTPATIENT)
Dept: CARDIOLOGY | Facility: CLINIC | Age: 49
End: 2024-07-30
Payer: MEDICAID

## 2024-07-30 NOTE — TELEPHONE ENCOUNTER
PATIENT STATES THAT SHE HAS BEEN ON LASIX SINCE 7/27/24. SHE SAID THAT SHE WASN'T THAT SWOLLEN TO BEGIN WITH, BUT THAT NOW HER ANKLES ARE TWICE AS SWOLLEN. SHE ALSO HAS REDNESS IN HER TOES. SHE HAS NO OTHER SYMPTOMS. NO SOA. NO CHEST PAIN. SPOKE WITH TO ARCINIEGA AND SHE ADVISED THAT PATIENT SHOULD GO BE SEEN SOMEWHERE TO MAKE SURE THAT SHE DOES NOT HAVE CELLULITIS. PATIENT VOICED UNDERSTANDING.

## 2024-08-29 ENCOUNTER — OFFICE VISIT (OUTPATIENT)
Dept: FAMILY MEDICINE CLINIC | Facility: CLINIC | Age: 49
End: 2024-08-29
Payer: MEDICAID

## 2024-08-29 DIAGNOSIS — Z28.39 SCHEDULED IMMUNIZATIONS NOT UP TO DATE: Primary | ICD-10-CM

## 2024-10-30 NOTE — PROGRESS NOTES
Date of Office Visit: 10/31/2024  Encounter Provider: Dr. Bryant Frank  Place of Service: [facility] CARDIOLOGY [place]  Patient Name: Марина Tilley  :1975  Betty Damon PA    Chief Complaint   Patient presents with    Congestive Heart Failure    Follow-up     History of Present Illness:    I am pleased to see Mrs. Hernandez in my office today.    As you know, patient is 49-year-old white female whose past medical history is significant for history of endocarditis, status post aortic valve replacement and tricuspid valve replacement and mitral valve repair, history of drug abuse intravenous who came today for follow-up.    In May 2024, patient was admitted in the hospital with congestive heart failure.  Patient was noted to have severe aortic regurgitation and moderate to severe mitral regurgitation and severe tricuspid regurgitation.  Patient was noted to have endocarditis of tricuspid valve and possibly other valve in the past.  Patient underwent cardiac catheterization and showed no significant coronary artery disease.  LETICIA showed EF of 40 to 45%.    In 2024, patient underwent aortic valve replacement and tricuspid valve replacement and repair of mitral valve.    Patient completed rehab at the [facility].  Patient denies any chest pain or shortness of breath.  No significant leg edema.  Patient denies any orthopnea, PND, syncope or presyncope.  No leg edema noted.    I would recommend to increase Toprol-XL to 75 mg daily.  Lisinopril 5 mg daily would be started.  I would repeat echocardiogram.  Patient is advised to monitor blood pressure at home.  Patient is drug-free at this stage.        Past Medical History:   Diagnosis Date    Anxiety     Asthma     CHF (congestive heart failure)     COPD (chronic obstructive pulmonary disease)          Past Surgical History:   Procedure Laterality Date    AORTIC VALVE REPAIR/REPLACEMENT MITRAL VALVE REPAIR/REPLACEMENT N/A  6/12/2024    Procedure: LETICIA; STERNOTOMY; AORTIC VALVE REPLACEMENT; MITRAL VALVE REPAIR; TRICUSPID VALVE REPLACEMENT; PRP;  Surgeon: Eric Skaggs MD;  Location: [facility] CVOR;  Service: Cardiothoracic;  Laterality: N/A;    CARDIAC CATHETERIZATION N/A 6/10/2024    Procedure: Left Heart Cath;  Surgeon: Gary Barton MD;  Location: [facility] CATH INVASIVE LOCATION;  Service: Cardiology;  Laterality: N/A;    CHOLECYSTECTOMY      COLONOSCOPY      ENDOSCOPY      HERNIA REPAIR      HYSTERECTOMY      TEETH EXTRACTION N/A 6/9/2024    Procedure: TOOTH EXTRACTION;  Surgeon: Sergio Zaidi DMD;  Location: [facility] MAIN OR;  Service: Oral Surgery;  Laterality: N/A;           Current Outpatient Medications:     albuterol sulfate  (90 Base) MCG/ACT inhaler, Inhale 2 puffs Every 4 (Four) Hours As Needed for Wheezing., Disp: , Rfl:     aspirin 81 MG EC tablet, Take 1 tablet by mouth Daily., Disp: 30 tablet, Rfl: 0    furosemide (LASIX) 20 MG tablet, Take 1 tablet by mouth 2 (Two) Times a Day., Disp: 180 tablet, Rfl: 3    gabapentin (NEURONTIN) 100 MG capsule, Take 1 capsule by mouth 3 (Three) Times a Day., Disp: , Rfl:     ipratropium-albuterol (DUO-NEB) 0.5-2.5 mg/3 ml nebulizer, Take 3 mL by nebulization Every 4 (Four) Hours As Needed for Shortness of Air or Wheezing., Disp: , Rfl:     magnesium oxide (MAG-OX) 400 MG tablet, TAKE 1 TABLET BY MOUTH EVERY DAY, Disp: 30 tablet, Rfl: 0    metoprolol succinate XL (TOPROL-XL) 50 MG 24 hr tablet, Take 1.5 tablets by mouth Daily., Disp: 270 tablet, Rfl: 1    sertraline (ZOLOFT) 25 MG tablet, Take 1 tablet by mouth Daily., Disp: 90 tablet, Rfl: 3    lisinopril (PRINIVIL,ZESTRIL) 5 MG tablet, Take 1 tablet by mouth Daily., Disp: 90 tablet, Rfl: 3      Social History     Socioeconomic History    Marital status: Legally    Tobacco Use    Smoking status: Every Day     Current packs/day: 3.00     Average packs/day: 3.0 packs/day for 30.8 years (92.5 ttl pk-yrs)     Types:  "Cigarettes     Start date: 1994     Passive exposure: Current    Smokeless tobacco: Never    Tobacco comments:     Patient advised to stop smoking   Vaping Use    Vaping status: Never Used   Substance and Sexual Activity    Alcohol use: Never    Drug use: Yes     Frequency: 7.0 times per week     Types: Heroin     Comment: patient states she quit 3 weeks ago    Sexual activity: Defer         Review of Systems   Constitutional: Negative for chills and fever.   HENT:  Negative for ear discharge and nosebleeds.    Eyes:  Negative for discharge and redness.   Cardiovascular:  Negative for chest pain, orthopnea, palpitations, paroxysmal nocturnal dyspnea and syncope.   Respiratory:  Positive for shortness of breath. Negative for cough and wheezing.    Endocrine: Negative for heat intolerance.   Skin:  Negative for rash.   Musculoskeletal:  Negative for arthritis and myalgias.   Gastrointestinal:  Negative for abdominal pain, melena, nausea and vomiting.   Genitourinary:  Negative for dysuria and hematuria.   Neurological:  Negative for dizziness, light-headedness, numbness and tremors.   Psychiatric/Behavioral:  Negative for depression. The patient is not nervous/anxious.        Procedures    Procedures    No orders to display           Objective:    /91 (BP Location: Right arm, Patient Position: Sitting, Cuff Size: Large Adult)   Pulse 88   Resp 18   Ht 167 cm (65.75\")   Wt 89.8 kg (198 lb)   SpO2 98%   BMI 32.20 kg/m²         Constitutional:       Appearance: Well-developed.   Eyes:      General: No scleral icterus.        Right eye: No discharge.   HENT:      Head: Normocephalic and atraumatic.   Neck:      Thyroid: No thyromegaly.      Lymphadenopathy: No cervical adenopathy.   Pulmonary:      Effort: Pulmonary effort is normal. No respiratory distress.      Breath sounds: Normal breath sounds. No wheezing. No rales.   Cardiovascular:      Normal rate. Regular rhythm.      No gallop.    Edema:     " Peripheral edema absent.   Abdominal:      Tenderness: There is no abdominal tenderness.   Skin:     Findings: No erythema or rash.   Neurological:      Mental Status: Alert and oriented to person, place, and time.             Assessment:       Diagnosis Plan   1. S/P AVR  Adult Transthoracic Echo Complete W/ Cont if Necessary Per Protocol    lisinopril (PRINIVIL,ZESTRIL) 5 MG tablet    metoprolol succinate XL (TOPROL-XL) 50 MG 24 hr tablet      2. S/P mitral valve repair  Adult Transthoracic Echo Complete W/ Cont if Necessary Per Protocol    lisinopril (PRINIVIL,ZESTRIL) 5 MG tablet    metoprolol succinate XL (TOPROL-XL) 50 MG 24 hr tablet      3. S/P tricuspid valve replacement  Adult Transthoracic Echo Complete W/ Cont if Necessary Per Protocol    lisinopril (PRINIVIL,ZESTRIL) 5 MG tablet    metoprolol succinate XL (TOPROL-XL) 50 MG 24 hr tablet      4. Bacterial endocarditis, unspecified chronicity  Adult Transthoracic Echo Complete W/ Cont if Necessary Per Protocol    lisinopril (PRINIVIL,ZESTRIL) 5 MG tablet    metoprolol succinate XL (TOPROL-XL) 50 MG 24 hr tablet               Plan:       MDM:    1.  S/p AVR:    I would recommend that patient should proceed with echocardiogram    2.  S/p mitral valve repair/tricuspid valve replacement:    Patient had history of endocarditis.  Patient would proceed with echocardiogram    3.  Bacterial endocarditis:    Patient has completed antibiotic therapy    4.  Hypertension:    I would recommend that patient should increase Toprol-XL to 75 mg daily.  Lisinopril would be added    5.  Dilated cardiomyopathy:    I would proceed with Toprol-XL and lisinopril.

## 2024-10-31 ENCOUNTER — OFFICE VISIT (OUTPATIENT)
Dept: CARDIOLOGY | Facility: CLINIC | Age: 49
End: 2024-10-31
Payer: MEDICAID

## 2024-10-31 VITALS
DIASTOLIC BLOOD PRESSURE: 91 MMHG | BODY MASS INDEX: 31.82 KG/M2 | HEART RATE: 88 BPM | WEIGHT: 198 LBS | OXYGEN SATURATION: 98 % | SYSTOLIC BLOOD PRESSURE: 135 MMHG | HEIGHT: 66 IN | RESPIRATION RATE: 18 BRPM

## 2024-10-31 DIAGNOSIS — I33.0 BACTERIAL ENDOCARDITIS, UNSPECIFIED CHRONICITY: ICD-10-CM

## 2024-10-31 DIAGNOSIS — Z98.890 S/P MITRAL VALVE REPAIR: ICD-10-CM

## 2024-10-31 DIAGNOSIS — Z95.4 S/P TRICUSPID VALVE REPLACEMENT: ICD-10-CM

## 2024-10-31 DIAGNOSIS — Z95.2 S/P AVR: Primary | ICD-10-CM

## 2024-10-31 RX ORDER — LISINOPRIL 5 MG/1
5 TABLET ORAL DAILY
Qty: 90 TABLET | Refills: 3 | Status: SHIPPED | OUTPATIENT
Start: 2024-10-31

## 2024-10-31 RX ORDER — METOPROLOL SUCCINATE 50 MG/1
75 TABLET, EXTENDED RELEASE ORAL DAILY
Qty: 270 TABLET | Refills: 1 | Status: SHIPPED | OUTPATIENT
Start: 2024-10-31

## 2024-12-02 ENCOUNTER — HOSPITAL ENCOUNTER (OUTPATIENT)
Dept: CARDIOLOGY | Facility: HOSPITAL | Age: 49
Discharge: HOME | End: 2024-12-02
Admitting: INTERNAL MEDICINE
Payer: MEDICAID

## 2024-12-02 VITALS
SYSTOLIC BLOOD PRESSURE: 136 MMHG | DIASTOLIC BLOOD PRESSURE: 85 MMHG | HEIGHT: 65 IN | BODY MASS INDEX: 32.99 KG/M2 | WEIGHT: 198 LBS

## 2024-12-02 DIAGNOSIS — Z95.2 S/P AVR: ICD-10-CM

## 2024-12-02 DIAGNOSIS — I33.0 BACTERIAL ENDOCARDITIS, UNSPECIFIED CHRONICITY: ICD-10-CM

## 2024-12-02 DIAGNOSIS — Z98.890 S/P MITRAL VALVE REPAIR: ICD-10-CM

## 2024-12-02 DIAGNOSIS — Z95.4 S/P TRICUSPID VALVE REPLACEMENT: ICD-10-CM

## 2024-12-02 PROCEDURE — 25010000002 SULFUR HEXAFLUORIDE MICROSPH 60.7-25 MG RECONSTITUTED SUSPENSION: Performed by: INTERNAL MEDICINE

## 2024-12-02 PROCEDURE — 93306 TTE W/DOPPLER COMPLETE: CPT

## 2024-12-02 RX ADMIN — SULFUR HEXAFLUORIDE 2 ML: KIT at 16:52

## 2024-12-04 LAB
AORTIC DIMENSIONLESS INDEX: 0.58 (DI)
BH CV ECHO MEAS - AO MAX PG: 18.3 MMHG
BH CV ECHO MEAS - AO MEAN PG: 10 MMHG
BH CV ECHO MEAS - AO V2 MAX: 214 CM/SEC
BH CV ECHO MEAS - AO V2 VTI: 33.8 CM
BH CV ECHO MEAS - AVA(I,D): 1.74 CM2
BH CV ECHO MEAS - EDV(CUBED): 68.9 ML
BH CV ECHO MEAS - EDV(MOD-SP4): 76.8 ML
BH CV ECHO MEAS - EF(MOD-BP): 63.5 %
BH CV ECHO MEAS - EF(MOD-SP4): 63.5 %
BH CV ECHO MEAS - ESV(CUBED): 19.7 ML
BH CV ECHO MEAS - ESV(MOD-SP4): 28 ML
BH CV ECHO MEAS - FS: 34.1 %
BH CV ECHO MEAS - IVS/LVPW: 1.09 CM
BH CV ECHO MEAS - IVSD: 1.2 CM
BH CV ECHO MEAS - LA DIMENSION: 3.8 CM
BH CV ECHO MEAS - LAT PEAK E' VEL: 9.3 CM/SEC
BH CV ECHO MEAS - LV DIASTOLIC VOL/BSA (35-75): 39 CM2
BH CV ECHO MEAS - LV MASS(C)D: 161.4 GRAMS
BH CV ECHO MEAS - LV MAX PG: 6.3 MMHG
BH CV ECHO MEAS - LV MEAN PG: 3 MMHG
BH CV ECHO MEAS - LV SYSTOLIC VOL/BSA (12-30): 14.2 CM2
BH CV ECHO MEAS - LV V1 MAX: 125 CM/SEC
BH CV ECHO MEAS - LV V1 VTI: 20.7 CM
BH CV ECHO MEAS - LVIDD: 4.1 CM
BH CV ECHO MEAS - LVIDS: 2.7 CM
BH CV ECHO MEAS - LVOT AREA: 2.8 CM2
BH CV ECHO MEAS - LVOT DIAM: 1.9 CM
BH CV ECHO MEAS - LVPWD: 1.1 CM
BH CV ECHO MEAS - MED PEAK E' VEL: 5.2 CM/SEC
BH CV ECHO MEAS - MV A MAX VEL: 95.3 CM/SEC
BH CV ECHO MEAS - MV DEC SLOPE: 644 CM/SEC2
BH CV ECHO MEAS - MV DEC TIME: 0.16 SEC
BH CV ECHO MEAS - MV E MAX VEL: 97.1 CM/SEC
BH CV ECHO MEAS - MV E/A: 1.02
BH CV ECHO MEAS - MV MAX PG: 4.7 MMHG
BH CV ECHO MEAS - MV MEAN PG: 3 MMHG
BH CV ECHO MEAS - MV P1/2T: 46.4 MSEC
BH CV ECHO MEAS - MV V2 VTI: 22.2 CM
BH CV ECHO MEAS - MVA(P1/2T): 4.7 CM2
BH CV ECHO MEAS - MVA(VTI): 2.6 CM2
BH CV ECHO MEAS - PA ACC TIME: 0.1 SEC
BH CV ECHO MEAS - PA V2 MAX: 82 CM/SEC
BH CV ECHO MEAS - RV MAX PG: 2.21 MMHG
BH CV ECHO MEAS - RV V1 MAX: 74.3 CM/SEC
BH CV ECHO MEAS - RV V1 VTI: 12.4 CM
BH CV ECHO MEAS - SV(LVOT): 58.7 ML
BH CV ECHO MEAS - SV(MOD-SP4): 48.8 ML
BH CV ECHO MEAS - SVI(LVOT): 29.8 ML/M2
BH CV ECHO MEAS - SVI(MOD-SP4): 24.8 ML/M2
BH CV ECHO MEAS - TAPSE (>1.6): 1.6 CM
BH CV ECHO MEASUREMENTS AVERAGE E/E' RATIO: 13.39
BH CV XLRA - RV BASE: 3.9 CM
BH CV XLRA - RV MID: 3.4 CM
BH CV XLRA - TDI S': 5.1 CM/SEC
SINUS: 3 CM

## 2025-03-25 RX ORDER — MAGNESIUM OXIDE 400 MG/1
1 TABLET ORAL DAILY
Qty: 30 TABLET | Refills: 0 | Status: SHIPPED | OUTPATIENT
Start: 2025-03-25

## 2025-04-30 RX ORDER — MAGNESIUM OXIDE 400 MG/1
1 TABLET ORAL DAILY
Qty: 90 TABLET | Refills: 3 | Status: SHIPPED | OUTPATIENT
Start: 2025-04-30

## 2025-07-02 RX ORDER — SERTRALINE HYDROCHLORIDE 25 MG/1
25 TABLET, FILM COATED ORAL DAILY
Qty: 90 TABLET | Refills: 3 | Status: SHIPPED | OUTPATIENT
Start: 2025-07-02

## 2025-07-16 RX ORDER — FUROSEMIDE 20 MG/1
20 TABLET ORAL EVERY 12 HOURS SCHEDULED
Qty: 180 TABLET | Refills: 3 | Status: SHIPPED | OUTPATIENT
Start: 2025-07-16

## (undated) DEVICE — SENSR CERBRL O2 PK/2

## (undated) DEVICE — SYS PERFUS SEP PLATLT W TIPS CUST

## (undated) DEVICE — CANN VESL CORNRY STR W/4MM BALN

## (undated) DEVICE — LABEL SHEET CUSTOM 2X2 YELLOW: Brand: MEDLINE INDUSTRIES, INC.

## (undated) DEVICE — 8 FOOT DISPOSABLE EXTENSION CABLE WITH SAFE CONNECT / ALLIGATOR CLIP

## (undated) DEVICE — Device

## (undated) DEVICE — CATH DIAG CARD PERFORMA JL3.5 BT 4F100CM

## (undated) DEVICE — DRP SLUSH WARMR MACH RECTG 66X44IN

## (undated) DEVICE — SINGLE STAGE VENOUS RETURN CANNULA: Brand: THIN-FLEX SINGLE STAGE VENOUS DRAINAGE CANNULA

## (undated) DEVICE — CONN REDUCING CANN/PUMP 1/2X3/8X3/8

## (undated) DEVICE — CATHETER,URETHRAL,REDRUBBER,STERILE,20FR: Brand: MEDLINE

## (undated) DEVICE — PK CATH CARD 40

## (undated) DEVICE — SUT POLY BR TP 2STRND 1/8X30IN

## (undated) DEVICE — BLD TONG INDIV/WRP A/ 6IN STRL

## (undated) DEVICE — HEMOCONCENTRATOR PERFUS LPS06

## (undated) DEVICE — GLV SURG BIOGEL LTX PF 7 1/2

## (undated) DEVICE — SPONGE,LAP,4"X18",XR,ST,5/PK,40PK/CS: Brand: MEDLINE INDUSTRIES, INC.

## (undated) DEVICE — GLV SURG PREMIERPRO ORTHO LTX PF SZ8 BRN

## (undated) DEVICE — SUT ETHIBOND 2/0 CV V5  30IN PXX52

## (undated) DEVICE — ST. SORBAVIEW ULTIMATE IJ SYSTEM A,C: Brand: CENTURION

## (undated) DEVICE — 3M™ TEGADERM™ CHG DRESSING 25/CARTON 4 CARTONS/CASE 1658: Brand: TEGADERM™

## (undated) DEVICE — DECANTER BAG 9": Brand: MEDLINE INDUSTRIES, INC.

## (undated) DEVICE — PK ENT MINOR 40

## (undated) DEVICE — CLAMP INSERT: Brand: STEALTH® CLAMP INSERT

## (undated) DEVICE — SPNG GZ WOVN 4X4IN 12PLY 10/BX STRL

## (undated) DEVICE — DRSNG SURESITE WNDW 2.38X2.75

## (undated) DEVICE — CANN VENI DLP SGL/STAGE RT/ANGL MTL/TP 28F

## (undated) DEVICE — STANDARD HYPODERMIC NEEDLE,POLYPROPYLENE HUB: Brand: MONOJECT

## (undated) DEVICE — SOL IRR H2O BTL 1000ML STRL

## (undated) DEVICE — GLIDESHEATH BASIC HYDROPHILIC COATED INTRODUCER SHEATH: Brand: GLIDESHEATH

## (undated) DEVICE — SUT ETHIB 2/0 CV V5 30IN 10X52

## (undated) DEVICE — IRRIGATOR BULB ASEPTO 60CC STRL

## (undated) DEVICE — DGW .035 FC J3MM 260CM TEF: Brand: EMERALD

## (undated) DEVICE — SOL ISO/ALC 70PCT 4OZ

## (undated) DEVICE — CATH VENT DLP W/CONN MALL NOVNT SILICON 16FR 16IN

## (undated) DEVICE — ST TOURNI COMPL A/ 7IN

## (undated) DEVICE — SUT GUT CHRM 3/0 PS2 27IN 1638H

## (undated) DEVICE — DOVER URETHRAL PVC CATHETER, INTEGRAL FUNNEL, SMOOTH ROUNDED TIP, 20 FR (6.7 MM) X 16": Brand: DOVER

## (undated) DEVICE — PK PERFUS CUST W/CARDIOPLEGIA

## (undated) DEVICE — KT MANIFLD CARDIAC

## (undated) DEVICE — ORGANIZER SUT SHELIGH 3T 213013

## (undated) DEVICE — SOL NACL 0.9PCT 1000ML

## (undated) DEVICE — CANN RETRGR STYLET RSCP 15F

## (undated) DEVICE — PK ATS CUST W CARDIOTOMY RESEVOIR

## (undated) DEVICE — BG TRANSF W/COUPLER SPK 600ML

## (undated) DEVICE — CANN AORT ROOT DLP VNT 14G 7F

## (undated) DEVICE — GAUZE,SPONGE,4"X4",16PLY,XRAY,STRL,LF: Brand: MEDLINE

## (undated) DEVICE — CANN ART EOPA 3D NV W/CONN 20F

## (undated) DEVICE — ABSORBABLE HEMOSTAT (OXIDIZED REGENERATED CELLULOSE)
Type: IMPLANTABLE DEVICE | Site: HEART | Status: NON-FUNCTIONAL
Brand: SURGICEL
Removed: 2024-06-12

## (undated) DEVICE — INTENT TO BE USED WITH SUTURE MATERIAL FOR TISSUE CLOSURE: Brand: RICHARD-ALLAN® NEEDLE 1/2 CIRCLE TAPER

## (undated) DEVICE — CANN VENI DLP SGL/STAGE RT/ANGL 22F 30.5TO38.1CM

## (undated) DEVICE — PK HEART OPN 40

## (undated) DEVICE — RNG TRICUSPID MDL 6200 30MM
Type: IMPLANTABLE DEVICE | Site: HEART | Status: NON-FUNCTIONAL
Removed: 2024-06-12

## (undated) DEVICE — HEMOST ABS SURGIFOAM SZ100 8X12 10MM
Type: IMPLANTABLE DEVICE | Site: HEART | Status: NON-FUNCTIONAL
Removed: 2024-06-12

## (undated) DEVICE — CONN STR 1/2INX3/8IN

## (undated) DEVICE — CVR PROB 96IN LF STRL

## (undated) DEVICE — CATH DIAG CARD PERFORM JR4.0 BT 4F100CM

## (undated) DEVICE — ADAPT ANTEGRADE RETRGR

## (undated) DEVICE — CATH VENT MIV RADL PIG ST TIP 4F 110CM

## (undated) DEVICE — TBG ART PRESS 60 IN

## (undated) DEVICE — SOL IRR NACL 0.9PCT BT 1000ML

## (undated) DEVICE — 28 FR STRAIGHT – SOFT PVC CATHETER: Brand: PVC THORACIC CATHETERS

## (undated) DEVICE — TOWEL,OR,DSP,ST,WHITE,DLX,4/PK,20PK/CS: Brand: MEDLINE

## (undated) DEVICE — DRSNG WND GZ PAD BORDERED 4X8IN STRL